# Patient Record
Sex: FEMALE | Race: BLACK OR AFRICAN AMERICAN | Employment: UNEMPLOYED | URBAN - METROPOLITAN AREA
[De-identification: names, ages, dates, MRNs, and addresses within clinical notes are randomized per-mention and may not be internally consistent; named-entity substitution may affect disease eponyms.]

---

## 2017-01-25 ENCOUNTER — CLINICAL SUPPORT (OUTPATIENT)
Dept: SURGERY | Age: 32
End: 2017-01-25

## 2017-01-25 VITALS — BODY MASS INDEX: 43.05 KG/M2 | WEIGHT: 243 LBS | HEIGHT: 63 IN

## 2017-01-25 DIAGNOSIS — E66.01 MORBID OBESITY WITH BODY MASS INDEX OF 40.0-49.9 (HCC): Primary | ICD-10-CM

## 2017-01-25 NOTE — PATIENT INSTRUCTIONS
Goals: 1. Will start walking 10-15 minutes per day 3 days per week. 2. Start taking Multivitamin with iron (Flinstone's Complete twice per day). 3. Follow 3 balanced meals guide; decrease portion sizes and do not skip meals. 4. Try protein shakes with 20-30g protein and less than 7g carb. Can use for snacks and one meal replacement per day. 5. Reduce carbonated and beverages with added sugars, replace with sugar-free or water.

## 2017-01-25 NOTE — PROGRESS NOTES
Pre-Surgical Multi-Disciplinary Program    Name: Riya Delacruz   : 1985    Session# 1  Date: 2017     Height: 5' 3\" (160 cm)    Weight: 110.2 kg (243 lb) lbs. Body mass index is 43.05 kg/(m^2). Pounds Gained: 13    Dietary Instructions    Reviewed intake  Understanding label reading  Understanding low carbohydrates, low sugar, higher protein meals  Understanding proper portions  Dining outside home  Instruction given for personal dietary changes  Comments: Pt given brief pre/post-op diet ed and diet hx reviewed. Physical Activity/Exercise    Discussed Perceived Compliance  Reasonable Goals Set  Motivation  Comments: Pt does not currently have a routine. Set goal to start, please see below. Behavior Modification    Positive attitude  Comments: Pt is working on the following goals: Goals: 1. Will start walking 10-15 minutes per day 3 days per week. 2. Start taking Multivitamin with iron (Flinstone's Complete twice per day). 3. Follow 3 balanced meals guide; decrease portion sizes and do not skip meals. 4. Try protein shakes with 20-30g protein and less than 7g carb. Can use for snacks and one meal replacement per day. 5. Reduce carbonated and beverages with added sugars, replace with sugar-free or water. Candidate for surgery (per RD): Pending    Dietitian: Camacho Arellano RD     Riya Delacruz is a 27 y.o. female who present for a pre-op evaluation.     Visit Vitals    Ht 5' 3\" (1.6 m)    Wt 110.2 kg (243 lb)    BMI 43.05 kg/m2     Past Medical History   Diagnosis Date    Incisional hernia     Intestinal malabsorption     Morbid obesity with body mass index of 40.0-49.9 (HCC)     Nausea & vomiting      with certain PO post sleeve resection    Status post bariatric surgery 2012     sleeve resection / kirstin borja         Patient Education and Materials Provided:  Supplement [facility], B Vitamin Information, MVI Recommendations, Calcium Citrate Information, Bariatric Supplement Companies, Protein Supplement Information, Fluid Requirements, No Caffeine or Carbonation, No Alcohol for One Year Post Op, 3 Balanced Meals a Day, Food Group Guide, Good Choices Dining Out, No Snacks, No Concentrated Sweets, Support System at Home, Exercising, Support Group Information and Addressed Current Habits / Changes to make     Reasons for Surgery:  BMI > 40 with one or more medically significant comorbidities    Summary:  Pt given brief pre/post-op diet ed and diet hx reviewed. Pt set several goals. See below. Candidate for surgery:   Pending    Procedure:  laparoscopic gastric bypass surgery over sleeve    Nutritional Hx: What is the number of meals you eat per day? 2-3  Comment: Skips breakfast sometimes    Do you eat between meals / snack? yes  Typical snack: peanuts, pineapple, apples with avocados    How fast do you eat your meals? fast    How many sodas do you drink per day? Very rarely; 2x per month    How many caffeinated drinks do you have per day? None; sensitive to caffeine, has palpitations    How much water do you drink per day? 6 glasses    How often do you eat fast food? occasionally, 3x/month    How often do you consume alcohol? never    Diet History:  Breakfast  What are you eating and how much? 2 pieces stephens, 1 piece honey wheat toast, bowl of oatmeal   When? After morning shift;11:30am   Where? iii   Snacks  What are you eating and how much? Pineapple, oranges and peanuts   When? ii   Where? iii   Hydration  What are you eating and how much? water   When? ii   Where? ii   Lunch  What are you eating and how much? skipped   When? Where? iii   Snacks  What are you eating and how much? i   When? ii   Where? iii   Hydration  What are you eating and how much? i   When? ii   Where? iii   Dinner  What are you eating and how much?  Pulled pork, broccoli 4oz, baked beans 4oz   When? 6pm   Where? iii   Snacks  What are you eating and how much? i   When? ii   Where? iii Hydration  What are you eating and how much? Mauro water, orange juice   When? ii   Where? iii     Exercise:  Do you currently have an exercise routine? no    Goals: 1. Will start walking 10-15 minutes per day 3 days per week. 2. Start taking Multivitamin with iron (Flinstone's Complete twice per day). 3. Follow 3 balanced meals guide; decrease portion sizes and do not skip meals. 4. Try protein shakes with 20-30g protein and less than 7g carb. Can use for snacks and one meal replacement per day. 5. Reduce carbonated and beverages with added sugars, replace with sugar-free or water.

## 2017-02-27 ENCOUNTER — CLINICAL SUPPORT (OUTPATIENT)
Dept: SURGERY | Age: 32
End: 2017-02-27

## 2017-02-27 DIAGNOSIS — E66.01 MORBID OBESITY WITH BODY MASS INDEX OF 40.0-49.9 (HCC): Primary | ICD-10-CM

## 2017-03-10 VITALS — WEIGHT: 246 LBS | BODY MASS INDEX: 43.59 KG/M2 | HEIGHT: 63 IN

## 2017-03-29 ENCOUNTER — CLINICAL SUPPORT (OUTPATIENT)
Dept: SURGERY | Age: 32
End: 2017-03-29

## 2017-03-29 DIAGNOSIS — E66.01 MORBID OBESITY WITH BODY MASS INDEX OF 40.0-49.9 (HCC): Primary | ICD-10-CM

## 2017-03-29 NOTE — PROGRESS NOTES
Pre-Surgical Multi-Disciplinary Program    Name: Dustin Guajardo   : 1985    Session# 3  Date: 3/29/2017            lbs.  There is no height or weight on file to calculate BMI. Phone consult, pt will submit Ht and wt as soon as possible. Dietary Instructions    Reviewed intake  Understanding label reading  Understanding low carbohydrates, low sugar, higher protein meals  Understanding proper portions  Dining outside home  Instruction given for personal dietary changes  Comments: Diet history reviewed and personal dietary changes discussed. Physical Activity/Exercise    Discussed Perceived Compliance  Reasonable Goals Set  Motivation  Comments: Pt is doing well with going to the gym 25-30 minutes 3 times per week. Behavior Modification    Identify obstacles to trigger change  Achieving/Rewarding goals met  Positive attitude  Comments: Pt is doing really well meeting goals. She started drinking a Premeir protein shake for breakast and is getting just about 64 oz of fluid per day. She also completely cut out pineapple which was a huge change for her. Goals:   1. Keep up the good work. 2. Slow down your eating, it should take at least 20 minutes to eat a meal.   3. Start practicing spacing eating from drinking to prepare for after surgery.      Candidate for surgery (per RD): Pending    Dietitian: Vamsi Shah RD

## 2017-04-25 ENCOUNTER — OFFICE VISIT (OUTPATIENT)
Dept: SURGERY | Age: 32
End: 2017-04-25

## 2017-04-25 DIAGNOSIS — E66.01 MORBID OBESITY WITH BODY MASS INDEX OF 40.0-49.9 (HCC): Primary | ICD-10-CM

## 2017-04-26 VITALS — HEIGHT: 63 IN | BODY MASS INDEX: 43.05 KG/M2 | WEIGHT: 243 LBS

## 2017-04-26 NOTE — PROGRESS NOTES
Medical Weight Loss Multi-Disciplinary Program    Name: Familia Blanca   : 1985    Session# 4  Date: 2017     Height: 5' 3\" (160 cm)    Weight: 110.2 kg (243 lb) lbs. Body mass index is 43.05 kg/(m^2). Pounds Lost: 3    Dietary Instructions    Reviewed intake  Understanding low carbohydrates, low sugar, higher protein meals  Understanding proper portions  Instruction given for personal dietary changes  Discussed perceived compliance  Comments: Diet hx reviewed and personal dietary changes discussed. Pt received a Thoughtful Eating diet ed. Physical Activity/Exercise    Discussed Perceived Compliance  Reasonable Goals Set  Motivation  Comments: Pt is walking for 30-45 minutes, at least twice per week. Goal to increase to walking at least 3 times per week for 30-45 minutes. Behavior Modification    Achieving/Rewarding goals met  Positive attitude  Discussed perceived compliance  Comments: Pt is doing well walking and plans to increase. She has limited snacking. She tried the EAS protein drink and likes this. She plans to work to limit mindless eating this month- with plans to fill out the food-mood diary. She is working on portion size and increasing fluid. She plans to limit snacks to 2 deliberate protein snacks daily. 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      Candidate for surgery (per RD): pending    Dietitian: Mahogany Chacon RD

## 2017-05-02 RX ORDER — ERGOCALCIFEROL 1.25 MG/1
CAPSULE ORAL
Qty: 52 CAP | Refills: 0 | Status: SHIPPED | OUTPATIENT
Start: 2017-05-02 | End: 2017-08-31

## 2017-05-22 ENCOUNTER — OFFICE VISIT (OUTPATIENT)
Dept: SURGERY | Age: 32
End: 2017-05-22

## 2017-05-22 VITALS — WEIGHT: 249 LBS | HEIGHT: 63 IN | BODY MASS INDEX: 44.12 KG/M2

## 2017-05-22 DIAGNOSIS — E66.01 MORBID OBESITY WITH BODY MASS INDEX OF 40.0-49.9 (HCC): Primary | ICD-10-CM

## 2017-05-23 NOTE — PROGRESS NOTES
Medical Weight Loss Multi-Disciplinary Program    Name: Ernestina Click   : 1985    Session# 5  Date: 2017     Height: 5' 3\" (160 cm)    Weight: 112.9 kg (249 lb) lbs. Body mass index is 44.11 kg/(m^2). Pounds Gained: 6    Dietary Instructions    Reviewed intake  Understanding low carbohydrates, low sugar, higher protein meals  Understanding proper portions  Instruction given for personal dietary changes  Discussed perceived compliance  Comments: Diet hx reviewed and personal dietary changes discussed. Pt given eating healthy on a budget for people having bariatric surgery diet ed. Physical Activity/Exercise    Discussed Perceived Compliance  Reasonable Goals Set  Motivation  Comments: Pt was exercising on the treadmill for 20 minutes, 3 times per week. She plans to increase to 30-45 minutes and her steps to 10,000 per day. Behavior Modification    Achieving/Rewarding goals met  Positive attitude  Discussed perceived compliance  Comments: Pt is doing well exercising and plans to increase. 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 She still plans to get Altoids to help with her mindless eating on the bus- has not done yet. She reports she got off track with some food choices and plans to get back on track.      Candidate for surgery (per RD): pending    Dietitian: Ashanti Bradley RD

## 2017-06-26 ENCOUNTER — OFFICE VISIT (OUTPATIENT)
Dept: SURGERY | Age: 32
End: 2017-06-26

## 2017-06-26 DIAGNOSIS — E66.01 MORBID OBESITY WITH BODY MASS INDEX OF 40.0-49.9 (HCC): Primary | ICD-10-CM

## 2017-06-27 VITALS — HEIGHT: 63 IN | WEIGHT: 249 LBS | BODY MASS INDEX: 44.12 KG/M2

## 2017-06-27 NOTE — PROGRESS NOTES
Medical Weight Loss Multi-Disciplinary Program    Name: Shay Bush   : 1985    Session# 6  Date: 2017     Height: 5' 3\" (160 cm)    Weight: 112.9 kg (249 lb) lbs. Body mass index is 44.11 kg/(m^2). Same weight     Dietary Instructions    Reviewed intake  Understanding low carbohydrates, low sugar, higher protein meals  Understanding proper portions  Dining outside home  Instruction given for personal dietary changes  Discussed perceived compliance  Comments: Diet hx reviewed and personal dietary changes discussed. Pt given eating healthy on vacation diet ed. 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 She plans to strive to be active wit her kids on her days off. Behavior Modification    Achieving/Rewarding goals met  Positive attitude  Discussed perceived compliance  Comments: Pt is being active and plans to increase. She is taking recommended vitamins. She has been packing healthy snacks for work. She is working to purchase different types of Pure Protein in preparation for surgery. Also to continue to make better food choices.      Candidate for surgery (per RD): YES    Dietitian: Andreina Galeana RD

## 2017-08-01 DIAGNOSIS — E66.01 MORBID OBESITY WITH BODY MASS INDEX OF 40.0-49.9 (HCC): ICD-10-CM

## 2017-08-01 DIAGNOSIS — R13.10 PROBLEMS WITH SWALLOWING AND MASTICATION: Primary | ICD-10-CM

## 2017-08-01 DIAGNOSIS — R11.2 NAUSEA AND VOMITING, INTRACTABILITY OF VOMITING NOT SPECIFIED, UNSPECIFIED VOMITING TYPE: ICD-10-CM

## 2017-08-01 DIAGNOSIS — Z01.812 BLOOD TESTS PRIOR TO TREATMENT OR PROCEDURE: ICD-10-CM

## 2017-09-15 RX ORDER — PHENOL/SODIUM PHENOLATE
20 AEROSOL, SPRAY (ML) MUCOUS MEMBRANE DAILY
Qty: 30 TAB | Refills: 1 | Status: SHIPPED | OUTPATIENT
Start: 2017-09-15 | End: 2017-10-25

## 2017-09-15 RX ORDER — OXYCODONE AND ACETAMINOPHEN 5; 325 MG/1; MG/1
1 TABLET ORAL
Qty: 30 TAB | Refills: 0 | Status: SHIPPED | OUTPATIENT
Start: 2017-09-15 | End: 2017-10-10

## 2017-09-18 ENCOUNTER — OFFICE VISIT (OUTPATIENT)
Dept: SURGERY | Age: 32
End: 2017-09-18

## 2017-09-18 ENCOUNTER — HOSPITAL ENCOUNTER (OUTPATIENT)
Dept: PREADMISSION TESTING | Age: 32
Discharge: HOME | End: 2017-09-18
Payer: COMMERCIAL

## 2017-09-18 VITALS
DIASTOLIC BLOOD PRESSURE: 91 MMHG | HEART RATE: 84 BPM | RESPIRATION RATE: 16 BRPM | BODY MASS INDEX: 45.18 KG/M2 | OXYGEN SATURATION: 100 % | SYSTOLIC BLOOD PRESSURE: 130 MMHG | HEIGHT: 63 IN | WEIGHT: 255 LBS

## 2017-09-18 DIAGNOSIS — E66.01 MORBID OBESITY WITH BODY MASS INDEX OF 40.0-49.9 (HCC): Primary | ICD-10-CM

## 2017-09-18 DIAGNOSIS — K90.9 INTESTINAL MALABSORPTION, UNSPECIFIED TYPE: ICD-10-CM

## 2017-09-18 DIAGNOSIS — K43.2 INCISIONAL HERNIA, WITHOUT OBSTRUCTION OR GANGRENE: ICD-10-CM

## 2017-09-18 DIAGNOSIS — Z98.84 STATUS POST BARIATRIC SURGERY: ICD-10-CM

## 2017-09-18 LAB
ABO + RH BLD: NORMAL
ALBUMIN SERPL-MCNC: 3.2 G/DL (ref 3.4–5)
ALBUMIN/GLOB SERPL: 0.8 {RATIO} (ref 0.8–1.7)
ALP SERPL-CCNC: 121 U/L (ref 45–117)
ALT SERPL-CCNC: 21 U/L (ref 13–56)
ANION GAP SERPL CALC-SCNC: 7 MMOL/L (ref 3–18)
AST SERPL-CCNC: 19 U/L (ref 15–37)
ATRIAL RATE: 70 BPM
BASOPHILS # BLD: 0 K/UL (ref 0–0.06)
BASOPHILS NFR BLD: 0 % (ref 0–2)
BILIRUB SERPL-MCNC: 0.3 MG/DL (ref 0.2–1)
BLOOD GROUP ANTIBODIES SERPL: NORMAL
BUN SERPL-MCNC: 11 MG/DL (ref 7–18)
BUN/CREAT SERPL: 22 (ref 12–20)
CALCIUM SERPL-MCNC: 9.1 MG/DL (ref 8.5–10.1)
CALCULATED P AXIS, ECG09: 61 DEGREES
CALCULATED R AXIS, ECG10: 70 DEGREES
CALCULATED T AXIS, ECG11: 59 DEGREES
CHLORIDE SERPL-SCNC: 103 MMOL/L (ref 100–108)
CO2 SERPL-SCNC: 29 MMOL/L (ref 21–32)
CREAT SERPL-MCNC: 0.5 MG/DL (ref 0.6–1.3)
DIAGNOSIS, 93000: NORMAL
DIFFERENTIAL METHOD BLD: ABNORMAL
EOSINOPHIL # BLD: 0.1 K/UL (ref 0–0.4)
EOSINOPHIL NFR BLD: 1 % (ref 0–5)
ERYTHROCYTE [DISTWIDTH] IN BLOOD BY AUTOMATED COUNT: 14.2 % (ref 11.6–14.5)
GLOBULIN SER CALC-MCNC: 4.1 G/DL (ref 2–4)
GLUCOSE SERPL-MCNC: 87 MG/DL (ref 74–99)
HCT VFR BLD AUTO: 36.6 % (ref 35–45)
HGB BLD-MCNC: 11.9 G/DL (ref 12–16)
LYMPHOCYTES # BLD: 2.6 K/UL (ref 0.9–3.6)
LYMPHOCYTES NFR BLD: 38 % (ref 21–52)
MCH RBC QN AUTO: 28.7 PG (ref 24–34)
MCHC RBC AUTO-ENTMCNC: 32.5 G/DL (ref 31–37)
MCV RBC AUTO: 88.4 FL (ref 74–97)
MONOCYTES # BLD: 0.6 K/UL (ref 0.05–1.2)
MONOCYTES NFR BLD: 8 % (ref 3–10)
NEUTS SEG # BLD: 3.5 K/UL (ref 1.8–8)
NEUTS SEG NFR BLD: 53 % (ref 40–73)
P-R INTERVAL, ECG05: 102 MS
PLATELET # BLD AUTO: 302 K/UL (ref 135–420)
PMV BLD AUTO: 10.9 FL (ref 9.2–11.8)
POTASSIUM SERPL-SCNC: 4.4 MMOL/L (ref 3.5–5.5)
PROT SERPL-MCNC: 7.3 G/DL (ref 6.4–8.2)
Q-T INTERVAL, ECG07: 364 MS
QRS DURATION, ECG06: 82 MS
QTC CALCULATION (BEZET), ECG08: 393 MS
RBC # BLD AUTO: 4.14 M/UL (ref 4.2–5.3)
SODIUM SERPL-SCNC: 139 MMOL/L (ref 136–145)
SPECIMEN EXP DATE BLD: NORMAL
VENTRICULAR RATE, ECG03: 70 BPM
WBC # BLD AUTO: 6.8 K/UL (ref 4.6–13.2)

## 2017-09-18 PROCEDURE — 86900 BLOOD TYPING SEROLOGIC ABO: CPT | Performed by: SPECIALIST

## 2017-09-18 PROCEDURE — 93005 ELECTROCARDIOGRAM TRACING: CPT

## 2017-09-18 PROCEDURE — 85025 COMPLETE CBC W/AUTO DIFF WBC: CPT | Performed by: SPECIALIST

## 2017-09-18 PROCEDURE — 80053 COMPREHEN METABOLIC PANEL: CPT | Performed by: SPECIALIST

## 2017-09-18 PROCEDURE — 36415 COLL VENOUS BLD VENIPUNCTURE: CPT | Performed by: SPECIALIST

## 2017-09-18 NOTE — PATIENT INSTRUCTIONS
Body Mass Index: Care Instructions  Your Care Instructions    Body mass index (BMI) can help you see if your weight is raising your risk for health problems. It uses a formula to compare how much you weigh with how tall you are. · A BMI lower than 18.5 is considered underweight. · A BMI between 18.5 and 24.9 is considered healthy. · A BMI between 25 and 29.9 is considered overweight. A BMI of 30 or higher is considered obese. If your BMI is in the normal range, it means that you have a lower risk for weight-related health problems. If your BMI is in the overweight or obese range, you may be at increased risk for weight-related health problems, such as high blood pressure, heart disease, stroke, arthritis or joint pain, and diabetes. If your BMI is in the underweight range, you may be at increased risk for health problems such as fatigue, lower protection (immunity) against illness, muscle loss, bone loss, hair loss, and hormone problems. BMI is just one measure of your risk for weight-related health problems. You may be at higher risk for health problems if you are not active, you eat an unhealthy diet, or you drink too much alcohol or use tobacco products. Follow-up care is a key part of your treatment and safety. Be sure to make and go to all appointments, and call your doctor if you are having problems. It's also a good idea to know your test results and keep a list of the medicines you take. How can you care for yourself at home? · Practice healthy eating habits. This includes eating plenty of fruits, vegetables, whole grains, lean protein, and low-fat dairy. · If your doctor recommends it, get more exercise. Walking is a good choice. Bit by bit, increase the amount you walk every day. Try for at least 30 minutes on most days of the week. · Do not smoke. Smoking can increase your risk for health problems. If you need help quitting, talk to your doctor about stop-smoking programs and medicines. These can increase your chances of quitting for good. · Limit alcohol to 2 drinks a day for men and 1 drink a day for women. Too much alcohol can cause health problems. If you have a BMI higher than 25  · Your doctor may do other tests to check your risk for weight-related health problems. This may include measuring the distance around your waist. A waist measurement of more than 40 inches in men or 35 inches in women can increase the risk of weight-related health problems. · Talk with your doctor about steps you can take to stay healthy or improve your health. You may need to make lifestyle changes to lose weight and stay healthy, such as changing your diet and getting regular exercise. If you have a BMI lower than 18.5  · Your doctor may do other tests to check your risk for health problems. · Talk with your doctor about steps you can take to stay healthy or improve your health. You may need to make lifestyle changes to gain or maintain weight and stay healthy, such as getting more healthy foods in your diet and doing exercises to build muscle. Where can you learn more? Go to http://wilbert-loraine.info/. Enter S176 in the search box to learn more about \"Body Mass Index: Care Instructions. \"  Current as of: January 23, 2017  Content Version: 11.3  © 2736-7582 [employer]. Care instructions adapted under license by [employer] (which disclaims liability or warranty for this information). If you have questions about a medical condition or this instruction, always ask your healthcare professional. [place] [address] any warranty or liability for your use of this information.

## 2017-09-18 NOTE — PROGRESS NOTES
Appears to have a good understanding of the diet progression, food choices, and dietary/exercise habits for successful weight loss and nourishment after surgery. The class material included: post-op diet progression, including liquid, pureed, and low fat, low sugar food recommendations; proper food group choices, and encouraging dietary and exercise habits that lead to weight loss success.      Freya Byrne RD

## 2017-09-18 NOTE — PROGRESS NOTES
Sleeve Resection to Gastric Bypass - History and Physical    Subjective: The patient is a 27 y.o. obese female with a Body mass index is 45.17 kg/(m^2). .   she presents now to review their work up to date to see if they are a candidate for surgery and whether or not to proceed with the previously requested procedure. The patient had a sleeve resection via [address] 5 years ago. She is seeking a conversion to a gastric bypass due to retained fundus and poor weight loss. Bariatric comorbidities continue to include:   Patient Active Problem List   Diagnosis Code    Morbid obesity with body mass index of 40.0-49.9 ([facility]) E66.01    Status post bariatric surgery Z98.84    Intestinal malabsorption K90.9    Nausea & vomiting R11.2    Incisional hernia K43.2       They have been generally well prior to this visit and have had no recent significant illnesses. The patient has had no gastrointestinal issues that would preclude them from proceeding with the surgery they have chosen. Dada Musa has recently tried a preoperative weight loss program  in addition to seeing a bariatric nutritionist preoperatively. We have discussed on at least one other occasion about the various types of surgical weight loss procedures and they have considered these options after our initial consultation. We have once again discussed these procedures in detail and they have now decided on a surgical procedure. They present today to discuss this and confirm that their evaluation pre operatively is acceptable to continue with surgery. The patient desires laparoscopic gastric bypass surgery for surgical weight loss. The patients goal weight is 152lb. (this represents a BMI of 27)    These goals are consistent with expected outcomes of their desired operation. her Medical goals are resolution of these health issues.     Patient Active Problem List    Diagnosis Date Noted    Morbid obesity with body mass index of 40.0-49.9 [facility])     Status post bariatric surgery     Intestinal malabsorption     Nausea & vomiting     Incisional hernia       Past Surgical History:   Procedure Laterality Date    HX  SECTION      X 3    HX GI  2012    sleeve resection / a salzberg    HX ORTHOPAEDIC      scoliosis correction surgery X 2    HX TONSIL AND ADENOIDECTOMY      HX TUBAL LIGATION        Social History   Substance Use Topics    Smoking status: Never Smoker    Smokeless tobacco: Never Used    Alcohol use No      History reviewed. No pertinent family history. Current Outpatient Prescriptions   Medication Sig Dispense Refill    oxyCODONE-acetaminophen (PERCOCET) 5-325 mg per tablet Take 1 Tab by mouth every four (4) hours as needed for Pain. Max Daily Amount: 6 Tabs. 30 Tab 0    Omeprazole delayed release (PRILOSEC D/R) 20 mg tablet Take 1 Tab by mouth daily. OTC 30 Tab 1    Cholecalciferol, Vitamin D3, 50,000 unit cap Take  by mouth two (2) times a week.  Indications: mon and tu       No Known Allergies       Review of Systems:        General - No history or complaints of unexpected fever, chills, or weight loss  Head/Neck - No history or complaints of headache, diplopia, dysphagia, hearing loss  Cardiac - No history or complaints of chest pain, palpitations, murmur, or shortness of breath  Pulmonary - No history or complaints of shortness of breath, productive cough, hemoptysis  Gastrointestinal - (+) reflux with certain PO, No history or complaints of abdominal pain, obstipation/constipation, blood per rectum  Genitourinary - No history or complaints of hematuria/dysuria, stress urinary incontinence symptoms, or renal lithiasis  Musculoskeletal - No history or complaints of joint pain or muscular weakness  Hematologic - No history or complaints of bleeding disorders, blood transfusions, sickle cell anemia  Neurologic - No history or complaints of  migraine headaches, seizure activity, syncopal episodes, TIA or stroke  Integumentary - No history or complaints of rashes, abnormal nevi, skin cancer  Gynecological - No history of heavy menses/abnormal menses    Objective:     Visit Vitals    BP (!) 130/91 (BP 1 Location: Left arm, BP Patient Position: Sitting)    Pulse 84    Resp 16    Ht 5' 3\" (1.6 m)    Wt 115.7 kg (255 lb)    LMP 08/16/2017    SpO2 100%    BMI 45.17 kg/m2     Physical Examination: General appearance - alert, well appearing, and in no distress and oriented to person, place, and time  Mental status - alert, oriented to person, place, and time, normal mood, behavior, speech, dress, motor activity, and thought processes  Eyes - pupils equal and reactive, extraocular eye movements intact, sclera anicteric, left eye normal, right eye normal  Ears - bilateral TM's and external ear canals normal, right ear normal, left ear normal  Nose - normal and patent, no erythema, discharge or polyps and normal nontender sinuses  Mouth - mucous membranes moist, pharynx normal without lesions  Neck - supple, no significant adenopathy  Lymphatics - no palpable lymphadenopathy, no hepatosplenomegaly  Chest - clear to auscultation, no wheezes, rales or rhonchi, symmetric air entry  Heart - normal rate, regular rhythm, normal S1, S2, no murmurs, rubs, clicks or gallops  Abdomen - soft, nontender, nondistended, no masses or organomegaly  Back exam - full range of motion, no tenderness, palpable spasm or pain on motion  Neurological - alert, oriented, normal speech, no focal findings or movement disorder noted  Musculoskeletal - no joint tenderness, deformity or swelling  Extremities - peripheral pulses normal, no pedal edema, no clubbing or cyanosis  Skin - normal coloration and turgor, no rashes, no suspicious skin lesions noted    Labs / Preoperative Evaluations:     Recent Results (from the past 1008 hour(s))   EKG, 12 LEAD, INITIAL    Collection Time: 09/18/17 12:37 PM   Result Value Ref Range    Ventricular Rate 70 BPM Atrial Rate 70 BPM    P-R Interval 102 ms    QRS Duration 82 ms    Q-T Interval 364 ms    QTC Calculation (Bezet) 393 ms    Calculated P Axis 61 degrees    Calculated R Axis 70 degrees    Calculated T Axis 59 degrees    Diagnosis       Sinus rhythm with short AK with premature supraventricular complexes and with   occasional premature ventricular complexes  Otherwise normal ECG  No previous ECGs available     CBC WITH AUTOMATED DIFF    Collection Time: 09/18/17 12:49 PM   Result Value Ref Range    WBC 6.8 4.6 - 13.2 K/uL    RBC 4.14 (L) 4.20 - 5.30 M/uL    HGB 11.9 (L) 12.0 - 16.0 g/dL    HCT 36.6 35.0 - 45.0 %    MCV 88.4 74.0 - 97.0 FL    MCH 28.7 24.0 - 34.0 PG    MCHC 32.5 31.0 - 37.0 g/dL    RDW 14.2 11.6 - 14.5 %    PLATELET 853 081 - 485 K/uL    MPV 10.9 9.2 - 11.8 FL    NEUTROPHILS 53 40 - 73 %    LYMPHOCYTES 38 21 - 52 %    MONOCYTES 8 3 - 10 %    EOSINOPHILS 1 0 - 5 %    BASOPHILS 0 0 - 2 %    ABS. NEUTROPHILS 3.5 1.8 - 8.0 K/UL    ABS. LYMPHOCYTES 2.6 0.9 - 3.6 K/UL    ABS. MONOCYTES 0.6 0.05 - 1.2 K/UL    ABS. EOSINOPHILS 0.1 0.0 - 0.4 K/UL    ABS. BASOPHILS 0.0 0.0 - 0.06 K/UL    DF AUTOMATED     METABOLIC PANEL, COMPREHENSIVE    Collection Time: 09/18/17 12:49 PM   Result Value Ref Range    Sodium 139 136 - 145 mmol/L    Potassium 4.4 3.5 - 5.5 mmol/L    Chloride 103 100 - 108 mmol/L    CO2 29 21 - 32 mmol/L    Anion gap 7 3.0 - 18 mmol/L    Glucose 87 74 - 99 mg/dL    BUN 11 7.0 - 18 MG/DL    Creatinine 0.50 (L) 0.6 - 1.3 MG/DL    BUN/Creatinine ratio 22 (H) 12 - 20      GFR est AA >60 >60 ml/min/1.73m2    GFR est non-AA >60 >60 ml/min/1.73m2    Calcium 9.1 8.5 - 10.1 MG/DL    Bilirubin, total 0.3 0.2 - 1.0 MG/DL    ALT (SGPT) 21 13 - 56 U/L    AST (SGOT) 19 15 - 37 U/L    Alk.  phosphatase 121 (H) 45 - 117 U/L    Protein, total 7.3 6.4 - 8.2 g/dL    Albumin 3.2 (L) 3.4 - 5.0 g/dL    Globulin 4.1 (H) 2.0 - 4.0 g/dL    A-G Ratio 0.8 0.8 - 1.7         Assessment:     Morbid obesity with associated comorbidity    Plan:     laparoscopic gastric bypass surgery    This is a 27 y.o. female with a BMI of Body mass index is 45.17 kg/(m^2). and the weight-related co-morbidties as noted above. Jessie Santillan meets the NIH criteria for bariatric surgery based upon the BMI of Body mass index is 45.17 kg/(m^2). and multiple weight-related co-morbidties. Jessie Santillan has elected laparoscopic gastric bypass as her intervention of choice for treatment of morbid obestiy through surgical means secondary to its uniform results,  profound baseline suppression of hunger and pace at which weight is lost.    In the office today, following Evelina's history and physical examination, a 40 minute discussion regarding the anatomic alterations for the laparoscopic gastric bypass  was undertaken. The dietary expectations and the patient  dependent factors for success were thoroughly discussed, to include the need for interval follow-up and long-term dietary changes associated with success. The possible short and long term  complications of the gastric bypass were also discussed, to include but not limited to;death, DVT/PE, staple line leak, bleeding, stricture formation, infection,internal hernia  and pouch dilation. Specific weight related outcomes for success were also discussed with an emphasis on careful and close follow-up with the first year and Dietary behavior modification over the first years as baseline cyclical hunger returns  The patient expressed an understanding of the above factors, and her questions were answered in their entirety. In addition, the patient attended a 1.5 hour power point seminar regarding obesity, surgical weight loss including, adjustable gastric band, gastric bypass, and sleeve gastrectomy. This discussion contrasted the different surgical techniques, mechanisms of actions and expected outcomes, and surgical and medical risks associated with each procedure.   During this seminar, there was a long question and answer session where each questions was answered until there were no additional questions. Today, the patient had all of her questions answered and the decision was made today that the patient's preoperative evaluation is acceptable for them  to proceed with bariatric surgery  choosing adjustable gastric bypass as her surgical option. The patient understands the plan of action    UGI from 6/2016 -     DESCRIPTION OF PROCEDURE: The patient was brought to the fluoroscopy unit,  was given thin barium. On swallowing of barium, she was noted to have  normal peristalsis of her esophagus. She had prompt filling of the distal  esophagus with tapering into and through the gastroesophageal junction. Contrast then filled with what appeared to be an extremely large gastric  fundus, there appeared to be at least half of her gastric fundus still  remaining. We continued to have her drink several swallows more of barium. The gastric body then filled out and the prepyloric region filled out, all  which appeared to be consistent with about only a 50% reduction in her  total gastric volume.     At this juncture, I feel like her reflux, regurgitation, poor weight loss  are all related to a sleeve that is too large retaining, particularly the  upper fundic region. I think the course of action in her situation would be  a conversion to a gastric bypass procedure, as this would improve her  weight loss and take away all of her reflux symptoms. We talked about this  the other day in the office. She is very comfortable with this process. We  will go ahead and submit this to her insurance company for approval.      Since the patient's original consult 15 months ago they have been seen by their PCP for routine appts. There has been no change to their overall medical or surgical history and they have been on no steroids in any form.       Secondary Diagnoses:     DVT / Pulmonary Embolus Risk - The patient is at a higher risk for post operative DVT / pulmonary embolus secondary to their morbid obese status, relative sedentary lifestyle, and impending general anesthetic. We will plan to use anticoagulation therapy pre and post operative as well as  pneumatic compression devices and encourage ambulation once on the hospital nursing floor. The need for possible at home anticoagulation therapy has also been discussed and any decision on this matter will be made during post operative evaluations. The patient understands that their efforts at ambulation are of vital importance to reduce the risk of this complication thus placing significant burden on them as to the prevention of such issues.  Signs and symptoms of DVT / PE have been discussed with the patient and they have been instructed to call the office if any these occur in the \"at home\" post op phase    Signed By: Joseph Cardenas MD     September 18, 2017

## 2017-09-26 ENCOUNTER — HOSPITAL ENCOUNTER (INPATIENT)
Age: 32
LOS: 1 days | Discharge: HOME | DRG: 327 | End: 2017-09-27
Attending: SPECIALIST | Admitting: SPECIALIST
Payer: COMMERCIAL

## 2017-09-26 ENCOUNTER — ANESTHESIA (OUTPATIENT)
Dept: SURGERY | Age: 32
DRG: 327 | End: 2017-09-26
Payer: COMMERCIAL

## 2017-09-26 ENCOUNTER — ANESTHESIA EVENT (OUTPATIENT)
Dept: SURGERY | Age: 32
DRG: 327 | End: 2017-09-26
Payer: COMMERCIAL

## 2017-09-26 PROBLEM — E66.01 MORBID OBESITY WITH BMI OF 40.0-44.9, ADULT (HCC): Status: ACTIVE | Noted: 2017-09-26

## 2017-09-26 LAB
ABO + RH BLD: NORMAL
BLOOD GROUP ANTIBODIES SERPL: NORMAL
HCG SERPL QL: NEGATIVE
SPECIMEN EXP DATE BLD: NORMAL

## 2017-09-26 PROCEDURE — 77030008683 HC TU ET CUF COVD -A: Performed by: SPECIALIST

## 2017-09-26 PROCEDURE — 76210000016 HC OR PH I REC 1 TO 1.5 HR: Performed by: SPECIALIST

## 2017-09-26 PROCEDURE — 77030022585 HC SEAL FBRN EVICEL J&J -F: Performed by: SPECIALIST

## 2017-09-26 PROCEDURE — 77030009403 HC ELECTRD ENDO MEGA -B: Performed by: SPECIALIST

## 2017-09-26 PROCEDURE — 84703 CHORIONIC GONADOTROPIN ASSAY: CPT | Performed by: SPECIALIST

## 2017-09-26 PROCEDURE — 77030027876 HC STPLR ENDOSC FLX PWR J&J -G1: Performed by: SPECIALIST

## 2017-09-26 PROCEDURE — 77030008603 HC TRCR ENDOSC EPATH J&J -C: Performed by: SPECIALIST

## 2017-09-26 PROCEDURE — 77030011810 HC STPLR ENDOSC J&J -G: Performed by: SPECIALIST

## 2017-09-26 PROCEDURE — 74011000250 HC RX REV CODE- 250

## 2017-09-26 PROCEDURE — 74011000258 HC RX REV CODE- 258: Performed by: ANESTHESIOLOGY

## 2017-09-26 PROCEDURE — 74011250636 HC RX REV CODE- 250/636

## 2017-09-26 PROCEDURE — 77030036598 HC CARTDRG STPL RELD ECHELON FLX J&J -D: Performed by: SPECIALIST

## 2017-09-26 PROCEDURE — 77030002935 HC SUT MCRYL J&J -C: Performed by: SPECIALIST

## 2017-09-26 PROCEDURE — 74011250636 HC RX REV CODE- 250/636: Performed by: SPECIALIST

## 2017-09-26 PROCEDURE — 77030020255 HC SOL INJ LR 1000ML BG: Performed by: SPECIALIST

## 2017-09-26 PROCEDURE — 36415 COLL VENOUS BLD VENIPUNCTURE: CPT | Performed by: SPECIALIST

## 2017-09-26 PROCEDURE — 88307 TISSUE EXAM BY PATHOLOGIST: CPT | Performed by: SPECIALIST

## 2017-09-26 PROCEDURE — 77030002916 HC SUT ETHLN J&J -A: Performed by: SPECIALIST

## 2017-09-26 PROCEDURE — 88313 SPECIAL STAINS GROUP 2: CPT | Performed by: SPECIALIST

## 2017-09-26 PROCEDURE — 77030036732 HC RELD STPLR VASC J&J -F: Performed by: SPECIALIST

## 2017-09-26 PROCEDURE — 77030008477 HC STYL SATN SLP COVD -A: Performed by: SPECIALIST

## 2017-09-26 PROCEDURE — 77030012407 HC DRN WND BARD -B: Performed by: SPECIALIST

## 2017-09-26 PROCEDURE — 0DB64ZZ EXCISION OF STOMACH, PERCUTANEOUS ENDOSCOPIC APPROACH: ICD-10-PCS | Performed by: SPECIALIST

## 2017-09-26 PROCEDURE — 77030018004 HC RELD STPLR ENDOSC1 J&J -C: Performed by: SPECIALIST

## 2017-09-26 PROCEDURE — 88342 IMHCHEM/IMCYTCHM 1ST ANTB: CPT | Performed by: SPECIALIST

## 2017-09-26 PROCEDURE — 74011250637 HC RX REV CODE- 250/637: Performed by: SPECIALIST

## 2017-09-26 PROCEDURE — 77030002986 HC SUT PROL J&J -A: Performed by: SPECIALIST

## 2017-09-26 PROCEDURE — 77030032490 HC SLV COMPR SCD KNE COVD -B: Performed by: SPECIALIST

## 2017-09-26 PROCEDURE — 77030002912 HC SUT ETHBND J&J -A: Performed by: SPECIALIST

## 2017-09-26 PROCEDURE — 0FB24ZX EXCISION OF LEFT LOBE LIVER, PERCUTANEOUS ENDOSCOPIC APPROACH, DIAGNOSTIC: ICD-10-PCS | Performed by: SPECIALIST

## 2017-09-26 PROCEDURE — 77030009426 HC FCPS BIOP ENDOSC BSC -B: Performed by: SPECIALIST

## 2017-09-26 PROCEDURE — 76060000037 HC ANESTHESIA 3 TO 3.5 HR: Performed by: SPECIALIST

## 2017-09-26 PROCEDURE — 77030005264 HC CATH JEJU BKR BARD -D: Performed by: SPECIALIST

## 2017-09-26 PROCEDURE — 77030002896 HC SUT CLP ABSRB J&J -B: Performed by: SPECIALIST

## 2017-09-26 PROCEDURE — 77030018836 HC SOL IRR NACL ICUM -A: Performed by: SPECIALIST

## 2017-09-26 PROCEDURE — 77030034154 HC SHR COAG HARM ACE J&J -F: Performed by: SPECIALIST

## 2017-09-26 PROCEDURE — 77030020782 HC GWN BAIR PAWS FLX 3M -B: Performed by: SPECIALIST

## 2017-09-26 PROCEDURE — 77030002966 HC SUT PDS J&J -A: Performed by: SPECIALIST

## 2017-09-26 PROCEDURE — 77030013567 HC DRN WND RESERV BARD -A: Performed by: SPECIALIST

## 2017-09-26 PROCEDURE — 77030003580 HC NDL INSUF VERES J&J -B: Performed by: SPECIALIST

## 2017-09-26 PROCEDURE — 77030034850: Performed by: SPECIALIST

## 2017-09-26 PROCEDURE — 65270000029 HC RM PRIVATE

## 2017-09-26 PROCEDURE — 0D164ZA BYPASS STOMACH TO JEJUNUM, PERCUTANEOUS ENDOSCOPIC APPROACH: ICD-10-PCS | Performed by: SPECIALIST

## 2017-09-26 PROCEDURE — 77030009851 HC PCH RTVR ENDOSC AMR -B: Performed by: SPECIALIST

## 2017-09-26 PROCEDURE — 88305 TISSUE EXAM BY PATHOLOGIST: CPT | Performed by: SPECIALIST

## 2017-09-26 PROCEDURE — 74011250636 HC RX REV CODE- 250/636: Performed by: ANESTHESIOLOGY

## 2017-09-26 PROCEDURE — 0DN74ZZ RELEASE STOMACH, PYLORUS, PERCUTANEOUS ENDOSCOPIC APPROACH: ICD-10-PCS | Performed by: SPECIALIST

## 2017-09-26 PROCEDURE — 0DB78ZX EXCISION OF STOMACH, PYLORUS, VIA NATURAL OR ARTIFICIAL OPENING ENDOSCOPIC, DIAGNOSTIC: ICD-10-PCS | Performed by: SPECIALIST

## 2017-09-26 PROCEDURE — 77030010515 HC APPL ENDOCLP LIG J&J -B: Performed by: SPECIALIST

## 2017-09-26 PROCEDURE — 74011000250 HC RX REV CODE- 250: Performed by: SPECIALIST

## 2017-09-26 PROCEDURE — 86900 BLOOD TYPING SEROLOGIC ABO: CPT | Performed by: SPECIALIST

## 2017-09-26 PROCEDURE — 76010000133 HC OR TIME 3 TO 3.5 HR: Performed by: SPECIALIST

## 2017-09-26 PROCEDURE — 77030010030: Performed by: SPECIALIST

## 2017-09-26 RX ORDER — KETOROLAC TROMETHAMINE 30 MG/ML
30 INJECTION, SOLUTION INTRAMUSCULAR; INTRAVENOUS EVERY 6 HOURS
Status: DISCONTINUED | OUTPATIENT
Start: 2017-09-26 | End: 2017-09-27 | Stop reason: HOSPADM

## 2017-09-26 RX ORDER — ONDANSETRON 2 MG/ML
4 INJECTION INTRAMUSCULAR; INTRAVENOUS
Status: DISCONTINUED | OUTPATIENT
Start: 2017-09-26 | End: 2017-09-27 | Stop reason: HOSPADM

## 2017-09-26 RX ORDER — DIPHENHYDRAMINE HYDROCHLORIDE 50 MG/ML
25 INJECTION, SOLUTION INTRAMUSCULAR; INTRAVENOUS
Status: DISCONTINUED | OUTPATIENT
Start: 2017-09-26 | End: 2017-09-27 | Stop reason: HOSPADM

## 2017-09-26 RX ORDER — NYSTATIN 100000 [USP'U]/ML
500000 SUSPENSION ORAL
Status: COMPLETED | OUTPATIENT
Start: 2017-09-26 | End: 2017-09-26

## 2017-09-26 RX ORDER — ENOXAPARIN SODIUM 100 MG/ML
40 INJECTION SUBCUTANEOUS EVERY 12 HOURS
Status: DISCONTINUED | OUTPATIENT
Start: 2017-09-26 | End: 2017-09-27 | Stop reason: HOSPADM

## 2017-09-26 RX ORDER — GLYCOPYRROLATE 0.2 MG/ML
INJECTION INTRAMUSCULAR; INTRAVENOUS AS NEEDED
Status: DISCONTINUED | OUTPATIENT
Start: 2017-09-26 | End: 2017-09-26 | Stop reason: HOSPADM

## 2017-09-26 RX ORDER — ONDANSETRON 2 MG/ML
4 INJECTION INTRAMUSCULAR; INTRAVENOUS ONCE
Status: COMPLETED | OUTPATIENT
Start: 2017-09-26 | End: 2017-09-26

## 2017-09-26 RX ORDER — SODIUM CHLORIDE 9 MG/ML
125 INJECTION, SOLUTION INTRAVENOUS CONTINUOUS
Status: DISCONTINUED | OUTPATIENT
Start: 2017-09-26 | End: 2017-09-26 | Stop reason: HOSPADM

## 2017-09-26 RX ORDER — HYDROMORPHONE HYDROCHLORIDE 1 MG/ML
0.2 INJECTION, SOLUTION INTRAMUSCULAR; INTRAVENOUS; SUBCUTANEOUS
Status: DISCONTINUED | OUTPATIENT
Start: 2017-09-26 | End: 2017-09-26 | Stop reason: HOSPADM

## 2017-09-26 RX ORDER — PROPOFOL 10 MG/ML
INJECTION, EMULSION INTRAVENOUS AS NEEDED
Status: DISCONTINUED | OUTPATIENT
Start: 2017-09-26 | End: 2017-09-26 | Stop reason: HOSPADM

## 2017-09-26 RX ORDER — SODIUM CHLORIDE, SODIUM LACTATE, POTASSIUM CHLORIDE, CALCIUM CHLORIDE 600; 310; 30; 20 MG/100ML; MG/100ML; MG/100ML; MG/100ML
150 INJECTION, SOLUTION INTRAVENOUS CONTINUOUS
Status: DISCONTINUED | OUTPATIENT
Start: 2017-09-26 | End: 2017-09-27 | Stop reason: HOSPADM

## 2017-09-26 RX ORDER — ENOXAPARIN SODIUM 100 MG/ML
40 INJECTION SUBCUTANEOUS ONCE
Status: COMPLETED | OUTPATIENT
Start: 2017-09-26 | End: 2017-09-26

## 2017-09-26 RX ORDER — ROCURONIUM BROMIDE 10 MG/ML
INJECTION, SOLUTION INTRAVENOUS AS NEEDED
Status: DISCONTINUED | OUTPATIENT
Start: 2017-09-26 | End: 2017-09-26 | Stop reason: HOSPADM

## 2017-09-26 RX ORDER — SUFENTANIL CITRATE 50 UG/ML
INJECTION EPIDURAL; INTRAVENOUS AS NEEDED
Status: DISCONTINUED | OUTPATIENT
Start: 2017-09-26 | End: 2017-09-26 | Stop reason: HOSPADM

## 2017-09-26 RX ORDER — SODIUM CHLORIDE 0.9 % (FLUSH) 0.9 %
5-10 SYRINGE (ML) INJECTION AS NEEDED
Status: DISCONTINUED | OUTPATIENT
Start: 2017-09-26 | End: 2017-09-26 | Stop reason: HOSPADM

## 2017-09-26 RX ORDER — HYDROMORPHONE HYDROCHLORIDE 2 MG/ML
1 INJECTION, SOLUTION INTRAMUSCULAR; INTRAVENOUS; SUBCUTANEOUS
Status: DISCONTINUED | OUTPATIENT
Start: 2017-09-26 | End: 2017-09-27

## 2017-09-26 RX ORDER — FENTANYL CITRATE 50 UG/ML
25 INJECTION, SOLUTION INTRAMUSCULAR; INTRAVENOUS AS NEEDED
Status: DISCONTINUED | OUTPATIENT
Start: 2017-09-26 | End: 2017-09-26 | Stop reason: HOSPADM

## 2017-09-26 RX ORDER — LIDOCAINE HYDROCHLORIDE 20 MG/ML
INJECTION, SOLUTION EPIDURAL; INFILTRATION; INTRACAUDAL; PERINEURAL AS NEEDED
Status: DISCONTINUED | OUTPATIENT
Start: 2017-09-26 | End: 2017-09-26 | Stop reason: HOSPADM

## 2017-09-26 RX ORDER — NALOXONE HYDROCHLORIDE 0.4 MG/ML
0.1 INJECTION, SOLUTION INTRAMUSCULAR; INTRAVENOUS; SUBCUTANEOUS AS NEEDED
Status: DISCONTINUED | OUTPATIENT
Start: 2017-09-26 | End: 2017-09-26 | Stop reason: HOSPADM

## 2017-09-26 RX ORDER — ONDANSETRON 2 MG/ML
INJECTION INTRAMUSCULAR; INTRAVENOUS AS NEEDED
Status: DISCONTINUED | OUTPATIENT
Start: 2017-09-26 | End: 2017-09-26 | Stop reason: HOSPADM

## 2017-09-26 RX ORDER — HYDROMORPHONE HYDROCHLORIDE 2 MG/ML
0.5 INJECTION, SOLUTION INTRAMUSCULAR; INTRAVENOUS; SUBCUTANEOUS
Status: DISCONTINUED | OUTPATIENT
Start: 2017-09-26 | End: 2017-09-27

## 2017-09-26 RX ORDER — ACETAMINOPHEN 10 MG/ML
1000 INJECTION, SOLUTION INTRAVENOUS ONCE
Status: COMPLETED | OUTPATIENT
Start: 2017-09-26 | End: 2017-09-26

## 2017-09-26 RX ORDER — CEFAZOLIN SODIUM 2 G/50ML
2 SOLUTION INTRAVENOUS ONCE
Status: COMPLETED | OUTPATIENT
Start: 2017-09-26 | End: 2017-09-26

## 2017-09-26 RX ORDER — SODIUM CHLORIDE, SODIUM LACTATE, POTASSIUM CHLORIDE, CALCIUM CHLORIDE 600; 310; 30; 20 MG/100ML; MG/100ML; MG/100ML; MG/100ML
125 INJECTION, SOLUTION INTRAVENOUS CONTINUOUS
Status: DISCONTINUED | OUTPATIENT
Start: 2017-09-26 | End: 2017-09-26

## 2017-09-26 RX ORDER — CEFAZOLIN SODIUM 2 G/50ML
2 SOLUTION INTRAVENOUS EVERY 8 HOURS
Status: COMPLETED | OUTPATIENT
Start: 2017-09-26 | End: 2017-09-27

## 2017-09-26 RX ORDER — MIDAZOLAM HYDROCHLORIDE 1 MG/ML
INJECTION, SOLUTION INTRAMUSCULAR; INTRAVENOUS AS NEEDED
Status: DISCONTINUED | OUTPATIENT
Start: 2017-09-26 | End: 2017-09-26 | Stop reason: HOSPADM

## 2017-09-26 RX ORDER — MAGNESIUM SULFATE 100 %
4 CRYSTALS MISCELLANEOUS AS NEEDED
Status: DISCONTINUED | OUTPATIENT
Start: 2017-09-26 | End: 2017-09-26 | Stop reason: HOSPADM

## 2017-09-26 RX ORDER — KETAMINE HYDROCHLORIDE 10 MG/ML
INJECTION, SOLUTION INTRAMUSCULAR; INTRAVENOUS AS NEEDED
Status: DISCONTINUED | OUTPATIENT
Start: 2017-09-26 | End: 2017-09-26 | Stop reason: HOSPADM

## 2017-09-26 RX ORDER — ACETAMINOPHEN 10 MG/ML
1000 INJECTION, SOLUTION INTRAVENOUS EVERY 6 HOURS
Status: COMPLETED | OUTPATIENT
Start: 2017-09-26 | End: 2017-09-27

## 2017-09-26 RX ORDER — FAMOTIDINE 20 MG/50ML
20 INJECTION, SOLUTION INTRAVENOUS ONCE
Status: DISCONTINUED | OUTPATIENT
Start: 2017-09-26 | End: 2017-09-26 | Stop reason: RX

## 2017-09-26 RX ORDER — DEXTROSE 50 % IN WATER (D50W) INTRAVENOUS SYRINGE
25-50 AS NEEDED
Status: DISCONTINUED | OUTPATIENT
Start: 2017-09-26 | End: 2017-09-26 | Stop reason: HOSPADM

## 2017-09-26 RX ADMIN — KETOROLAC TROMETHAMINE 30 MG: 30 INJECTION, SOLUTION INTRAMUSCULAR at 18:23

## 2017-09-26 RX ADMIN — FENTANYL CITRATE 25 MCG: 50 INJECTION, SOLUTION INTRAMUSCULAR; INTRAVENOUS at 13:14

## 2017-09-26 RX ADMIN — SUFENTANIL CITRATE 5 MCG: 50 INJECTION EPIDURAL; INTRAVENOUS at 11:41

## 2017-09-26 RX ADMIN — SODIUM CHLORIDE, SODIUM LACTATE, POTASSIUM CHLORIDE, AND CALCIUM CHLORIDE: 600; 310; 30; 20 INJECTION, SOLUTION INTRAVENOUS at 10:37

## 2017-09-26 RX ADMIN — KETOROLAC TROMETHAMINE 30 MG: 30 INJECTION, SOLUTION INTRAMUSCULAR at 23:17

## 2017-09-26 RX ADMIN — FENTANYL CITRATE 25 MCG: 50 INJECTION, SOLUTION INTRAMUSCULAR; INTRAVENOUS at 13:24

## 2017-09-26 RX ADMIN — SUFENTANIL CITRATE 5 MCG: 50 INJECTION EPIDURAL; INTRAVENOUS at 10:57

## 2017-09-26 RX ADMIN — CEFAZOLIN SODIUM 2 G: 2 SOLUTION INTRAVENOUS at 18:23

## 2017-09-26 RX ADMIN — SUFENTANIL CITRATE 10 MCG: 50 INJECTION EPIDURAL; INTRAVENOUS at 09:32

## 2017-09-26 RX ADMIN — CEFAZOLIN SODIUM 2 G: 2 SOLUTION INTRAVENOUS at 09:36

## 2017-09-26 RX ADMIN — HYDROMORPHONE HYDROCHLORIDE 0.2 MG: 1 INJECTION, SOLUTION INTRAMUSCULAR; INTRAVENOUS; SUBCUTANEOUS at 14:21

## 2017-09-26 RX ADMIN — SUFENTANIL CITRATE 5 MCG: 50 INJECTION EPIDURAL; INTRAVENOUS at 11:55

## 2017-09-26 RX ADMIN — LIDOCAINE HYDROCHLORIDE 20 MG: 20 INJECTION, SOLUTION EPIDURAL; INFILTRATION; INTRACAUDAL; PERINEURAL at 09:29

## 2017-09-26 RX ADMIN — PROPOFOL 20 MG: 10 INJECTION, EMULSION INTRAVENOUS at 09:29

## 2017-09-26 RX ADMIN — MIDAZOLAM HYDROCHLORIDE 2 MG: 1 INJECTION, SOLUTION INTRAMUSCULAR; INTRAVENOUS at 09:25

## 2017-09-26 RX ADMIN — SODIUM CHLORIDE, SODIUM LACTATE, POTASSIUM CHLORIDE, AND CALCIUM CHLORIDE: 600; 310; 30; 20 INJECTION, SOLUTION INTRAVENOUS at 09:58

## 2017-09-26 RX ADMIN — SUFENTANIL CITRATE 10 MCG: 50 INJECTION EPIDURAL; INTRAVENOUS at 09:35

## 2017-09-26 RX ADMIN — FENTANYL CITRATE 25 MCG: 50 INJECTION, SOLUTION INTRAMUSCULAR; INTRAVENOUS at 13:36

## 2017-09-26 RX ADMIN — FENTANYL CITRATE 25 MCG: 50 INJECTION, SOLUTION INTRAMUSCULAR; INTRAVENOUS at 13:04

## 2017-09-26 RX ADMIN — SODIUM CHLORIDE, SODIUM LACTATE, POTASSIUM CHLORIDE, AND CALCIUM CHLORIDE: 600; 310; 30; 20 INJECTION, SOLUTION INTRAVENOUS at 11:31

## 2017-09-26 RX ADMIN — SODIUM CHLORIDE, SODIUM LACTATE, POTASSIUM CHLORIDE, AND CALCIUM CHLORIDE 125 ML/HR: 600; 310; 30; 20 INJECTION, SOLUTION INTRAVENOUS at 07:45

## 2017-09-26 RX ADMIN — ROCURONIUM BROMIDE 50 MG: 10 INJECTION, SOLUTION INTRAVENOUS at 09:33

## 2017-09-26 RX ADMIN — ACETAMINOPHEN 1000 MG: 10 INJECTION, SOLUTION INTRAVENOUS at 21:34

## 2017-09-26 RX ADMIN — GLYCOPYRROLATE 0.2 MG: 0.2 INJECTION INTRAMUSCULAR; INTRAVENOUS at 12:31

## 2017-09-26 RX ADMIN — SUFENTANIL CITRATE 5 MCG: 50 INJECTION EPIDURAL; INTRAVENOUS at 12:20

## 2017-09-26 RX ADMIN — ENOXAPARIN SODIUM 40 MG: 40 INJECTION SUBCUTANEOUS at 21:34

## 2017-09-26 RX ADMIN — SODIUM CHLORIDE, SODIUM LACTATE, POTASSIUM CHLORIDE, AND CALCIUM CHLORIDE 150 ML/HR: 600; 310; 30; 20 INJECTION, SOLUTION INTRAVENOUS at 17:48

## 2017-09-26 RX ADMIN — GLYCOPYRROLATE 0.2 MG: 0.2 INJECTION INTRAMUSCULAR; INTRAVENOUS at 09:25

## 2017-09-26 RX ADMIN — CEFAZOLIN SODIUM 2 G: 2 SOLUTION INTRAVENOUS at 23:11

## 2017-09-26 RX ADMIN — HYDROMORPHONE HYDROCHLORIDE 1 MG: 2 INJECTION, SOLUTION INTRAMUSCULAR; INTRAVENOUS; SUBCUTANEOUS at 21:34

## 2017-09-26 RX ADMIN — ACETAMINOPHEN 1000 MG: 10 INJECTION, SOLUTION INTRAVENOUS at 09:58

## 2017-09-26 RX ADMIN — ENOXAPARIN SODIUM 40 MG: 40 INJECTION SUBCUTANEOUS at 07:47

## 2017-09-26 RX ADMIN — SUFENTANIL CITRATE 5 MCG: 50 INJECTION EPIDURAL; INTRAVENOUS at 12:13

## 2017-09-26 RX ADMIN — ONDANSETRON 4 MG: 2 INJECTION INTRAMUSCULAR; INTRAVENOUS at 13:01

## 2017-09-26 RX ADMIN — LIDOCAINE HYDROCHLORIDE 120 MG: 20 INJECTION, SOLUTION EPIDURAL; INFILTRATION; INTRACAUDAL; PERINEURAL at 09:32

## 2017-09-26 RX ADMIN — ROCURONIUM BROMIDE 15 MG: 10 INJECTION, SOLUTION INTRAVENOUS at 10:49

## 2017-09-26 RX ADMIN — PROMETHAZINE HYDROCHLORIDE 6.25 MG: 25 INJECTION, SOLUTION INTRAMUSCULAR; INTRAVENOUS at 18:21

## 2017-09-26 RX ADMIN — ACETAMINOPHEN 1000 MG: 10 INJECTION, SOLUTION INTRAVENOUS at 16:02

## 2017-09-26 RX ADMIN — KETAMINE HYDROCHLORIDE 30 MG: 10 INJECTION, SOLUTION INTRAMUSCULAR; INTRAVENOUS at 09:32

## 2017-09-26 RX ADMIN — SUFENTANIL CITRATE 5 MCG: 50 INJECTION EPIDURAL; INTRAVENOUS at 12:36

## 2017-09-26 RX ADMIN — NYSTATIN 500000 UNITS: 100000 SUSPENSION ORAL at 07:48

## 2017-09-26 RX ADMIN — HYDROMORPHONE HYDROCHLORIDE 0.2 MG: 1 INJECTION, SOLUTION INTRAMUSCULAR; INTRAVENOUS; SUBCUTANEOUS at 14:11

## 2017-09-26 RX ADMIN — PROPOFOL 120 MG: 10 INJECTION, EMULSION INTRAVENOUS at 09:32

## 2017-09-26 RX ADMIN — FAMOTIDINE 20 MG: 10 INJECTION, SOLUTION INTRAVENOUS at 07:45

## 2017-09-26 RX ADMIN — ROCURONIUM BROMIDE 10 MG: 10 INJECTION, SOLUTION INTRAVENOUS at 10:09

## 2017-09-26 RX ADMIN — ONDANSETRON 4 MG: 2 INJECTION INTRAMUSCULAR; INTRAVENOUS at 09:25

## 2017-09-26 RX ADMIN — HYDROMORPHONE HYDROCHLORIDE 1 MG: 2 INJECTION, SOLUTION INTRAMUSCULAR; INTRAVENOUS; SUBCUTANEOUS at 16:02

## 2017-09-26 NOTE — PERIOP NOTES
TRANSFER - IN REPORT:    Verbal report received from Dr Corrinne Gayer (name) on [employer]  being received from OR (unit) for routine progression of care      Report consisted of patients Situation, Background, Assessment and   Recommendations(SBAR). Information from the following report(s) OR Summary, Procedure Summary, Intake/Output and MAR was reviewed with the receiving nurse. Opportunity for questions and clarification was provided. Assessment completed upon patients arrival to unit and care assumed.

## 2017-09-26 NOTE — PROGRESS NOTES
Pt admitted for an elective surgical procedure. Pt is independent. Please encourage ambulation. No plan of care needs identified. Anticipate pt will be medically stable for discharge within the next 24-48 hours. CM available to assist as needed. Discharge Reassessment Plan:  Low Risk    RRAT Score:  1 - 12     Low Risk Care Transition Interventions:  1. Discharge transition plan:  Physician follow up   2. Involved patient/caregiver in assessment, planning, education and implement of intervention. 3. CM daily patient care huddles/interdisciplinary rounds were completed. 4. PCP/Specialist appointment within 5 days made prior to discharge. Date/Time  5. Facilitated transportation and logistics for follow-up appointments. 6. Handoff to [address] [place] [address] Nurse Navigator or PCP practice. Care Management Interventions  PCP Verified by CM: Yes  Mode of Transport at Discharge:  Other (see comment) (Family)  Transition of Care Consult (CM Consult): Discharge Planning  Health Maintenance Reviewed: Yes  Current Support Network: Family Lives Nearby  Confirm Follow Up Transport: Self  Discharge Location  Discharge Placement: Home

## 2017-09-26 NOTE — INTERVAL H&P NOTE
H&P Update:  Rick Forbes was seen and examined. History and physical has been reviewed. The patient has been examined.  There have been no significant clinical changes since the completion of the originally dated History and Physical.    Signed By: Elias Golden MD     September 26, 2017 7:16 AM

## 2017-09-26 NOTE — IP AVS SNAPSHOT
[address] [address] 
[phone] Patient: Medhat Sylvester MRN: [id number] :1985 Current Discharge Medication List  
  
CONTINUE these medications which have NOT CHANGED Dose & Instructions Dispensing Information Comments Morning Noon Evening Bedtime Omeprazole delayed release 20 mg tablet Commonly known as:  PRILOSEC D/R Your last dose was: Your next dose is:    
   
   
 Dose:  20 mg Take 1 Tab by mouth daily. OTC Quantity:  30 Tab Refills:  1  
     
   
   
   
  
 oxyCODONE-acetaminophen 5-325 mg per tablet Commonly known as:  PERCOCET Your last dose was: Your next dose is:    
   
   
 Dose:  1 Tab Take 1 Tab by mouth every four (4) hours as needed for Pain. Max Daily Amount: 6 Tabs. Quantity:  30 Tab Refills:  0 STOP taking these medications Cholecalciferol (Vitamin D3) 50,000 unit Cap

## 2017-09-26 NOTE — PROGRESS NOTES
PM check -      Pt awake and alert C/O expected discomfort     KASSIDY with serosang output     UGI and diet in AM

## 2017-09-26 NOTE — PERIOP NOTES
Reviewed PTA medication list with patient/caregiver and patient/caregiver denies any additional medications.

## 2017-09-26 NOTE — ANESTHESIA PREPROCEDURE EVALUATION
Anesthetic History        Pertinent negatives: No PONV       Review of Systems / Medical History  Patient summary reviewed, nursing notes reviewed and pertinent labs reviewed    Pulmonary              Pertinent negatives: No COPD and asthma     Neuro/Psych   Within defined limits           Cardiovascular  Within defined limits              Pertinent negatives: No hypertension, past MI and CAD       GI/Hepatic/Renal  Within defined limits           Pertinent negatives: No GERD, liver disease and renal disease   Endo/Other        Morbid obesity  Pertinent negatives: No diabetes   Other Findings              Physical Exam    Airway  Mallampati: II  TM Distance: > 6 cm  Neck ROM: normal range of motion   Mouth opening: Normal     Cardiovascular    Rhythm: regular  Rate: normal         Dental      Comments: Retainer lower   Pulmonary  Breath sounds clear to auscultation               Abdominal         Other Findings            Anesthetic Plan    ASA: 3  Anesthesia type: general          Induction: Intravenous  Anesthetic plan and risks discussed with: Patient

## 2017-09-26 NOTE — PERIOP NOTES
TRANSFER - OUT REPORT:    Verbal report given to [employer] (name) on Venice Ladonna  being transferred to 3 S (unit) for routine progression of care       Report consisted of patients Situation, Background, Assessment and   Recommendations(SBAR). Information from the following report(s) SBAR, Kardex, Procedure Summary and Intake/Output was reviewed with the receiving nurse. Lines:   Peripheral IV 09/26/17 Left Hand (Active)   Site Assessment Clean, dry, & intact 9/26/2017  2:27 PM   Phlebitis Assessment 0 9/26/2017  2:27 PM   Infiltration Assessment 0 9/26/2017  2:27 PM   Dressing Status Clean, dry, & intact 9/26/2017  2:27 PM   Dressing Type Transparent;Tape 9/26/2017  2:27 PM   Hub Color/Line Status Infusing 9/26/2017  2:27 PM        Opportunity for questions and clarification was provided.       Patient transported with:   O2 @ 2 liters  Registered Nurse

## 2017-09-26 NOTE — PERIOP NOTES
Spoke to Kady, [facility] about patients irregular heart beat.  She will notify Dr. Krystyna Mercado

## 2017-09-26 NOTE — IP AVS SNAPSHOT
[facility] [address] 
[phone] Patient: Rick Forbes MRN: [id number] :1985 You are allergic to the following No active allergies Recent Documentation Height Weight BMI OB Status Smoking Status 1.6 m 115.4 kg 45.06 kg/m2 Having regular periods Never Smoker Emergency Contacts Name Discharge Info Relation Home Work Mobile Alanis Timmons DISCHARGE CAREGIVER [3] Mother [14]   [phone] About your hospitalization You were admitted on:  2017 You last received care in the:  [address] [place] [address] You were discharged on:  2017 Unit phone number:  [phone] Why you were hospitalized Your primary diagnosis was:  Not on File Your diagnoses also included: Morbid Obesity With Bmi Of 40.0-44.9, Adult (Hcc) Providers Seen During Your Hospitalizations Provider Role Specialty Primary office phone Elias Golden MD Attending Provider General Surgery [phone] Your Primary Care Physician (PCP) Primary Care Physician Office Phone Office Fax  
 Juanita Jacobson [place] [phone] Follow-up Information Follow up With Details Comments Contact Info Abimael Olea MD   [address] [place] [address] 
[phone] Elias Golden MD On 10/11/2017 Follow up appointment scheduled for 2017 at 11:00 a.m. [address] [place] [address] Drive [address] [facility] 
[phone] Your Appointments 2017 11:00 AM EDT  
POST OP with Elias Golden MD  
[facility] ([facility])  
 [address] [place] [address] [facility]  
[phone] 2017 11:00 AM EDT Office Visit with Elias Golden MD  
 [facility] ([facility])  
 [address] [place] [address] [place] [address] [place]  
[phone] Wednesday October 25, 2017 11:30 AM EDT Office Visit with TSS NUTR VISIT2 [facility] ([facility])  
 [address] [place] [address] [address]  
[phone] Current Discharge Medication List  
  
CONTINUE these medications which have NOT CHANGED Dose & Instructions Dispensing Information Comments Morning Noon Evening Bedtime Omeprazole delayed release 20 mg tablet Commonly known as:  PRILOSEC D/R Your last dose was: Your next dose is:    
   
   
 Dose:  20 mg Take 1 Tab by mouth daily. OTC Quantity:  30 Tab Refills:  1  
     
   
   
   
  
 oxyCODONE-acetaminophen 5-325 mg per tablet Commonly known as:  PERCOCET Your last dose was: Your next dose is:    
   
   
 Dose:  1 Tab Take 1 Tab by mouth every four (4) hours as needed for Pain. Max Daily Amount: 6 Tabs. Quantity:  30 Tab Refills:  0 STOP taking these medications Cholecalciferol (Vitamin D3) 50,000 unit Cap Discharge Instructions [facility] Ana Barahona M.D., F.A.C.S. 
[address] [place] [address]., [address] [facility], [address] [place] [address] Office: [phone]    Fax:  [phone] Discharge Instruction for Gastric Bypass / Sleeve Gastrectomy Patients Diet: 
? Continue with the liquid diet until you are seen in the office. Make sure you sip fluids all day. Goal for total fluid intake is a minimum of 64 ounces per day. ? Aim for  Grams of protein every day. Activity: 
? Walk a minimum of 30 minutes every day. Rest when you are tired. ? You may shower. No baths, hot tubs or swimming. ? You may climb stairs. Take your time. ? No lifting more than 10-15 lbs. ? If you feel discomfort during an activity, rest. 
? Do not drive for 1 week or until you are off of all narcotics. Wound Care: ? Clean incisions with soap and water when in the shower. Pat dry. ? Leave steri-strips on until they fall off. ? A small amount of drainage may be present from the incisions. Contact the office if you notice an increase in drainage, an odor, increased redness, or fever > 100.5. Medications: 
? You should have received a prescription for pain medication and Prilosec prior to surgery, if not, notify Dr. Piña Sensor team. 
? For upset stomach you may take over the counter medications such as Maalox, Mylanta, or Pepto Bismol. ? Gas X works very well for bloating when eating or drinking. ? You may take Tylenol, do not exceed 4,000mg in one day. Percocet has Tylenol in it, you will need to consider this when taking Tylenol and Percocet together. ? Non-aspirin based arthritis medications may be resumed. ? Take a childrens or adult chewable multivitamin. ? Milk of Magnesia will help with constipation. ? It is fine to take your usual home medications. Blood pressure medications should be continued after surgery. Diabetic medications can frequently be reduced very quickly after surgery. Diabetics should continue to monitor blood sugars frequently after surgery and contact the prescribing physician for any questions. Follow-Up Appointment: 
? If you do not already have a follow-up appointment scheduled, contact the office in the next few days to obtain one. It is usually scheduled for 10-14 days after your surgery date. Office phone number:  [phone].  
? Call our office if you have questions, concerns or any worsening of condition. If you are not able to reach us, go to your primary care provider or the Emergency Department. Discharge Orders None Curtis Announcement  We are excited to announce that we are making your provider's discharge notes available to you in Jumblets. You will see these notes when they are completed and signed by the physician that discharged you from your recent hospital stay. If you have any questions or concerns about any information you see in Jumblets, please call the Health Information Department where you were seen or reach out to your Primary Care Provider for more information about your plan of care. Introducing [facility]! [facility] introduces Jumblets patient portal. Now you can access parts of your medical record, email your doctor's office, and request medication refills online. 1. In your internet browser, go to [email or web address] 2. Click on the First Time User? Click Here link in the Sign In box. You will see the New Member Sign Up page. 3. Enter your Jumblets Access Code exactly as it appears below. You will not need to use this code after youve completed the sign-up process. If you do not sign up before the expiration date, you must request a new code. · Jumblets Access Code: [email or web address] Expires: 12/25/2017  6:31 AM 
 
4. Enter the last four digits of your Social Security Number (xxxx) and Date of Birth (mm/dd/yyyy) as indicated and click Submit. You will be taken to the next sign-up page. 5. Create a Jumblets ID. This will be your Jumblets login ID and cannot be changed, so think of one that is secure and easy to remember. 6. Create a Jumblets password. You can change your password at any time. 7. Enter your Password Reset Question and Answer. This can be used at a later time if you forget your password. 8. Enter your e-mail address. You will receive e-mail notification when new information is available in [address] 9. Click Sign Up. You can now view and download portions of your medical record. 10. Click the Download Summary menu link to download a portable copy of your medical information. If you have questions, please visit the Frequently Asked Questions section of the MyChart website. Remember, MyChart is NOT to be used for urgent needs. For medical emergencies, dial 911. Now available from your iPhone and Android! General Information Please provide this summary of care documentation to your next provider. Patient Signature:  ____________________________________________________________ Date:  ____________________________________________________________  
  
Aloma Snellen Provider Signature:  ____________________________________________________________ Date:  ____________________________________________________________

## 2017-09-26 NOTE — H&P (VIEW-ONLY)
Sleeve Resection to Gastric Bypass - History and Physical    Subjective: The patient is a 27 y.o. obese female with a Body mass index is 45.17 kg/(m^2). .   she presents now to review their work up to date to see if they are a candidate for surgery and whether or not to proceed with the previously requested procedure. The patient had a sleeve resection via [address] 5 years ago. She is seeking a conversion to a gastric bypass due to retained fundus and poor weight loss. Bariatric comorbidities continue to include:   Patient Active Problem List   Diagnosis Code    Morbid obesity with body mass index of 40.0-49.9 ([facility]) E66.01    Status post bariatric surgery Z98.84    Intestinal malabsorption K90.9    Nausea & vomiting R11.2    Incisional hernia K43.2       They have been generally well prior to this visit and have had no recent significant illnesses. The patient has had no gastrointestinal issues that would preclude them from proceeding with the surgery they have chosen. Viktor Salomon has recently tried a preoperative weight loss program  in addition to seeing a bariatric nutritionist preoperatively. We have discussed on at least one other occasion about the various types of surgical weight loss procedures and they have considered these options after our initial consultation. We have once again discussed these procedures in detail and they have now decided on a surgical procedure. They present today to discuss this and confirm that their evaluation pre operatively is acceptable to continue with surgery. The patient desires laparoscopic gastric bypass surgery for surgical weight loss. The patients goal weight is 152lb. (this represents a BMI of 27)    These goals are consistent with expected outcomes of their desired operation. her Medical goals are resolution of these health issues.     Patient Active Problem List    Diagnosis Date Noted    Morbid obesity with body mass index of 40.0-49.9 [facility])     Status post bariatric surgery     Intestinal malabsorption     Nausea & vomiting     Incisional hernia       Past Surgical History:   Procedure Laterality Date    HX  SECTION      X 3    HX GI  2012    sleeve resection / a salzberg    HX ORTHOPAEDIC      scoliosis correction surgery X 2    HX TONSIL AND ADENOIDECTOMY      HX TUBAL LIGATION        Social History   Substance Use Topics    Smoking status: Never Smoker    Smokeless tobacco: Never Used    Alcohol use No      History reviewed. No pertinent family history. Current Outpatient Prescriptions   Medication Sig Dispense Refill    oxyCODONE-acetaminophen (PERCOCET) 5-325 mg per tablet Take 1 Tab by mouth every four (4) hours as needed for Pain. Max Daily Amount: 6 Tabs. 30 Tab 0    Omeprazole delayed release (PRILOSEC D/R) 20 mg tablet Take 1 Tab by mouth daily. OTC 30 Tab 1    Cholecalciferol, Vitamin D3, 50,000 unit cap Take  by mouth two (2) times a week.  Indications: mon and tu       No Known Allergies       Review of Systems:        General - No history or complaints of unexpected fever, chills, or weight loss  Head/Neck - No history or complaints of headache, diplopia, dysphagia, hearing loss  Cardiac - No history or complaints of chest pain, palpitations, murmur, or shortness of breath  Pulmonary - No history or complaints of shortness of breath, productive cough, hemoptysis  Gastrointestinal - (+) reflux with certain PO, No history or complaints of abdominal pain, obstipation/constipation, blood per rectum  Genitourinary - No history or complaints of hematuria/dysuria, stress urinary incontinence symptoms, or renal lithiasis  Musculoskeletal - No history or complaints of joint pain or muscular weakness  Hematologic - No history or complaints of bleeding disorders, blood transfusions, sickle cell anemia  Neurologic - No history or complaints of  migraine headaches, seizure activity, syncopal episodes, TIA or stroke  Integumentary - No history or complaints of rashes, abnormal nevi, skin cancer  Gynecological - No history of heavy menses/abnormal menses    Objective:     Visit Vitals    BP (!) 130/91 (BP 1 Location: Left arm, BP Patient Position: Sitting)    Pulse 84    Resp 16    Ht 5' 3\" (1.6 m)    Wt 115.7 kg (255 lb)    LMP 08/16/2017    SpO2 100%    BMI 45.17 kg/m2     Physical Examination: General appearance - alert, well appearing, and in no distress and oriented to person, place, and time  Mental status - alert, oriented to person, place, and time, normal mood, behavior, speech, dress, motor activity, and thought processes  Eyes - pupils equal and reactive, extraocular eye movements intact, sclera anicteric, left eye normal, right eye normal  Ears - bilateral TM's and external ear canals normal, right ear normal, left ear normal  Nose - normal and patent, no erythema, discharge or polyps and normal nontender sinuses  Mouth - mucous membranes moist, pharynx normal without lesions  Neck - supple, no significant adenopathy  Lymphatics - no palpable lymphadenopathy, no hepatosplenomegaly  Chest - clear to auscultation, no wheezes, rales or rhonchi, symmetric air entry  Heart - normal rate, regular rhythm, normal S1, S2, no murmurs, rubs, clicks or gallops  Abdomen - soft, nontender, nondistended, no masses or organomegaly  Back exam - full range of motion, no tenderness, palpable spasm or pain on motion  Neurological - alert, oriented, normal speech, no focal findings or movement disorder noted  Musculoskeletal - no joint tenderness, deformity or swelling  Extremities - peripheral pulses normal, no pedal edema, no clubbing or cyanosis  Skin - normal coloration and turgor, no rashes, no suspicious skin lesions noted    Labs / Preoperative Evaluations:     Recent Results (from the past 1008 hour(s))   EKG, 12 LEAD, INITIAL    Collection Time: 09/18/17 12:37 PM   Result Value Ref Range    Ventricular Rate 70 BPM Atrial Rate 70 BPM    P-R Interval 102 ms    QRS Duration 82 ms    Q-T Interval 364 ms    QTC Calculation (Bezet) 393 ms    Calculated P Axis 61 degrees    Calculated R Axis 70 degrees    Calculated T Axis 59 degrees    Diagnosis       Sinus rhythm with short KY with premature supraventricular complexes and with   occasional premature ventricular complexes  Otherwise normal ECG  No previous ECGs available     CBC WITH AUTOMATED DIFF    Collection Time: 09/18/17 12:49 PM   Result Value Ref Range    WBC 6.8 4.6 - 13.2 K/uL    RBC 4.14 (L) 4.20 - 5.30 M/uL    HGB 11.9 (L) 12.0 - 16.0 g/dL    HCT 36.6 35.0 - 45.0 %    MCV 88.4 74.0 - 97.0 FL    MCH 28.7 24.0 - 34.0 PG    MCHC 32.5 31.0 - 37.0 g/dL    RDW 14.2 11.6 - 14.5 %    PLATELET 555 155 - 291 K/uL    MPV 10.9 9.2 - 11.8 FL    NEUTROPHILS 53 40 - 73 %    LYMPHOCYTES 38 21 - 52 %    MONOCYTES 8 3 - 10 %    EOSINOPHILS 1 0 - 5 %    BASOPHILS 0 0 - 2 %    ABS. NEUTROPHILS 3.5 1.8 - 8.0 K/UL    ABS. LYMPHOCYTES 2.6 0.9 - 3.6 K/UL    ABS. MONOCYTES 0.6 0.05 - 1.2 K/UL    ABS. EOSINOPHILS 0.1 0.0 - 0.4 K/UL    ABS. BASOPHILS 0.0 0.0 - 0.06 K/UL    DF AUTOMATED     METABOLIC PANEL, COMPREHENSIVE    Collection Time: 09/18/17 12:49 PM   Result Value Ref Range    Sodium 139 136 - 145 mmol/L    Potassium 4.4 3.5 - 5.5 mmol/L    Chloride 103 100 - 108 mmol/L    CO2 29 21 - 32 mmol/L    Anion gap 7 3.0 - 18 mmol/L    Glucose 87 74 - 99 mg/dL    BUN 11 7.0 - 18 MG/DL    Creatinine 0.50 (L) 0.6 - 1.3 MG/DL    BUN/Creatinine ratio 22 (H) 12 - 20      GFR est AA >60 >60 ml/min/1.73m2    GFR est non-AA >60 >60 ml/min/1.73m2    Calcium 9.1 8.5 - 10.1 MG/DL    Bilirubin, total 0.3 0.2 - 1.0 MG/DL    ALT (SGPT) 21 13 - 56 U/L    AST (SGOT) 19 15 - 37 U/L    Alk.  phosphatase 121 (H) 45 - 117 U/L    Protein, total 7.3 6.4 - 8.2 g/dL    Albumin 3.2 (L) 3.4 - 5.0 g/dL    Globulin 4.1 (H) 2.0 - 4.0 g/dL    A-G Ratio 0.8 0.8 - 1.7         Assessment:     Morbid obesity with associated comorbidity    Plan:     laparoscopic gastric bypass surgery    This is a 27 y.o. female with a BMI of Body mass index is 45.17 kg/(m^2). and the weight-related co-morbidties as noted above. Nash Her meets the NIH criteria for bariatric surgery based upon the BMI of Body mass index is 45.17 kg/(m^2). and multiple weight-related co-morbidties. Nash Her has elected laparoscopic gastric bypass as her intervention of choice for treatment of morbid obestiy through surgical means secondary to its uniform results,  profound baseline suppression of hunger and pace at which weight is lost.    In the office today, following Evelina's history and physical examination, a 40 minute discussion regarding the anatomic alterations for the laparoscopic gastric bypass  was undertaken. The dietary expectations and the patient  dependent factors for success were thoroughly discussed, to include the need for interval follow-up and long-term dietary changes associated with success. The possible short and long term  complications of the gastric bypass were also discussed, to include but not limited to;death, DVT/PE, staple line leak, bleeding, stricture formation, infection,internal hernia  and pouch dilation. Specific weight related outcomes for success were also discussed with an emphasis on careful and close follow-up with the first year and Dietary behavior modification over the first years as baseline cyclical hunger returns  The patient expressed an understanding of the above factors, and her questions were answered in their entirety. In addition, the patient attended a 1.5 hour power point seminar regarding obesity, surgical weight loss including, adjustable gastric band, gastric bypass, and sleeve gastrectomy. This discussion contrasted the different surgical techniques, mechanisms of actions and expected outcomes, and surgical and medical risks associated with each procedure.   During this seminar, there was a long question and answer session where each questions was answered until there were no additional questions. Today, the patient had all of her questions answered and the decision was made today that the patient's preoperative evaluation is acceptable for them  to proceed with bariatric surgery  choosing adjustable gastric bypass as her surgical option. The patient understands the plan of action    UGI from 6/2016 -     DESCRIPTION OF PROCEDURE: The patient was brought to the fluoroscopy unit,  was given thin barium. On swallowing of barium, she was noted to have  normal peristalsis of her esophagus. She had prompt filling of the distal  esophagus with tapering into and through the gastroesophageal junction. Contrast then filled with what appeared to be an extremely large gastric  fundus, there appeared to be at least half of her gastric fundus still  remaining. We continued to have her drink several swallows more of barium. The gastric body then filled out and the prepyloric region filled out, all  which appeared to be consistent with about only a 50% reduction in her  total gastric volume.     At this juncture, I feel like her reflux, regurgitation, poor weight loss  are all related to a sleeve that is too large retaining, particularly the  upper fundic region. I think the course of action in her situation would be  a conversion to a gastric bypass procedure, as this would improve her  weight loss and take away all of her reflux symptoms. We talked about this  the other day in the office. She is very comfortable with this process. We  will go ahead and submit this to her insurance company for approval.      Since the patient's original consult 15 months ago they have been seen by their PCP for routine appts. There has been no change to their overall medical or surgical history and they have been on no steroids in any form.       Secondary Diagnoses:     DVT / Pulmonary Embolus Risk - The patient is at a higher risk for post operative DVT / pulmonary embolus secondary to their morbid obese status, relative sedentary lifestyle, and impending general anesthetic. We will plan to use anticoagulation therapy pre and post operative as well as  pneumatic compression devices and encourage ambulation once on the hospital nursing floor. The need for possible at home anticoagulation therapy has also been discussed and any decision on this matter will be made during post operative evaluations. The patient understands that their efforts at ambulation are of vital importance to reduce the risk of this complication thus placing significant burden on them as to the prevention of such issues.  Signs and symptoms of DVT / PE have been discussed with the patient and they have been instructed to call the office if any these occur in the \"at home\" post op phase    Signed By: Glory Fitzgerald MD     September 18, 2017

## 2017-09-26 NOTE — PROGRESS NOTES
conducted an initial consultation and Spiritual Assessment for Jonathan Dougherty, who is a 27 y.o.,female. Patients Primary Language is: [place]. According to the patients EMR Hindu Affiliation is: [place]. The reason the Patient came to the hospital is:   Patient Active Problem List    Diagnosis Date Noted    Morbid obesity with BMI of 40.0-44.9, adult ([address] 09/26/2017    Morbid obesity with body mass index of 40.0-49.9 ([facility])     Status post bariatric surgery     Intestinal malabsorption     Nausea & vomiting     Incisional hernia         The  provided the following Interventions:  Initiated a relationship of care and support. Explored issues of genesis, belief, spirituality and Baptist/ritual needs while hospitalized. Listened empathically. Provided chaplaincy education. Provided information about Spiritual Care Services. Offered prayer and assurance of continued prayers on patients behalf. Chart reviewed. The following outcomes were achieved:  Patient shared limited information about both their medical narrative and spiritual journey/beliefs. Patient processed feeling about current hospitalization. Patient expressed gratitude for pastoral care visit. Assessment:  Patient does not have any Baptist/cultural needs that will affect patients preferences in health care. There are no further spiritual or Baptist issues which require intervention at this time. Plan:  Chaplains will continue to follow and will provide pastoral care on an as needed/requested basis.  recommends bedside caregivers page  on duty if patient shows signs of acute spiritual or emotional distress.       Sister Paula Butts, [place], [address]  [phone]

## 2017-09-27 ENCOUNTER — APPOINTMENT (OUTPATIENT)
Dept: GENERAL RADIOLOGY | Age: 32
DRG: 327 | End: 2017-09-27
Attending: SPECIALIST
Payer: COMMERCIAL

## 2017-09-27 VITALS
TEMPERATURE: 98.5 F | WEIGHT: 254.38 LBS | DIASTOLIC BLOOD PRESSURE: 70 MMHG | SYSTOLIC BLOOD PRESSURE: 120 MMHG | RESPIRATION RATE: 20 BRPM | HEART RATE: 94 BPM | BODY MASS INDEX: 45.07 KG/M2 | OXYGEN SATURATION: 99 % | HEIGHT: 63 IN

## 2017-09-27 PROCEDURE — 74011250637 HC RX REV CODE- 250/637: Performed by: SPECIALIST

## 2017-09-27 PROCEDURE — 74011636320 HC RX REV CODE- 636/320: Performed by: SPECIALIST

## 2017-09-27 PROCEDURE — 74011250636 HC RX REV CODE- 250/636: Performed by: SPECIALIST

## 2017-09-27 PROCEDURE — 74240 X-RAY XM UPR GI TRC 1CNTRST: CPT

## 2017-09-27 RX ORDER — OXYCODONE AND ACETAMINOPHEN 5; 325 MG/1; MG/1
1 TABLET ORAL
Status: DISCONTINUED | OUTPATIENT
Start: 2017-09-27 | End: 2017-09-27 | Stop reason: HOSPADM

## 2017-09-27 RX ADMIN — SODIUM CHLORIDE, SODIUM LACTATE, POTASSIUM CHLORIDE, AND CALCIUM CHLORIDE 150 ML/HR: 600; 310; 30; 20 INJECTION, SOLUTION INTRAVENOUS at 02:21

## 2017-09-27 RX ADMIN — IOHEXOL 75 ML: 240 INJECTION, SOLUTION INTRATHECAL; INTRAVASCULAR; INTRAVENOUS; ORAL at 09:31

## 2017-09-27 RX ADMIN — HYDROMORPHONE HYDROCHLORIDE 1 MG: 2 INJECTION, SOLUTION INTRAMUSCULAR; INTRAVENOUS; SUBCUTANEOUS at 03:32

## 2017-09-27 RX ADMIN — ACETAMINOPHEN 1000 MG: 10 INJECTION, SOLUTION INTRAVENOUS at 10:38

## 2017-09-27 RX ADMIN — KETOROLAC TROMETHAMINE 30 MG: 30 INJECTION, SOLUTION INTRAMUSCULAR at 06:51

## 2017-09-27 RX ADMIN — HYDROMORPHONE HYDROCHLORIDE 1 MG: 2 INJECTION, SOLUTION INTRAMUSCULAR; INTRAVENOUS; SUBCUTANEOUS at 06:45

## 2017-09-27 RX ADMIN — SODIUM CHLORIDE, SODIUM LACTATE, POTASSIUM CHLORIDE, AND CALCIUM CHLORIDE 150 ML/HR: 600; 310; 30; 20 INJECTION, SOLUTION INTRAVENOUS at 10:38

## 2017-09-27 RX ADMIN — HYDROMORPHONE HYDROCHLORIDE 1 MG: 2 INJECTION, SOLUTION INTRAMUSCULAR; INTRAVENOUS; SUBCUTANEOUS at 00:29

## 2017-09-27 RX ADMIN — ACETAMINOPHEN 1000 MG: 10 INJECTION, SOLUTION INTRAVENOUS at 03:32

## 2017-09-27 RX ADMIN — ENOXAPARIN SODIUM 40 MG: 40 INJECTION SUBCUTANEOUS at 10:38

## 2017-09-27 RX ADMIN — HYDROMORPHONE HYDROCHLORIDE 1 MG: 2 INJECTION, SOLUTION INTRAMUSCULAR; INTRAVENOUS; SUBCUTANEOUS at 10:09

## 2017-09-27 RX ADMIN — KETOROLAC TROMETHAMINE 30 MG: 30 INJECTION, SOLUTION INTRAMUSCULAR at 12:56

## 2017-09-27 RX ADMIN — OXYCODONE HYDROCHLORIDE AND ACETAMINOPHEN 1 TABLET: 5; 325 TABLET ORAL at 12:56

## 2017-09-27 NOTE — PROGRESS NOTES
Bariatric Surgery                POD #1 (PM check)    Visit Vitals    /70 (BP 1 Location: Right arm, BP Patient Position: At rest)    Pulse 94    Temp 98.5 °F (36.9 °C)    Resp 20    Ht 5' 3\" (1.6 m)    Wt 115.4 kg (254 lb 6 oz)    LMP 08/16/2017    SpO2 99%    BMI 45.06 kg/m2     Patient has minimal complaints of pain, minimal nausea noted     Exam:  Appears well in no distress  Lungs- clear bilaterally  Abd - soft, incisions look good without erythema  Extremities- no new edema or swelling    UGI - no obstrustion or leak    Data Review:    Labs: Results:       Chemistry No results for input(s): GLU, NA, K, CL, CO2, BUN, CREA, CA, AGAP, BUCR, TBIL, GPT, AP, TP, ALB, GLOB, AGRAT in the last 72 hours. CBC w/Diff No results for input(s): WBC, RBC, HGB, HCT, PLT, GRANS, LYMPH, EOS, RETIC, HGBEXT, HCTEXT, PLTEXT in the last 72 hours. Coagulation No results for input(s): PTP, INR, APTT in the last 72 hours. No lab exists for component: INREXT    Liver Enzymes No results for input(s): TP, ALB, TBIL, AP, SGOT, GPT in the last 72 hours. No lab exists for component: DBIL       Assessment/Plan: S/P  laparoscopic gastric bypass surgery - doing well without any issues    1.  Proceed with D/C

## 2017-09-27 NOTE — OP NOTES
[address] [place] [address]  OPERATIVE REPORT    Name:  Ney Rodriguez  MR#:  [id number]  :  1985  Account #:  [de-identified]  Date of Adm:  2017  Date of Surgery:  2017      PREOPERATIVE DIAGNOSIS: The patient status post laparoscopic  sleeve gastrectomy with massively enlarged proximal gastric pouch  and chronic reflux disease, in addition to morbid obesity. POSTOPERATIVE DIAGNOSIS: The patient status post laparoscopic  sleeve gastrectomy with massively enlarged proximal gastric pouch  and chronic reflux disease, in addition to morbid obesity with evidence  of prior perforation of fundus of sleeve back to splenic hilum. PROCEDURES PERFORMED:  1. Laparoscopic lysis of adhesions of approximately 90 minutes'  duration. 2. Conversion of laparoscopic sleeve gastrectomy through  laparoscopic gastric bypass procedure with a 150-cm Kori limb  bypass in an antecolic antegastric fashion. 3. Partial gastrectomy. 4. Wedge liver biopsy. 5. Endoscopy with biopsy. SURGEON: Suzie Villalobos MD    ASSISTANT: Jp Ellison PA-C    ANESTHESIA: General endotracheal.    COMPLICATIONS: None. ESTIMATED BLOOD LOSS: Scant. SPECIMENS REMOVED: gastric resection, liver biopsy, endoscopy biopsy    FINDINGS:  1. The patient noted to have significant intraabdominal adhesions due  to prior laparoscopic sleeve surgery. 2. The patient had a massively enlarged proximal gastric pouch with  almost all of the fundus retained. 3. The patient had evidence of a prior perforation of fundic portion of  sleeve gastrectomy in apical most staple line with complete  involvement of splenic hilum. 4. Fatty infiltration of the liver. STATEMENT OF MEDICAL NECESSITY: The patient is a prior patient  who had a laparoscopic sleeve gastrectomy performed at [facility] several years ago. Immediately  postoperatively, the patient suffered from several things.  The first being  some chronic reflux symptoms and almost immediately she noted very  poor weight loss with no sensation of fullness at all. The patient  ultimately was lost to followup in the [facility] system since their  program closed down. She was able find us over at [facility] and she consulted me for evaluation of her symptomatology  and her persistent morbid obesity. We performed an upper GI on the  patient and upper the GI showed a fairly normal mid to distal sleeve,  but she had almost complete retained fundus of her stomach. An  endoscopy had been performed, which confirmed the same. I  discussed with her this finding. The current recommendation by most  experts in the field of bariatrics is that the best course of action would  be conversion to a gastric bypass procedure and not consider  resleeving her prior operation. She, at this juncture, does wish to  proceed with gastric bypass procedure, understanding the risk of the  operation as it pertains to  her procedure and the fact that it is a  Conversion procedure. OPERATIVE PROCEDURE: The patient was brought to the operating  room, placed on the table in supine position. Some general anesthesia  was administered without any difficulty. The abdomen was prepped  and draped in the usual sterile fashion. Using a 15 blade, a 1 cm  incision made just to the left of the umbilicus. Veress needle approach  was used to gain access to the peritoneal cavity, which was then  insufflated. The Visi-Port was then placed at that site, then 4 additional  trocars placed in the usual U-shaped configuration. On entering the  abdomen, the first interesting thing was that the prior surgeon had  completely mobilized the falciform ligament off of the  the diaphragm. This made her liver essentially  free-floating within her abdomen, it was massively enlarged due to fatty  infiltration. I then placed a Hazel tractor.  I was able to elevate the  liver and at this juncture, the patient had classic adhesions consistent  with the prior sleeve. I began to free up the anterior abdominal wall first  using the hook Bovie cautery. I then freed up the subhepatic space on  the left that I began at the prepyloric region where the sleeve had  begun and I began a dissection using the Harmonic scalpel. It should  be noted that the prior surgeon had violated the transverse  mesocolon, such that the small bowel was completely visible  throughout the mesocolon. I knew I would not be able to close this. Therefore, I left it widely open. Then, in an exceptionally arduous  fashion, moved cephalad towards the midportion of the sleeve, freeing  off the gastrocolic omentum off of the sleeve. It was at this point that I  noted that the sleeve turned extremely to the left upper quadrant and  the sleeve itself was completely adherent to the entire hilum of the  spleen and the spleen was massively enlarged due to her  hepatomegaly. There was no clear evidence of any portal  hypertension, but the sleeve was involved in the entire hilum of the  spleen from its superior portion of the lower pole all the way to the  superior portion of the upper pole. The spleen itself was completely  involved with the completely retained fundus and it was evident that  there had been probably contained perforation from the superior most  aspect of the staple line. I turned my attention to the mobilization of the  gastroesophageal junction. I was able to enter a small space between  the retained fundus and the angle of His and working from both the  superior and inferior aspects of the retained fundus. In an exceptionally  arduous fashion, I continued to dissect along the retained fundus and  the splenic hilum.  After about an hour's worth of dissection and lysis of  adhesions, I reached a point where it was clear that there would be no  way to separate the fundus of the stomach off of the splenic hilum due  to its involvement and I elected to simply come across the stomach at  that point, leaving some of the mucosa within the splenic hilum. I did so  using the Harmonic scalpel, and I was able to finally, after about 90  minutes worth of dissection, bring the retained fundus of the stomach  off the splenic hilum. Of course, I was left with an enterotomy which  was about 3 cm in length and I used a stapling device to close this  gastrotomy. At this juncture, what I could ascertain was that the spleen  was massively enlarged at its mid portion, likely was involved with this  contained perforation. I irrigated the abdomen at this point. The plan was at the  termination of the procedure, to completely burn the mucosa using the  Bovie cautery and then I used some Evicel in that location and Surgicel  SNoW. I, at this juncture, had the sleeve completely mobilized, placed  a Baker's tube transorally in the stomach, inflating it to 20 mL of saline  solution, I pulled it back towards the gastroesophageal junction. At this  juncture, I then dissected along the lesser curvature of the stomach  and the retrogastric space. It was at this point that I knew I would be  able to convert her to a gastric bypass, so I did. Once I had achieved  this, I then turned my attention to the ligament of Treitz. I measured an  area approximately 30 to 40 cm distal to ligament of Treitz. I transected  the small bowel using the Endo CHEO stapler with white reloads. Then, I  mobilized the mesentery of the small bowel using 2-1/2 firings of the  Endo CHEO stapler, which allowed for excellent mobilization to the upper  abdominal region. I then measured off a 150-cm Kori limb bypass. I  placed it in a side-to-side fashion with the afferent limb, placing stay  sutures in 2 limbs of the bowel. I then created enterotomies in 2 limbs  of the bowel. I placed the stapling device intraluminally. I then closed it  and fired it, creating the linear anastomosis.  With the anastomosis  being hemostatic, the free margin of the enterotomy was  reapproximated using #2 Ethibond suture and these sutures were then  held up to facilitate closure using the stapling device. I then closed the  mesenteric defect at that site using running #2 Ethibond suture. The  Kori limb passed nicely over the transverse colon to the upper  abdomen with no tension at all. I then, at this time period, had removed  the Baker's tube, I then created a gastrotomy using the lateral portion  of the distal sleeve where there was some retained antrum and placed  the cap of a 21 ILS stapler transabdominally. It was guided through the  gastrotomy out through the anterior gastric pouch in the area I had  marked earlier using a flamingo grasper and Prolene suture attached  to the cap. After retrieving the cap, a pursestring suture was then  placed at the base and tied securely. I then created the pouch using  the Valley stapler with gold reloads. I transected the proximal sleeve  just under the cap which I had placed in the lumen of the stomach and  this was completely hemostatic at this point. I then closed the lateral  gastrotomy using the stapling device. Once this was all achieved, the Kori limb  was then brought in an antecolic antegastric fashion. It reached nicely  to the level of the pouch and no tension at all. At this juncture, I then  opened free end using Harmonic scalpel. I guided the circular stapling  device transabdominally and guided it through the enterotomy which I  had placed in the Kori limb and deployed the sphere through the  anterior mesenteric border of bowel. I then attached the cap, closed  and fired, creating the circular anastomosis. I then trimmed free the  Kori limb using the Valley stapler. I then oversewed the anastomosis  and there were 2 good tissue rings noted within the stapling device. We then tested the pouch using dilute methylene blue. It was noted to  be completely watertight.  I then placed Evicel in the entire upper  abdomen along all staple lines. It should be noted that I then used the  Bovie cautery to completely burn all of the mucosa which was left  within the splenic hilum, taking care not to affect the spleen itself. Evicel was placed at that site also and SNoW was placed along with  some Surgicel in the upper abdomen. All areas were hemostatic. The  liver retractor was then removed. I did biopsy the left lobe of the liver  using wedge technique, submitted to pathology for permanent section. All areas were once again hemostatic. A KASSIDY drain was placed in the  upper abdomen and brought out via the left upper quadrant incision. All  trocars were then removed under direct visualization. I closed the left  lower quadrant trocar site using a transabdominal 0 PDS suture. It  should be noted that after placing the stapler cap in the proximal  pouch, I had to resect all of the retained fundus and I did so using the  [place] stapler. This was done using gold reloads and that  gastrectomy specimen was submitted to Pathology for permanent  section. Once again, I then turned my attention to the head of the  table, performed endoscopy on the pateint, the patient's pouch was  noted to be completely viable. The Kori limb was viable. I did biopsy  the proximal pouch using the cold biopsy forceps and submitted to  pathology for   assessment. After removing all trocars, the skin  incisions were closed using 4-0 subcuticular Monocryl and Steri-Strips  and sterile dressings were applied. The patient tolerated the procedure  well.         Dea Abdul MD    AT / CD  D:  09/26/2017   20:53  T:  09/27/2017   07:29  Job #:  [id number]

## 2017-09-27 NOTE — DISCHARGE INSTRUCTIONS
[facility]  Jeff Kelly. Jake Dang M.D., F.A.C.S.  [facility]., [address] [place] [address], [address] [place] [address]  Office: [phone]    Fax:  [phone]    Discharge Instruction for Gastric Bypass / Sleeve Gastrectomy Patients    Diet:   Continue with the liquid diet until you are seen in the office. Make sure you sip fluids all day. Goal for total fluid intake is a minimum of 64 ounces per day.  Aim for  Grams of protein every day. Activity:   Walk a minimum of 30 minutes every day. Rest when you are tired.  You may shower. No baths, hot tubs or swimming.  You may climb stairs. Take your time.  No lifting more than 10-15 lbs.  If you feel discomfort during an activity, rest.   Do not drive for 1 week or until you are off of all narcotics. Wound Care:   Clean incisions with soap and water when in the shower. Pat dry.  Leave steri-strips on until they fall off.  A small amount of drainage may be present from the incisions. Contact the office if you notice an increase in drainage, an odor, increased redness, or fever > 100.5. Medications:   You should have received a prescription for pain medication and Prilosec prior to surgery, if not, notify Dr. Yeny Bonds team.  Ellyn Philippe For upset stomach you may take over the counter medications such as Maalox, Mylanta, or Pepto Bismol.  Gas X works very well for bloating when eating or drinking.  You may take Tylenol, do not exceed 4,000mg in one day. Percocet has Tylenol in it, you will need to consider this when taking Tylenol and Percocet together.  Non-aspirin based arthritis medications may be resumed.  Take a childrens or adult chewable multivitamin.  Milk of Magnesia will help with constipation.  It is fine to take your usual home medications. Blood pressure medications should be continued after surgery. Diabetic medications can frequently be reduced very quickly after surgery.   Diabetics should continue to monitor blood sugars frequently after surgery and contact the prescribing physician for any questions. Follow-Up Appointment:   If you do not already have a follow-up appointment scheduled, contact the office in the next few days to obtain one. It is usually scheduled for 10-14 days after your surgery date. Office phone number:  [phone].  Call our office if you have questions, concerns or any worsening of condition. If you are not able to reach us, go to your primary care provider or the Emergency Department.

## 2017-09-27 NOTE — PROGRESS NOTES
Dual AVS reviewed with Jia RN. All medications reviewed individually with patient. Opportunities for questions and concerns provided. Patient discharged via (mode of transport ie. Car, ambulance or air transport) car  Patient's arm band appropriately discarded.

## 2017-09-27 NOTE — DISCHARGE SUMMARY
Discharge Summary    Patient: Marta Topete               Sex: female          DOA: 9/26/2017         YOB: 1985      Age:  27 y.o.        LOS:  LOS: 1 day                Admit Date: 9/26/2017    Discharge Date: 9/27/2017    Admission Diagnoses: [address] SURGERY,POST BARIATRIC SURGERY  Morbid obesity with BMI of 40.0-44.9, [facility])    Discharge Diagnoses:    Problem List as of 9/27/2017  Date Reviewed: 9/18/2017          Codes Class Noted - Resolved    Morbid obesity with BMI of 40.0-44.9, [facility]) ICD-10-CM: E66.01, Z68.41  ICD-9-CM: 278.01, V85.41  9/26/2017 - Present        Morbid obesity with body mass index of 40.0-49.9 ([facility]) ICD-10-CM: E66.01  ICD-9-CM: 278.01  Unknown - Present        Status post bariatric surgery ICD-10-CM: Z98.84  ICD-9-CM: V45.86  Unknown - Present        Intestinal malabsorption ICD-10-CM: K90.9  ICD-9-CM: 579.9  Unknown - Present        Nausea & vomiting ICD-10-CM: R11.2  ICD-9-CM: 787.01  Unknown - Present        Incisional hernia ICD-10-CM: K43.2  ICD-9-CM: 553.21  Unknown - Present              Discharge Condition: Good    Hospital Course: The patient underwent  laparoscopic sleeve gastrectomy revision to Laparoscopic Gastric Bypass  on 9/26/2017. The patient tolerated the procedure well. Vital signs remained stable and the patient was transferred to  3rd floor surgical unit without complications. The patient remained stable throughout the first night post operatively with stable vital signs and adequate urine output and pain control. Pain was controlled with Dilaudid IV and IV Tylenol . The patient on the first morning post operative was transferred to the radiology suite where they underwent a gastrograffin UGI study which showed no evidence of a leak or stricture. The drain was discontinued on POD # 1 and the patient was started on a bariatric liquid diet with protein shakes.  The patient progressed throughout the day and was ambulating well and tolerating their diet. They were therefore discharged home with instructions to notify me with any issues that may arise. Significant Diagnostic Studies:   No results for input(s): HGB, HGBEXT in the last 72 hours. No results for input(s): HCT, HCTEXT in the last 72 hours. Current Discharge Medication List      CONTINUE these medications which have NOT CHANGED    Details   oxyCODONE-acetaminophen (PERCOCET) 5-325 mg per tablet Take 1 Tab by mouth every four (4) hours as needed for Pain. Max Daily Amount: 6 Tabs. Qty: 30 Tab, Refills: 0      Omeprazole delayed release (PRILOSEC D/R) 20 mg tablet Take 1 Tab by mouth daily. OTC  Qty: 30 Tab, Refills: 1         STOP taking these medications       Cholecalciferol, Vitamin D3, 50,000 unit cap Comments:   Reason for Stopping:               Activity: activity as tolerated with no heavy lifting of greater than 20 pounds. No anti- inflammatory medications. Use stool softeners at home as needed while taking pain medications since they are constipating. Diet: Bariatric liquid diet    Wound Care: Keep wound clean and dry, Reinforce dressing PRN and ice to area for comfort. Do not get wound wet for 2 days.     Follow-up: 14 days with Dr Russell Guzman M.D

## 2017-09-27 NOTE — PROGRESS NOTES
Bariatric Surgery                POD #1    Visit Vitals    /80 (BP 1 Location: Right arm, BP Patient Position: At rest)    Pulse 97    Temp 98.4 °F (36.9 °C)    Resp 18    Ht 5' 3\" (1.6 m)    Wt 115.4 kg (254 lb 6 oz)    LMP 08/16/2017    SpO2 97%    BMI 45.06 kg/m2     Patient has minimal complaints of pain, minimal nausea noted     Exam:  Appears well in no distress  Lungs- clear bilaterally  Abd - soft, incisions look good without erythema           KASSIDY with minimal serosanguinous output  Extremities- no new edema or swelling    UGI - pending    Data Review:    Labs: Results:       Chemistry No results for input(s): GLU, NA, K, CL, CO2, BUN, CREA, CA, AGAP, BUCR, TBIL, GPT, AP, TP, ALB, GLOB, AGRAT in the last 72 hours. CBC w/Diff No results for input(s): WBC, RBC, HGB, HCT, PLT, GRANS, LYMPH, EOS, RETIC, HGBEXT, HCTEXT, PLTEXT in the last 72 hours. Coagulation No results for input(s): PTP, INR, APTT in the last 72 hours. No lab exists for component: INREXT    Liver Enzymes No results for input(s): TP, ALB, TBIL, AP, SGOT, GPT in the last 72 hours. No lab exists for component: DBIL       Assessment/Plan: S/P  Laparoscopic gastric sleeve revision to  laparoscopic gastric bypass surgery  - doing well without any issues    Orders are pending until UGI study shows normal anatomy. 1.Start bariatric diet and protein shakes  2. D/C IV pain meds and start PO pain meds  3. D/C KASSIDY drain  4. Likely PM D/C if continues to be okay and tolerates PO

## 2017-09-27 NOTE — NURSE NAVIGATOR
Doing well. UGI results confirmed. Tolerating liquid diet and protein shakes. Denies nausea. Pain - 1.  KASSIDY output WNL. Adequate urine output. Encouraged to continue to cough, deep breathe and use spirometer every hour while awake. Encouraged to be out of bed ambulating no less than 3 times today. Dressings removed. Incisions dry and intact. Discussed diet and activities after discharge.

## 2017-09-27 NOTE — ROUTINE PROCESS
Bedside and Verbal shift change report given to DERRICK Landis RN (oncoming nurse) by Hang Gonzalez RN  (offgoing nurse). Report included the following information SBAR, Kardex, Intake/Output and MAR.

## 2017-09-27 NOTE — PROGRESS NOTES
2134 Pt ambulated in hallway x2 laps. Tolerated well. Burping, but denies passing gas. Performed IS, tolerated 750cc. 0011 Pt ambulating in hallway x3 laps around nurse station. Tolerating well.     0327 Pt ambulating in hallway x3 laps. Tolerated well. SHIFT SUMMARY: Syed catheter removed at 0630. Assisted to use bath wipes this AM. Continues to burp. Pt is very cooperative.

## 2017-10-02 ENCOUNTER — TELEPHONE (OUTPATIENT)
Dept: SURGERY | Age: 32
End: 2017-10-02

## 2017-10-02 ENCOUNTER — NURSE NAVIGATOR (OUTPATIENT)
Dept: SURGERY | Age: 32
End: 2017-10-02

## 2017-10-02 RX ORDER — TRAMADOL HYDROCHLORIDE 50 MG/1
50 TABLET ORAL
Qty: 20 TAB | Refills: 0 | Status: SHIPPED | OUTPATIENT
Start: 2017-10-02 | End: 2017-10-25

## 2017-10-02 NOTE — TELEPHONE ENCOUNTER
Yesica please advise:    Patient requested refill on Rx pain medication. Advised patient that alternative med likely to be prescribed. Patient verbalized understanding.

## 2017-10-04 ENCOUNTER — APPOINTMENT (OUTPATIENT)
Dept: CT IMAGING | Age: 32
End: 2017-10-04
Attending: PHYSICIAN ASSISTANT
Payer: COMMERCIAL

## 2017-10-04 ENCOUNTER — TELEPHONE (OUTPATIENT)
Dept: SURGERY | Age: 32
End: 2017-10-04

## 2017-10-04 ENCOUNTER — HOSPITAL ENCOUNTER (EMERGENCY)
Age: 32
Discharge: HOME | End: 2017-10-05
Attending: EMERGENCY MEDICINE
Payer: COMMERCIAL

## 2017-10-04 DIAGNOSIS — N39.0 URINARY TRACT INFECTION WITHOUT HEMATURIA, SITE UNSPECIFIED: ICD-10-CM

## 2017-10-04 DIAGNOSIS — J18.9 PNEUMONIA OF LEFT LOWER LOBE DUE TO INFECTIOUS ORGANISM: ICD-10-CM

## 2017-10-04 DIAGNOSIS — E86.0 DEHYDRATION: Primary | ICD-10-CM

## 2017-10-04 LAB
ALBUMIN SERPL-MCNC: 2.8 G/DL (ref 3.4–5)
ALBUMIN/GLOB SERPL: 0.6 {RATIO} (ref 0.8–1.7)
ALP SERPL-CCNC: 105 U/L (ref 45–117)
ALT SERPL-CCNC: 32 U/L (ref 13–56)
ANION GAP SERPL CALC-SCNC: 15 MMOL/L (ref 3–18)
APPEARANCE UR: CLEAR
APTT PPP: 37 SEC (ref 23–36.4)
AST SERPL-CCNC: 27 U/L (ref 15–37)
BACTERIA URNS QL MICRO: ABNORMAL /HPF
BASOPHILS # BLD: 0 K/UL (ref 0–0.06)
BASOPHILS NFR BLD: 0 % (ref 0–2)
BILIRUB SERPL-MCNC: 0.4 MG/DL (ref 0.2–1)
BILIRUB UR QL: NEGATIVE
BUN SERPL-MCNC: 4 MG/DL (ref 7–18)
BUN/CREAT SERPL: 12 (ref 12–20)
CALCIUM SERPL-MCNC: 8.9 MG/DL (ref 8.5–10.1)
CHLORIDE SERPL-SCNC: 102 MMOL/L (ref 100–108)
CO2 SERPL-SCNC: 21 MMOL/L (ref 21–32)
COLOR UR: ABNORMAL
CREAT SERPL-MCNC: 0.33 MG/DL (ref 0.6–1.3)
DIFFERENTIAL METHOD BLD: ABNORMAL
EOSINOPHIL # BLD: 0.1 K/UL (ref 0–0.4)
EOSINOPHIL NFR BLD: 1 % (ref 0–5)
EPITH CASTS URNS QL MICRO: ABNORMAL /LPF (ref 0–5)
ERYTHROCYTE [DISTWIDTH] IN BLOOD BY AUTOMATED COUNT: 14.1 % (ref 11.6–14.5)
GLOBULIN SER CALC-MCNC: 4.4 G/DL (ref 2–4)
GLUCOSE SERPL-MCNC: 76 MG/DL (ref 74–99)
GLUCOSE UR STRIP.AUTO-MCNC: NEGATIVE MG/DL
HCT VFR BLD AUTO: 33.6 % (ref 35–45)
HGB BLD-MCNC: 10.8 G/DL (ref 12–16)
HGB UR QL STRIP: ABNORMAL
INR PPP: 1.2 (ref 0.8–1.2)
KETONES UR QL STRIP.AUTO: 80 MG/DL
LACTATE BLD-SCNC: 0.6 MMOL/L (ref 0.4–2)
LEUKOCYTE ESTERASE UR QL STRIP.AUTO: ABNORMAL
LYMPHOCYTES # BLD: 1.5 K/UL (ref 0.9–3.6)
LYMPHOCYTES NFR BLD: 13 % (ref 21–52)
MCH RBC QN AUTO: 28.4 PG (ref 24–34)
MCHC RBC AUTO-ENTMCNC: 32.1 G/DL (ref 31–37)
MCV RBC AUTO: 88.4 FL (ref 74–97)
MONOCYTES # BLD: 1.3 K/UL (ref 0.05–1.2)
MONOCYTES NFR BLD: 11 % (ref 3–10)
NEUTS SEG # BLD: 8.8 K/UL (ref 1.8–8)
NEUTS SEG NFR BLD: 75 % (ref 40–73)
NITRITE UR QL STRIP.AUTO: NEGATIVE
PH UR STRIP: 6 [PH] (ref 5–8)
PLATELET # BLD AUTO: 418 K/UL (ref 135–420)
PMV BLD AUTO: 10.6 FL (ref 9.2–11.8)
POTASSIUM SERPL-SCNC: 4.5 MMOL/L (ref 3.5–5.5)
PROT SERPL-MCNC: 7.2 G/DL (ref 6.4–8.2)
PROT UR STRIP-MCNC: ABNORMAL MG/DL
PROTHROMBIN TIME: 14.2 SEC (ref 11.5–15.2)
RBC # BLD AUTO: 3.8 M/UL (ref 4.2–5.3)
RBC #/AREA URNS HPF: ABNORMAL /HPF (ref 0–5)
SODIUM SERPL-SCNC: 138 MMOL/L (ref 136–145)
SP GR UR REFRACTOMETRY: >1.03 (ref 1–1.03)
TROPONIN I SERPL-MCNC: 0.06 NG/ML (ref 0–0.06)
UROBILINOGEN UR QL STRIP.AUTO: 1 EU/DL (ref 0.2–1)
WBC # BLD AUTO: 11.7 K/UL (ref 4.6–13.2)
WBC URNS QL MICRO: ABNORMAL /HPF (ref 0–4)

## 2017-10-04 PROCEDURE — 96361 HYDRATE IV INFUSION ADD-ON: CPT

## 2017-10-04 PROCEDURE — 87086 URINE CULTURE/COLONY COUNT: CPT | Performed by: EMERGENCY MEDICINE

## 2017-10-04 PROCEDURE — 74011250636 HC RX REV CODE- 250/636: Performed by: EMERGENCY MEDICINE

## 2017-10-04 PROCEDURE — 96365 THER/PROPH/DIAG IV INF INIT: CPT

## 2017-10-04 PROCEDURE — 93005 ELECTROCARDIOGRAM TRACING: CPT

## 2017-10-04 PROCEDURE — 74011636320 HC RX REV CODE- 636/320: Performed by: EMERGENCY MEDICINE

## 2017-10-04 PROCEDURE — 74011000258 HC RX REV CODE- 258: Performed by: EMERGENCY MEDICINE

## 2017-10-04 PROCEDURE — 81001 URINALYSIS AUTO W/SCOPE: CPT | Performed by: EMERGENCY MEDICINE

## 2017-10-04 PROCEDURE — 85610 PROTHROMBIN TIME: CPT | Performed by: PHYSICIAN ASSISTANT

## 2017-10-04 PROCEDURE — 80053 COMPREHEN METABOLIC PANEL: CPT | Performed by: PHYSICIAN ASSISTANT

## 2017-10-04 PROCEDURE — 83605 ASSAY OF LACTIC ACID: CPT

## 2017-10-04 PROCEDURE — 85730 THROMBOPLASTIN TIME PARTIAL: CPT | Performed by: PHYSICIAN ASSISTANT

## 2017-10-04 PROCEDURE — 99285 EMERGENCY DEPT VISIT HI MDM: CPT

## 2017-10-04 PROCEDURE — 85025 COMPLETE CBC W/AUTO DIFF WBC: CPT | Performed by: PHYSICIAN ASSISTANT

## 2017-10-04 PROCEDURE — 71275 CT ANGIOGRAPHY CHEST: CPT

## 2017-10-04 PROCEDURE — 84484 ASSAY OF TROPONIN QUANT: CPT | Performed by: PHYSICIAN ASSISTANT

## 2017-10-04 RX ORDER — SODIUM CHLORIDE 9 MG/ML
1000 INJECTION, SOLUTION INTRAVENOUS ONCE
Status: COMPLETED | OUTPATIENT
Start: 2017-10-04 | End: 2017-10-05

## 2017-10-04 RX ORDER — SODIUM CHLORIDE 9 MG/ML
1000 INJECTION, SOLUTION INTRAVENOUS ONCE
Status: COMPLETED | OUTPATIENT
Start: 2017-10-04 | End: 2017-10-04

## 2017-10-04 RX ADMIN — IOPAMIDOL 90 ML: 755 INJECTION, SOLUTION INTRAVENOUS at 18:47

## 2017-10-04 RX ADMIN — SODIUM CHLORIDE 1000 ML: 900 INJECTION, SOLUTION INTRAVENOUS at 19:05

## 2017-10-04 RX ADMIN — SODIUM CHLORIDE 1000 ML: 900 INJECTION, SOLUTION INTRAVENOUS at 22:32

## 2017-10-04 RX ADMIN — SODIUM CHLORIDE 1000 ML: 900 INJECTION, SOLUTION INTRAVENOUS at 20:48

## 2017-10-04 RX ADMIN — CEFTRIAXONE 1 G: 1 INJECTION, POWDER, FOR SOLUTION INTRAMUSCULAR; INTRAVENOUS at 23:02

## 2017-10-04 NOTE — ED PROVIDER NOTES
HPI Comments: Sheri Tiwari is a 27 y.o. Female 8 days s/p bariatric surgery who presents to the ED c/o palpitations onset this morning. Patient states she woke up this morning just generally not feeling well and had onset of palpitations. Patient reports a history of palpitations that she had worked up by a cardiologist approximately 10 years ago at [facility] in [place], [place] to include Holter study. She states she was told they were benign but she has never experienced them for as long as she has today. She reports she tends to have palpitations when on her menses which she is currently on. Pt also reporting chest pain that she describes as has like pains in her chest and shoulders. She reports last night she had diffuse abdominal cramping that she describes as gas pains. Patient also reports increased belching since surgery. Patient not on hormonal therapy, no recent travel, no prior history of DVT/PE. No SOB. Patient received lovenox in the hospital s/p bariatric surgery. The history is provided by the patient. Past Medical History:   Diagnosis Date    Incisional hernia     Intestinal malabsorption     Morbid obesity with body mass index of 40.0-49.9 ([facility])     Status post bariatric surgery 2012    sleeve resection / kirstin Anderson       Past Surgical History:   Procedure Laterality Date    HX  SECTION      X 3    HX GI  2012    sleeve resection / a salzberg    HX ORTHOPAEDIC      scoliosis correction surgery X 2    HX TONSIL AND ADENOIDECTOMY      HX TUBAL LIGATION           No family history on file. Social History     Social History    Marital status: SINGLE     Spouse name: N/A    Number of children: N/A    Years of education: N/A     Occupational History    Not on file.      Social History Main Topics    Smoking status: Never Smoker    Smokeless tobacco: Never Used    Alcohol use No    Drug use: No    Sexual activity: Yes     Partners: Male Other Topics Concern    Not on file     Social History Narrative         ALLERGIES: Review of patient's allergies indicates no known allergies. Review of Systems   Constitutional: Negative for chills and fever. Respiratory: Negative for shortness of breath. Cardiovascular: Positive for chest pain and palpitations. Negative for leg swelling. Gastrointestinal: Positive for abdominal pain. Negative for abdominal distention, constipation, diarrhea, nausea and vomiting. Genitourinary: Positive for vaginal bleeding. Negative for dysuria and urgency. There were no vitals filed for this visit. Physical Exam   Constitutional: She is oriented to person, place, and time. She appears well-developed and well-nourished. HENT:   Head: Normocephalic and atraumatic. Right Ear: External ear normal.   Left Ear: External ear normal.   Eyes: Conjunctivae are normal.   Neck: Normal range of motion. Cardiovascular:   Regular rhythm, tachycardiac. During abdominal examination patient HR up to 170's on the monitor. No change with vagal maneuvers. Repeat EKG obtained at that time. Pulmonary/Chest: Effort normal and breath sounds normal. No respiratory distress. She has no wheezes. She exhibits no tenderness. Abdominal: Soft. Bowel sounds are normal. She exhibits no distension. There is no rebound and no guarding. Mild diffuse tenderness to palpation   Neurological: She is alert and oriented to person, place, and time. Skin: Skin is warm and dry. Psychiatric: She has a normal mood and affect. Her behavior is normal.   Nursing note and vitals reviewed.        MDM  Number of Diagnoses or Management Options  Diagnosis management comments: DDx: PE, dehydration, fever, cardiac etiology, anxiety/panic disorder, medication induced, sepsis, post-op fever, PAC      Patient is 8 day post-op arrived tachycardiac and tachypneic; during initial examination patient had a brief run of SVT at 170, approximately 3 minutes in duration, that initially had no response with vagal maneuver but did break spontaneously. Since that time patient has remained tachycardiac around 110s. Patient symptoms most concerning for PE - Well's criteria 6.0 moderate risk therefore the decision was made to forego D-dimer and proceed with CTA. Delay in getting patient to CTA as nurses where unable to get 20g IV access for CTA. Pt currently in CTA now. 8:16 PM  Patient back from CTA. Dr. Kenny Dinero, oncoming ED attending, made aware of the patient, presenting symptoms and all available labs at this time. He is aware CTA results are pending and will assume care of the patient. Amount and/or Complexity of Data Reviewed  Clinical lab tests: ordered and reviewed  Discuss the patient with other providers: yes (Case discussed with Dr. Hortencia Castillo and Dr. Kenny Dinero, both ED attendings.    )      ED Course       Procedures

## 2017-10-04 NOTE — TELEPHONE ENCOUNTER
Ms. Lawrence Victor called in regards to feeling dehydrated. I transferred her to [facility]. Patient called right back and told me no one answers and if I knew what she needed to do. I told patient she needs to speak with a nurse. I put patient on hold and went and spoke with bharati. End result: patient said she will have her mom take her to the Emergency Room.

## 2017-10-04 NOTE — TELEPHONE ENCOUNTER
Dr. Fidelina Delgado please be advised:    Called patient per report from front office staff. Pt is enroute to [facility].  Pt. c/o \"heart pounding, dry mouth, excessive tiredness and thirst.

## 2017-10-05 VITALS
TEMPERATURE: 98.5 F | WEIGHT: 249 LBS | BODY MASS INDEX: 44.12 KG/M2 | RESPIRATION RATE: 12 BRPM | SYSTOLIC BLOOD PRESSURE: 125 MMHG | HEIGHT: 63 IN | DIASTOLIC BLOOD PRESSURE: 78 MMHG | OXYGEN SATURATION: 99 % | HEART RATE: 107 BPM

## 2017-10-05 LAB
ATRIAL RATE: 121 BPM
ATRIAL RATE: 123 BPM
CALCULATED P AXIS, ECG09: 54 DEGREES
CALCULATED P AXIS, ECG09: 56 DEGREES
CALCULATED R AXIS, ECG10: 29 DEGREES
CALCULATED R AXIS, ECG10: 31 DEGREES
CALCULATED T AXIS, ECG11: 18 DEGREES
CALCULATED T AXIS, ECG11: 23 DEGREES
DIAGNOSIS, 93000: NORMAL
DIAGNOSIS, 93000: NORMAL
P-R INTERVAL, ECG05: 126 MS
P-R INTERVAL, ECG05: 126 MS
Q-T INTERVAL, ECG07: 290 MS
Q-T INTERVAL, ECG07: 290 MS
QRS DURATION, ECG06: 84 MS
QRS DURATION, ECG06: 86 MS
QTC CALCULATION (BEZET), ECG08: 411 MS
QTC CALCULATION (BEZET), ECG08: 415 MS
VENTRICULAR RATE, ECG03: 121 BPM
VENTRICULAR RATE, ECG03: 123 BPM

## 2017-10-05 RX ORDER — SULFAMETHOXAZOLE AND TRIMETHOPRIM 800; 160 MG/1; MG/1
1 TABLET ORAL 2 TIMES DAILY
Qty: 14 TAB | Refills: 0 | Status: SHIPPED | OUTPATIENT
Start: 2017-10-05 | End: 2017-10-08 | Stop reason: CLARIF

## 2017-10-05 RX ORDER — AMOXICILLIN 500 MG/1
500 TABLET, FILM COATED ORAL 3 TIMES DAILY
Qty: 21 TAB | Refills: 0 | Status: SHIPPED | OUTPATIENT
Start: 2017-10-05 | End: 2017-10-08

## 2017-10-05 RX ORDER — ONDANSETRON 4 MG/1
4 TABLET, ORALLY DISINTEGRATING ORAL
Qty: 14 TAB | Refills: 0 | Status: SHIPPED | OUTPATIENT
Start: 2017-10-05 | End: 2017-11-14

## 2017-10-05 RX ORDER — HYDROCODONE BITARTRATE AND ACETAMINOPHEN 5; 325 MG/1; MG/1
1-2 TABLET ORAL
Qty: 16 TAB | Refills: 0 | Status: SHIPPED | OUTPATIENT
Start: 2017-10-05 | End: 2017-10-25

## 2017-10-05 NOTE — DISCHARGE INSTRUCTIONS
Return for pain, fever, any shortness of breath, vomiting, decreased fluid intake, weakness, numbness, dizziness, or any change or concerns. Dehydration: Care Instructions  Your Care Instructions  Dehydration happens when your body loses too much fluid. This might happen when you do not drink enough water or you lose large amounts of fluids from your body because of diarrhea, vomiting, or sweating. Severe dehydration can be life-threatening. Water and minerals called electrolytes help put your body fluids back in balance. Learn the early signs of fluid loss, and drink more fluids to prevent dehydration. Follow-up care is a key part of your treatment and safety. Be sure to make and go to all appointments, and call your doctor if you are having problems. It's also a good idea to know your test results and keep a list of the medicines you take. How can you care for yourself at home? · To prevent dehydration, drink plenty of fluids, enough so that your urine is light yellow or clear like water. Choose water and other caffeine-free clear liquids until you feel better. If you have kidney, heart, or liver disease and have to limit fluids, talk with your doctor before you increase the amount of fluids you drink. · If you do not feel like eating or drinking, try taking small sips of water, sports drinks, or other rehydration drinks. · Get plenty of rest.  To prevent dehydration  · Add more fluids to your diet and daily routine, unless your doctor has told you not to. · During hot weather, drink more fluids. Drink even more fluids if you exercise a lot. Stay away from drinks with alcohol or caffeine. · Watch for the symptoms of dehydration. These include:  ¨ A dry, sticky mouth. ¨ Dark yellow urine, and not much of it. ¨ Dry and sunken eyes. ¨ Feeling very tired. · Learn what problems can lead to dehydration. These include:  ¨ Diarrhea, fever, and vomiting.   ¨ Any illness with a fever, such as pneumonia or the flu. ¨ Activities that cause heavy sweating, such as endurance races and heavy outdoor work in hot or humid weather. ¨ Alcohol or drug abuse or withdrawal.  ¨ Certain medicines, such as cold and allergy pills (antihistamines), diet pills (diuretics), and laxatives. ¨ Certain diseases, such as diabetes, cancer, and heart or kidney disease. When should you call for help? Call 911 anytime you think you may need emergency care. For example, call if:  · You passed out (lost consciousness). Call your doctor now or seek immediate medical care if:  · You are confused and cannot think clearly. · You are dizzy or lightheaded, or you feel like you may faint. · You have signs of needing more fluids. You have sunken eyes and a dry mouth, and you pass only a little dark urine. · You cannot keep fluids down. Watch closely for changes in your health, and be sure to contact your doctor if:  · You are not making tears. · Your skin is very dry and sags slowly back into place after you pinch it. · Your mouth and eyes are very dry. Where can you learn more? Go to http://wilbertWrite.myloraine.info/. Enter F872 in the search box to learn more about \"Dehydration: Care Instructions. \"  Current as of: March 20, 2017  Content Version: 11.3  © 1841-1283 [employer]. Care instructions adapted under license by [employer] (which disclaims liability or warranty for this information). If you have questions about a medical condition or this instruction, always ask your healthcare professional. [place] [address] any warranty or liability for your use of this information. Pneumonia: Care Instructions  Your Care Instructions    Pneumonia is an infection of the lungs. Most cases are caused by infections from bacteria or viruses. Pneumonia may be mild or very severe. If it is caused by bacteria, you will be treated with antibiotics.  It may take a few weeks to a few months to recover fully from pneumonia, depending on how sick you were and whether your overall health is good. Follow-up care is a key part of your treatment and safety. Be sure to make and go to all appointments, and call your doctor if you are having problems. Its also a good idea to know your test results and keep a list of the medicines you take. How can you care for yourself at home? · Take your antibiotics exactly as directed. Do not stop taking the medicine just because you are feeling better. You need to take the full course of antibiotics. · Take your medicines exactly as prescribed. Call your doctor if you think you are having a problem with your medicine. · Get plenty of rest and sleep. You may feel weak and tired for a while, but your energy level will improve with time. · To prevent dehydration, drink plenty of fluids, enough so that your urine is light yellow or clear like water. Choose water and other caffeine-free clear liquids until you feel better. If you have kidney, heart, or liver disease and have to limit fluids, talk with your doctor before you increase the amount of fluids you drink. · Take care of your cough so you can rest. A cough that brings up mucus from your lungs is common with pneumonia. It is one way your body gets rid of the infection. But if coughing keeps you from resting or causes severe fatigue and chest-wall pain, talk to your doctor. He or she may suggest that you take a medicine to reduce the cough. · Use a vaporizer or humidifier to add moisture to your bedroom. Follow the directions for cleaning the machine. · Do not smoke or allow others to smoke around you. Smoke will make your cough last longer. If you need help quitting, talk to your doctor about stop-smoking programs and medicines. These can increase your chances of quitting for good. · Take an over-the-counter pain medicine, such as acetaminophen (Tylenol), ibuprofen (Advil, Motrin), or naproxen (Aleve).  Read and follow all instructions on the label. · Do not take two or more pain medicines at the same time unless the doctor told you to. Many pain medicines have acetaminophen, which is Tylenol. Too much acetaminophen (Tylenol) can be harmful. · If you were given a spirometer to measure how well your lungs are working, use it as instructed. This can help your doctor tell how your recovery is going. · To prevent pneumonia in the future, talk to your doctor about getting a flu vaccine (once a year) and a pneumococcal vaccine (one time only for most people). When should you call for help? Call 911 anytime you think you may need emergency care. For example, call if:  · You have severe trouble breathing. Call your doctor now or seek immediate medical care if:  · You cough up dark brown or bloody mucus (sputum). · You have new or worse trouble breathing. · You are dizzy or lightheaded, or you feel like you may faint. Watch closely for changes in your health, and be sure to contact your doctor if:  · You have a new or higher fever. · You are coughing more deeply or more often. · You are not getting better after 2 days (48 hours). · You do not get better as expected. Where can you learn more? Go to http://wilbert-loraine.info/. Enter [id number] in the search box to learn more about \"Pneumonia: Care Instructions. \"  Current as of: March 25, 2017  Content Version: 11.3  © 7116-5085 [employer]. Care instructions adapted under license by [employer] (which disclaims liability or warranty for this information). If you have questions about a medical condition or this instruction, always ask your healthcare professional. [place] [address] any warranty or liability for your use of this information.          Urinary Tract Infection in Women: Care Instructions  Your Care Instructions    A urinary tract infection, or UTI, is a general term for an infection anywhere between the kidneys and the urethra (where urine comes out). Most UTIs are bladder infections. They often cause pain or burning when you urinate. UTIs are caused by bacteria and can be cured with antibiotics. Be sure to complete your treatment so that the infection goes away. Follow-up care is a key part of your treatment and safety. Be sure to make and go to all appointments, and call your doctor if you are having problems. It's also a good idea to know your test results and keep a list of the medicines you take. How can you care for yourself at home? · Take your antibiotics as directed. Do not stop taking them just because you feel better. You need to take the full course of antibiotics. · Drink extra water and other fluids for the next day or two. This may help wash out the bacteria that are causing the infection. (If you have kidney, heart, or liver disease and have to limit fluids, talk with your doctor before you increase your fluid intake.)  · Avoid drinks that are carbonated or have caffeine. They can irritate the bladder. · Urinate often. Try to empty your bladder each time. · To relieve pain, take a hot bath or lay a heating pad set on low over your lower belly or genital area. Never go to sleep with a heating pad in place. To prevent UTIs  · Drink plenty of water each day. This helps you urinate often, which clears bacteria from your system. (If you have kidney, heart, or liver disease and have to limit fluids, talk with your doctor before you increase your fluid intake.)  · Urinate when you need to. · Urinate right after you have sex. · Change sanitary pads often. · Avoid douches, bubble baths, feminine hygiene sprays, and other feminine hygiene products that have deodorants. · After going to the bathroom, wipe from front to back. When should you call for help?   Call your doctor now or seek immediate medical care if:  · Symptoms such as fever, chills, nausea, or vomiting get worse or appear for the first time. · You have new pain in your back just below your rib cage. This is called flank pain. · There is new blood or pus in your urine. · You have any problems with your antibiotic medicine. Watch closely for changes in your health, and be sure to contact your doctor if:  · You are not getting better after taking an antibiotic for 2 days. · Your symptoms go away but then come back. Where can you learn more? Go to http://wilbert-loraine.info/. Enter K807 in the search box to learn more about \"Urinary Tract Infection in Women: Care Instructions. \"  Current as of: November 28, 2016  Content Version: 11.3  © 9832-3048 [employer]. Care instructions adapted under license by [employer] (which disclaims liability or warranty for this information). If you have questions about a medical condition or this instruction, always ask your healthcare professional. [address] any warranty or liability for your use of this information.

## 2017-10-06 LAB
BACTERIA SPEC CULT: NORMAL
SERVICE CMNT-IMP: NORMAL

## 2017-10-08 ENCOUNTER — HOSPITAL ENCOUNTER (EMERGENCY)
Age: 32
Discharge: HOME | End: 2017-10-08
Attending: EMERGENCY MEDICINE
Payer: COMMERCIAL

## 2017-10-08 ENCOUNTER — APPOINTMENT (OUTPATIENT)
Dept: CT IMAGING | Age: 32
End: 2017-10-08
Attending: PHYSICIAN ASSISTANT
Payer: COMMERCIAL

## 2017-10-08 VITALS
HEIGHT: 63 IN | TEMPERATURE: 98.1 F | WEIGHT: 247 LBS | DIASTOLIC BLOOD PRESSURE: 64 MMHG | BODY MASS INDEX: 43.77 KG/M2 | SYSTOLIC BLOOD PRESSURE: 109 MMHG | RESPIRATION RATE: 16 BRPM | HEART RATE: 94 BPM | OXYGEN SATURATION: 100 %

## 2017-10-08 DIAGNOSIS — J18.9 COMMUNITY ACQUIRED PNEUMONIA OF LEFT LOWER LOBE OF LUNG: Primary | ICD-10-CM

## 2017-10-08 DIAGNOSIS — E87.6 HYPOKALEMIA: ICD-10-CM

## 2017-10-08 DIAGNOSIS — N30.01 ACUTE CYSTITIS WITH HEMATURIA: ICD-10-CM

## 2017-10-08 DIAGNOSIS — Z98.84 HISTORY OF GASTRIC BYPASS: ICD-10-CM

## 2017-10-08 LAB
ALBUMIN SERPL-MCNC: 2.5 G/DL (ref 3.4–5)
ALBUMIN/GLOB SERPL: 0.4 {RATIO} (ref 0.8–1.7)
ALP SERPL-CCNC: 107 U/L (ref 45–117)
ALT SERPL-CCNC: 16 U/L (ref 13–56)
ANION GAP SERPL CALC-SCNC: 15 MMOL/L (ref 3–18)
APPEARANCE UR: ABNORMAL
AST SERPL-CCNC: 16 U/L (ref 15–37)
BACTERIA URNS QL MICRO: ABNORMAL /HPF
BASOPHILS # BLD: 0 K/UL (ref 0–0.1)
BASOPHILS NFR BLD: 0 % (ref 0–3)
BILIRUB SERPL-MCNC: 0.2 MG/DL (ref 0.2–1)
BILIRUB UR QL: NEGATIVE
BUN SERPL-MCNC: 6 MG/DL (ref 7–18)
BUN/CREAT SERPL: 9 (ref 12–20)
CALCIUM SERPL-MCNC: 9 MG/DL (ref 8.5–10.1)
CHLORIDE SERPL-SCNC: 98 MMOL/L (ref 100–108)
CK MB CFR SERPL CALC: 1.3 % (ref 0–4)
CK MB SERPL-MCNC: 1.3 NG/ML (ref 5–25)
CK SERPL-CCNC: 99 U/L (ref 26–192)
CO2 SERPL-SCNC: 20 MMOL/L (ref 21–32)
COLOR UR: YELLOW
CREAT SERPL-MCNC: 0.64 MG/DL (ref 0.6–1.3)
DIFFERENTIAL METHOD BLD: ABNORMAL
EOSINOPHIL # BLD: 0.1 K/UL (ref 0–0.4)
EOSINOPHIL NFR BLD: 1 % (ref 0–5)
EPITH CASTS URNS QL MICRO: ABNORMAL /LPF (ref 0–5)
ERYTHROCYTE [DISTWIDTH] IN BLOOD BY AUTOMATED COUNT: 14.3 % (ref 11.6–14.5)
GLOBULIN SER CALC-MCNC: 5.6 G/DL (ref 2–4)
GLUCOSE SERPL-MCNC: 94 MG/DL (ref 74–99)
GLUCOSE UR STRIP.AUTO-MCNC: NEGATIVE MG/DL
HCG SERPL QL: NEGATIVE
HCT VFR BLD AUTO: 31.1 % (ref 35–45)
HGB BLD-MCNC: 10.5 G/DL (ref 12–16)
HGB UR QL STRIP: ABNORMAL
KETONES UR QL STRIP.AUTO: >160 MG/DL
LEUKOCYTE ESTERASE UR QL STRIP.AUTO: ABNORMAL
LIPASE SERPL-CCNC: 143 U/L (ref 73–393)
LYMPHOCYTES # BLD: 1.1 K/UL (ref 0.8–3.5)
LYMPHOCYTES NFR BLD: 10 % (ref 20–51)
MAGNESIUM SERPL-MCNC: 1.9 MG/DL (ref 1.6–2.6)
MCH RBC QN AUTO: 28.9 PG (ref 24–34)
MCHC RBC AUTO-ENTMCNC: 33.8 G/DL (ref 31–37)
MCV RBC AUTO: 85.7 FL (ref 74–97)
METAMYELOCYTES NFR BLD MANUAL: 1 %
MONOCYTES # BLD: 0.9 K/UL (ref 0–1)
MONOCYTES NFR BLD: 8 % (ref 2–9)
MUCOUS THREADS URNS QL MICRO: ABNORMAL /LPF
NEUTS BAND NFR BLD MANUAL: 1 % (ref 0–5)
NEUTS SEG # BLD: 8.9 K/UL (ref 1.8–8)
NEUTS SEG NFR BLD: 79 % (ref 42–75)
NITRITE UR QL STRIP.AUTO: NEGATIVE
PH UR STRIP: 6.5 [PH] (ref 5–8)
PLATELET # BLD AUTO: 530 K/UL (ref 135–420)
PLATELET COMMENTS,PCOM: ABNORMAL
PMV BLD AUTO: 9.4 FL (ref 9.2–11.8)
POTASSIUM SERPL-SCNC: 3.4 MMOL/L (ref 3.5–5.5)
PROT SERPL-MCNC: 8.1 G/DL (ref 6.4–8.2)
PROT UR STRIP-MCNC: 30 MG/DL
RBC # BLD AUTO: 3.63 M/UL (ref 4.2–5.3)
RBC #/AREA URNS HPF: ABNORMAL /HPF (ref 0–5)
RBC MORPH BLD: ABNORMAL
SODIUM SERPL-SCNC: 133 MMOL/L (ref 136–145)
SP GR UR REFRACTOMETRY: 1.02 (ref 1–1.03)
TROPONIN I SERPL-MCNC: <0.02 NG/ML (ref 0–0.06)
UROBILINOGEN UR QL STRIP.AUTO: 1 EU/DL (ref 0.2–1)
WBC # BLD AUTO: 11.3 K/UL (ref 4.6–13.2)
WBC MORPH BLD: ABNORMAL
WBC URNS QL MICRO: ABNORMAL /HPF (ref 0–5)

## 2017-10-08 PROCEDURE — 96365 THER/PROPH/DIAG IV INF INIT: CPT

## 2017-10-08 PROCEDURE — 74011250637 HC RX REV CODE- 250/637: Performed by: PHYSICIAN ASSISTANT

## 2017-10-08 PROCEDURE — 96361 HYDRATE IV INFUSION ADD-ON: CPT

## 2017-10-08 PROCEDURE — 74011636320 HC RX REV CODE- 636/320: Performed by: PHYSICIAN ASSISTANT

## 2017-10-08 PROCEDURE — 74176 CT ABD & PELVIS W/O CONTRAST: CPT

## 2017-10-08 PROCEDURE — 96375 TX/PRO/DX INJ NEW DRUG ADDON: CPT

## 2017-10-08 PROCEDURE — 93005 ELECTROCARDIOGRAM TRACING: CPT

## 2017-10-08 PROCEDURE — 83735 ASSAY OF MAGNESIUM: CPT | Performed by: PHYSICIAN ASSISTANT

## 2017-10-08 PROCEDURE — 74011250636 HC RX REV CODE- 250/636: Performed by: PHYSICIAN ASSISTANT

## 2017-10-08 PROCEDURE — 82550 ASSAY OF CK (CPK): CPT | Performed by: PHYSICIAN ASSISTANT

## 2017-10-08 PROCEDURE — 81001 URINALYSIS AUTO W/SCOPE: CPT | Performed by: PHYSICIAN ASSISTANT

## 2017-10-08 PROCEDURE — 87086 URINE CULTURE/COLONY COUNT: CPT | Performed by: PHYSICIAN ASSISTANT

## 2017-10-08 PROCEDURE — 74011000250 HC RX REV CODE- 250: Performed by: PHYSICIAN ASSISTANT

## 2017-10-08 PROCEDURE — 84703 CHORIONIC GONADOTROPIN ASSAY: CPT | Performed by: PHYSICIAN ASSISTANT

## 2017-10-08 PROCEDURE — 83690 ASSAY OF LIPASE: CPT | Performed by: PHYSICIAN ASSISTANT

## 2017-10-08 PROCEDURE — 96376 TX/PRO/DX INJ SAME DRUG ADON: CPT

## 2017-10-08 PROCEDURE — 85025 COMPLETE CBC W/AUTO DIFF WBC: CPT | Performed by: PHYSICIAN ASSISTANT

## 2017-10-08 PROCEDURE — 99285 EMERGENCY DEPT VISIT HI MDM: CPT

## 2017-10-08 PROCEDURE — 80053 COMPREHEN METABOLIC PANEL: CPT | Performed by: PHYSICIAN ASSISTANT

## 2017-10-08 RX ORDER — FAMOTIDINE 10 MG/ML
20 INJECTION INTRAVENOUS
Status: COMPLETED | OUTPATIENT
Start: 2017-10-08 | End: 2017-10-08

## 2017-10-08 RX ORDER — ONDANSETRON 2 MG/ML
4 INJECTION INTRAMUSCULAR; INTRAVENOUS
Status: COMPLETED | OUTPATIENT
Start: 2017-10-08 | End: 2017-10-08

## 2017-10-08 RX ORDER — MORPHINE SULFATE 4 MG/ML
4 INJECTION, SOLUTION INTRAMUSCULAR; INTRAVENOUS
Status: COMPLETED | OUTPATIENT
Start: 2017-10-08 | End: 2017-10-08

## 2017-10-08 RX ORDER — POTASSIUM CHLORIDE 7.45 MG/ML
10 INJECTION INTRAVENOUS
Status: COMPLETED | OUTPATIENT
Start: 2017-10-08 | End: 2017-10-08

## 2017-10-08 RX ORDER — LEVOFLOXACIN 750 MG/1
750 TABLET ORAL
Status: COMPLETED | OUTPATIENT
Start: 2017-10-08 | End: 2017-10-08

## 2017-10-08 RX ORDER — LEVOFLOXACIN 500 MG/1
500 TABLET, FILM COATED ORAL DAILY
Qty: 10 TAB | Refills: 0 | Status: SHIPPED | OUTPATIENT
Start: 2017-10-08 | End: 2017-10-25

## 2017-10-08 RX ADMIN — Medication 4 MG: at 11:39

## 2017-10-08 RX ADMIN — IOHEXOL: 240 INJECTION, SOLUTION INTRATHECAL; INTRAVASCULAR; INTRAVENOUS; ORAL at 11:43

## 2017-10-08 RX ADMIN — SODIUM CHLORIDE, SODIUM LACTATE, POTASSIUM CHLORIDE, AND CALCIUM CHLORIDE 1000 ML: 600; 310; 30; 20 INJECTION, SOLUTION INTRAVENOUS at 14:12

## 2017-10-08 RX ADMIN — ONDANSETRON 4 MG: 2 INJECTION INTRAMUSCULAR; INTRAVENOUS at 14:11

## 2017-10-08 RX ADMIN — ONDANSETRON 4 MG: 2 INJECTION INTRAMUSCULAR; INTRAVENOUS at 11:39

## 2017-10-08 RX ADMIN — POTASSIUM CHLORIDE 10 MEQ: 10 INJECTION, SOLUTION INTRAVENOUS at 14:10

## 2017-10-08 RX ADMIN — SODIUM CHLORIDE, SODIUM LACTATE, POTASSIUM CHLORIDE, AND CALCIUM CHLORIDE 1000 ML: 600; 310; 30; 20 INJECTION, SOLUTION INTRAVENOUS at 11:39

## 2017-10-08 RX ADMIN — FAMOTIDINE 20 MG: 10 INJECTION, SOLUTION INTRAVENOUS at 11:39

## 2017-10-08 RX ADMIN — Medication 4 MG: at 14:10

## 2017-10-08 RX ADMIN — LEVOFLOXACIN 750 MG: 750 TABLET, FILM COATED ORAL at 14:10

## 2017-10-08 NOTE — DISCHARGE INSTRUCTIONS
Pneumonia: Care Instructions  Your Care Instructions    Pneumonia is an infection of the lungs. Most cases are caused by infections from bacteria or viruses. Pneumonia may be mild or very severe. If it is caused by bacteria, you will be treated with antibiotics. It may take a few weeks to a few months to recover fully from pneumonia, depending on how sick you were and whether your overall health is good. Follow-up care is a key part of your treatment and safety. Be sure to make and go to all appointments, and call your doctor if you are having problems. Its also a good idea to know your test results and keep a list of the medicines you take. How can you care for yourself at home? · Take your antibiotics exactly as directed. Do not stop taking the medicine just because you are feeling better. You need to take the full course of antibiotics. · Take your medicines exactly as prescribed. Call your doctor if you think you are having a problem with your medicine. · Get plenty of rest and sleep. You may feel weak and tired for a while, but your energy level will improve with time. · To prevent dehydration, drink plenty of fluids, enough so that your urine is light yellow or clear like water. Choose water and other caffeine-free clear liquids until you feel better. If you have kidney, heart, or liver disease and have to limit fluids, talk with your doctor before you increase the amount of fluids you drink. · Take care of your cough so you can rest. A cough that brings up mucus from your lungs is common with pneumonia. It is one way your body gets rid of the infection. But if coughing keeps you from resting or causes severe fatigue and chest-wall pain, talk to your doctor. He or she may suggest that you take a medicine to reduce the cough. · Use a vaporizer or humidifier to add moisture to your bedroom. Follow the directions for cleaning the machine. · Do not smoke or allow others to smoke around you.  Smoke will make your cough last longer. If you need help quitting, talk to your doctor about stop-smoking programs and medicines. These can increase your chances of quitting for good. · Take an over-the-counter pain medicine, such as acetaminophen (Tylenol), ibuprofen (Advil, Motrin), or naproxen (Aleve). Read and follow all instructions on the label. · Do not take two or more pain medicines at the same time unless the doctor told you to. Many pain medicines have acetaminophen, which is Tylenol. Too much acetaminophen (Tylenol) can be harmful. · If you were given a spirometer to measure how well your lungs are working, use it as instructed. This can help your doctor tell how your recovery is going. · To prevent pneumonia in the future, talk to your doctor about getting a flu vaccine (once a year) and a pneumococcal vaccine (one time only for most people). When should you call for help? Call 911 anytime you think you may need emergency care. For example, call if:  · You have severe trouble breathing. Call your doctor now or seek immediate medical care if:  · You cough up dark brown or bloody mucus (sputum). · You have new or worse trouble breathing. · You are dizzy or lightheaded, or you feel like you may faint. Watch closely for changes in your health, and be sure to contact your doctor if:  · You have a new or higher fever. · You are coughing more deeply or more often. · You are not getting better after 2 days (48 hours). · You do not get better as expected. Where can you learn more? Go to http://wilbert-loraine.info/. Enter [id number] in the search box to learn more about \"Pneumonia: Care Instructions. \"  Current as of: March 25, 2017  Content Version: 11.3  © 8180-2831 [employer]. Care instructions adapted under license by [employer] (which disclaims liability or warranty for this information).  If you have questions about a medical condition or this instruction, always ask your healthcare professional. [place] [address] any warranty or liability for your use of this information. Urinary Tract Infection in Women: Care Instructions  Your Care Instructions    A urinary tract infection, or UTI, is a general term for an infection anywhere between the kidneys and the urethra (where urine comes out). Most UTIs are bladder infections. They often cause pain or burning when you urinate. UTIs are caused by bacteria and can be cured with antibiotics. Be sure to complete your treatment so that the infection goes away. Follow-up care is a key part of your treatment and safety. Be sure to make and go to all appointments, and call your doctor if you are having problems. It's also a good idea to know your test results and keep a list of the medicines you take. How can you care for yourself at home? · Take your antibiotics as directed. Do not stop taking them just because you feel better. You need to take the full course of antibiotics. · Drink extra water and other fluids for the next day or two. This may help wash out the bacteria that are causing the infection. (If you have kidney, heart, or liver disease and have to limit fluids, talk with your doctor before you increase your fluid intake.)  · Avoid drinks that are carbonated or have caffeine. They can irritate the bladder. · Urinate often. Try to empty your bladder each time. · To relieve pain, take a hot bath or lay a heating pad set on low over your lower belly or genital area. Never go to sleep with a heating pad in place. To prevent UTIs  · Drink plenty of water each day. This helps you urinate often, which clears bacteria from your system. (If you have kidney, heart, or liver disease and have to limit fluids, talk with your doctor before you increase your fluid intake.)  · Urinate when you need to. · Urinate right after you have sex. · Change sanitary pads often.   · Avoid douches, bubble baths, feminine hygiene sprays, and other feminine hygiene products that have deodorants. · After going to the bathroom, wipe from front to back. When should you call for help? Call your doctor now or seek immediate medical care if:  · Symptoms such as fever, chills, nausea, or vomiting get worse or appear for the first time. · You have new pain in your back just below your rib cage. This is called flank pain. · There is new blood or pus in your urine. · You have any problems with your antibiotic medicine. Watch closely for changes in your health, and be sure to contact your doctor if:  · You are not getting better after taking an antibiotic for 2 days. · Your symptoms go away but then come back. Where can you learn more? Go to http://wilbert-loraine.info/. Enter S651 in the search box to learn more about \"Urinary Tract Infection in Women: Care Instructions. \"  Current as of: November 28, 2016  Content Version: 11.3  © 2546-9014 [employer] Care instructions adapted under license by [employer] (which disclaims liability or warranty for this information). If you have questions about a medical condition or this instruction, always ask your healthcare professional. [address] any warranty or liability for your use of this information.

## 2017-10-08 NOTE — ED PROVIDER NOTES
HPI Comments: 11:01 AM  Nilda Carolina is a 27 y.o. female with PSHX gastric bypass (by Dr. Sreedhar Spencer on 17 for which she received Lovenox s/p surgery and takes Vicodin for pain) presenting to the ED C/O sharp stabbing epigastric abd pain that has spread to the LUQ and radiates to the left flank, rated 7/10, onset 5 hours ago. Worse with inspiration and movement. Associated sxs include constipation (last BM 5 days ago) and increased belching. Pt denies passing flatus. Pt reports she tried 30 mL Miralax yesterday with minimal relief. Pt states her first post-op appointment is in 3 days. Pt is still on a liquid diet (protein shakes, fruit punch shakes). Pt was seen at [facility] 4 days ago for palpitations, had a negative CTA Chest, dx'ed with UTI and pna, and rx'ed Bactrim and Amoxicillin. LNMP 10/2/17. PSHX scoliosis surgery. Pt denies palpitations, fever, chills, n/v, and any other symptoms or complaints. Written by RAMON Freedman, as dictated by Teresa Zapata PA-C       Patient is a 27 y.o. female presenting with post-operative complications. The history is provided by the patient. No  was used. Post OP Complication   This is a new problem. The current episode started 3 to 5 hours ago. The problem occurs constantly. Associated symptoms include abdominal pain (epigastric that has spread to the LUQ and radiates to the left flank). Pertinent negatives include no chest pain, no headaches and no shortness of breath.         Past Medical History:   Diagnosis Date    Incisional hernia     Intestinal malabsorption     Morbid obesity with body mass index of 40.0-49.9 ([facility])     Status post bariatric surgery 2012    sleeve resection / kirstin Jackson       Past Surgical History:   Procedure Laterality Date    HX  SECTION      X 3    HX GI  2012    sleeve resection / a salcasiberg    HX ORTHOPAEDIC      scoliosis correction surgery X 2    HX TONSIL AND ADENOIDECTOMY      HX TUBAL LIGATION           History reviewed. No pertinent family history. Social History     Social History    Marital status: SINGLE     Spouse name: N/A    Number of children: N/A    Years of education: N/A     Occupational History    Not on file. Social History Main Topics    Smoking status: Never Smoker    Smokeless tobacco: Never Used    Alcohol use No    Drug use: No    Sexual activity: Yes     Partners: Male     Other Topics Concern    Not on file     Social History Narrative         ALLERGIES: Review of patient's allergies indicates no known allergies. Review of Systems   Constitutional: Negative for chills and fever. HENT: Negative for congestion and sore throat. Respiratory: Negative for cough and shortness of breath. Cardiovascular: Negative for chest pain. Gastrointestinal: Positive for abdominal pain (epigastric that has spread to the LUQ and radiates to the left flank) and constipation. Negative for abdominal distention, nausea and vomiting.        (+) increased belching   Genitourinary: Positive for flank pain. Negative for difficulty urinating, dysuria, frequency, hematuria and urgency. Musculoskeletal: Positive for back pain. Negative for arthralgias and joint swelling. Skin: Negative for rash. Neurological: Negative for dizziness, weakness, light-headedness and headaches. All other systems reviewed and are negative. Vitals:    10/08/17 1200 10/08/17 1230 10/08/17 1400 10/08/17 1500   BP: 109/71 105/65 119/70 109/64   Pulse:  (!) 103  94   Resp:  16  16   Temp:       SpO2: 100% 97% 99% 100%   Weight:       Height:                Physical Exam   Constitutional: She is oriented to person, place, and time. She appears well-developed and well-nourished. No distress. AA morbidly obese female in NAD. Alert. Appears uncomfortable at times. HENT:   Head: Normocephalic and atraumatic.    Right Ear: External ear normal.   Left Ear: External ear normal.   Nose: Nose normal.   Mouth/Throat: Oropharynx is clear and moist.   Eyes: Conjunctivae are normal. Right eye exhibits no discharge. Left eye exhibits no discharge. Neck: Normal range of motion. Cardiovascular: Normal rate, regular rhythm, normal heart sounds and intact distal pulses. Exam reveals no gallop and no friction rub. No murmur heard. Pulmonary/Chest: Effort normal and breath sounds normal. No accessory muscle usage. No tachypnea. No respiratory distress. She has no decreased breath sounds. She has no wheezes. She has no rhonchi. She has no rales. Abdominal: Soft. Normal appearance. She exhibits no ascites and no mass. There is tenderness (epigastric). There is no rebound, no guarding, no CVA tenderness and no tenderness at McBurney's point. Obese abdomen     Musculoskeletal: Normal range of motion. Neurological: She is alert and oriented to person, place, and time. Skin: Skin is warm and dry. No rash noted. She is not diaphoretic. No erythema. Psychiatric: She has a normal mood and affect. Judgment normal.   Nursing note and vitals reviewed. RESULTS:    PULSE OXIMETRY NOTE:  Pulse-ox is 100% on RA  Interpretation: normal       EKG interpretation: (Preliminary)  11:59 PM   103 bpm, sinus tachycardia with short FL, minimal voltage criteria for LVH, may be normal variant. No SHAY.  EKG read by Lorri Lesch, PA-C     CT ABD PELV WO CONT   Final Result   Worsening airspace disease in left base consistent with pneumonia     Ventral hernias as described above. One contains fluid. 2 separate  fat-containing periumbilical hernias with adjacent fat stranding. Recommend  correlation with area of patient's pain.     No herniated bowel. No evidence of bowel obstruction.     No acute intra-abdominal abnormality identified.     Spinal fixation rods extending to L4. Possible spondylolysis/pars defects at L4  with anterior subluxation L4 on L5.  Consider follow-up evaluation to assess for  Instability. As read by the radiologist.           Labs Reviewed   URINALYSIS W/ RFLX MICROSCOPIC - Abnormal; Notable for the following:        Result Value    Protein 30 (*)     Ketone >160 (*)     Blood MODERATE (*)     Leukocyte Esterase TRACE (*)     All other components within normal limits   CBC WITH AUTOMATED DIFF - Abnormal; Notable for the following:     RBC 3.63 (*)     HGB 10.5 (*)     HCT 31.1 (*)     PLATELET 479 (*)     NEUTROPHILS 79 (*)     LYMPHOCYTES 10 (*)     METAMYELOCYTES 1 (*)     ABS.  NEUTROPHILS 8.9 (*)     All other components within normal limits   METABOLIC PANEL, COMPREHENSIVE - Abnormal; Notable for the following:     Sodium 133 (*)     Potassium 3.4 (*)     Chloride 98 (*)     CO2 20 (*)     BUN 6 (*)     BUN/Creatinine ratio 9 (*)     Albumin 2.5 (*)     Globulin 5.6 (*)     A-G Ratio 0.4 (*)     All other components within normal limits   URINE MICROSCOPIC ONLY - Abnormal; Notable for the following:     Bacteria 2+ (*)     Mucus 2+ (*)     All other components within normal limits   CULTURE, URINE   LIPASE   CARDIAC PANEL,(CK, CKMB & TROPONIN)   MAGNESIUM   HCG QL SERUM       Recent Results (from the past 12 hour(s))   URINALYSIS W/ RFLX MICROSCOPIC    Collection Time: 10/08/17 11:40 AM   Result Value Ref Range    Color YELLOW      Appearance CLOUDY      Specific gravity 1.023 1.005 - 1.030      pH (UA) 6.5 5.0 - 8.0      Protein 30 (A) NEG mg/dL    Glucose NEGATIVE  NEG mg/dL    Ketone >160 (A) NEG mg/dL    Bilirubin NEGATIVE  NEG      Blood MODERATE (A) NEG      Urobilinogen 1.0 0.2 - 1.0 EU/dL    Nitrites NEGATIVE  NEG      Leukocyte Esterase TRACE (A) NEG     URINE MICROSCOPIC ONLY    Collection Time: 10/08/17 11:40 AM   Result Value Ref Range    WBC 11 to 20 0 - 5 /hpf    RBC 2 to 5 0 - 5 /hpf    Epithelial cells 4+ 0 - 5 /lpf    Bacteria 2+ (A) NEG /hpf    Mucus 2+ (A) NEG /lpf   CBC WITH AUTOMATED DIFF    Collection Time: 10/08/17 11:45 AM   Result Value Ref Range WBC 11.3 4.6 - 13.2 K/uL    RBC 3.63 (L) 4.20 - 5.30 M/uL    HGB 10.5 (L) 12.0 - 16.0 g/dL    HCT 31.1 (L) 35.0 - 45.0 %    MCV 85.7 74.0 - 97.0 FL    MCH 28.9 24.0 - 34.0 PG    MCHC 33.8 31.0 - 37.0 g/dL    RDW 14.3 11.6 - 14.5 %    PLATELET 184 (H) 193 - 420 K/uL    MPV 9.4 9.2 - 11.8 FL    NEUTROPHILS 79 (H) 42 - 75 %    BAND NEUTROPHILS 1 0 - 5 %    LYMPHOCYTES 10 (L) 20 - 51 %    MONOCYTES 8 2 - 9 %    EOSINOPHILS 1 0 - 5 %    BASOPHILS 0 0 - 3 %    METAMYELOCYTES 1 (H) 0 %    ABS. NEUTROPHILS 8.9 (H) 1.8 - 8.0 K/UL    ABS. LYMPHOCYTES 1.1 0.8 - 3.5 K/UL    ABS. MONOCYTES 0.9 0 - 1.0 K/UL    ABS. EOSINOPHILS 0.1 0.0 - 0.4 K/UL    ABS. BASOPHILS 0.0 0.0 - 0.1 K/UL    PLATELET COMMENTS LARGE PLATELETS      RBC COMMENTS OVALOCYTES  FEW        WBC COMMENTS REACTIVE LYMPHS      DF MANUAL     METABOLIC PANEL, COMPREHENSIVE    Collection Time: 10/08/17 11:45 AM   Result Value Ref Range    Sodium 133 (L) 136 - 145 mmol/L    Potassium 3.4 (L) 3.5 - 5.5 mmol/L    Chloride 98 (L) 100 - 108 mmol/L    CO2 20 (L) 21 - 32 mmol/L    Anion gap 15 3.0 - 18 mmol/L    Glucose 94 74 - 99 mg/dL    BUN 6 (L) 7.0 - 18 MG/DL    Creatinine 0.64 0.6 - 1.3 MG/DL    BUN/Creatinine ratio 9 (L) 12 - 20      GFR est AA >60 >60 ml/min/1.73m2    GFR est non-AA >60 >60 ml/min/1.73m2    Calcium 9.0 8.5 - 10.1 MG/DL    Bilirubin, total 0.2 0.2 - 1.0 MG/DL    ALT (SGPT) 16 13 - 56 U/L    AST (SGOT) 16 15 - 37 U/L    Alk.  phosphatase 107 45 - 117 U/L    Protein, total 8.1 6.4 - 8.2 g/dL    Albumin 2.5 (L) 3.4 - 5.0 g/dL    Globulin 5.6 (H) 2.0 - 4.0 g/dL    A-G Ratio 0.4 (L) 0.8 - 1.7     LIPASE    Collection Time: 10/08/17 11:45 AM   Result Value Ref Range    Lipase 143 73 - 393 U/L   CARDIAC PANEL,(CK, CKMB & TROPONIN)    Collection Time: 10/08/17 11:45 AM   Result Value Ref Range    CK 99 26 - 192 U/L    CK - MB 1.3 <3.6 ng/ml    CK-MB Index 1.3 0.0 - 4.0 %    Troponin-I, Qt. <0.02 0.00 - 0.06 NG/ML   MAGNESIUM    Collection Time: 10/08/17 11:45 AM   Result Value Ref Range    Magnesium 1.9 1.6 - 2.6 mg/dL   HCG QL SERUM    Collection Time: 10/08/17 11:45 AM   Result Value Ref Range    HCG, Ql. NEGATIVE  NEG     EKG, 12 LEAD, INITIAL    Collection Time: 10/08/17 11:59 AM   Result Value Ref Range    Ventricular Rate 103 BPM    Atrial Rate 103 BPM    P-R Interval 96 ms    QRS Duration 78 ms    Q-T Interval 322 ms    QTC Calculation (Bezet) 421 ms    Calculated P Axis 45 degrees    Calculated R Axis 9 degrees    Calculated T Axis 0 degrees    Diagnosis       Sinus tachycardia with short [place]  Minimal voltage criteria for LVH, may be normal variant  Borderline ECG  When compared with ECG of 04-OCT-2017 18:14,  No significant change was found          MDM  Number of Diagnoses or Management Options  Acute cystitis with hematuria:   Community acquired pneumonia of left lower lobe of lung ([facility] [address]):   History of gastric bypass:   Hypokalemia:   Diagnosis management comments: Gastritis, gastroenteritis, GB, post-op complications, SBO, food borne, stone, UTI/pyelo, MSK.  Doubt dissection       Amount and/or Complexity of Data Reviewed  Clinical lab tests: ordered and reviewed  Tests in the radiology section of CPT®: ordered and reviewed (CT Abd Pelv)  Tests in the medicine section of CPT®: ordered and reviewed (EKG)  Independent visualization of images, tracings, or specimens: yes (EKG)      ED Course     MEDICATIONS GIVEN:  Medications   lactated ringers bolus infusion 1,000 mL (0 mL IntraVENous IV Completed 10/8/17 1215)   morphine injection 4 mg (4 mg IntraVENous Given 10/8/17 1139)   ondansetron (ZOFRAN) injection 4 mg (4 mg IntraVENous Given 10/8/17 1139)   famotidine (PF) (PEPCID) injection 20 mg (20 mg IntraVENous Given 10/8/17 1139)   iohexol (OMNIPAQUE) ORAL mixture ( Oral Given 10/8/17 1143)   potassium chloride 10 mEq in 100 ml IVPB (0 mEq IntraVENous IV Completed 10/8/17 1530)   lactated ringers bolus infusion 1,000 mL (0 mL IntraVENous IV Completed 10/8/17 1530) levoFLOXacin (LEVAQUIN) tablet 750 mg (750 mg Oral Given 10/8/17 1410)   morphine injection 4 mg (4 mg IntraVENous Given 10/8/17 1410)   ondansetron (ZOFRAN) injection 4 mg (4 mg IntraVENous Given 10/8/17 1411)        Procedures      PROGRESS NOTE:  11:01 AM  Initial assessment performed. Written by Mitra Oreilly ED Scribe, as dictated by Robby Posada PA-C    CONSULT NOTE:   1:43 PM  Robby Posada PA-C spoke with Laura Patrick MD   Specialty: Emergency Medicine  Discussed pt's hx, disposition, and available diagnostic and imaging results. Reviewed care plans. Consulting physician agrees with plans as outlined. Recommends changing abx. Will stop Bactrim and start Levaquin and follow up with Dr. Johana Galeano or his Gloria Giordano NP. Written by Mitra Oreilly ED Scribe, as dictated by Robby Posada PA-C. PROGRESS NOTE:   2:02 PM  Pt has been re-examined by Robby Posada PA-C. Pt feels better. Will give additional dose of pain meds, abx, replace K, and hydrate with 2L of LR. Written by Mitra Oreilly ED Scribe, as dictated by Robby Posada PA-C. CT with evidence of PNA. On bactrim and amox for PNA after negative CTA chest during recent [facility] ED visit. Will have pt d/c bactrim and amox. Place on levaquin. Replaced K in ED. IVF given. Close FU with bariatric surgery. Reasons to RTED discussed with pt. All questions answered. Pt feels comfortable going home at this time. Pt expressed understanding and she agrees with plan. DISCHARGE NOTE:  2:03 PM  Mary Lou Timmons's  results have been reviewed with her. She has been counseled regarding her diagnosis, treatment, and plan. She verbally conveys understanding and agreement of the signs, symptoms, diagnosis, treatment and prognosis and additionally agrees to follow up as discussed. She also agrees with the care-plan and conveys that all of her questions have been answered.   I have also provided discharge instructions for her that include: educational information regarding their diagnosis and treatment, and list of reasons why they would want to return to the ED prior to their follow-up appointment, should her condition change. She has been provided with education for proper emergency department utilization. CLINICAL IMPRESSION:    1. Community acquired pneumonia of left lower lobe of lung ([address]    2. Acute cystitis with hematuria    3. Hypokalemia    4. History of gastric bypass        PLAN:  1. D/C Home  2. Discharge Medication List as of 10/8/2017  2:03 PM      START taking these medications    Details   levoFLOXacin (LEVAQUIN) 500 mg tablet Take 1 Tab by mouth daily. Next dose in 24 hours. First dose given in ED., Normal, Disp-10 Tab, R-0         CONTINUE these medications which have NOT CHANGED    Details   Omeprazole delayed release (PRILOSEC D/R) 20 mg tablet Take 1 Tab by mouth daily. OTC, Print, Disp-30 Tab, R-1      ondansetron (ZOFRAN ODT) 4 mg disintegrating tablet Take 1 Tab by mouth every eight (8) hours as needed for Nausea. , Print, Disp-14 Tab, R-0      HYDROcodone-acetaminophen (NORCO) 5-325 mg per tablet Take 1-2 Tabs by mouth every six (6) hours as needed for Pain. Max Daily Amount: 8 Tabs., Print, Disp-16 Tab, R-0      traMADol (ULTRAM) 50 mg tablet Take 1 Tab by mouth every six (6) hours as needed for Pain. Max Daily Amount: 200 mg., Print, Disp-20 Tab, R-0      oxyCODONE-acetaminophen (PERCOCET) 5-325 mg per tablet Take 1 Tab by mouth every four (4) hours as needed for Pain. Max Daily Amount: 6 Tabs., Print, Disp-30 Tab, R-0         STOP taking these medications       amoxicillin 500 mg tab Comments:   Reason for Stopping:             3.   Follow-up Information     Follow up With Details Comments 201 Alex Gutiérrez MD Go in 3 days For you scheduled appointment.   [address]  [address] [address] [facility]  [phone]      [facility] EMERGENCY DEPT  As needed, If symptoms worsen [address] [address]  [phone]          SCRIBE ATTESTATION:  This note is prepared by Julia Babcock, acting as Scribe for Gabriel Bucahnan PA-C    PROVIDER ATTESTATION:  Gabriel Buchanan PA-C: The scribe's documentation has been prepared under my direction and personally reviewed by me in its entirety. I confirm that the note above accurately reflects all work, treatment, procedures, and medical decision making performed by me.

## 2017-10-09 LAB
BACTERIA SPEC CULT: NORMAL
SERVICE CMNT-IMP: NORMAL

## 2017-10-10 ENCOUNTER — HOSPITAL ENCOUNTER (EMERGENCY)
Age: 32
Discharge: HOME | End: 2017-10-10
Attending: EMERGENCY MEDICINE
Payer: COMMERCIAL

## 2017-10-10 ENCOUNTER — APPOINTMENT (OUTPATIENT)
Dept: GENERAL RADIOLOGY | Age: 32
End: 2017-10-10
Attending: EMERGENCY MEDICINE
Payer: COMMERCIAL

## 2017-10-10 VITALS
HEART RATE: 91 BPM | TEMPERATURE: 98.4 F | SYSTOLIC BLOOD PRESSURE: 114 MMHG | HEIGHT: 63 IN | WEIGHT: 245 LBS | RESPIRATION RATE: 20 BRPM | DIASTOLIC BLOOD PRESSURE: 70 MMHG | OXYGEN SATURATION: 98 % | BODY MASS INDEX: 43.41 KG/M2

## 2017-10-10 DIAGNOSIS — R07.81 PLEURITIC CHEST PAIN: Primary | ICD-10-CM

## 2017-10-10 LAB
ANION GAP SERPL CALC-SCNC: 12 MMOL/L (ref 3–18)
BASOPHILS # BLD: 0 K/UL (ref 0–0.06)
BASOPHILS NFR BLD: 0 % (ref 0–3)
BUN SERPL-MCNC: 8 MG/DL (ref 7–18)
BUN/CREAT SERPL: 16 (ref 12–20)
CALCIUM SERPL-MCNC: 9.1 MG/DL (ref 8.5–10.1)
CHLORIDE SERPL-SCNC: 102 MMOL/L (ref 100–108)
CO2 SERPL-SCNC: 27 MMOL/L (ref 21–32)
CREAT SERPL-MCNC: 0.49 MG/DL (ref 0.6–1.3)
DIFFERENTIAL METHOD BLD: ABNORMAL
EOSINOPHIL # BLD: 0 K/UL (ref 0–0.4)
EOSINOPHIL NFR BLD: 0 % (ref 0–5)
ERYTHROCYTE [DISTWIDTH] IN BLOOD BY AUTOMATED COUNT: 14.2 % (ref 11.6–14.5)
GLUCOSE SERPL-MCNC: 83 MG/DL (ref 74–99)
HCT VFR BLD AUTO: 31.6 % (ref 35–45)
HGB BLD-MCNC: 10.1 G/DL (ref 12–16)
LYMPHOCYTES # BLD: 1.9 K/UL (ref 0.8–3.5)
LYMPHOCYTES NFR BLD: 25 % (ref 20–51)
MCH RBC QN AUTO: 28.2 PG (ref 24–34)
MCHC RBC AUTO-ENTMCNC: 32 G/DL (ref 31–37)
MCV RBC AUTO: 88.3 FL (ref 74–97)
MONOCYTES # BLD: 0.8 K/UL (ref 0–1)
MONOCYTES NFR BLD: 10 % (ref 2–9)
NEUTS SEG # BLD: 4.8 K/UL (ref 1.8–8)
NEUTS SEG NFR BLD: 65 % (ref 42–75)
PLATELET # BLD AUTO: 544 K/UL (ref 135–420)
PLATELET COMMENTS,PCOM: ABNORMAL
PMV BLD AUTO: 9.8 FL (ref 9.2–11.8)
POTASSIUM SERPL-SCNC: 4 MMOL/L (ref 3.5–5.5)
RBC # BLD AUTO: 3.58 M/UL (ref 4.2–5.3)
RBC MORPH BLD: ABNORMAL
SODIUM SERPL-SCNC: 141 MMOL/L (ref 136–145)
WBC # BLD AUTO: 7.5 K/UL (ref 4.6–13.2)

## 2017-10-10 PROCEDURE — 71020 XR CHEST PA LAT: CPT

## 2017-10-10 PROCEDURE — 71020 XR CHEST AP LAT: CPT

## 2017-10-10 PROCEDURE — 85025 COMPLETE CBC W/AUTO DIFF WBC: CPT | Performed by: EMERGENCY MEDICINE

## 2017-10-10 PROCEDURE — 74011250637 HC RX REV CODE- 250/637: Performed by: EMERGENCY MEDICINE

## 2017-10-10 PROCEDURE — 93005 ELECTROCARDIOGRAM TRACING: CPT

## 2017-10-10 PROCEDURE — 99285 EMERGENCY DEPT VISIT HI MDM: CPT

## 2017-10-10 PROCEDURE — 80048 BASIC METABOLIC PNL TOTAL CA: CPT | Performed by: EMERGENCY MEDICINE

## 2017-10-10 RX ORDER — OXYCODONE AND ACETAMINOPHEN 5; 325 MG/1; MG/1
TABLET ORAL
Qty: 12 TAB | Refills: 0 | Status: SHIPPED | OUTPATIENT
Start: 2017-10-10 | End: 2017-10-25

## 2017-10-10 RX ORDER — OXYCODONE AND ACETAMINOPHEN 5; 325 MG/1; MG/1
1 TABLET ORAL
Status: COMPLETED | OUTPATIENT
Start: 2017-10-10 | End: 2017-10-10

## 2017-10-10 RX ADMIN — OXYCODONE HYDROCHLORIDE AND ACETAMINOPHEN 1 TABLET: 5; 325 TABLET ORAL at 21:02

## 2017-10-10 NOTE — ED PROVIDER NOTES
HPI Comments: 7:15 PM Michael Mac is a 27 y.o. female with a h/o Gastric bypass several weeks ago, C-Sec, and Obesity presents to the ED with c/o CP onset several hours ago. Pt states she had a gastric bypass several weeks ago, and was recently dx with pneumonia 1 week ago when she received Levaquin that she believes did not work. Pt reported left sided flank pain that makes breathing difficult. Pt denied n/v/d, fever, SOB, chills, or cough. All other sx denied. No other complaints at this time. Nadir Jones MD      Patient is a 27 y.o. female presenting with chest pain. The history is provided by the patient. Chest Pain (Angina)    Pertinent negatives include no abdominal pain, no back pain, no cough, no fever, no shortness of breath and no vomiting. Past Medical History:   Diagnosis Date    Incisional hernia     Intestinal malabsorption     Morbid obesity with body mass index of 40.0-49.9 ([facility])     Status post bariatric surgery 2012    sleeve resection / kirstin Sousa       Past Surgical History:   Procedure Laterality Date    HX  SECTION      X 3    HX GI  2012    sleeve resection / a salzberg    HX ORTHOPAEDIC      scoliosis correction surgery X 2    HX TONSIL AND ADENOIDECTOMY      HX TUBAL LIGATION           History reviewed. No pertinent family history. Social History     Social History    Marital status: SINGLE     Spouse name: N/A    Number of children: N/A    Years of education: N/A     Occupational History    Not on file. Social History Main Topics    Smoking status: Never Smoker    Smokeless tobacco: Never Used    Alcohol use No    Drug use: No    Sexual activity: Yes     Partners: Male     Other Topics Concern    Not on file     Social History Narrative         ALLERGIES: Review of patient's allergies indicates no known allergies. Review of Systems   Constitutional: Negative for fever. HENT: Negative for congestion.     Respiratory: Negative for cough and shortness of breath. Cardiovascular: Positive for chest pain. Gastrointestinal: Negative for abdominal pain and vomiting. Musculoskeletal: Negative for back pain. Skin: Negative for rash. Neurological: Negative for light-headedness. All other systems reviewed and are negative. Vitals:    10/10/17 1857   BP: 123/77   Pulse: 96   Resp: 18   Temp: 98.1 °F (36.7 °C)   SpO2: 100%   Weight: 111.1 kg (245 lb)   Height: 5' 3\" (1.6 m)            Physical Exam   Constitutional: She is oriented to person, place, and time. She appears well-developed and well-nourished. No distress. HENT:   Head: Normocephalic and atraumatic. Mouth/Throat: Oropharynx is clear and moist.   Eyes: Conjunctivae and EOM are normal. Pupils are equal, round, and reactive to light. No scleral icterus. Neck: Normal range of motion. Neck supple. Cardiovascular: Normal rate, regular rhythm and normal heart sounds. No murmur heard. Pulmonary/Chest: Effort normal and breath sounds normal. No respiratory distress. Abdominal: Soft. Bowel sounds are normal. She exhibits no distension. There is no tenderness. Musculoskeletal: She exhibits no edema. Lymphadenopathy:     She has no cervical adenopathy. Neurological: She is alert and oriented to person, place, and time. Coordination normal.   Skin: Skin is warm and dry. No rash noted. Psychiatric: She has a normal mood and affect. Her behavior is normal.   Nursing note and vitals reviewed. MDM  Number of Diagnoses or Management Options  Pleuritic chest pain:   Diagnosis management comments: Results reviewed with pt, she is to continue her current ABX regimen, will add a pain med to help with her sx. Pt understands and agrees with this plan.   Sigifredo Rascon MD  8:54 PM         Amount and/or Complexity of Data Reviewed  Clinical lab tests: ordered and reviewed  Tests in the radiology section of CPT®: ordered and reviewed  Review and summarize past medical records: yes  Independent visualization of images, tracings, or specimens: yes      ED Course       Procedures   Vitals:  Patient Vitals for the past 12 hrs:   Temp Pulse Resp BP SpO2   10/10/17 1857 98.1 °F (36.7 °C) 96 18 123/77 100 %         Medications ordered:   Medications - No data to display      Lab findings:  Recent Results (from the past 12 hour(s))   EKG, 12 LEAD, INITIAL    Collection Time: 10/10/17  7:00 PM   Result Value Ref Range    Ventricular Rate 96 BPM    Atrial Rate 96 BPM    P-R Interval 124 ms    QRS Duration 84 ms    Q-T Interval 332 ms    QTC Calculation (Bezet) 419 ms    Calculated P Axis 48 degrees    Calculated R Axis 19 degrees    Calculated T Axis 21 degrees    Diagnosis       Normal sinus rhythm  Normal ECG  When compared with ECG of 08-OCT-2017 11:59,  No significant change was found         EKG interpretation by ED Physician:      X-Ray, CT or other radiology findings or impressions:  XR CHEST AP LAT    (Results Pending)       Progress notes, Consult notes or additional Procedure notes:       Reevaluation of patient:       Disposition:  Diagnosis: No diagnosis found. Disposition: discharge    Follow-up Information     None            Patient's Medications   Start Taking    No medications on file   Continue Taking    HYDROCODONE-ACETAMINOPHEN (NORCO) 5-325 MG PER TABLET    Take 1-2 Tabs by mouth every six (6) hours as needed for Pain. Max Daily Amount: 8 Tabs. LEVOFLOXACIN (LEVAQUIN) 500 MG TABLET    Take 1 Tab by mouth daily. Next dose in 24 hours. First dose given in ED. OMEPRAZOLE DELAYED RELEASE (PRILOSEC D/R) 20 MG TABLET    Take 1 Tab by mouth daily. OTC    ONDANSETRON (ZOFRAN ODT) 4 MG DISINTEGRATING TABLET    Take 1 Tab by mouth every eight (8) hours as needed for Nausea. OXYCODONE-ACETAMINOPHEN (PERCOCET) 5-325 MG PER TABLET    Take 1 Tab by mouth every four (4) hours as needed for Pain. Max Daily Amount: 6 Tabs.     TRAMADOL (ULTRAM) 50 MG TABLET    Take 1 Tab by mouth every six (6) hours as needed for Pain. Max Daily Amount: 200 mg. These Medications have changed    No medications on file   Stop Taking    No medications on file     Ruba Friend acting as a scribe for and in the presence of Irlanda Arellano MD      October 10, 2017 at 7:46 PM       Provider Attestation:      I personally performed the services described in the documentation, reviewed the documentation, as recorded by the scribe in my presence, and it accurately and completely records my words and actions.  October 10, 2017 at 7:46 PM - Irlanda Arellano MD

## 2017-10-10 NOTE — ED TRIAGE NOTES
Patient states that she has pneumonia. States chest pain onset a few hours ago.    States sensation of burning pain to chest

## 2017-10-11 ENCOUNTER — OFFICE VISIT (OUTPATIENT)
Dept: SURGERY | Age: 32
End: 2017-10-11

## 2017-10-11 VITALS
HEART RATE: 108 BPM | HEIGHT: 63 IN | SYSTOLIC BLOOD PRESSURE: 120 MMHG | WEIGHT: 242 LBS | DIASTOLIC BLOOD PRESSURE: 72 MMHG | BODY MASS INDEX: 42.88 KG/M2 | RESPIRATION RATE: 16 BRPM | OXYGEN SATURATION: 100 %

## 2017-10-11 DIAGNOSIS — K90.9 INTESTINAL MALABSORPTION, UNSPECIFIED TYPE: Primary | ICD-10-CM

## 2017-10-11 LAB
ATRIAL RATE: 96 BPM
CALCULATED P AXIS, ECG09: 48 DEGREES
CALCULATED R AXIS, ECG10: 19 DEGREES
CALCULATED T AXIS, ECG11: 21 DEGREES
DIAGNOSIS, 93000: NORMAL
P-R INTERVAL, ECG05: 124 MS
Q-T INTERVAL, ECG07: 332 MS
QRS DURATION, ECG06: 84 MS
QTC CALCULATION (BEZET), ECG08: 419 MS
VENTRICULAR RATE, ECG03: 96 BPM

## 2017-10-11 NOTE — ED NOTES
I have reviewed discharge instructions with the patient. The patient verbalized understanding. Medication teaching given, to include name, dose, action, and side effects. Patient verbalized understanding of medications. Encouraged patient to voice any concerns with reassurance provided. Instructed not to drive or use heavy machinery while taking this medication. Patient states they have someone to drive them home. Patient armband removed and shredded    Patient Discharged in stable condition. Patient is awake, alert and oriented x 4, C/O left rib cage pain with deep breath.

## 2017-10-11 NOTE — PROGRESS NOTES
Subjective:     Ashu Smart  is a 27 y.o. female who presents for follow-up about 8 days following conversion sleeve gastrectomy to gastric bypass. She has lost a total of 13 pounds since surgery. Body mass index is 42.87 kg/(m^2). Capri Juarez Liver biopsy results were reviewed with the patient today. Surgery related complication: none       She reports 3 trips to ER for pneumonia and denies abdominal pain. Patients pain score:3      The patient's exercise level: not active. Changes in her medical history and medications have been reviewed. Patient Active Problem List   Diagnosis Code    Morbid obesity with body mass index of 40.0-49.9 ([facility]) E66.01    Status post bariatric surgery Z98.84    Intestinal malabsorption K90.9    Nausea & vomiting R11.2    Incisional hernia K43.2    Morbid obesity with BMI of 40.0-44.9, adult ([facility] [address] E66.01, Z68.41     Past Medical History:   Diagnosis Date    Incisional hernia     Intestinal malabsorption     Morbid obesity with body mass index of 40.0-49.9 ([facility] [address]     Status post bariatric surgery 2012    sleeve resection / kirstin Hernandez     Past Surgical History:   Procedure Laterality Date    HX  SECTION      X 3    HX GI  2012    sleeve resection / rosaline salzberg    HX ORTHOPAEDIC      scoliosis correction surgery X 2    HX TONSIL AND ADENOIDECTOMY      HX TUBAL LIGATION       Current Outpatient Prescriptions   Medication Sig Dispense Refill    multivitamin with iron (FLINTSTONES) chewable tablet Take 1 Tab by mouth two (2) times a day.  oxyCODONE-acetaminophen (PERCOCET) 5-325 mg per tablet Take 1 tablet every 4-6 hours as needed for pain control. If you were instructed to try over the counter ibuprofen or tylenol, only take the percocet for pain not controlled with the over the counter medication. 12 Tab 0    levoFLOXacin (LEVAQUIN) 500 mg tablet Take 1 Tab by mouth daily. Next dose in 24 hours. First dose given in ED.  10 Tab 0    ondansetron (ZOFRAN ODT) 4 mg disintegrating tablet Take 1 Tab by mouth every eight (8) hours as needed for Nausea. 14 Tab 0    HYDROcodone-acetaminophen (NORCO) 5-325 mg per tablet Take 1-2 Tabs by mouth every six (6) hours as needed for Pain. Max Daily Amount: 8 Tabs. 16 Tab 0    traMADol (ULTRAM) 50 mg tablet Take 1 Tab by mouth every six (6) hours as needed for Pain. Max Daily Amount: 200 mg. 20 Tab 0    Omeprazole delayed release (PRILOSEC D/R) 20 mg tablet Take 1 Tab by mouth daily. OTC 30 Tab 1       Objective:     Visit Vitals    /72 (BP 1 Location: Left arm, BP Patient Position: Sitting)    Pulse (!) 108    Resp 16    Ht 5' 3\" (1.6 m)    Wt 109.8 kg (242 lb)    LMP 10/02/2017    SpO2 100%    BMI 42.87 kg/m2        Physical Exam:    General:  alert, cooperative, no distress, appears stated age   Lungs:  Decreased BS  Left base   Abdomen:   abdomen is soft without significant tenderness, masses, organomegaly or guarding; Incisions: healing well           CXR:    Results   XR CHEST PA LAT (Accession [id number]) (Order [id number])   Allergies      No Known Allergies   Result Information   Status Provider Status      Final result (Exam End: 10/10/2017  7:53 PM) Open    Study Result   Two view chest     CPT code: 72113     CLINICAL HISTORY: Left-sided chest pain. Left flank pain making breathing  difficulty. Possible pneumonia. History of gastric bypass procedure several  weeks ago.     TECHNIQUE: 2 views of the chest.     COMPARISON: 3/24/14 [address] [place] [address] CT 10/4/2017 and 10/8/2017     FINDINGS: Previous fusion thoracolumbar spine with bilateral Gomez rods. Numerous vascular clips left upper quadrant of the abdomen.     There is opacity in the retrocardiac region and small left pleural effusion. Part of the density at the left lung base is related to a higher diaphragm on  this side as seen on previous CT. Lung volumes are lower than previously. Left  hemidiaphragm and left heart border are obscured.  No effusion on the right.     Vascularity normal. Heart size difficult to evaluate due to the left lung  opacities.     IMPRESSION  IMPRESSION:     Small left effusion. Question sympathetic fusion related to upper abdominal  process in the early postoperative period. Correlate for any upper abdominal  symptoms. No effusion on the right.     Parenchymal densities at the left base likely passive atelectasis. Pneumonia  might have a similar appearance. Worsening aeration since 10/4/2017.                   Assessment:     1. History of Morbid obesity, status post  laparoscopic gastric bypass surgery. LLL pneumonia worsening. Plan:     1. Remember to measure portions, continue low carbohydrate diet  2. Advance diet to puree phase in 1 week  3.   Refer to Dr Delonte Peña for evaluation today

## 2017-10-11 NOTE — DISCHARGE INSTRUCTIONS
Chest Pain: Care Instructions  Your Care Instructions  There are many things that can cause chest pain. Some are not serious and will get better on their own in a few days. But some kinds of chest pain need more testing and treatment. Your doctor may have recommended a follow-up visit in the next 8 to 12 hours. If you are not getting better, you may need more tests or treatment. Even though your doctor has released you, you still need to watch for any problems. The doctor carefully checked you, but sometimes problems can develop later. If you have new symptoms or if your symptoms do not get better, get medical care right away. If you have worse or different chest pain or pressure that lasts more than 5 minutes or you passed out (lost consciousness), call 911 or seek other emergency help right away. A medical visit is only one step in your treatment. Even if you feel better, you still need to do what your doctor recommends, such as going to all suggested follow-up appointments and taking medicines exactly as directed. This will help you recover and help prevent future problems. How can you care for yourself at home? · Rest until you feel better. · Take your medicine exactly as prescribed. Call your doctor if you think you are having a problem with your medicine. · Do not drive after taking a prescription pain medicine. When should you call for help? Call 911 if:  · You passed out (lost consciousness). · You have severe difficulty breathing. · You have symptoms of a heart attack. These may include:  ¨ Chest pain or pressure, or a strange feeling in your chest.  ¨ Sweating. ¨ Shortness of breath. ¨ Nausea or vomiting. ¨ Pain, pressure, or a strange feeling in your back, neck, jaw, or upper belly or in one or both shoulders or arms. ¨ Lightheadedness or sudden weakness. ¨ A fast or irregular heartbeat.   After you call 911, the  may tell you to chew 1 adult-strength or 2 to 4 low-dose aspirin. Wait for an ambulance. Do not try to drive yourself. Call your doctor today if:  · You have any trouble breathing. · Your chest pain gets worse. · You are dizzy or lightheaded, or you feel like you may faint. · You are not getting better as expected. · You are having new or different chest pain. Where can you learn more? Go to http://wilbert-loraine.info/. Enter A120 in the search box to learn more about \"Chest Pain: Care Instructions. \"  Current as of: March 20, 2017  Content Version: 11.3  © 6060-5074 [employer] [place]. Care instructions adapted under license by [employer] (which disclaims liability or warranty for this information). If you have questions about a medical condition or this instruction, always ask your healthcare professional. [address] any warranty or liability for your use of this information.

## 2017-10-11 NOTE — PATIENT INSTRUCTIONS
Body Mass Index: Care Instructions  Your Care Instructions    Body mass index (BMI) can help you see if your weight is raising your risk for health problems. It uses a formula to compare how much you weigh with how tall you are. · A BMI lower than 18.5 is considered underweight. · A BMI between 18.5 and 24.9 is considered healthy. · A BMI between 25 and 29.9 is considered overweight. A BMI of 30 or higher is considered obese. If your BMI is in the normal range, it means that you have a lower risk for weight-related health problems. If your BMI is in the overweight or obese range, you may be at increased risk for weight-related health problems, such as high blood pressure, heart disease, stroke, arthritis or joint pain, and diabetes. If your BMI is in the underweight range, you may be at increased risk for health problems such as fatigue, lower protection (immunity) against illness, muscle loss, bone loss, hair loss, and hormone problems. BMI is just one measure of your risk for weight-related health problems. You may be at higher risk for health problems if you are not active, you eat an unhealthy diet, or you drink too much alcohol or use tobacco products. Follow-up care is a key part of your treatment and safety. Be sure to make and go to all appointments, and call your doctor if you are having problems. It's also a good idea to know your test results and keep a list of the medicines you take. How can you care for yourself at home? · Practice healthy eating habits. This includes eating plenty of fruits, vegetables, whole grains, lean protein, and low-fat dairy. · If your doctor recommends it, get more exercise. Walking is a good choice. Bit by bit, increase the amount you walk every day. Try for at least 30 minutes on most days of the week. · Do not smoke. Smoking can increase your risk for health problems. If you need help quitting, talk to your doctor about stop-smoking programs and medicines. These can increase your chances of quitting for good. · Limit alcohol to 2 drinks a day for men and 1 drink a day for women. Too much alcohol can cause health problems. If you have a BMI higher than 25  · Your doctor may do other tests to check your risk for weight-related health problems. This may include measuring the distance around your waist. A waist measurement of more than 40 inches in men or 35 inches in women can increase the risk of weight-related health problems. · Talk with your doctor about steps you can take to stay healthy or improve your health. You may need to make lifestyle changes to lose weight and stay healthy, such as changing your diet and getting regular exercise. If you have a BMI lower than 18.5  · Your doctor may do other tests to check your risk for health problems. · Talk with your doctor about steps you can take to stay healthy or improve your health. You may need to make lifestyle changes to gain or maintain weight and stay healthy, such as getting more healthy foods in your diet and doing exercises to build muscle. Where can you learn more? Go to http://wilbert-loraine.info/. Enter S176 in the search box to learn more about \"Body Mass Index: Care Instructions. \"  Current as of: January 23, 2017  Content Version: 11.3  © 7575-9772 [employer]. Care instructions adapted under license by [employer] (which disclaims liability or warranty for this information). If you have questions about a medical condition or this instruction, always ask your healthcare professional. [place] [address] any warranty or liability for your use of this information.

## 2017-10-15 LAB
ATRIAL RATE: 103 BPM
CALCULATED P AXIS, ECG09: 45 DEGREES
CALCULATED R AXIS, ECG10: 9 DEGREES
CALCULATED T AXIS, ECG11: 0 DEGREES
DIAGNOSIS, 93000: NORMAL
P-R INTERVAL, ECG05: 96 MS
Q-T INTERVAL, ECG07: 322 MS
QRS DURATION, ECG06: 78 MS
QTC CALCULATION (BEZET), ECG08: 421 MS
VENTRICULAR RATE, ECG03: 103 BPM

## 2017-10-25 ENCOUNTER — OFFICE VISIT (OUTPATIENT)
Dept: SURGERY | Age: 32
End: 2017-10-25

## 2017-10-25 VITALS
HEIGHT: 63 IN | BODY MASS INDEX: 41.11 KG/M2 | OXYGEN SATURATION: 99 % | HEART RATE: 90 BPM | SYSTOLIC BLOOD PRESSURE: 122 MMHG | WEIGHT: 232 LBS | DIASTOLIC BLOOD PRESSURE: 75 MMHG

## 2017-10-25 VITALS — WEIGHT: 232 LBS | HEIGHT: 63 IN | BODY MASS INDEX: 41.11 KG/M2

## 2017-10-25 DIAGNOSIS — Z98.84 STATUS POST BARIATRIC SURGERY: ICD-10-CM

## 2017-10-25 DIAGNOSIS — Z98.84 STATUS POST BARIATRIC SURGERY: Primary | ICD-10-CM

## 2017-10-25 DIAGNOSIS — K90.9 INTESTINAL MALABSORPTION, UNSPECIFIED TYPE: Primary | ICD-10-CM

## 2017-10-25 NOTE — PROGRESS NOTES
Pt given one on one diet education. Appears to have a good understanding of the diet progression, food choices, and dietary/exercise habits for successful weight loss and nourishment one month after surgery. The  material included: post-op diet progression and portion sizes (including low fat, low sugar food recommendations and emphasis on protein foods and protein supplements), good beverage choices, reading a food label, vitamins/minerals required after weight loss surgery, and encouraging dietary and exercise habits that lead to weight loss success. Pt also received a restaurant card, which tells restaurants that the patient had a procedure that decreases the size of their stomach so the restaurant may let them order off the children's menu, the senior's menu, or a smaller portion for a reduced rate. Jasmyn Izaguirre        Pt states she is nauseous when she wakes up and the smell of food also makes her nauseous and the Zofran is not working.

## 2017-10-25 NOTE — PATIENT INSTRUCTIONS
Body Mass Index: Care Instructions  Your Care Instructions    Body mass index (BMI) can help you see if your weight is raising your risk for health problems. It uses a formula to compare how much you weigh with how tall you are. · A BMI lower than 18.5 is considered underweight. · A BMI between 18.5 and 24.9 is considered healthy. · A BMI between 25 and 29.9 is considered overweight. A BMI of 30 or higher is considered obese. If your BMI is in the normal range, it means that you have a lower risk for weight-related health problems. If your BMI is in the overweight or obese range, you may be at increased risk for weight-related health problems, such as high blood pressure, heart disease, stroke, arthritis or joint pain, and diabetes. If your BMI is in the underweight range, you may be at increased risk for health problems such as fatigue, lower protection (immunity) against illness, muscle loss, bone loss, hair loss, and hormone problems. BMI is just one measure of your risk for weight-related health problems. You may be at higher risk for health problems if you are not active, you eat an unhealthy diet, or you drink too much alcohol or use tobacco products. Follow-up care is a key part of your treatment and safety. Be sure to make and go to all appointments, and call your doctor if you are having problems. It's also a good idea to know your test results and keep a list of the medicines you take. How can you care for yourself at home? · Practice healthy eating habits. This includes eating plenty of fruits, vegetables, whole grains, lean protein, and low-fat dairy. · If your doctor recommends it, get more exercise. Walking is a good choice. Bit by bit, increase the amount you walk every day. Try for at least 30 minutes on most days of the week. · Do not smoke. Smoking can increase your risk for health problems. If you need help quitting, talk to your doctor about stop-smoking programs and medicines. These can increase your chances of quitting for good. · Limit alcohol to 2 drinks a day for men and 1 drink a day for women. Too much alcohol can cause health problems. If you have a BMI higher than 25  · Your doctor may do other tests to check your risk for weight-related health problems. This may include measuring the distance around your waist. A waist measurement of more than 40 inches in men or 35 inches in women can increase the risk of weight-related health problems. · Talk with your doctor about steps you can take to stay healthy or improve your health. You may need to make lifestyle changes to lose weight and stay healthy, such as changing your diet and getting regular exercise. If you have a BMI lower than 18.5  · Your doctor may do other tests to check your risk for health problems. · Talk with your doctor about steps you can take to stay healthy or improve your health. You may need to make lifestyle changes to gain or maintain weight and stay healthy, such as getting more healthy foods in your diet and doing exercises to build muscle. Where can you learn more? Go to http://wilbert-loraine.info/. Enter S176 in the search box to learn more about \"Body Mass Index: Care Instructions. \"  Current as of: January 23, 2017  Content Version: 11.3  © 4265-9915 [employer]. Care instructions adapted under license by [employer] (which disclaims liability or warranty for this information). If you have questions about a medical condition or this instruction, always ask your healthcare professional. [place] [address] any warranty or liability for your use of this information.

## 2017-10-25 NOTE — PROGRESS NOTES
Subjective:     Dustin Guajardo  is a 27 y.o. female who presents for follow-up about 1 month following laparoscopic gastric bypass surgery as a conversion from sleeve gastrectomy. She has lost a total of 23 pounds since surgery. Body mass index is 41.1 kg/(m^2). .    Surgery related complication: none       She reports feeling better and denies vomiting and abdominal pain. The patient's exercise plan has been discussed with them and is unrestricted at this time. Changes in her medical history and medications have been reviewed. Patient Active Problem List   Diagnosis Code    Morbid obesity with body mass index of 40.0-49.9 ([facility]) E66.01    Status post bariatric surgery Z98.84    Intestinal malabsorption K90.9    Nausea & vomiting R11.2    Incisional hernia K43.2    Morbid obesity with BMI of 40.0-44.9, adult ([facility] [address] E66.01, Z68.41     Past Medical History:   Diagnosis Date    Incisional hernia     Intestinal malabsorption     Morbid obesity with body mass index of 40.0-49.9 ([facility] [address]     Status post bariatric surgery 2012    sleeve resection / a. Wynona Blizzard     Past Surgical History:   Procedure Laterality Date    HX  SECTION      X 3    HX GI  2012    sleeve resection / a huong    HX ORTHOPAEDIC      scoliosis correction surgery X 2    HX TONSIL AND ADENOIDECTOMY      HX TUBAL LIGATION      LAP GASTRIC BYPASS/TAMICA-EN-Y      revision from sleeve on 2017     Current Outpatient Prescriptions   Medication Sig Dispense Refill    multivitamin with iron (FLINTSTONES) chewable tablet Take 1 Tab by mouth two (2) times a day.  ondansetron (ZOFRAN ODT) 4 mg disintegrating tablet Take 1 Tab by mouth every eight (8) hours as needed for Nausea.  14 Tab 0       Objective:     Visit Vitals    /75 (BP 1 Location: Left arm, BP Patient Position: Sitting)    Pulse 90    Ht 5' 3\" (1.6 m)    Wt 105.2 kg (232 lb)    LMP 10/02/2017    SpO2 99%    BMI 41.1 kg/m2        Physical Exam:    General:  alert, cooperative, no distress, appears stated age   Heart:  Regular rate and rhythm   Abdomen:   abdomen is soft without significant tenderness, masses, organomegaly or guarding; Incisions: healing well       Assessment:     1. History of Morbid obesity, status post  laparoscopic gastric bypass surgery. Doing well; no concerns. .     Plan:     1. Remember to measure portions, continue low carbohydrate diet  2. Advance diet to solid phase  3. Remember vitamin supplements. Start adult MVI, B-Complex, B-12, and Calcium supplements. Discussed importance as it relates to their malabsorptive state pertaining to vitamins. 4. Start Ursoforte for gastric bypass patients. 5. Increase aerobic exercise. 6. Attend support group  7. Follow-up in 1 month(s). 8. Total time spent with the patient was 20 minutes.

## 2017-10-25 NOTE — MR AVS SNAPSHOT
Visit Information Date & Time Provider Department Dept. Phone Encounter #  
 10/25/2017 11:30 AM [facility] [phone] [id number] Follow-up Instructions Routing History Follow-up and Disposition History Your Appointments 11/29/2017 10:15 AM  
Office Visit with MD [place] [facility] ([address]) Appt Note: 2 mo po  
 [address] [place] [address]  
  
   
 [address] [place] [address] Upcoming Health Maintenance Date Due DTaP/Tdap/Td series (1 - Tdap) 12/12/2005 PAP AKA CERVICAL CYTOLOGY 12/12/2005 INFLUENZA AGE 9 TO ADULT 8/1/2017 Allergies as of 10/25/2017  Review Complete On: 10/25/2017 By: [place] MD  
 No Known Allergies Current Immunizations  Reviewed on 9/27/2017 No immunizations on file. Not reviewed this visit You Were Diagnosed With   
  
 Codes Comments Status post bariatric surgery    -  Primary ICD-10-CM: Z98.84 ICD-9-CM: V45.86 Vitals Height(growth percentile) Weight(growth percentile) LMP BMI OB Status Smoking Status 5' 3\" (1.6 m) 232 lb (105.2 kg) 10/02/2017 41.1 kg/m2 Having regular periods Never Smoker BMI and BSA Data Body Mass Index Body Surface Area  
 41.1 kg/m 2 2.16 m 2 Preferred Pharmacy Pharmacy Name Phone [id number] [address] Your Updated Medication List  
  
   
This list is accurate as of: 10/25/17 12:30 PM.  Always use your most recent med list.  
  
  
  
  
 multivitamin with iron chewable tablet Commonly known as:  Wyona Kiarra Take 1 Tab by mouth two (2) times a day. ondansetron 4 mg disintegrating tablet Commonly known as:  ZOFRAN ODT Take 1 Tab by mouth every eight (8) hours as needed for Nausea. Introducing [facility]! Estefani Bowens introduces [employer] patient portal. Now you can access parts of your medical record, email your doctor's office, and request medication refills online. 1. In your internet browser, go to [email or web address] 2. Click on the First Time User? Click Here link in the Sign In box. You will see the New Member Sign Up page. 3. Enter your [employer] Access Code exactly as it appears below. You will not need to use this code after youve completed the sign-up process. If you do not sign up before the expiration date, you must request a new code. · [employer] Access Code: [email or web address] Expires: 12/25/2017  6:31 AM 
 
4. Enter the last four digits of your Social Security Number (xxxx) and Date of Birth (mm/dd/yyyy) as indicated and click Submit. You will be taken to the next sign-up page. 5. Create a [employer] ID. This will be your [employer] login ID and cannot be changed, so think of one that is secure and easy to remember. 6. Create a [employer] password. You can change your password at any time. 7. Enter your Password Reset Question and Answer. This can be used at a later time if you forget your password. 8. Enter your e-mail address. You will receive e-mail notification when new information is available in [address] 9. Click Sign Up. You can now view and download portions of your medical record. 10. Click the Download Summary menu link to download a portable copy of your medical information. If you have questions, please visit the Frequently Asked Questions section of the [employer] website. Remember, [employer] is NOT to be used for urgent needs. For medical emergencies, dial 911. Now available from your iPhone and Android! Please provide this summary of care documentation to your next provider. Your primary care clinician is listed as Sandra Dennison. If you have any questions after today's visit, please call [phone].

## 2017-11-06 ENCOUNTER — TELEPHONE (OUTPATIENT)
Dept: SURGERY | Age: 32
End: 2017-11-06

## 2017-11-14 ENCOUNTER — APPOINTMENT (OUTPATIENT)
Dept: GENERAL RADIOLOGY | Age: 32
End: 2017-11-14
Attending: PHYSICIAN ASSISTANT
Payer: COMMERCIAL

## 2017-11-14 ENCOUNTER — HOSPITAL ENCOUNTER (EMERGENCY)
Age: 32
Discharge: HOME | End: 2017-11-14
Attending: EMERGENCY MEDICINE
Payer: COMMERCIAL

## 2017-11-14 VITALS
BODY MASS INDEX: 39.87 KG/M2 | HEART RATE: 86 BPM | SYSTOLIC BLOOD PRESSURE: 132 MMHG | RESPIRATION RATE: 16 BRPM | HEIGHT: 63 IN | DIASTOLIC BLOOD PRESSURE: 87 MMHG | TEMPERATURE: 97.7 F | OXYGEN SATURATION: 100 % | WEIGHT: 225 LBS

## 2017-11-14 DIAGNOSIS — S39.012A STRAIN OF LUMBAR REGION, INITIAL ENCOUNTER: Primary | ICD-10-CM

## 2017-11-14 DIAGNOSIS — M62.838 MUSCLE SPASM: ICD-10-CM

## 2017-11-14 DIAGNOSIS — M43.10 SPONDYLOLISTHESIS, UNSPECIFIED SPINAL REGION: ICD-10-CM

## 2017-11-14 PROCEDURE — 72100 X-RAY EXAM L-S SPINE 2/3 VWS: CPT

## 2017-11-14 PROCEDURE — 99282 EMERGENCY DEPT VISIT SF MDM: CPT

## 2017-11-14 RX ORDER — CYCLOBENZAPRINE HCL 10 MG
10 TABLET ORAL
Qty: 30 TAB | Refills: 0 | Status: SHIPPED | OUTPATIENT
Start: 2017-11-14 | End: 2018-01-26

## 2017-11-14 RX ORDER — IBUPROFEN 200 MG
CAPSULE ORAL DAILY
COMMUNITY

## 2017-11-14 RX ORDER — KETOROLAC TROMETHAMINE 30 MG/ML
15 INJECTION, SOLUTION INTRAMUSCULAR; INTRAVENOUS
Status: DISCONTINUED | OUTPATIENT
Start: 2017-11-14 | End: 2017-11-14 | Stop reason: HOSPADM

## 2017-11-14 RX ORDER — HYDROCODONE BITARTRATE AND ACETAMINOPHEN 5; 325 MG/1; MG/1
1 TABLET ORAL
Qty: 6 TAB | Refills: 0 | Status: SHIPPED | OUTPATIENT
Start: 2017-11-14 | End: 2018-01-26

## 2017-11-14 RX ORDER — LANOLIN ALCOHOL/MO/W.PET/CERES
500 CREAM (GRAM) TOPICAL DAILY
COMMUNITY

## 2017-11-14 NOTE — ED TRIAGE NOTES
C/O left lower back pain since yesterday. Pt states that she fell a week ago and has not had any pain since fall. Pt reports hx of back surgery for scoliosis.

## 2017-11-14 NOTE — LETTER
[facility] EMERGENCY DEPT 
[address] [facility] [id number] [phone] Work/School Note Date: 11/14/2017 To Whom It May concern: 
 
Marta Topete was seen and treated today in the emergency room by the following provider(s): 
Attending Provider: Anand German MD 
Physician Assistant: SHAHID Bradley. Marta Topete may return to work on 11/16/2017. Sincerely, Eladio Kelly RN

## 2017-11-15 NOTE — DISCHARGE INSTRUCTIONS
Spondylolysis and Spondylolisthesis: Exercises  Your Care Instructions  Here are some examples of typical rehabilitation exercises for your condition. Start each exercise slowly. Ease off the exercise if you start to have pain. Your doctor or physical therapist will tell you when you can start these exercises and which ones will work best for you. How to do the exercises  Single knee-to-chest    1. Lie on your back with your knees bent and your feet flat on the floor. You can put a small pillow under your head and neck if it is more comfortable. 2. Bring one knee to your chest, keeping the other foot flat on the floor. 3. Keep your lower back pressed to the floor. Hold for 15 to 30 seconds. 4. Relax, and lower the knee to the starting position. 5. Repeat with the other leg. Repeat 2 to 4 times with each leg. 6. To get more stretch, put your other leg flat on the floor while pulling your knee to your chest.  Double knee-to-chest    1. Lie on your back with your knees bent and your feet flat on the floor. You can put a small pillow under your head and neck if it is more comfortable. 2. Bring both knees to your chest.  3. Keep your lower back pressed to the floor. Hold for 15 to 30 seconds. 4. Relax, and lower your knees to the starting position. 5. Repeat 2 to 4 times. Alternate arm and leg (bird dog) exercise    Do this exercise slowly. Try to keep your body straight at all times. 1. Start on the floor, on your hands and knees. 2. Tighten your belly muscles by pulling your belly button in toward your spine. Be sure you continue to breathe normally and do not hold your breath. 3. Raise one arm off the floor, and hold it straight out in front of you. Be careful not to let your shoulder drop down, because that will twist your trunk. 4. Hold for about 6 seconds, then lower your arm and switch to your other arm. 5. Repeat 8 to 12 times on each arm.   6. When you can do this exercise with ease and no pain, repeat steps 1 through 5. But this time do it with one leg raised off the floor, holding your leg straight out behind you. Be careful not to let your hip drop down, because that will twist your trunk. 7. When holding your leg straight out becomes easier, try raising your opposite arm at the same time, and repeat steps 1 through 5. Bridging    1. Lie on your back with both knees bent. Your knees should be bent about 90 degrees. 2. Then push your feet into the floor, squeeze your buttocks, and lift your hips off the floor until your shoulders, hips, and knees are all in a straight line. 3. Hold for about 6 seconds as you continue to breathe normally, and then slowly lower your hips back down to the floor and rest for up to 10 seconds. 4. Repeat 8 to 12 times. Curl-ups    1. Lie on the floor on your back with your knees bent at a 90-degree angle. Your feet should be flat on the floor, about 12 inches from your buttocks. 2. Cross your arms over your chest. If this bothers your neck, try putting your hands behind your neck (not your head), with your elbows spread apart. 3. Slowly tighten your belly muscles and raise your shoulder blades off the floor. 4. Keep your head in line with your body, and do not press your chin to your chest.  5. Hold this position for 1 or 2 seconds, then slowly lower yourself back down to the floor. 6. Repeat 8 to 12 times. Plank    Do this exercise slowly. Try to keep your body straight at all times, and do not let one hip drop lower than the other. 1. Lie on your stomach, resting your upper body on your forearms. 2. Tighten your belly muscles by pulling your belly button in toward your spine. 3. Keeping your knees on the floor, press down with your forearms to lift your upper body off the floor. 4. Hold for about 6 seconds, then lower your body to the floor. Rest for up to 10 seconds. 5. Repeat 8 to 12 times.   6. Over time, work up to holding for 15 to 30 seconds each time.  7. If this exercise is easy to do with your knees on the floor, try doing this exercise with your knees and legs straight, supported by your toes on the floor. Follow-up care is a key part of your treatment and safety. Be sure to make and go to all appointments, and call your doctor if you are having problems. It's also a good idea to know your test results and keep a list of the medicines you take. Where can you learn more? Go to http://wilbert-loraine.info/. Enter [phone] in the search box to learn more about \"Spondylolysis and Spondylolisthesis: Exercises. \"  Current as of: March 21, 2017  Content Version: 11.4  © 0187-3674 [employer]. Care instructions adapted under license by [employer] (which disclaims liability or warranty for this information). If you have questions about a medical condition or this instruction, always ask your healthcare professional. [place] [address] any warranty or liability for your use of this information.

## 2017-11-15 NOTE — ED NOTES
Larry Beach is a [facility] that was discharged in stable. Pt was accompanied by mother. Pt is not driving. The patients diagnosis, condition and treatment were explained to  patient and aftercare instructions were given. The patient verbalized understanding. Patient armband removed and shredded.

## 2017-11-15 NOTE — ED PROVIDER NOTES
Patient is a 27 y.o. female presenting with back pain. The history is provided by the patient. Back Pain       Medhat Sylvester is a 27 y.o. female with history of previous back surgery with rods presents with constant 7/10 low sharp left low back pain. States fell about a week ago but pain did not start till yesterday. States has taken OTC pain medication with little relief. Denies radicular sx, changes in bowel or urination. Past Medical History:   Diagnosis Date    Incisional hernia     Intestinal malabsorption     Morbid obesity with body mass index of 40.0-49.9 ([facility])     Status post bariatric surgery 2012    sleeve resection / kirstin Ventura       Past Surgical History:   Procedure Laterality Date    HX  SECTION      X 3    HX GI  2012    sleeve resection / a isidraberg    HX ORTHOPAEDIC      scoliosis correction surgery X 2    HX TONSIL AND ADENOIDECTOMY      HX TUBAL LIGATION      LAP GASTRIC BYPASS/TAMICA-EN-Y      revision from sleeve on 2017         History reviewed. No pertinent family history. Social History     Social History    Marital status: SINGLE     Spouse name: N/A    Number of children: N/A    Years of education: N/A     Occupational History    Not on file. Social History Main Topics    Smoking status: Never Smoker    Smokeless tobacco: Never Used    Alcohol use No    Drug use: No    Sexual activity: Yes     Partners: Male     Other Topics Concern    Not on file     Social History Narrative         ALLERGIES: Review of patient's allergies indicates no known allergies. Review of Systems   Musculoskeletal: Positive for back pain. Constitutional:  Denies malaise, fever, chills. Head:  Denies injury. Face:  Denies injury or pain. ENMT:  Denies sore throat. Neck:  Denies injury or pain. Chest:  Denies injury. Cardiac:  Denies chest pain or palpitations. Respiratory:  Denies cough, wheezing, difficulty breathing, shortness of breath. GI/ABD:  Denies injury, pain, distention, nausea, vomiting, diarrhea. :  Denies injury, pain, dysuria or urgency. Back:  Pain  Pelvis:  Denies injury or pain. Extremity/MS:  Denies injury or pain. Neuro:  Denies headache, LOC, dizziness, neurologic symptoms/deficits/paresthesias. Skin: Denies injury, rash, itching or skin changes. Vitals:    11/14/17 1900   BP: 132/87   Pulse: 86   Resp: 16   Temp: 97.7 °F (36.5 °C)   SpO2: 100%   Weight: 102.1 kg (225 lb)   Height: 5' 3\" (1.6 m)            Physical Exam   Nursing note and vitals reviewed. CONSTITUTIONAL: Alert, in no apparent distress; well-developed; well-nourished. HEAD:  Normocephalic, atraumatic. RESP: Chest clear, equal breath sounds. CV: S1 and S2 WNL; No murmurs, gallops or rubs. GI: Abdomen soft and non-tender. No masses or organomegaly. BACK: hands to thighs, 5/5 strength, 2+DTR's, left lumbar paraspinal tenderness no erythema, no edema, no ecchymosis,(-) deformity, swelling, skin changes, midline tenderness or crepitus. (-) step offs. Neg SLR bilaterlly. Full sensation to deep and light palpation bilaterally. (-) foot drop  UPPER EXT:  Normal inspection. LOWER EXT: Normal inspection  NEURO:  strength 5/5 and sym, sensation intact. SKIN: No rashes; Normal for age and stage. PSYCH:  Alert and oriented, normal affect.        MDM  Number of Diagnoses or Management Options  Muscle spasm:   Spondylolisthesis, unspecified spinal region:   Strain of lumbar region, initial encounter:   Diagnosis management comments: DDx: sciatica, spinal stenosis, arthritis, DJD, spondylitis, muscular pain/spasm, Fx (traumatic, compression, etc.), cauda equina, epidural abscess, UTI, pyelo, STD,  Pewg-Rlfq-Sibzit syndrome, kidney stones, preg, ectopic, ovarian cyst, ovarian torsion, GI etiology (constipation, pancreatitis, hepatitis, gastritis, diverticulitis, colitis, gastric cancer, etc), acute abdomen (appendicitis, SBO, etc.), AAA, malignancy  IMPRESSION AND MEDICAL DECISION MAKING:  Based upon the patient's presentation with noted HPI and PE, along with the work up done in the emergency department, I believe that the patient has a lumbar strain, muscle spasm and spondylolisthesis. Spondylolisthesis appears stable at this point but will have her follow upw ith Dr. Stephanie Walker tomorrow for further evaluation. Compared to CT done 10/8/17. Pt does not have any radicular sx at this time. The patient will be discharged home. Warning signs of worsening condition were discussed and understood by the patient. Based on patient's age, coexisting illness, exam, and the results of this ED evaluation, the decision to treat as an outpatient was made. Based on the information available at time of discharge, acute pathology requiring immediate intervention was deemed relative unlikely. While it is impossible to completely exclude the possibility of underlying serious disease or worsening of condition, I feel the relative likelihood is extremely low. I discussed this uncertainty with the patient, who understood ED evaluation and treatment and felt comfortable with the outpatient treatment plan. All questions regarding care, test results, and follow up were answered. The patient is stable and appropriate to discharge. They understand that they should return to the emergency department for any new or worsening symptoms. I stressed the importance of follow up for repeat assessment and possibly further evaluation/treatment.            Amount and/or Complexity of Data Reviewed  Tests in the radiology section of CPT®: ordered and reviewed  Review and summarize past medical records: yes (Review of abdominal CT done on 10/8/17 shows similar in appearance spondylolisthesis.)  Independent visualization of images, tracings, or specimens: yes    Patient Progress  Patient progress: improved    ED Course       Procedures        Vitals:  Patient Vitals for the past 12 hrs: Temp Pulse Resp BP SpO2   11/14/17 1900 97.7 °F (36.5 °C) 86 16 132/87 100 %         Medications ordered:   Medications   ketorolac (TORADOL) injection 15 mg (not administered)         Lab findings:  No results found for this or any previous visit (from the past 12 hour(s)). EKG interpretation by ED Physician:      X-Ray, CT or other radiology findings or impressions:  XR SPINE LUMB TRAUMA 2 V    (Results Pending)       Progress notes, Consult notes or additional Procedure notes:       Disposition:  Diagnosis:   1. Strain of lumbar region, initial encounter    2. Muscle spasm    3. Spondylolisthesis, unspecified spinal region        Disposition:   8:04 PM  Pt reevaluated at this time and is resting comfortably in NAD. Discussed results and findings, as well as, diagnosis and plan for discharge. Pt verbalizes understanding and agreement with plan. All questions addressed at this time. Follow-up Information     Follow up With Details Comments [address] Schedule an appointment as soon as possible for a visit in 1 day  [address] [place] [address] [facility]  If symptoms worsen [address]  [phone]           Patient's Medications   Start Taking    CYCLOBENZAPRINE (FLEXERIL) 10 MG TABLET    Take 1 Tab by mouth three (3) times daily as needed for Muscle Spasm(s). HYDROCODONE-ACETAMINOPHEN (NORCO) 5-325 MG PER TABLET    Take 1 Tab by mouth every four (4) hours as needed for Pain. Max Daily Amount: 6 Tabs. Continue Taking    CALCIUM CITRATE 200 MG (950 MG) TABLET    Take  by mouth daily. CYANOCOBALAMIN (VITAMIN B-12) 500 MCG TABLET    Take 500 mcg by mouth daily. MULTIVITAMIN WITH IRON (FLINTSTONES) CHEWABLE TABLET    Take 1 Tab by mouth two (2) times a day.    These Medications have changed    No medications on file   Stop Taking    ONDANSETRON (ZOFRAN ODT) 4 MG DISINTEGRATING TABLET    Take 1 Tab by mouth every eight (8) hours as needed for Nausea.

## 2017-11-18 ENCOUNTER — HOSPITAL ENCOUNTER (EMERGENCY)
Age: 32
Discharge: HOME | End: 2017-11-18
Attending: EMERGENCY MEDICINE
Payer: COMMERCIAL

## 2017-11-18 ENCOUNTER — APPOINTMENT (OUTPATIENT)
Dept: GENERAL RADIOLOGY | Age: 32
End: 2017-11-18
Attending: PHYSICIAN ASSISTANT
Payer: COMMERCIAL

## 2017-11-18 ENCOUNTER — APPOINTMENT (OUTPATIENT)
Dept: CT IMAGING | Age: 32
End: 2017-11-18
Attending: PHYSICIAN ASSISTANT
Payer: COMMERCIAL

## 2017-11-18 VITALS
WEIGHT: 225 LBS | DIASTOLIC BLOOD PRESSURE: 67 MMHG | RESPIRATION RATE: 14 BRPM | TEMPERATURE: 98.6 F | OXYGEN SATURATION: 100 % | BODY MASS INDEX: 39.87 KG/M2 | HEART RATE: 85 BPM | HEIGHT: 63 IN | SYSTOLIC BLOOD PRESSURE: 108 MMHG

## 2017-11-18 DIAGNOSIS — K92.1 MELENA: Primary | ICD-10-CM

## 2017-11-18 DIAGNOSIS — R07.81 PLEURITIC CHEST PAIN: ICD-10-CM

## 2017-11-18 DIAGNOSIS — E87.6 HYPOKALEMIA: ICD-10-CM

## 2017-11-18 DIAGNOSIS — R10.12 ABDOMINAL PAIN, LUQ (LEFT UPPER QUADRANT): ICD-10-CM

## 2017-11-18 LAB
ANION GAP SERPL CALC-SCNC: 8 MMOL/L (ref 3–18)
BASOPHILS # BLD: 0 K/UL (ref 0–0.06)
BASOPHILS NFR BLD: 0 % (ref 0–2)
BUN SERPL-MCNC: 11 MG/DL (ref 7–18)
BUN/CREAT SERPL: 24 (ref 12–20)
CALCIUM SERPL-MCNC: 9 MG/DL (ref 8.5–10.1)
CHLORIDE SERPL-SCNC: 103 MMOL/L (ref 100–108)
CK MB CFR SERPL CALC: 0.8 % (ref 0–4)
CK MB SERPL-MCNC: 1.3 NG/ML (ref 5–25)
CK SERPL-CCNC: 170 U/L (ref 26–192)
CO2 SERPL-SCNC: 29 MMOL/L (ref 21–32)
CREAT SERPL-MCNC: 0.45 MG/DL (ref 0.6–1.3)
D DIMER PPP FEU-MCNC: 1.46 UG/ML(FEU)
DIFFERENTIAL METHOD BLD: ABNORMAL
EOSINOPHIL # BLD: 0.1 K/UL (ref 0–0.4)
EOSINOPHIL NFR BLD: 2 % (ref 0–5)
ERYTHROCYTE [DISTWIDTH] IN BLOOD BY AUTOMATED COUNT: 15 % (ref 11.6–14.5)
GLUCOSE SERPL-MCNC: 82 MG/DL (ref 74–99)
HCT VFR BLD AUTO: 32.6 % (ref 35–45)
HGB BLD-MCNC: 10 G/DL (ref 12–16)
LIPASE SERPL-CCNC: 86 U/L (ref 73–393)
LYMPHOCYTES # BLD: 2 K/UL (ref 0.9–3.6)
LYMPHOCYTES NFR BLD: 36 % (ref 21–52)
MCH RBC QN AUTO: 27.5 PG (ref 24–34)
MCHC RBC AUTO-ENTMCNC: 30.7 G/DL (ref 31–37)
MCV RBC AUTO: 89.6 FL (ref 74–97)
MONOCYTES # BLD: 0.5 K/UL (ref 0.05–1.2)
MONOCYTES NFR BLD: 9 % (ref 3–10)
NEUTS SEG # BLD: 3 K/UL (ref 1.8–8)
NEUTS SEG NFR BLD: 53 % (ref 40–73)
PLATELET # BLD AUTO: 350 K/UL (ref 135–420)
PMV BLD AUTO: 11.6 FL (ref 9.2–11.8)
POTASSIUM SERPL-SCNC: 3.2 MMOL/L (ref 3.5–5.5)
RBC # BLD AUTO: 3.64 M/UL (ref 4.2–5.3)
SODIUM SERPL-SCNC: 140 MMOL/L (ref 136–145)
TROPONIN I SERPL-MCNC: <0.02 NG/ML (ref 0–0.06)
WBC # BLD AUTO: 5.6 K/UL (ref 4.6–13.2)

## 2017-11-18 PROCEDURE — 74011636320 HC RX REV CODE- 636/320: Performed by: EMERGENCY MEDICINE

## 2017-11-18 PROCEDURE — 93005 ELECTROCARDIOGRAM TRACING: CPT

## 2017-11-18 PROCEDURE — 85025 COMPLETE CBC W/AUTO DIFF WBC: CPT

## 2017-11-18 PROCEDURE — 96374 THER/PROPH/DIAG INJ IV PUSH: CPT

## 2017-11-18 PROCEDURE — 99284 EMERGENCY DEPT VISIT MOD MDM: CPT

## 2017-11-18 PROCEDURE — 82550 ASSAY OF CK (CPK): CPT

## 2017-11-18 PROCEDURE — 74011250636 HC RX REV CODE- 250/636: Performed by: PHYSICIAN ASSISTANT

## 2017-11-18 PROCEDURE — 83690 ASSAY OF LIPASE: CPT

## 2017-11-18 PROCEDURE — 71275 CT ANGIOGRAPHY CHEST: CPT

## 2017-11-18 PROCEDURE — C9113 INJ PANTOPRAZOLE SODIUM, VIA: HCPCS | Performed by: PHYSICIAN ASSISTANT

## 2017-11-18 PROCEDURE — 71010 XR CHEST PORT: CPT

## 2017-11-18 PROCEDURE — 80048 BASIC METABOLIC PNL TOTAL CA: CPT

## 2017-11-18 PROCEDURE — 85379 FIBRIN DEGRADATION QUANT: CPT

## 2017-11-18 RX ORDER — PANTOPRAZOLE SODIUM 40 MG/10ML
40 INJECTION, POWDER, LYOPHILIZED, FOR SOLUTION INTRAVENOUS
Status: COMPLETED | OUTPATIENT
Start: 2017-11-18 | End: 2017-11-18

## 2017-11-18 RX ADMIN — PANTOPRAZOLE SODIUM 40 MG: 40 INJECTION, POWDER, FOR SOLUTION INTRAVENOUS at 15:43

## 2017-11-18 RX ADMIN — IOPAMIDOL 75 ML: 755 INJECTION, SOLUTION INTRAVENOUS at 15:00

## 2017-11-18 NOTE — ED NOTES
Pt states ready for discharge. Pt states she will follow up with PCP as instructed by provider. Pt appears in NOAD. I have reviewed discharge instructions with the patient. The patient verbalized understanding. Patient seen leaving ED ambulatory without difficulty or need for assistance, with S/O in no sign of distress. Patient armband removed and shredded. Current Discharge Medication List      CONTINUE these medications which have NOT CHANGED    Details   cyanocobalamin (VITAMIN B-12) 500 mcg tablet Take 500 mcg by mouth daily. calcium citrate 200 mg (950 mg) tablet Take  by mouth daily. cyclobenzaprine (FLEXERIL) 10 mg tablet Take 1 Tab by mouth three (3) times daily as needed for Muscle Spasm(s). Qty: 30 Tab, Refills: 0      HYDROcodone-acetaminophen (NORCO) 5-325 mg per tablet Take 1 Tab by mouth every four (4) hours as needed for Pain. Max Daily Amount: 6 Tabs. Qty: 6 Tab, Refills: 0      multivitamin with iron (FLINTSTONES) chewable tablet Take 1 Tab by mouth two (2) times a day.

## 2017-11-18 NOTE — ED TRIAGE NOTES
Patient reports to ED with complaints of left upper quadrant pain, chest pain and blood in stool [id number].  Patient had gastric bypass 9/26/17

## 2017-11-18 NOTE — ED PROVIDER NOTES
HPI Comments: Patient is a 26 y/o female w/ PMH obesity, with recent Gastric Bypass on 9/26/2017 who presents to the ER c/o chest pain, abdominal pain and bloody stools. Patient states she has been having these symptoms intermittently for 3-5 days. She reports pain with deep breathing and when she turns. She also reports abdominal pain that comes and goes in the center and left upper quadrant of her abdomen. She describes the pain as pressure, however is unable to rate her pain. She has also noticed some dark blood in her stool for the past 3 days. She denied any recent fevers, chills, SOB, headache, dizziness, nausea, vomiting, diarrhea, constipation, dysuria and has no other complaints. Patient reports having a collapsed lung s/p gastric bypass surgery due to not using the incentive spirometer. She states the pain today is somewhat similar however she is not experiencing any shortness of breath. She is taking her vitamins as directed and having a normal appetite. Patient is a 27 y.o. female presenting with chest pain, abdominal pain, and melena. The history is provided by the patient. Chest Pain (Angina)    Associated symptoms include abdominal pain. Pertinent negatives include no cough, no dizziness, no fever, no headaches, no nausea, no palpitations, no shortness of breath, no vomiting and no weakness. Abdominal Pain    Associated symptoms include melena and chest pain. Pertinent negatives include no fever, no diarrhea, no nausea, no vomiting, no constipation, no dysuria and no headaches. Melena    Associated symptoms include abdominal pain and chest pain. Pertinent negatives include no fever, no diarrhea, no nausea, no vomiting, no headaches and no coughing.         Past Medical History:   Diagnosis Date    Incisional hernia     Intestinal malabsorption     Morbid obesity with body mass index of 40.0-49.9 ([facility])     Status post bariatric surgery 7/2012    sleeve resection / kirstin Capellan Past Surgical History:   Procedure Laterality Date    HX  SECTION      X 3    HX GI  2012    sleeve resection / a salzberg    HX ORTHOPAEDIC      scoliosis correction surgery X 2    HX TONSIL AND ADENOIDECTOMY      HX TUBAL LIGATION      LAP GASTRIC BYPASS/TAMICA-EN-Y      revision from sleeve on 2017         History reviewed. No pertinent family history. Social History     Social History    Marital status: SINGLE     Spouse name: N/A    Number of children: N/A    Years of education: N/A     Occupational History    Not on file. Social History Main Topics    Smoking status: Never Smoker    Smokeless tobacco: Never Used    Alcohol use No    Drug use: No    Sexual activity: Yes     Partners: Male     Other Topics Concern    Not on file     Social History Narrative         ALLERGIES: Review of patient's allergies indicates no known allergies. Review of Systems   Constitutional: Negative for chills, fatigue and fever. HENT: Negative. Negative for sore throat. Eyes: Negative. Respiratory: Negative for cough and shortness of breath. Cardiovascular: Positive for chest pain. Negative for palpitations. Gastrointestinal: Positive for abdominal pain, blood in stool and melena. Negative for constipation, diarrhea, nausea and vomiting. Genitourinary: Negative for dysuria. Musculoskeletal: Negative. Skin: Negative. Neurological: Negative for dizziness, weakness, light-headedness and headaches. Psychiatric/Behavioral: Negative. All other systems reviewed and are negative. Vitals:    17 1526 17 1530 17 1545 17 1600   BP:  116/75 115/73 118/68   Pulse: 90 91 87 85   Resp: 18 14 10 19   Temp:       SpO2: 100% 100% 100% 100%   Weight:       Height:                Physical Exam   Constitutional: She is oriented to person, place, and time. She appears well-developed and well-nourished. No distress.    HENT:   Head: Normocephalic and atraumatic. Mouth/Throat: Oropharynx is clear and moist.   Eyes: Conjunctivae are normal. No scleral icterus. Neck: Neck supple. No JVD present. No tracheal deviation present. Cardiovascular: Normal rate, regular rhythm and normal heart sounds. Pulmonary/Chest: Effort normal and breath sounds normal. No respiratory distress. She has no wheezes. She has no rales. She exhibits tenderness. Left anterior chest wall tenderness along with tenderness under left breast; worsens with deep inspiration   Abdominal: Soft. Bowel sounds are normal. She exhibits no distension and no mass. There is tenderness in the periumbilical area and left upper quadrant. There is no rigidity, no rebound, no guarding, no CVA tenderness, no tenderness at McBurney's point and negative Rangel's sign. Abdomen, soft mild tenderness to periumbilical and LUQ on exam; no signs of acute abdomen. Normal bowel sounds. Genitourinary: Rectal exam shows guaiac positive stool. Rectal exam shows no external hemorrhoid, no internal hemorrhoid, no fissure, no mass, no tenderness and anal tone normal.   Genitourinary Comments: No gross blood on exam, however fecal occult positive   Musculoskeletal: Normal range of motion. Neurological: She is alert and oriented to person, place, and time. She has normal strength. Gait normal. GCS eye subscore is 4. GCS verbal subscore is 5. GCS motor subscore is 6. Skin: Skin is warm and dry. She is not diaphoretic. Psychiatric: She has a normal mood and affect. Nursing note and vitals reviewed. MDM  Number of Diagnoses or Management Options  Abdominal pain, LUQ (left upper quadrant):   Hypokalemia:   Melena:   Pleuritic chest pain:   Diagnosis management comments: 2:59 PM  26 y/o female c/o chest pain, abd pain and blood in stool x several days. Recent bariatric sx late September with several complications. States stools have been darker than normal.  Taking her supplements as prescribed.   Well appearing on exam with no signs of acute abdomen. Will plan on labs, imaging. EKG NSR rate 95 w/ no acute ST/T wave abnormalities. Fecal Occult positive, however no gross blood on exam.    Teddy Hadley PA-C    4:34 PM  CTA chest negative for acute DVT/embolus. All labs reviewed. Discussed results with pt. Noted to have slow down trending H/H since September 2017. Suspect possible ulcer or other upper GI bleed. abd soft on reexamination. Due to recent bariatric surgery, will defer prescription of GERD meds to bariatric surgeon and GI doctor as outpatient follow up on Monday. Pt expressed understanding of everything discussed. All questions answered and patient in agreement with plan of care. Will plan for discharge.   Teddy Hadley PA-C    Clinical Impression:  Melena, hypokalemia, LUQ abd pain, Pleuritic chest pain         Amount and/or Complexity of Data Reviewed  Clinical lab tests: ordered and reviewed  Tests in the radiology section of CPT®: ordered and reviewed    Risk of Complications, Morbidity, and/or Mortality  Presenting problems: moderate  Diagnostic procedures: moderate  Management options: moderate    Patient Progress  Patient progress: stable    ED Course       Procedures           Vitals:  Patient Vitals for the past 12 hrs:   Temp Pulse Resp BP SpO2   11/18/17 1600 - 85 19 118/68 100 %   11/18/17 1545 - 87 10 115/73 100 %   11/18/17 1530 - 91 14 116/75 100 %   11/18/17 1526 - 90 18 - 100 %   11/18/17 1525 - 89 13 - 100 %   11/18/17 1524 - - - 117/73 -   11/18/17 1500 - 88 21 114/71 100 %   11/18/17 1445 - 91 25 112/74 100 %   11/18/17 1430 - 91 23 121/86 99 %   11/18/17 1419 98.6 °F (37 °C) 88 16 126/86 100 %   11/18/17 1415 - 97 17 128/90 100 %         Medications ordered:   Medications   iopamidol (ISOVUE-370) 76 % injection  mL (75 mL IntraVENous Given 11/18/17 1500)   pantoprazole (PROTONIX) injection 40 mg (40 mg IntraVENous Given 11/18/17 1543)         Lab findings:  Recent Results (from the past 12 hour(s))   EKG, 12 LEAD, INITIAL    Collection Time: 11/18/17  2:13 PM   Result Value Ref Range    Ventricular Rate 95 BPM    Atrial Rate 95 BPM    P-R Interval 118 ms    QRS Duration 82 ms    Q-T Interval 340 ms    QTC Calculation (Bezet) 427 ms    Calculated P Axis 58 degrees    Calculated R Axis 45 degrees    Calculated T Axis 42 degrees    Diagnosis       Normal sinus rhythm  Normal ECG  When compared with ECG of 10-OCT-2017 19:00,  No significant change was found     CBC WITH AUTOMATED DIFF    Collection Time: 11/18/17  2:26 PM   Result Value Ref Range    WBC 5.6 4.6 - 13.2 K/uL    RBC 3.64 (L) 4.20 - 5.30 M/uL    HGB 10.0 (L) 12.0 - 16.0 g/dL    HCT 32.6 (L) 35.0 - 45.0 %    MCV 89.6 74.0 - 97.0 FL    MCH 27.5 24.0 - 34.0 PG    MCHC 30.7 (L) 31.0 - 37.0 g/dL    RDW 15.0 (H) 11.6 - 14.5 %    PLATELET 491 333 - 062 K/uL    MPV 11.6 9.2 - 11.8 FL    NEUTROPHILS 53 40 - 73 %    LYMPHOCYTES 36 21 - 52 %    MONOCYTES 9 3 - 10 %    EOSINOPHILS 2 0 - 5 %    BASOPHILS 0 0 - 2 %    ABS. NEUTROPHILS 3.0 1.8 - 8.0 K/UL    ABS. LYMPHOCYTES 2.0 0.9 - 3.6 K/UL    ABS. MONOCYTES 0.5 0.05 - 1.2 K/UL    ABS. EOSINOPHILS 0.1 0.0 - 0.4 K/UL    ABS.  BASOPHILS 0.0 0.0 - 0.06 K/UL    DF AUTOMATED     METABOLIC PANEL, BASIC    Collection Time: 11/18/17  2:26 PM   Result Value Ref Range    Sodium 140 136 - 145 mmol/L    Potassium 3.2 (L) 3.5 - 5.5 mmol/L    Chloride 103 100 - 108 mmol/L    CO2 29 21 - 32 mmol/L    Anion gap 8 3.0 - 18 mmol/L    Glucose 82 74 - 99 mg/dL    BUN 11 7.0 - 18 MG/DL    Creatinine 0.45 (L) 0.6 - 1.3 MG/DL    BUN/Creatinine ratio 24 (H) 12 - 20      GFR est AA >60 >60 ml/min/1.73m2    GFR est non-AA >60 >60 ml/min/1.73m2    Calcium 9.0 8.5 - 10.1 MG/DL   LIPASE    Collection Time: 11/18/17  2:26 PM   Result Value Ref Range    Lipase 86 73 - 393 U/L   CARDIAC PANEL,(CK, CKMB & TROPONIN)    Collection Time: 11/18/17  2:26 PM   Result Value Ref Range     26 - 192 U/L    CK - MB 1.3 <3.6 ng/ml    CK-MB Index 0.8 0.0 - 4.0 %    Troponin-I, Qt. <0.02 0.00 - 0.06 NG/ML   D DIMER    Collection Time: 11/18/17  2:26 PM   Result Value Ref Range    D DIMER 1.46 (H) <0.46 ug/ml(FEU)       EKG interpretation by ED Physician:  NSR rate 95 w/ no acute ST/T wave abnormalities    X-Ray, CT or other radiology findings or impressions:  CTA CHEST W OR W WO CONT   Final Result      XR CHEST PORT   Final Result          Progress notes, Consult notes or additional Procedure notes:       Reevaluation of patient:       Disposition:  Diagnosis:   1. Melena    2. Hypokalemia    3. Abdominal pain, LUQ (left upper quadrant)    4. Pleuritic chest pain        Disposition:     Follow-up Information     Follow up With Details Comments Contact Info    [facility] EMERGENCY DEPT  If symptoms worsen [address]    Namoie Laguna MD Call in 3 days ER follow up as needed [address] [facility]  Jelani Moore MD Call on 11/20/2017 ER follow up for Melena and suspected GI bleed and downtrending H/H [address] [place] [address] Drive  [address] [place] [address]  [phone]             Patient's Medications   Start Taking    No medications on file   Continue Taking    CALCIUM CITRATE 200 MG (950 MG) TABLET    Take  by mouth daily. CYANOCOBALAMIN (VITAMIN B-12) 500 MCG TABLET    Take 500 mcg by mouth daily. CYCLOBENZAPRINE (FLEXERIL) 10 MG TABLET    Take 1 Tab by mouth three (3) times daily as needed for Muscle Spasm(s). HYDROCODONE-ACETAMINOPHEN (NORCO) 5-325 MG PER TABLET    Take 1 Tab by mouth every four (4) hours as needed for Pain. Max Daily Amount: 6 Tabs. MULTIVITAMIN WITH IRON (FLINTSTONES) CHEWABLE TABLET    Take 1 Tab by mouth two (2) times a day.    These Medications have changed    No medications on file   Stop Taking    No medications on file

## 2017-11-19 LAB
ATRIAL RATE: 95 BPM
CALCULATED P AXIS, ECG09: 58 DEGREES
CALCULATED R AXIS, ECG10: 45 DEGREES
CALCULATED T AXIS, ECG11: 42 DEGREES
DIAGNOSIS, 93000: NORMAL
P-R INTERVAL, ECG05: 118 MS
Q-T INTERVAL, ECG07: 340 MS
QRS DURATION, ECG06: 82 MS
QTC CALCULATION (BEZET), ECG08: 427 MS
VENTRICULAR RATE, ECG03: 95 BPM

## 2017-11-20 ENCOUNTER — TELEPHONE (OUTPATIENT)
Dept: SURGERY | Age: 32
End: 2017-11-20

## 2017-11-20 ENCOUNTER — NURSE NAVIGATOR (OUTPATIENT)
Dept: SURGERY | Age: 32
End: 2017-11-20

## 2017-11-20 NOTE — TELEPHONE ENCOUNTER
Dr. Larisa Barahona please call this patient:    Patient went to [facility] ED 11/18/17 Dx: Melena, Peptic GI bleed, Upper GI bleed due to color of blood and downtrending HGB, no hemorrhoid.

## 2017-11-21 ENCOUNTER — OFFICE VISIT (OUTPATIENT)
Dept: SURGERY | Age: 32
End: 2017-11-21

## 2017-11-21 ENCOUNTER — TELEPHONE (OUTPATIENT)
Dept: SURGERY | Age: 32
End: 2017-11-21

## 2017-11-21 VITALS
HEART RATE: 109 BPM | SYSTOLIC BLOOD PRESSURE: 129 MMHG | HEIGHT: 63 IN | BODY MASS INDEX: 39.14 KG/M2 | DIASTOLIC BLOOD PRESSURE: 84 MMHG | WEIGHT: 220.9 LBS | OXYGEN SATURATION: 100 %

## 2017-11-21 DIAGNOSIS — Z98.84 STATUS POST BARIATRIC SURGERY: ICD-10-CM

## 2017-11-21 DIAGNOSIS — K90.9 INTESTINAL MALABSORPTION, UNSPECIFIED TYPE: Primary | ICD-10-CM

## 2017-11-21 NOTE — PROGRESS NOTES
Subjective:     Agustín Hu  is a 27 y.o. female who presents for follow-up about 2 months following laparoscopic gastric bypass surgery as a revision of her prior sleeve procedure. She has lost a total of 35 pounds since surgery. Body mass index is 39.13 kg/(m^2). .    Surgery related complication: none       She reports some rectal bleeding which has since stopped and denies vomiting and abdominal pain. Patients pain score:0      The patient's exercise level: moderately active. Changes in her medical history and medications have been reviewed. Patient Active Problem List   Diagnosis Code    Morbid obesity with body mass index of 40.0-49.9 ([facility]) E66.01    Status post bariatric surgery Z98.84    Intestinal malabsorption K90.9    Nausea & vomiting R11.2    Incisional hernia K43.2    Morbid obesity with BMI of 40.0-44.9, adult ([facility] [address] E66.01, Z68.41     Past Medical History:   Diagnosis Date    Incisional hernia     Intestinal malabsorption     Morbid obesity with body mass index of 40.0-49.9 ([facility] [address]     Status post bariatric surgery 2012    sleeve resection / kirstin Eli     Past Surgical History:   Procedure Laterality Date    HX  SECTION      X 3    HX GI  2012    sleeve resection / a salzberg    HX ORTHOPAEDIC      scoliosis correction surgery X 2    HX TONSIL AND ADENOIDECTOMY      HX TUBAL LIGATION      LAP GASTRIC BYPASS/TAMICA-EN-Y      revision from sleeve on 2017     Current Outpatient Prescriptions   Medication Sig Dispense Refill    cyanocobalamin (VITAMIN B-12) 500 mcg tablet Take 500 mcg by mouth daily.  calcium citrate 200 mg (950 mg) tablet Take  by mouth daily.  multivitamin with iron (FLINTSTONES) chewable tablet Take 1 Tab by mouth two (2) times a day.  cyclobenzaprine (FLEXERIL) 10 mg tablet Take 1 Tab by mouth three (3) times daily as needed for Muscle Spasm(s).  30 Tab 0    HYDROcodone-acetaminophen (NORCO) 5-325 mg per tablet Take 1 Tab by mouth every four (4) hours as needed for Pain. Max Daily Amount: 6 Tabs. 6 Tab 0       Objective:     Visit Vitals    /84 (BP 1 Location: Left arm, BP Patient Position: Sitting)    Pulse (!) 109    Ht 5' 3\" (1.6 m)    Wt 100.2 kg (220 lb 14.4 oz)    LMP 11/09/2017    SpO2 100%    BMI 39.13 kg/m2        Physical Exam:    General:  alert, cooperative, no distress, appears stated age   Lungs:   clear to auscultation bilaterally   Heart:  Regular rate and rhythm   Abdomen:   abdomen is soft without significant tenderness, masses, organomegaly or guarding; Incisions: healing well         Assessment:     1. History of Morbid obesity, status post  laparoscopic gastric bypass surgery. Probable hemorrhoidal bleed now stopped    Plan:     1. Remember to measure portions, continue low carbohydrate diet  2. Tolerated transition to solids without issue  3. Remember vitamin supplements. 4. Exercise regimen appears adequate. 5. Attend support group  6. Follow-up in 2 month(s). 7. Total time spent with the patient 20 minutes.

## 2017-11-21 NOTE — TELEPHONE ENCOUNTER
Dr. Marlon Mohan please be advised. Spoke with patient on 11/20/17 about recent ER visit. Patient c/o 5-6 days of \"dark rectal bleeding\", weakness and tiredness. Seen in [facility] emergency department. Patient advised of office visit 11/21/17. If Dr. Bailee Tamez is unable hopefully she can be seen by Daija Thompson or Reggie Porter.     Thank you

## 2017-11-22 ENCOUNTER — TELEPHONE (OUTPATIENT)
Dept: SURGERY | Age: 32
End: 2017-11-22

## 2017-11-23 NOTE — PROGRESS NOTES
Evelina Timmons  11/22/17    I saw Ms. Timmons yesterday in the office as per my note. She had some minimal rectal bleeding which was fairly bright red in nature and it had since stopped prior to her appointment with me. Of note, was the fact that she had lab work a few days prior which showed a normal BUN and we really felt like she was having simply hemorrhoidal bleeding. She has had no emesis at all and no abdominal pain and apparently she called the office today stating that she vomited some clot and we had her go immediately to the ER. Unfortunately, she did call EMS and they transported her to the nearest ER which was [facility]. I talked with the ER physician there and although she appeared to be totally stable and was not actively vomiting, she was found to have a hemoglobin which had dropped significantly in the mid 6 range. He felt that she needed to stay there at his facility and I certainly agree with that given her situation and he will have the GI physician there at [facility] see her and go ahead and perform an endoscopy on her. She is to receive a transfusion, of course, there at [facility] and I have asked him to let the GI physician know that I am available if there are any questions or concerns. This is certainly a change from her symptoms which she described yesterday and my feeling is since she did have a slightly laney post operative course with her pulmonary status that she might have developed a stress ulceration in her gastric pouch due to that issue. It is unclear to me as to whether or not she had been taking nonsteroidal drugs and the ER physician states that he is unaware that she had been taking that. He will let me know if there are any issues, otherwise, she will be treated there at [facility] for her current issue.     Dee/alley

## 2017-11-27 RX ORDER — SUCRALFATE 1 G/1
1 TABLET ORAL 3 TIMES DAILY
Qty: 30 TAB | Refills: 0 | Status: SHIPPED | OUTPATIENT
Start: 2017-11-27 | End: 2017-12-07

## 2017-11-29 RX ORDER — PHENOL/SODIUM PHENOLATE
AEROSOL, SPRAY (ML) MUCOUS MEMBRANE
Qty: 30 TAB | Refills: 1 | Status: SHIPPED | OUTPATIENT
Start: 2017-11-29 | End: 2018-03-29 | Stop reason: ALTCHOICE

## 2017-12-20 NOTE — ED NOTES
12:20 AM pt declines admit, strongly wants dc. No complaints. No weakness. Hr markedly improved but still 107, pt declines further ed care/tx/fluids, strongly wants dc at this time. No emc, detailed ret inst given. Not c/w sepsis/pe. Nl sats.
Multiple attempts to obtain IV for CTA by several ED staff; unsuccessful. Provider informed.
Noted no assessment completed by off going RN Janett Vera).
Patient instructed of still need for a clean catch urine specimen. Patient verbalized understanding of.
Patient states she \"feels so much better\". Will continue to monitor.
Patient states she is on her menses and she always gets palpuitations with it each month.
Patient up for discharge. Discharge results have been reviewed with patient by the Provider.  Patient has been counseled regarding her diagnosis, treatment, and plan. Patient verbally conveys understanding and agreement of the signs, symptoms, diagnosis, treatment and prognosis and additionally agrees to follow up as discussed.  Patient also agrees with the care-plan and conveys that all of her questions have been answered.  I have also provided discharge instructions for the patient by the Provider, that include: educational information regarding their diagnosis and treatment, and list of reasons why they would want to return to the ED prior to their follow-up appointment, should her condition change. Patient has been provided with education for proper emergency department utilization. Any and all prescriptions for medicationif any, including it's  name, dosage, action, frequency, and side effects have been reviewed with the patient. Encouraged to adhere to MD discharge instructions. Armband removed and shredded per policy. Encouraged to voice any concerns, and all concerns addressed. Patient discharged in no apparent distress and stable vital signs.
Patient updated on which test results are still pending. Encouraged to voice any concerns, and all questions/concerns addressed. Frequent rounding provided to address any concerns. Offered comfort measures; warm blanket, reposition, dimmed lights. Patient denies any discomforts at this time. Call bell remains at bedside. Hourly rounding for comfort and pain control remains in progress.
Purposeful rounding completed:  Side rails up x 2:  YES  Bed in low position and wheels locked: YES  Call bell within reach: YES  Comfort addressed: YES    Toileting needs addressed: YES  Plan of care reviewed/updated with patient and or family members: YES  IV site assessed: YES  Pain assessed and addressed: YES  Will continue to monitor.
Received report from off going RN Nikia Hansen). Noted no assessment documented. Patient greeted / introduced myself as their primary nurse. Encouraged to voice any concerns, and all questions/concerns addressed. Assessment in progress. Explanation and teaching of all care given,  including any pending orders or procedures. Patient verbalized understanding of 'Plan of Care'. Vital signs, history, and home medications reviewed. Call bell with reach. Awaiting further MD orders at this time.
Resting quietly with eyes closed. No distress noted. Respirations equal and unlabored. Skins warm, dry, acyanotic. Arouses easily to verbal stimuli with no complaints offered.
Sitting up on side of stretcher talking with visitor. No complaints at this time.
Sugar free juice given, per Provider approval.
Attending Attestation (For Attendings USE Only)...

## 2017-12-26 ENCOUNTER — OFFICE VISIT (OUTPATIENT)
Dept: ORTHOPEDIC SURGERY | Age: 32
End: 2017-12-26

## 2017-12-26 ENCOUNTER — TELEPHONE (OUTPATIENT)
Dept: SURGERY | Age: 32
End: 2017-12-26

## 2017-12-26 VITALS
DIASTOLIC BLOOD PRESSURE: 86 MMHG | HEART RATE: 98 BPM | TEMPERATURE: 98 F | HEIGHT: 63 IN | RESPIRATION RATE: 18 BRPM | BODY MASS INDEX: 37.92 KG/M2 | SYSTOLIC BLOOD PRESSURE: 131 MMHG | WEIGHT: 214 LBS

## 2017-12-26 DIAGNOSIS — M54.5 LOW BACK PAIN, UNSPECIFIED BACK PAIN LATERALITY, UNSPECIFIED CHRONICITY, WITH SCIATICA PRESENCE UNSPECIFIED: ICD-10-CM

## 2017-12-26 DIAGNOSIS — M43.16 SPONDYLOLISTHESIS OF LUMBAR REGION: Primary | ICD-10-CM

## 2017-12-26 RX ORDER — GABAPENTIN 300 MG/1
300 CAPSULE ORAL 3 TIMES DAILY
Qty: 90 CAP | Refills: 2 | Status: SHIPPED | OUTPATIENT
Start: 2017-12-26 | End: 2018-01-26

## 2017-12-26 NOTE — PROGRESS NOTES
[address]  [address], [address]  [place], [address] [place] [address] Street  Phone: [phone]  Fax: [phone]        Desi Yang  : 1985  PCP: Elisa Campbell MD  2017    NEW PATIENT      ASSESSMENT AND PLAN     Mark Carrillo comes in to the office today c/o chronic low back pain with a hx of a long fusion ending at the L4 level. She presents with tenderness in the lumbar region, reproducible pain with lumbar flexion/extension. This was worst with extension. She also had a positive left straight leg raise. The lumbar XR showed an unstable spondylolisthesis at L4-5. Her symptoms are likely multifactorial including left lumbar radiculopathy, spondylolisthesis, and a posterior element pain possibly related to hardware/listhesis causing flexed forward posture which may be responsible for her myofascial symptoms. I will obtain a lumbar MRI to further assess her spinal pathology. She will f/u with Dr. Min Bill for a surgical consultation regarding potential fusion. I prescribed her Gabapentin 300 mg TID for her radicular symptoms. I also referred her to pain management for her chronic back pain. Pt will f/u in 2 weeks or sooner if needed. Diagnoses and all orders for this visit:    1. Spondylolisthesis of lumbar region  -     MRI LUMB SPINE W WO CONT; Future  -     gabapentin (NEURONTIN) 300 mg capsule; Take 1 Cap by mouth three (3) times daily. 2. Low back pain, unspecified back pain laterality, unspecified chronicity, with sciatica presence unspecified  -     [[id number]] L/S 2-3 views         Follow-up Disposition:  Return with Dr. Min Bill. CHIEF COMPLAINT  Mark Carrillo is seen today in consultation at the request of Elisa Campbell MD for complaints of chronic back pain.        HISTORY OF PRESENT ILLNESS  Mark Carrillo is a 34 y.o. female c/o chronic back pain with a hx of severe scoliosis which was treated with a long fusion surgery which ended at the L4 level when she was 5 y.o. The procedure presented with some complications which included left-sided paralysis. This was treated with a second surgery. She continues to have weakness on the left side related to this. She reports her pain started to worsen in 2017. She notes that sitting will help alleviate her pain however, she will have to change positions after a prolonged period. She will have intermittent neuropathic radicular symptoms down the posterior aspect of the LLE. Her pain is worse with bending forward. She has recently lost approximately 50 lbs due to a gastric bypass surgery, which has helped decrease her pain. Pt denies any fevers, chills, nausea, vomiting. Pt denies any chest pain and shortness of breath. Pt denies any ear, nose, and throat problems. Pt denies any fecal or urinary incontinence. PAST MEDICAL HISTORY   Past Medical History:   Diagnosis Date    Incisional hernia     Intestinal malabsorption     Morbid obesity with body mass index of 40.0-49.9 ([facility])     Status post bariatric surgery 2012    sleeve resection / kirstin borja       Past Surgical History:   Procedure Laterality Date    HX  SECTION      X 3    HX GI  2012    sleeve resection / rosaline borja    HX ORTHOPAEDIC      scoliosis correction surgery X 2    HX TONSIL AND ADENOIDECTOMY      HX TUBAL LIGATION      LAP GASTRIC BYPASS/TAMICA-EN-Y      revision from sleeve on 2017       MEDICATIONS      Current Outpatient Prescriptions   Medication Sig Dispense Refill    gabapentin (NEURONTIN) 300 mg capsule Take 1 Cap by mouth three (3) times daily. 90 Cap 2    Omeprazole delayed release (PRILOSEC D/R) 20 mg tablet TAKE 1 TABLET BY MOUTH EVERY DAY 30 Tab 1    cyanocobalamin (VITAMIN B-12) 500 mcg tablet Take 500 mcg by mouth daily.  calcium citrate 200 mg (950 mg) tablet Take  by mouth daily.       cyclobenzaprine (FLEXERIL) 10 mg tablet Take 1 Tab by mouth three (3) times daily as needed for Muscle Spasm(s). 30 Tab 0    HYDROcodone-acetaminophen (NORCO) 5-325 mg per tablet Take 1 Tab by mouth every four (4) hours as needed for Pain. Max Daily Amount: 6 Tabs. 6 Tab 0    multivitamin with iron (FLINTSTONES) chewable tablet Take 1 Tab by mouth two (2) times a day. ALLERGIES    Allergies   Allergen Reactions    Nsaids (Non-Steroidal Anti-Inflammatory Drug) Other (comments)     GI Ulcer- Hx of Gastric Bypass          SOCIAL HISTORY    Social History     Social History    Marital status: SINGLE     Spouse name: N/A    Number of children: N/A    Years of education: N/A     Social History Main Topics    Smoking status: Never Smoker    Smokeless tobacco: Never Used    Alcohol use No    Drug use: No    Sexual activity: Yes     Partners: Male     Other Topics Concern    None     Social History Narrative       FAMILY HISTORY  No family history on file. REVIEW OF SYSTEMS  Review of Systems   Constitutional: Negative for chills, diaphoresis, fever, malaise/fatigue and weight loss. Respiratory: Negative for shortness of breath. Cardiovascular: Negative for chest pain and leg swelling. Gastrointestinal: Negative for constipation, nausea and vomiting. Musculoskeletal: Positive for back pain. Neurological: Negative for dizziness, tingling, seizures, loss of consciousness, weakness and headaches. Psychiatric/Behavioral: The patient does not have insomnia.         PHYSICAL EXAMINATION  Visit Vitals    /86    Pulse 98    Temp 98 °F (36.7 °C) (Oral)    Resp 18    Ht 5' 3\" (1.6 m)    Wt 214 lb (97.1 kg)  Comment: heavy clothing and boots noted    BMI 37.91 kg/m2       Pain Assessment  12/26/2017   Location of Pain Back   Severity of Pain 7   Quality of Pain Burning   Duration of Pain Persistent   Frequency of Pain Constant   Aggravating Factors Bending;Walking;Squatting;Standing;Stretching;Exercise   Limiting Behavior Yes   Relieving Factors Nothing   Result of Injury No       Constitutional:  Well developed, well nourished, in no acute distress. Psychiatric: Affect and mood are appropriate. HEENT: Normocephalic, atraumatic. Extraocular movements intact. Integumentary: No rashes or abrasions noted on exposed areas. Cardiovascular: Regular rate and rhythm. Pulmonary: Clear to auscultation bilaterally. SPINE/MUSCULOSKELETAL EXAM    Cervical spine:  Neck is midline. Normal muscle tone. No focal atrophy is noted. ROM pain free. Shoulder ROM intact. No tenderness to palpation. Negative Spurling's sign. Negative Tinel's sign. Negative Robbins's sign. Sensation in the bilateral arms grossly intact to light touch. Lumbar spine:  No rash, ecchymosis, or gross obliquity. No fasciculations. No focal atrophy is noted. No pain with hip ROM. Full range of motion. Tenderness to palpation. No tenderness to palpation at the sciatic notch. SI joints non-tender. Trochanters non tender. Pain with lumbar flexion and extension      Sensation in the bilateral legs grossly intact to light touch. MOTOR:      Biceps  Triceps Deltoids Wrist Ext Wrist Flex Hand Intrin   Right 5/5 5/5 5/5 5/5 5/5 5/5   Left 5/5 5/5 5/5 5/5 5/5 5/5             Hip Flex  Quads Hamstrings Ankle DF EHL Ankle PF   Right 5/5 5/5 5/5 5/5 5/5 5/5   Left 5/5 5/5 5/5 5/5 5/5 5/5     DTRs are 1+ biceps, triceps, brachioradialis, patella, and Achilles. Positive left Straight Leg raise. Squat not tested. No difficulty with tandem gait. Ambulation without assistive device. FWB. RADIOGRAPHS  Lumbar XR images taken on 12/26/2017 personally reviewed with patient:  Spondylolisthesis at approximately 1 cm with flexion and .5 cm with extension. Lumbar XR images taken on 11/14/2017 personally reviewed with patient:  FINDINGS:     Two views obtained.  Long Gomez thierry bilaterally from T2 through L4. 1 cm of  anterior offset of L4 on L5 with marked disc space narrowing. .  The pedicles are  intact. There is no evidence of fracture or dislocation. Mineralization is  normal.     IMPRESSION  IMPRESSION:     Stable 1 cm spondylolisthesis L4/5 with marked degenerative disc disease. Long Gomez thierry intact     reviewed    Ms. Maria R Arnett has a reminder for a \"due or due soon\" health maintenance. I have asked that she contact her primary care provider for follow-up on this health maintenance. This plan was reviewed with the patient and patient agrees. All questions were answered. More than half of this visit today was spent on counseling. Written by Cynthia Sandoval, as dictated by Dr. Paul Francisco. I, Dr. Paul Francisco, confirm that all documentation is accurate.

## 2017-12-26 NOTE — TELEPHONE ENCOUNTER
Patient had called and left a message about whether it is ok for her to start taking gabapentin at the advise of another provider. Attempted to call and let her know that is fine. Had to leave message as there was no answer. PIETER Moreno, FNP-BC

## 2018-01-10 ENCOUNTER — HOSPITAL ENCOUNTER (EMERGENCY)
Age: 33
Discharge: HOME | End: 2018-01-10
Attending: EMERGENCY MEDICINE
Payer: COMMERCIAL

## 2018-01-10 ENCOUNTER — HOSPITAL ENCOUNTER (OUTPATIENT)
Dept: CT IMAGING | Age: 33
Discharge: HOME | End: 2018-01-10
Attending: EMERGENCY MEDICINE
Payer: COMMERCIAL

## 2018-01-10 ENCOUNTER — HOSPITAL ENCOUNTER (OUTPATIENT)
Age: 33
Discharge: HOME | End: 2018-01-10
Attending: PHYSICAL MEDICINE & REHABILITATION
Payer: COMMERCIAL

## 2018-01-10 ENCOUNTER — HOSPITAL ENCOUNTER (OUTPATIENT)
Dept: GENERAL RADIOLOGY | Age: 33
Discharge: HOME | End: 2018-01-10
Attending: EMERGENCY MEDICINE
Payer: COMMERCIAL

## 2018-01-10 VITALS
HEART RATE: 65 BPM | OXYGEN SATURATION: 100 % | BODY MASS INDEX: 37.39 KG/M2 | TEMPERATURE: 98 F | HEIGHT: 63 IN | SYSTOLIC BLOOD PRESSURE: 123 MMHG | RESPIRATION RATE: 18 BRPM | DIASTOLIC BLOOD PRESSURE: 80 MMHG | WEIGHT: 211 LBS

## 2018-01-10 DIAGNOSIS — M43.16 SPONDYLOLISTHESIS OF LUMBAR REGION: ICD-10-CM

## 2018-01-10 DIAGNOSIS — R06.00 DYSPNEA, UNSPECIFIED TYPE: Primary | ICD-10-CM

## 2018-01-10 DIAGNOSIS — R07.9 CHEST PAIN, UNSPECIFIED TYPE: ICD-10-CM

## 2018-01-10 LAB
ALBUMIN SERPL-MCNC: 3.1 G/DL (ref 3.4–5)
ALBUMIN/GLOB SERPL: 0.7 {RATIO} (ref 0.8–1.7)
ALP SERPL-CCNC: 109 U/L (ref 45–117)
ALT SERPL-CCNC: 28 U/L (ref 13–56)
ANION GAP SERPL CALC-SCNC: 7 MMOL/L (ref 3–18)
AST SERPL-CCNC: 24 U/L (ref 15–37)
BASOPHILS # BLD: 0 K/UL (ref 0–0.06)
BASOPHILS NFR BLD: 0 % (ref 0–2)
BILIRUB SERPL-MCNC: 0.2 MG/DL (ref 0.2–1)
BUN SERPL-MCNC: 12 MG/DL (ref 7–18)
BUN/CREAT SERPL: 21 (ref 12–20)
CALCIUM SERPL-MCNC: 9.2 MG/DL (ref 8.5–10.1)
CHLORIDE SERPL-SCNC: 104 MMOL/L (ref 100–108)
CO2 SERPL-SCNC: 29 MMOL/L (ref 21–32)
CREAT SERPL-MCNC: 0.56 MG/DL (ref 0.6–1.3)
D DIMER PPP FEU-MCNC: 0.84 UG/ML(FEU)
DIFFERENTIAL METHOD BLD: ABNORMAL
EOSINOPHIL # BLD: 0.1 K/UL (ref 0–0.4)
EOSINOPHIL NFR BLD: 2 % (ref 0–5)
ERYTHROCYTE [DISTWIDTH] IN BLOOD BY AUTOMATED COUNT: 14.2 % (ref 11.6–14.5)
GLOBULIN SER CALC-MCNC: 4.6 G/DL (ref 2–4)
GLUCOSE SERPL-MCNC: 85 MG/DL (ref 74–99)
HCT VFR BLD AUTO: 33.6 % (ref 35–45)
HGB BLD-MCNC: 10 G/DL (ref 12–16)
LYMPHOCYTES # BLD: 2.2 K/UL (ref 0.9–3.6)
LYMPHOCYTES NFR BLD: 36 % (ref 21–52)
MCH RBC QN AUTO: 26.2 PG (ref 24–34)
MCHC RBC AUTO-ENTMCNC: 29.8 G/DL (ref 31–37)
MCV RBC AUTO: 88 FL (ref 74–97)
MONOCYTES # BLD: 0.6 K/UL (ref 0.05–1.2)
MONOCYTES NFR BLD: 10 % (ref 3–10)
NEUTS SEG # BLD: 3.3 K/UL (ref 1.8–8)
NEUTS SEG NFR BLD: 52 % (ref 40–73)
PLATELET # BLD AUTO: 288 K/UL (ref 135–420)
PMV BLD AUTO: 11.7 FL (ref 9.2–11.8)
POTASSIUM SERPL-SCNC: 3.9 MMOL/L (ref 3.5–5.5)
PROT SERPL-MCNC: 7.7 G/DL (ref 6.4–8.2)
RBC # BLD AUTO: 3.82 M/UL (ref 4.2–5.3)
SODIUM SERPL-SCNC: 140 MMOL/L (ref 136–145)
WBC # BLD AUTO: 6.2 K/UL (ref 4.6–13.2)

## 2018-01-10 PROCEDURE — 85379 FIBRIN DEGRADATION QUANT: CPT | Performed by: EMERGENCY MEDICINE

## 2018-01-10 PROCEDURE — 80053 COMPREHEN METABOLIC PANEL: CPT | Performed by: EMERGENCY MEDICINE

## 2018-01-10 PROCEDURE — 99282 EMERGENCY DEPT VISIT SF MDM: CPT

## 2018-01-10 PROCEDURE — 85025 COMPLETE CBC W/AUTO DIFF WBC: CPT | Performed by: EMERGENCY MEDICINE

## 2018-01-10 PROCEDURE — 71275 CT ANGIOGRAPHY CHEST: CPT

## 2018-01-10 PROCEDURE — 71046 X-RAY EXAM CHEST 2 VIEWS: CPT

## 2018-01-10 PROCEDURE — 74011636320 HC RX REV CODE- 636/320: Performed by: EMERGENCY MEDICINE

## 2018-01-10 RX ORDER — ACETAMINOPHEN AND CODEINE PHOSPHATE 300; 30 MG/1; MG/1
1 TABLET ORAL
Qty: 12 TAB | Refills: 0 | Status: SHIPPED | OUTPATIENT
Start: 2018-01-10 | End: 2018-01-26

## 2018-01-10 RX ORDER — ONDANSETRON 4 MG/1
4 TABLET, ORALLY DISINTEGRATING ORAL
Qty: 14 TAB | Refills: 0 | Status: SHIPPED | OUTPATIENT
Start: 2018-01-10 | End: 2018-01-26

## 2018-01-10 RX ADMIN — IOPAMIDOL 75 ML: 612 INJECTION, SOLUTION INTRAVENOUS at 20:00

## 2018-01-10 NOTE — ED PROVIDER NOTES
EMERGENCY DEPARTMENT HISTORY AND PHYSICAL EXAM    12:59 PM      Date: 1/10/2018  Patient Name: iNma Espinal    History of Presenting Illness     Chief Complaint   Patient presents with    Breathing Problem         History Provided By: Patient    Chief Complaint: SOB  Duration:  PTA during MRI  Timing:  Acute  Location:   Quality:   Severity: Mild  Modifying Factors: MRI for back pain  Associated Symptoms: CP, lightheadedness      Additional History (Context): Nima Espinal is a 34 y.o. female with shx of gastric bypass in Sept 2017 who presents with c/o acute onset mild SOB during MRI immediately prior to arrival.  Pt states she started to become SOB along with lightheadedness during MRI and test was not complete secondary to sx and pt immediately came to ED to be seen. Pt reports associated sx of left sided CP. Denies hx of blood clots. Pt had gastric bypass in Sept 2017 and then went to ED after secondary to CP and discharged with possible PNE and given Levaquin Rx. Pt followed up with gastric surgeon and he stated sx looks to be increasing and referred to [address] [place] [address] Drive determined from labs/imaging performed in ED that pt had collapsed lung. Pt states there has not been any follow up imaging performed. PCP: Tamara Radford MD    Current Outpatient Prescriptions   Medication Sig Dispense Refill    ondansetron (ZOFRAN ODT) 4 mg disintegrating tablet Take 1 Tab by mouth every eight (8) hours as needed for Nausea. 14 Tab 0    acetaminophen-codeine (TYLENOL-CODEINE #3) 300-30 mg per tablet Take 1 Tab by mouth every six (6) hours as needed for Pain. Max Daily Amount: 4 Tabs. 12 Tab 0    gabapentin (NEURONTIN) 300 mg capsule Take 1 Cap by mouth three (3) times daily. 90 Cap 2    Omeprazole delayed release (PRILOSEC D/R) 20 mg tablet TAKE 1 TABLET BY MOUTH EVERY DAY 30 Tab 1    cyanocobalamin (VITAMIN B-12) 500 mcg tablet Take 500 mcg by mouth daily.       calcium citrate 200 mg (950 mg) tablet Take  by mouth daily.  cyclobenzaprine (FLEXERIL) 10 mg tablet Take 1 Tab by mouth three (3) times daily as needed for Muscle Spasm(s). 30 Tab 0    HYDROcodone-acetaminophen (NORCO) 5-325 mg per tablet Take 1 Tab by mouth every four (4) hours as needed for Pain. Max Daily Amount: 6 Tabs. 6 Tab 0    multivitamin with iron (FLINTSTONES) chewable tablet Take 1 Tab by mouth two (2) times a day. Past History     Past Medical History:  Past Medical History:   Diagnosis Date    Incisional hernia     Intestinal malabsorption     Morbid obesity with body mass index of 40.0-49.9 ([facility])     Status post bariatric surgery 2012    sleeve resection / kirstin Marino       Past Surgical History:  Past Surgical History:   Procedure Laterality Date    HX  SECTION      X 3    HX GI  2012    sleeve resection / rosaline borja    HX ORTHOPAEDIC      scoliosis correction surgery X 2    HX TONSIL AND ADENOIDECTOMY      HX TUBAL LIGATION      LAP GASTRIC BYPASS/TAMICA-EN-Y      revision from sleeve on 2017       Family History:  No family history on file. Social History:  Social History   Substance Use Topics    Smoking status: Never Smoker    Smokeless tobacco: Never Used    Alcohol use No       Allergies: Allergies   Allergen Reactions    Nsaids (Non-Steroidal Anti-Inflammatory Drug) Other (comments)     GI Ulcer- Hx of Gastric Bypass         Review of Systems       Review of Systems   Constitutional: Negative for chills and fever. Respiratory: Positive for shortness of breath. Cardiovascular: Positive for chest pain (left-sided). Gastrointestinal: Negative for diarrhea, nausea and vomiting. Neurological: Positive for light-headedness. Negative for headaches. Psychiatric/Behavioral: The patient is nervous/anxious (panic attack during MRI). All other systems reviewed and are negative.         Physical Exam     Visit Vitals    /80    Pulse 65    Temp 98 °F (36.7 °C)    Resp 18    Ht 5' 3\" (1.6 m)    Wt 95.7 kg (211 lb)    SpO2 100%    BMI 37.38 kg/m2         Physical Exam   Constitutional: She is oriented to person, place, and time. She appears well-developed and well-nourished. No distress. HENT:   Head: Normocephalic and atraumatic. Eyes: Conjunctivae and EOM are normal. Right eye exhibits no discharge. Left eye exhibits no discharge. No scleral icterus. Neck: Normal range of motion. Neck supple. No tracheal deviation present. Cardiovascular: Normal rate, regular rhythm and normal heart sounds. No murmur heard. Pulmonary/Chest: Tachypnea (mild) noted. No respiratory distress. She has decreased breath sounds (diminished at right base). She has no wheezes. She has no rales. Abdominal: Soft. She exhibits no distension. There is no tenderness. There is no rebound and no guarding. Musculoskeletal: Normal range of motion. She exhibits no edema or deformity. Neurological: She is alert and oriented to person, place, and time. No cranial nerve deficit. Skin: Skin is warm and dry. She is not diaphoretic. Psychiatric: She has a normal mood and affect. Her behavior is normal. Judgment and thought content normal.         Diagnostic Study Results     Labs -  No results found for this or any previous visit (from the past 12 hour(s)). Radiologic Studies -   XR CHEST PA LAT   Final Result   IMPRESSION:     No acute diagnostic abnormality. Medical Decision Making   I am the first provider for this patient. I reviewed the vital signs, available nursing notes, past medical history, past surgical history, family history and social history. Vital Signs-Reviewed the patient's vital signs. Provider Notes (Medical Decision Making): Positive d dimer, pending CTA. Diagnosis     Clinical Impression:   1. Dyspnea, unspecified type    2. Chest pain, unspecified type        Disposition: 7:00 PM : Pt care transferred to Dr. Aries Colón  ,ED provider.  History of patient complaint(s), available diagnostic reports and current treatment plan has been discussed thoroughly. Bedside rounding on patient occured : yes . Intended disposition of patient : TBD  Pending diagnostics reports and/or labs (please list): CTA-Chest        Follow-up Information     Follow up With Details Comments 324 Malcolm Rawls MD Schedule an appointment as soon as possible for a visit in 1 day  [address]             Discharge Medication List as of 1/10/2018  8:55 PM      START taking these medications    Details   ondansetron (ZOFRAN ODT) 4 mg disintegrating tablet Take 1 Tab by mouth every eight (8) hours as needed for Nausea. , Print, Disp-14 Tab, R-0      acetaminophen-codeine (TYLENOL-CODEINE #3) 300-30 mg per tablet Take 1 Tab by mouth every six (6) hours as needed for Pain. Max Daily Amount: 4 Tabs., Print, Disp-12 Tab, R-0         CONTINUE these medications which have NOT CHANGED    Details   gabapentin (NEURONTIN) 300 mg capsule Take 1 Cap by mouth three (3) times daily. , Normal, Disp-90 Cap, R-2      Omeprazole delayed release (PRILOSEC D/R) 20 mg tablet TAKE 1 TABLET BY MOUTH EVERY DAY, Normal, Disp-30 Tab, R-1      cyanocobalamin (VITAMIN B-12) 500 mcg tablet Take 500 mcg by mouth daily. , Historical Med      calcium citrate 200 mg (950 mg) tablet Take  by mouth daily. , Historical Med      cyclobenzaprine (FLEXERIL) 10 mg tablet Take 1 Tab by mouth three (3) times daily as needed for Muscle Spasm(s). , Print, Disp-30 Tab, R-0      HYDROcodone-acetaminophen (NORCO) 5-325 mg per tablet Take 1 Tab by mouth every four (4) hours as needed for Pain.  Max Daily Amount: 6 Tabs., Print, Disp-6 Tab, R-0      multivitamin with iron (FLINTSTONES) chewable tablet Take 1 Tab by mouth two (2) times a day., Historical Med           _______________________________    Attestations:  Scribe Attestation     Danelle Lopez acting as a scribe for and in the presence of Candy Cifuentes MD      January 10, 2018 at 12:59 PM       Provider Attestation:      I personally performed the services described in the documentation, reviewed the documentation, as recorded by the scribe in my presence, and it accurately and completely records my words and actions.  January 10, 2018 at 12:59 PM - Candy Cifuentes MD    _______________________________

## 2018-01-10 NOTE — ED TRIAGE NOTES
Patient here for outpatient MRI (low back) but became anxious and SOB with test discontinued. Patient walked over to ED for evaluation. States she had gastric bypass in September, did not use incentive spirometer as directed, and was Dx with left pneumothorax in October. States she was treated with incentive spirometer only (Dr. Ramu Clifton) and has not had follow up. States she has been having pain with deep inspiration to left lung since Dx.

## 2018-01-11 NOTE — ED NOTES
I have reviewed discharge instructions with the patient. The patient verbalized understanding. Patient armband removed and given to patient to take home. Patient was informed of the privacy risks if armband lost or stolen  Current Discharge Medication List      START taking these medications    Details   ondansetron (ZOFRAN ODT) 4 mg disintegrating tablet Take 1 Tab by mouth every eight (8) hours as needed for Nausea. Qty: 14 Tab, Refills: 0    Associated Diagnoses: Chest pain, unspecified type      acetaminophen-codeine (TYLENOL-CODEINE #3) 300-30 mg per tablet Take 1 Tab by mouth every six (6) hours as needed for Pain. Max Daily Amount: 4 Tabs.   Qty: 12 Tab, Refills: 0    Associated Diagnoses: Chest pain, unspecified type

## 2018-01-11 NOTE — DISCHARGE INSTRUCTIONS
Return for any new or worsening pain, fever, shortness of breath, vomiting, decreased fluid intake, weakness, numbness, dizziness, or any change or concerns. Shortness of Breath: Care Instructions  Your Care Instructions  Shortness of breath has many causes. Sometimes conditions such as anxiety can lead to shortness of breath. Some people get mild shortness of breath when they exercise. Trouble breathing also can be a symptom of a serious problem, such as asthma, lung disease, emphysema, heart problems, and pneumonia. If your shortness of breath continues, you may need tests and treatment. Watch for any changes in your breathing and other symptoms. Follow-up care is a key part of your treatment and safety. Be sure to make and go to all appointments, and call your doctor if you are having problems. It's also a good idea to know your test results and keep a list of the medicines you take. How can you care for yourself at home? · Do not smoke or allow others to smoke around you. If you need help quitting, talk to your doctor about stop-smoking programs and medicines. These can increase your chances of quitting for good. · Get plenty of rest and sleep. · Take your medicines exactly as prescribed. Call your doctor if you think you are having a problem with your medicine. · Find healthy ways to deal with stress. ¨ Exercise daily. ¨ Get plenty of sleep. ¨ Eat regularly and well. When should you call for help? Call 911 anytime you think you may need emergency care. For example, call if:  ? · You have severe shortness of breath. ? · You have symptoms of a heart attack. These may include:  ¨ Chest pain or pressure, or a strange feeling in the chest.  ¨ Sweating. ¨ Shortness of breath. ¨ Nausea or vomiting. ¨ Pain, pressure, or a strange feeling in the back, neck, jaw, or upper belly or in one or both shoulders or arms. ¨ Lightheadedness or sudden weakness. ¨ A fast or irregular heartbeat.   After you call 911, the  may tell you to chew 1 adult-strength or 2 to 4 low-dose aspirin. Wait for an ambulance. Do not try to drive yourself. ?Call your doctor now or seek immediate medical care if:  ? · Your shortness of breath gets worse or you start to wheeze. Wheezing is a high-pitched sound when you breathe. ? · You wake up at night out of breath or have to prop your head up on several pillows to breathe. ? · You are short of breath after only light activity or while at rest.   ? Watch closely for changes in your health, and be sure to contact your doctor if:  ? · You do not get better over the next 1 to 2 days. Where can you learn more? Go to http://wilbert-loraine.info/. Enter S780 in the search box to learn more about \"Shortness of Breath: Care Instructions. \"  Current as of: May 12, 2017  Content Version: 11.4  © 1299-7866 [employer]. Care instructions adapted under license by [employer] (which disclaims liability or warranty for this information). If you have questions about a medical condition or this instruction, always ask your healthcare professional. [address] any warranty or liability for your use of this information. Chest Pain: Care Instructions  Your Care Instructions    There are many things that can cause chest pain. Some are not serious and will get better on their own in a few days. But some kinds of chest pain need more testing and treatment. Your doctor may have recommended a follow-up visit in the next 8 to 12 hours. If you are not getting better, you may need more tests or treatment. Even though your doctor has released you, you still need to watch for any problems. The doctor carefully checked you, but sometimes problems can develop later. If you have new symptoms or if your symptoms do not get better, get medical care right away.   If you have worse or different chest pain or pressure that lasts more than 5 minutes or you passed out (lost consciousness), call 911 or seek other emergency help right away. A medical visit is only one step in your treatment. Even if you feel better, you still need to do what your doctor recommends, such as going to all suggested follow-up appointments and taking medicines exactly as directed. This will help you recover and help prevent future problems. How can you care for yourself at home? · Rest until you feel better. · Take your medicine exactly as prescribed. Call your doctor if you think you are having a problem with your medicine. · Do not drive after taking a prescription pain medicine. When should you call for help? Call 911 if:  ? · You passed out (lost consciousness). ? · You have severe difficulty breathing. ? · You have symptoms of a heart attack. These may include:  ¨ Chest pain or pressure, or a strange feeling in your chest.  ¨ Sweating. ¨ Shortness of breath. ¨ Nausea or vomiting. ¨ Pain, pressure, or a strange feeling in your back, neck, jaw, or upper belly or in one or both shoulders or arms. ¨ Lightheadedness or sudden weakness. ¨ A fast or irregular heartbeat. After you call 911, the  may tell you to chew 1 adult-strength or 2 to 4 low-dose aspirin. Wait for an ambulance. Do not try to drive yourself. ?Call your doctor today if:  ? · You have any trouble breathing. ? · Your chest pain gets worse. ? · You are dizzy or lightheaded, or you feel like you may faint. ? · You are not getting better as expected. ? · You are having new or different chest pain. Where can you learn more? Go to http://wilbert-loraine.info/. Enter A120 in the search box to learn more about \"Chest Pain: Care Instructions. \"  Current as of: March 20, 2017  Content Version: 11.4  © 7449-1064 [employer]  Care instructions adapted under license by [employer] (which disclaims liability or warranty for this information). If you have questions about a medical condition or this instruction, always ask your healthcare professional. [place] [address] any warranty or liability for your use of this information.

## 2018-01-11 NOTE — ED NOTES
8:55 PM rec'd pt in s/o to check cta. cta wnl. Pt told. Says still mild cp w inspiration and sob. But declines further testing/treatment as offered, prefers dc per dr Josemanuel Urban. No emc. Stable for dc and close f/u. Detailed return instructions given. No emc.

## 2018-01-15 ENCOUNTER — TELEPHONE (OUTPATIENT)
Dept: ORTHOPEDIC SURGERY | Age: 33
End: 2018-01-15

## 2018-01-15 NOTE — TELEPHONE ENCOUNTER
See below. Please assist with rescheduling MRI. Ok for Valium 10 mg #1    0 RF to take 30 mins prior to MRI. Needs .

## 2018-01-15 NOTE — TELEPHONE ENCOUNTER
Patient very claustrophobic could not complete MRI of 01/10/2018. Need to r/s and have medication prescribed for her so she can have the mri completed. [facility] [address] [phone]  Patient does have appt to see Dr. Vijay Bryan on 01/23/2018 need mri before appt.  Please call patient back at [phone]

## 2018-01-16 DIAGNOSIS — M43.16 SPONDYLOLISTHESIS OF LUMBAR REGION: Primary | ICD-10-CM

## 2018-01-16 RX ORDER — DIAZEPAM 10 MG/1
TABLET ORAL
Qty: 1 TAB | Refills: 0 | Status: SHIPPED | OUTPATIENT
Start: 2018-01-16 | End: 2018-01-26

## 2018-01-16 NOTE — TELEPHONE ENCOUNTER
Called in the Valium 10 mg #1 tab to take prior to MRI to [facility] and new order for MRI given to Charles Bhardwaj to R/S, patient notified

## 2018-01-22 ENCOUNTER — HOSPITAL ENCOUNTER (OUTPATIENT)
Age: 33
Discharge: HOME | End: 2018-01-22
Attending: PHYSICAL MEDICINE & REHABILITATION
Payer: COMMERCIAL

## 2018-01-22 DIAGNOSIS — M43.16 SPONDYLOLISTHESIS OF LUMBAR REGION: ICD-10-CM

## 2018-01-22 PROCEDURE — 72148 MRI LUMBAR SPINE W/O DYE: CPT

## 2018-01-26 ENCOUNTER — OFFICE VISIT (OUTPATIENT)
Dept: SURGERY | Age: 33
End: 2018-01-26

## 2018-01-26 VITALS
WEIGHT: 203.8 LBS | OXYGEN SATURATION: 100 % | SYSTOLIC BLOOD PRESSURE: 130 MMHG | DIASTOLIC BLOOD PRESSURE: 72 MMHG | BODY MASS INDEX: 36.11 KG/M2 | HEIGHT: 63 IN | HEART RATE: 79 BPM

## 2018-01-26 DIAGNOSIS — K90.9 INTESTINAL MALABSORPTION, UNSPECIFIED TYPE: Primary | ICD-10-CM

## 2018-01-26 DIAGNOSIS — E55.9 HYPOVITAMINOSIS D: ICD-10-CM

## 2018-01-26 DIAGNOSIS — Z98.84 STATUS POST BARIATRIC SURGERY: ICD-10-CM

## 2018-01-26 DIAGNOSIS — K90.9 INTESTINAL MALABSORPTION, UNSPECIFIED TYPE: ICD-10-CM

## 2018-01-26 RX ORDER — GLUCOSAMINE/CHONDR SU A SOD 750-600 MG
TABLET ORAL
COMMUNITY

## 2018-01-26 NOTE — MR AVS SNAPSHOT
[address] [address] 
[phone] Patient: Suzette Skiff MRN: [id number] :1985 Visit Information Date & Time Provider Department Dept. Phone Encounter #  
 2018 11:30 AM Brando Cruz, 82 [facility] [phone] [id number] Follow-up Instructions Return in about 2 months (around 3/26/2018). Your Appointments 2018  9:30 AM  
SURGERY CONSULT with Luis Barrera MD  
[facility] ONE [address]) Appt Note: PER DR MACEDO LOWER LUMBAR; LVM FOR PT TO CALL AND R/S APPT; pt rsd to this date [facility] 18  
 [address] [facility] [address] [facility] [address]  
[phone]  
  
   
 [facility] [facility] [address] [facility] [address] Upcoming Health Maintenance Date Due DTaP/Tdap/Td series (1 - Tdap) 2005 PAP AKA CERVICAL CYTOLOGY 2005 Influenza Age 5 to Adult 2017 Allergies as of 2018  Review Complete On: 2018 By: SHAHID Carty Severity Noted Reaction Type Reactions Nsaids (Non-steroidal Anti-inflammatory Drug) High 2017    Other (comments) GI Ulcer- Hx of Gastric Bypass Current Immunizations  Reviewed on 2017 No immunizations on file. Not reviewed this visit You Were Diagnosed With   
  
 Codes Comments Intestinal malabsorption, unspecified type    -  Primary ICD-10-CM: K90.9 ICD-9-CM: 579.9 Status post bariatric surgery     ICD-10-CM: Z98.84 ICD-9-CM: V45.86 Hypovitaminosis D     ICD-10-CM: E55.9 ICD-9-CM: 268.9 Vitals BP Pulse Height(growth percentile) Weight(growth percentile) SpO2 BMI  
 130/72 (BP 1 Location: Right arm, BP Patient Position: Sitting) 79 5' 3\" (1.6 m) 203 lb 12.8 oz (92.4 kg) 100% 36.1 kg/m2 OB Status Smoking Status Having regular periods Never Smoker BMI and BSA Data Body Mass Index Body Surface Area  
 36.1 kg/m 2 2.03 m 2 Preferred Pharmacy Pharm[facility] Name Phone [id number], [address] [place] [address] Your Updated Medication List  
  
   
This list is accurate as of: 1/26/18 12:20 PM.  Always use your most recent med list.  
  
  
  
  
 Biotin 2,500 mcg Cap Take  by mouth.  
  
 calcium citrate 200 mg (950 mg) tablet Take  by mouth daily. multivitamin with iron chewable tablet Commonly known as:  Beverlie Husky Take 1 Tab by mouth two (2) times a day. Omeprazole delayed release 20 mg tablet Commonly known as:  PRILOSEC D/R  
TAKE 1 TABLET BY MOUTH EVERY DAY  
  
 VITAMIN B-12 500 mcg tablet Generic drug:  cyanocobalamin Take 500 mcg by mouth daily. Follow-up Instructions Return in about 2 months (around 3/26/2018). To-Do List   
 01/26/2018 Lab:  VITAMIN B1, WHOLE BLOOD   
  
 01/26/2018 Lab:  VITAMIN B12 & FOLATE   
  
 01/26/2018 Lab:  VITAMIN D, 25 HYDROXY Patient Instructions Patient Instructions 1. Remember hydration goals - minimum of 64 ounces of liquids per day (dehydration is the number one reason for hospital readmission). 2. Continue to monitor carbohydrate and protein intake you need a minimum of  Grams of protein daily- remember to keep your total carbohydrates to 50 grams or less per day for best results. 3. Continue to work towards exercise goals - 60-90 minutes, 5 times a week minimum of deliberate, aerobic exercise is the ultimate goal with strength training 2 times each week. Refer to Appsembler for  information. 4. Remember to take vitamins as directed in your handbook. 5. Attend support group the 2nd Thursday of each month. 6. Use Miralax if you become constipated. 7. Call us at [phone] or email us through [email or web address]" with questions,     concerns or worsening of condition, we have someone on call 24 hours a day. If you are unable to reach our office, you are to go to your Primary Care Physician or the Emergency Department. Supplement Resource Guide Importance of Protein:  
Maintains lean body mass, produces antibodies to fight off infections, heals wounds, minimizes hair loss, helps to give you energy, helps with satiety, and keeping you full between meals. Importance of Calcium: 
Needed for healthy bones and teeth, normal blood clotting, and nervous system functioning, higher risk of osteoporosis and bone disease with non-compliance. Importance of Multivitamins: Many functions. Supply you with extra nutrients that you may be missing from food. May lead to iron deficiency anemia, weakness, fatigue, and many other symptoms with non-compliance. Importance of B Vitamins: 
Important for red blood cell formation, metabolism, energy, and helps to maintain a healthy nervous system. Protein Supplement Find one you like now. Use immediately after surgery. Look for: 
35-50g protein each day from your protein supplement once you reach the progression diet. 0-3 g fat per serving 0-3 g sugar per serving Protein drinks should be split in separate dosages. Recommend: Lifelong 1 year + Calcium Supplement:  
 
Start taking within a month after surgery. Look for: Calcium Citrate Plus D (1500 mg per day) Recommend: Citracal 
 
 . Avoid chocolate chewable calcium. Can use chewable bariatric or GNC brand or similar chewable. The body cannot absorb more than 500-600 mg of calcium at a time. Take for Life Multi-vitamin Supplement:   
 
Start immediately after surgery: any complete chewable, such as: Avas Complete chewables. Avoid Ava sours or gummies.   They lack iron and other important nutrients and also have added sugar. Continue with chewable vitamin or change to adult complete multivitamin one month after surgery. Menstruating women can take a prenatal vitamin. Make sure has at least 18 mg iron and 515-691 mcg folic acid Vitamin B12, B Complex Vitamin, and Biotin Start taking within a month after surgery. Vitamin B12:  1000 mcg of Vitamin B12 three times weekly Must take sublingually (meaning you take it under your tongue) or in a liquid drop form for easy absorption. B Complex Vitamin: Take a pill or liquid drop form once daily. Biotin: This vitamin can help prevent hair loss. Recommend 5mg  
(5000 mcg) a day Biotin is Optional  
 
 
 
 
 
 
  
Introducing [facility] & HEALTH SERVICES! [facility] introduces Activity Rocket patient portal. Now you can access parts of your medical record, email your doctor's office, and request medication refills online. 1. In your internet browser, go to [email or web address] 2. Click on the First Time User? Click Here link in the Sign In box. You will see the New Member Sign Up page. 3. Enter your Activity Rocket Access Code exactly as it appears below. You will not need to use this code after youve completed the sign-up process. If you do not sign up before the expiration date, you must request a new code. · Activity Rocket Access Code: [email or web address] Expires: 3/24/2018  1:00 PM 
 
4. Enter the last four digits of your Social Security Number (xxxx) and Date of Birth (mm/dd/yyyy) as indicated and click Submit. You will be taken to the next sign-up page. 5. Create a [employer] ID. This will be your Activity Rocket login ID and cannot be changed, so think of one that is secure and easy to remember. 6. Create a [employer] password. You can change your password at any time. 7. Enter your Password Reset Question and Answer. This can be used at a later time if you forget your password. 8. Enter your e-mail address. You will receive e-mail notification when new information is available in [address] 9. Click Sign Up. You can now view and download portions of your medical record. 10. Click the Download Summary menu link to download a portable copy of your medical information. If you have questions, please visit the Frequently Asked Questions section of the miacosa website. Remember, miacosa is NOT to be used for urgent needs. For medical emergencies, dial 911. Now available from your iPhone and Android! Please provide this summary of care documentation to your next provider. Your primary care clinician is listed as Chanelle Griffith. If you have any questions after today's visit, please call [phone].

## 2018-01-26 NOTE — PROGRESS NOTES
Subjective:      Alveta Koyanagi is a 34 y.o. female is now 4 months status post conversion from sleeve gastrectomy to gastric bypass. Doing well overall. She has lost a total of 52 pounds since surgery. Body mass index is 36.1 kg/(m^2). Has lost 41% of EBW. Currently on a solid food diet without difficulty, reports pain on left side under her rib cage when she takes a deep breath and denies vomiting and abdominal pain. Taking in 64oz water daily. Sources of protein include shakes, chicken, eggs, cheese, seafood, fish, shrimp, crab meat. 15-20 min of activity 4 days a week, including stairs. Bowel movements are regular. The patient is compliant with multivitamins, calcium, Vit D and B12 supplements.      Weight Loss Metrics 2018 1/10/2018 2017 2017 2017 2017 10/25/2017   Today's Wt 203 lb 12.8 oz 211 lb 214 lb 220 lb 14.4 oz 225 lb 225 lb 232 lb   BMI 36.1 kg/m2 37.38 kg/m2 37.91 kg/m2 39.13 kg/m2 39.86 kg/m2 39.86 kg/m2 41.1 kg/m2          Comorbidities:    Hypertension: not applicable  Diabetes: not applicable  Obstructive Sleep Apnea: not applicable  Hyperlipidemia: not applicable  Stress Urinary Incontinence: not applicable  Gastroesophageal Reflux: not applicable  Weight related arthropathy:improved has scoliosis which has causes some musculoskeletal issues      Patient Active Problem List   Diagnosis Code    Morbid obesity with body mass index of 40.0-49.9 ([facility]) E66.01    Status post bariatric surgery Z98.84    Intestinal malabsorption K90.9    Nausea & vomiting R11.2    Incisional hernia K43.2    Morbid obesity with BMI of 40.0-44.9, adult ([facility] E66.01, Z68.41        Past Medical History:   Diagnosis Date    Incisional hernia     Intestinal malabsorption     Morbid obesity with body mass index of 40.0-49.9 ([facility]     Status post bariatric surgery 2012    sleeve resection / kirstin Buck       Past Surgical History:   Procedure Laterality Date    HX  SECTION      X 3    HX GI  7/2012    sleeve resection / a salzberg    HX ORTHOPAEDIC      scoliosis correction surgery X 2    HX TONSIL AND ADENOIDECTOMY      HX TUBAL LIGATION      LAP GASTRIC BYPASS/TAMICA-EN-Y      revision from sleeve on 9/26/2017       Current Outpatient Prescriptions   Medication Sig Dispense Refill    Biotin 2,500 mcg cap Take  by mouth.  Omeprazole delayed release (PRILOSEC D/R) 20 mg tablet TAKE 1 TABLET BY MOUTH EVERY DAY 30 Tab 1    cyanocobalamin (VITAMIN B-12) 500 mcg tablet Take 500 mcg by mouth daily.  calcium citrate 200 mg (950 mg) tablet Take  by mouth daily.  multivitamin with iron (FLINTSTONES) chewable tablet Take 1 Tab by mouth two (2) times a day.          Allergies   Allergen Reactions    Nsaids (Non-Steroidal Anti-Inflammatory Drug) Other (comments)     GI Ulcer- Hx of Gastric Bypass       Review of Systems:  General - No history or complaints of unexpected fever or chills  Head/Neck - No history or complaints of headache or dizziness  Cardiac - No history or complaints of chest pain, palpitations, or shortness of breath  Pulmonary - No history or complaints of shortness of breath or productive cough  Gastrointestinal - as noted above  Genitourinary - No history or complaints of hematuria/dysuria or renal lithiasis  Musculoskeletal - No history or complaints of joint  muscular weakness  Hematologic - No history of any bleeding episodes  Neurologic - No history or complaints of  migraine headaches or neurologic symptoms    Objective:     Visit Vitals    /72 (BP 1 Location: Right arm, BP Patient Position: Sitting)    Pulse 79    Ht 5' 3\" (1.6 m)    Wt 92.4 kg (203 lb 12.8 oz)    SpO2 100%    BMI 36.1 kg/m2       General:  alert, cooperative, no distress, appears stated age   Chest: lungs clear to auscultation, no rhonchi, rales or wheezes, no accessory muscle use, right lower lobe sounded different than all other lung fields almost as if it were hollow   Cor:   Regular rate and rhythm or without murmur or extra heart sounds   Abdomen: soft, bowel sounds active, non-tender, no masses or organomegaly   Incisions:   healing well, no drainage, no erythema, no hernia, no seroma, no swelling, no dehiscence, incision well approximated       Assessment:   History of Morbid obesity, status post conversion from sleeve gastrectomy to gastric bypass. Doing well postoperatively. Left sided chest pain on inspiration  Plan:     1. Increase activity to the goal of 30 minutes daily   2. Return to pulmonologist  3. Discussed patients weight loss goals and dietary choices in relation to goals. 4. Reminded to measure portions, continue high protein, low carbohydrate diet. Reminded to eat regularly, to eat slowly & not to drink with meals. 5. Continue vitamin supplementation  6. Continue current medications and follow up with PCP for management of regimen. 7. Continue cardio exercise and add resistance exercises. 60-90 minutes of aerobic activity 5 days a week and strength training 2 days each week. 8. Encouraged to attend support group   9. Patient to complete labs before next visit. Lab slip given today. 10. I have discussed this plan with patient and they verbalized understanding  11. Follow up in 2 months or sooner if patient has questions, concerns or worsening of condition, if unable to reach our office, patient should report to the ED. 15. Ms. Tavarez Alen has a reminder for a \"due or due soon\" health maintenance. I have asked that she contact her primary care provider for a follow-up on this health maintenance.

## 2018-02-02 ENCOUNTER — OFFICE VISIT (OUTPATIENT)
Dept: ORTHOPEDIC SURGERY | Age: 33
End: 2018-02-02

## 2018-02-02 VITALS
DIASTOLIC BLOOD PRESSURE: 71 MMHG | SYSTOLIC BLOOD PRESSURE: 115 MMHG | OXYGEN SATURATION: 100 % | HEIGHT: 63 IN | TEMPERATURE: 97.8 F | RESPIRATION RATE: 20 BRPM | WEIGHT: 205 LBS | HEART RATE: 81 BPM | BODY MASS INDEX: 36.32 KG/M2

## 2018-02-02 DIAGNOSIS — Z98.890 HISTORY OF SPINAL SURGERY: ICD-10-CM

## 2018-02-02 DIAGNOSIS — M43.16 SPONDYLOLISTHESIS OF LUMBAR REGION: Primary | ICD-10-CM

## 2018-02-02 NOTE — MR AVS SNAPSHOT
[address] [facility]. [address] [place] [id number] 
[phone] Patient: Merary Chavez MRN: [id number] :1985 Visit Information Date & Time Provider Department Dept. Phone Encounter #  
 2018  9:30 AM Jack Pham  [facility], [address] [facility] [phone] [id number] Follow-up Instructions Return for MRI/CT f/u. Follow-up and Disposition History Your Appointments 3/28/2018 11:15 AM  
EST PATIENT PROBLEM with Vi Toussaint MD  
[address] [facility]) Appt Note: f/u  
 [address] [facility]  
  
   
 [address] [place] [address] Upcoming Health Maintenance Date Due DTaP/Tdap/Td series (1 - Tdap) 2005 PAP AKA CERVICAL CYTOLOGY 2005 Influenza Age 5 to Adult 2017 Allergies as of 2018  Review Complete On: 2018 By: Jack Pham MD  
  
 Severity Noted Reaction Type Reactions Nsaids (Non-steroidal Anti-inflammatory Drug) High 2017    Other (comments) GI Ulcer- Hx of Gastric Bypass Current Immunizations  Reviewed on 2017 No immunizations on file. Not reviewed this visit You Were Diagnosed With   
  
 Codes Comments Spondylolisthesis of lumbar region    -  Primary ICD-10-CM: M43.16 
ICD-9-CM: 738.4 History of spinal surgery     ICD-10-CM: Z98.890 ICD-9-CM: V45.89 scoliosis surgery Vitals BP Pulse Temp Resp Height(growth percentile) Weight(growth percentile) 115/71 81 97.8 °F (36.6 °C) (Oral) 20 5' 3\" (1.6 m) 205 lb (93 kg) SpO2 BMI OB Status Smoking Status 100% 36.31 kg/m2 Having regular periods Never Smoker Vitals History BMI and BSA Data Body Mass Index Body Surface Area  
 36.31 kg/m 2 2.03 m 2 Preferred Pharmacy Pharmacy Name Phone [facility], [address] [place] [address] Your Updated Medication List  
  
   
This list is accurate as of: 2/2/18 10:58 AM.  Always use your most recent med list.  
  
  
  
  
 Biotin 2,500 mcg Cap Take  by mouth.  
  
 calcium citrate 200 mg (950 mg) tablet Take  by mouth daily. multivitamin with iron chewable tablet Commonly known as:  Wadell Big Take 1 Tab by mouth two (2) times a day. Omeprazole delayed release 20 mg tablet Commonly known as:  PRILOSEC D/R  
TAKE 1 TABLET BY MOUTH EVERY DAY  
  
 VITAMIN B-12 500 mcg tablet Generic drug:  cyanocobalamin Take 500 mcg by mouth daily. Follow-up Instructions Return for MRI/CT f/u. To-Do List   
 02/02/2018 Imaging:  CT SPINE LUMB WO CONT   
  
 02/02/2018 Imaging:  XR SPINE ENTIRE T-L , SKULL TO SACRUM 2 OR 3 VWS SCOLIOSIS   
  
 02/13/2018 6:30 PM  
  Appointment with [facility] CT RM 1 at [facility] CT ([phone]) GENERAL INSTRUCTIONS  This study does not require you to drink contrast prior to your study. RELATED STUDY INFORMATION  Bring any films, CDs, and reports related with you on the day of your exam.  This only includes studies done outside of [address] [place] [address], [facility], Nohemi Stark , and Shandra. QUESTIONS  Notify the CT Department if you have any questions concerning your study. Nohemi Lincoln [phone] [facility] [phone] Introducing [facility] & HEALTH SERVICES! [employer] introduces Revee patient portal. Now you can access parts of your medical record, email your doctor's office, and request medication refills online. 1. In your internet browser, go to [email or web address] 2. Click on the First Time User? Click Here link in the Sign In box. You will see the New Member Sign Up page. 3. Enter your Revee Access Code exactly as it appears below.  You will not need to use this code after youve completed the sign-up process. If you do not sign up before the expiration date, you must request a new code. · ZipRecruiter Access Code: [email or web address] Expires: 3/24/2018  1:00 PM 
 
4. Enter the last four digits of your Social Security Number (xxxx) and Date of Birth (mm/dd/yyyy) as indicated and click Submit. You will be taken to the next sign-up page. 5. Create a ZipRecruiter ID. This will be your ZipRecruiter login ID and cannot be changed, so think of one that is secure and easy to remember. 6. Create a ZipRecruiter password. You can change your password at any time. 7. Enter your Password Reset Question and Answer. This can be used at a later time if you forget your password. 8. Enter your e-mail address. You will receive e-mail notification when new information is available in [address] 9. Click Sign Up. You can now view and download portions of your medical record. 10. Click the Download Summary menu link to download a portable copy of your medical information. If you have questions, please visit the Frequently Asked Questions section of the ZipRecruiter website. Remember, ZipRecruiter is NOT to be used for urgent needs. For medical emergencies, dial 911. Now available from your iPhone and Android! Please provide this summary of care documentation to your next provider. Your primary care clinician is listed as Juan Ruiz. If you have any questions after today's visit, please call [phone].

## 2018-02-02 NOTE — PROGRESS NOTES
[address]  [address], [address]  [address]  Phone: [phone]  Fax: [phone]  INITIAL CONSULTATION  Patient: Nima Espinal                MRN: [id number]       SSN: [id number]  YOB: 1985        AGE: 34 y.o. SEX: female  Body mass index is 36.31 kg/(m^2). PCP: Tamara Radford MD  02/02/18    Chief Complaint   Patient presents with    Back Pain     Surgery consult          HISTORY OF PRESENT ILLNESS, RADIOGRAPHS, and PLAN:         HISTORY OF PRESENT ILLNESS:  Ms. Miriam Alas is seen today at the request of Dr. Skye Gtz, Dr. Jose Aceves, and Dr. Jevon Solorzano. Ms. Miriam Alas is a pleasant, 28-year-old female who 20 years ago or so underwent a thoracolumbar fusion by Dr. Damon Phelps at [facility] utilizing hook thierry instrumentation to L4. She did well until two or three years ago when she began to experience progressive lumbosacral back pain. There was minimal leg radiation, just severe back pain, left paracentral, with some buttock pain and posterior thigh pain on the left. The pain has been steadily progressive. Now it is an 8/10 pain. It is sometimes down to a 6/10 pain. The pain has been progressive. She suffered from morbid obesity. Three months ago, she underwent gastric bypass surgery for when she lost 50 pounds. She is now down to 205 pounds. The patient is referred today for evaluation of her back pain. PHYSICAL EXAMINATION:  Her physical exam demonstrates normal strength, sensation, and reflexes with back and radiating leg pain. She has to stand with some flexed posture to improve her symptoms. She does not have bad sagittal or coronal balance when attempting to stand erect. She has a well healed thoracolumbar scar and normal strength and sensation throughout. RADIOGRAPHS:  Radiographs demonstrate hook thierry construct at L4.   Solid fusion grade II mobile degenerative spondylolisthesis at L4-5, with severe motion with flexion and extension but most to a grade III slip on flexion. L5-S1 appears to be preserved. ASSESSMENT/PLAN: I discussed the matter at length with her. The patient has junctional degeneration below a solid thoracolumbar fusion. I believe she requires a reconstructive fusion. The question is do we do it to L5 or to the sacrum. My sense is it probably makes sense to take her to the sacrum, and she probably requires a fusion circumferentially. I would likely consider doing it with anterior interbody devices at L4-5 and L5-S1 and extending her instrumentation with instrumentation at L5 and S1 adding on to a pre-existing instrumentation above with appropriate interbody devices at the segments above. I discussed the matter with her. It would be nice if we could get her weight down a bit more. She has already lost 50 pounds in the first three months. I would like to get some formal scoliosis films on her and a CT scan of her lumbar spine for further planning. I will see her back following further studies and to give her a chance to digest our discussion. cc: Bryan Hallman M.D. Harold Severe, M.D. Past Medical History:   Diagnosis Date    Incisional hernia     Intestinal malabsorption     Morbid obesity with body mass index of 40.0-49.9 ([facility])     Status post bariatric surgery 7/2012    sleeve resection / kirstin borja       History reviewed. No pertinent family history. Current Outpatient Prescriptions   Medication Sig Dispense Refill    Biotin 2,500 mcg cap Take  by mouth.  Omeprazole delayed release (PRILOSEC D/R) 20 mg tablet TAKE 1 TABLET BY MOUTH EVERY DAY 30 Tab 1    cyanocobalamin (VITAMIN B-12) 500 mcg tablet Take 500 mcg by mouth daily.  calcium citrate 200 mg (950 mg) tablet Take  by mouth daily.  multivitamin with iron (FLINTSTONES) chewable tablet Take 1 Tab by mouth two (2) times a day.          Allergies   Allergen Reactions  Nsaids (Non-Steroidal Anti-Inflammatory Drug) Other (comments)     GI Ulcer- Hx of Gastric Bypass       Past Surgical History:   Procedure Laterality Date    HX  SECTION      X 3    HX GI  2012    sleeve resection / a salzberg    HX ORTHOPAEDIC      scoliosis correction surgery X 2    HX TONSIL AND ADENOIDECTOMY      HX TUBAL LIGATION      LAP GASTRIC BYPASS/TAMICA-EN-Y      revision from sleeve on 2017       Past Medical History:   Diagnosis Date    Incisional hernia     Intestinal malabsorption     Morbid obesity with body mass index of 40.0-49.9 ([facility])     Status post bariatric surgery 2012    sleeve resection / kirstin borja       Social History     Social History    Marital status: SINGLE     Spouse name: N/A    Number of children: N/A    Years of education: N/A     Occupational History    Not on file. Social History Main Topics    Smoking status: Never Smoker    Smokeless tobacco: Never Used    Alcohol use No    Drug use: No    Sexual activity: Yes     Partners: Male     Other Topics Concern    Not on file     Social History Narrative           REVIEW OF SYSTEMS:   CONSTITUTIONAL SYMPTOMS:  Negative. EYES:  Negative. EARS, NOSE, THROAT AND MOUTH:  Negative. CARDIOVASCULAR:  Negative. RESPIRATORY:  Negative. GENITOURINARY: Negative. GASTROINTESTINAL:  Negative. INTEGUMENTARY (SKIN AND/OR BREAST):  Negative. MUSCULOSKELETAL: Per HPI.   ENDOCRINE/RHEUMATOLOGIC:  Negative. NEUROLOGICAL:  Per HPI. HEMATOLOGIC/LYMPHATIC:  Negative. ALLERGIC/IMMUNOLOGIC:  Negative. PSYCHIATRIC:  Negative. PHYSICAL EXAMINATION:   Visit Vitals    /71    Pulse 81    Temp 97.8 °F (36.6 °C) (Oral)    Resp 20    Ht 5' 3\" (1.6 m)    Wt 205 lb (93 kg)    SpO2 100%    BMI 36.31 kg/m2    PAIN SCALE: 6/10    CONSTITUTIONAL: The patient is in no apparent distress and is alert and oriented x 3. HEENT: Normocephalic. Hearing grossly intact.   NECK: Supple and symmetric. no tenderness, or masses were felt. RESPIRATORY: No labored breathing. CARDIOVASCULAR: The carotid pulses were normal. Peripheral pulses were 2+. CHEST: Normal AP diameter and normal contour without any kyphoscoliosis. LYMPHATIC: No lymphadenopathy was appreciated in the neck, axillae or groin. SKIN:  Negative for scars, rashes, lesions, or ulcers on the right upper, right lower, left upper, left lower and trunk. NEUROLOGICAL: Alert and oriented x 3. Ambulation without assistive device. FWB. EXTREMITIES:  See musculoskeletal.  MUSCULOSKELETAL:   Head and Neck:  Negative for misalignment, asymmetry, crepitation, defects, tenderness masses or effusions.  Left Upper Extremity: Inspection, percussion and palpation preformed. Robbinss sign is negative.  Right Upper Extremity: Inspection, percussion and palpation preformed. Robbinss sign is negative.  Spine, Ribs and Pelvis: L sided low back pain into L buttock. Inspection, percussion and palpation preformed. Negative for misalignment, asymmetry, crepitation, defects, tenderness masses or effusions.  Left Lower Extremity: Inspection, percussion and palpation preformed. Negative straight leg raise.  Right Lower Extremity: Inspection, percussion and palpation preformed. Negative straight leg raise. SPINE EXAM:     Lumbar spine: No rash, ecchymosis, or gross obliquity. No focal atrophy is noted. ASSESSMENT    ICD-10-CM ICD-9-CM    1. Spondylolisthesis of lumbar region M43.16 738.4 XR SPINE ENTIRE T-L , SKULL TO SACRUM 2 OR 3 VWS SCOLIOSIS      CT SPINE LUMB WO CONT   2. History of spinal surgery Z98.890 V45.89 XR SPINE ENTIRE T-L , SKULL TO SACRUM 2 OR 3 VWS SCOLIOSIS      CT SPINE LUMB WO CONT    scoliosis surgery       Written by Bolivar Lennox, as dictated by Brannon Martinez MD.    I, Dr. Brannon Martinez MD, confirm that all documentation is accurate.

## 2018-02-02 NOTE — LETTER
NOTIFICATION OF RETURN TO WORK / SCHOOL 
 
2/2/2018 Ms. Lupe Watts [address] [facility] [address] To Whom It May Concern: 
 
Lupe Watts is under the care of Kandice Lehman. She is free to return to work with no limitations. If there are questions or concerns please have the patient contact our office.  
 
 
 
Sincerely, 
 
 
Jorge Ayers MD

## 2018-03-06 ENCOUNTER — TELEPHONE (OUTPATIENT)
Dept: ORTHOPEDIC SURGERY | Age: 33
End: 2018-03-06

## 2018-03-29 ENCOUNTER — OFFICE VISIT (OUTPATIENT)
Dept: SURGERY | Age: 33
End: 2018-03-29

## 2018-03-29 ENCOUNTER — HOSPITAL ENCOUNTER (OUTPATIENT)
Dept: LAB | Age: 33
Discharge: HOME | End: 2018-03-29

## 2018-03-29 VITALS
OXYGEN SATURATION: 98 % | WEIGHT: 190.9 LBS | SYSTOLIC BLOOD PRESSURE: 116 MMHG | DIASTOLIC BLOOD PRESSURE: 73 MMHG | BODY MASS INDEX: 33.82 KG/M2 | HEART RATE: 74 BPM | HEIGHT: 63 IN

## 2018-03-29 DIAGNOSIS — Z98.84 STATUS POST BARIATRIC SURGERY: ICD-10-CM

## 2018-03-29 DIAGNOSIS — K25.0 ACUTE GASTRIC ULCER WITH HEMORRHAGE: ICD-10-CM

## 2018-03-29 DIAGNOSIS — K90.9 INTESTINAL MALABSORPTION, UNSPECIFIED TYPE: ICD-10-CM

## 2018-03-29 DIAGNOSIS — K90.9 INTESTINAL MALABSORPTION, UNSPECIFIED TYPE: Primary | ICD-10-CM

## 2018-03-29 PROBLEM — E66.01 MORBID OBESITY WITH BMI OF 40.0-44.9, ADULT (HCC): Status: RESOLVED | Noted: 2017-09-26 | Resolved: 2018-03-29

## 2018-03-29 LAB — SENTARA SPECIMEN COL,SENBCF: NORMAL

## 2018-03-29 PROCEDURE — 99001 SPECIMEN HANDLING PT-LAB: CPT | Performed by: NURSE PRACTITIONER

## 2018-03-29 RX ORDER — PANTOPRAZOLE SODIUM 40 MG/1
40 TABLET, DELAYED RELEASE ORAL 2 TIMES DAILY
Qty: 60 TAB | Refills: 2 | Status: SHIPPED | OUTPATIENT
Start: 2018-03-29 | End: 2018-09-27 | Stop reason: SDUPTHER

## 2018-03-29 RX ORDER — SUCRALFATE 1 G/1
1 TABLET ORAL 3 TIMES DAILY
Qty: 120 TAB | Refills: 1 | Status: SHIPPED | OUTPATIENT
Start: 2018-03-29 | End: 2018-06-24

## 2018-03-29 NOTE — PROGRESS NOTES
Subjective:     Fatemeh Bueno  is a 34 y.o. female who presents for follow-up about 6 months following status post conversion from sleeve gastrectomy to gastric bypass. She has lost a total of 64 pounds since surgery. Body mass index is 33.82 kg/(m^2). . EBWL is 50%. The patient presents today to assess their progress toward their goal of weight loss and to address any issues that may be present. Today the patient and I have reviewed their diet and how appropriate their food choices are. The following issues have been identified - patient has start having epigastric pain again similar to previous gastric ulcer pain in Nov 2017 after receiving toradol. See records in [facility]. Initially, took Protonix with resolution of symptoms, but ran out of prescription and started taking prilosec instead. Also, never filled carafate liquid due to cost. Had acute GIB with last repeat Hgb still only 10 in Jan 2018. Surgery related complication: NA       She reports epigastric pain and denies vomiting, abdominal pain, diarrhea and nausea. The patients diet choices have been reviewed today and are as follows:      Breakfast: protein shake or egg and turkey sausage      Lunch: deli meat and cheese rollup      Snack: protein shake      Supper :fish and veggie    Patients pain score:0/10      The patient's exercise level: moderately active. Changes in her medical history and medications have been reviewed. Planning to have back surgery this summer for scoliosis surgery revision. Seeing pain management now.     Patient Active Problem List   Diagnosis Code    Morbid obesity with body mass index of 40.0-49.9 ([facility]) E66.01    Status post bariatric surgery Z98.84    Intestinal malabsorption K90.9    Nausea & vomiting R11.2    Incisional hernia K43.2    Morbid obesity with BMI of 40.0-44.9, [facility] E66.01, Z68.41    Spondylolisthesis of lumbar region M43.16    History of spinal surgery Z98.890 Past Medical History:   Diagnosis Date    Incisional hernia     Intestinal malabsorption     Morbid obesity with body mass index of 40.0-49.9 ([facility])     Status post bariatric surgery 2012    sleeve resection / kirstin borja     Past Surgical History:   Procedure Laterality Date    HX  SECTION      X 3    HX GI  2012    sleeve resection / rosaline borja    HX ORTHOPAEDIC      scoliosis correction surgery X 2    HX TONSIL AND ADENOIDECTOMY      HX TUBAL LIGATION      LAP GASTRIC BYPASS/TAMICA-EN-Y      revision from sleeve on 2017     Current Outpatient Prescriptions   Medication Sig Dispense Refill    ferrous sulfate ER (IRON) 160 mg (50 mg iron) TbER tablet Take 1 Tab by mouth daily.  Biotin 2,500 mcg cap Take  by mouth.  Omeprazole delayed release (PRILOSEC D/R) 20 mg tablet TAKE 1 TABLET BY MOUTH EVERY DAY 30 Tab 1    cyanocobalamin (VITAMIN B-12) 500 mcg tablet Take 500 mcg by mouth daily.  calcium citrate 200 mg (950 mg) tablet Take  by mouth daily.  multivitamin with iron (FLINTSTONES) chewable tablet Take 1 Tab by mouth two (2) times a day.           Review of Symptoms:       General - No history or complaints of unexpected fever or chills  Head/Neck - No history or complaints of headache or dizziness  Cardiac - No history or complaints of chest pain, palpitations, or shortness of breath  Pulmonary - No history or complaints of shortness of breath or productive cough  Gastrointestinal - as noted above  Genitourinary - No history or complaints of hematuria/dysuria or renal lithiasis  Musculoskeletal - No history or complaints of joint  muscular weakness  Hematologic - No history of any bleeding episodes  Neurologic - No history or complaints of  migraine headaches or neurologic symptoms                     Objective:     Visit Vitals    Ht 5' 3\" (1.6 m)    Wt 86.6 kg (190 lb 14.4 oz)    BMI 33.82 kg/m2        Physical Exam:    General:  alert, cooperative, no distress, appears stated age   Lungs:   clear to auscultation bilaterally   Heart:  Regular rate and rhythm, S1S2 present or without murmur or extra heart sounds   Abdomen:   abdomen is soft without significant tenderness, masses, organomegaly or guarding; Incisions: Well healed         Lab Results   Component Value Date/Time    WBC 6.2 01/10/2018 02:10 PM    HGB 10.0 (L) 01/10/2018 02:10 PM    HCT 33.6 (L) 01/10/2018 02:10 PM    PLATELET 925 33/65/0483 02:10 PM    MCV 88.0 01/10/2018 02:10 PM     Lab Results   Component Value Date/Time    Sodium 140 01/10/2018 02:10 PM    Potassium 3.9 01/10/2018 02:10 PM    Chloride 104 01/10/2018 02:10 PM    CO2 29 01/10/2018 02:10 PM    Anion gap 7 01/10/2018 02:10 PM    Glucose 85 01/10/2018 02:10 PM    BUN 12 01/10/2018 02:10 PM    Creatinine 0.56 (L) 01/10/2018 02:10 PM    BUN/Creatinine ratio 21 (H) 01/10/2018 02:10 PM    GFR est AA >60 01/10/2018 02:10 PM    GFR est non-AA >60 01/10/2018 02:10 PM    Calcium 9.2 01/10/2018 02:10 PM    Bilirubin, total 0.2 01/10/2018 02:10 PM    AST (SGOT) 24 01/10/2018 02:10 PM    Alk. phosphatase 109 01/10/2018 02:10 PM    Protein, total 7.7 01/10/2018 02:10 PM    Albumin 3.1 (L) 01/10/2018 02:10 PM    Globulin 4.6 (H) 01/10/2018 02:10 PM    A-G Ratio 0.7 (L) 01/10/2018 02:10 PM    ALT (SGPT) 28 01/10/2018 02:10 PM     Lab Results   Component Value Date/Time    Iron 147 06/29/2016 12:00 AM    Ferritin 18 06/29/2016 12:00 AM     Lab Results   Component Value Date/Time    Folate 15.5 06/29/2016 12:00 AM     Lab Results   Component Value Date/Time    VITAMIN D, 25-HYDROXY 29.5 (L) 06/29/2016 12:00 AM             Assessment:     1. History of Morbid obesity,   status post conversion from sleeve gastrectomy to gastric bypass. The patient is doing well, but I do have concerns that she may be experiencing ulcer pain, will resume protonix BID and carafate. When she returns in 3 months will see if repeat EGD indicated.  [facility]. Call if symptoms not improving within 1 week. Plan:     1. Remember to measure portions, continue low carbohydrate diet  2. Continue to concentrate on protein intake meeting daily requirements  3. Remember vitamin supplements. The importance of such was discussed regarding the malabsorptive issues that the surgery creates. 4. Exercise regimen appears to be: adequate  5. Try and attend support group if feasible. 6. Follow-up in 3 month(s). 7. Lab reviewed and given lab slip today. 8. Total time spent with the patient 30 minutes.

## 2018-03-29 NOTE — MR AVS SNAPSHOT
Onetha Catherine 
 
 
 [facility] [address] [facility] 
[phone] Patient: Nima Espinal MRN: [id number] :1985 Visit Information Date & Time Provider Department Dept. Phone Encounter #  
 3/29/2018 11:30 AM Kevin Gallegos NP [employer] [facility] [phone] [id number] Follow-up Instructions Return in about 3 months (around 2018). Your Appointments 2018 10:00 AM  
EST PATIENT PROBLEM with Stephanie Logan MD  
[address] [facility] 3651 [facility]) Appt Note: 9 mo  
 One [facility] [address] Upcoming Health Maintenance Date Due DTaP/Tdap/Td series (1 - Tdap) 2005 PAP AKA CERVICAL CYTOLOGY 2005 Influenza Age 5 to Adult 2017 Allergies as of 3/29/2018  Review Complete On: 3/29/2018 By: Min Klein LPN Severity Noted Reaction Type Reactions Nsaids (Non-steroidal Anti-inflammatory Drug) High 2017    Other (comments) GI Ulcer- Hx of Gastric Bypass Current Immunizations  Reviewed on 2017 No immunizations on file. Not reviewed this visit You Were Diagnosed With   
  
 Codes Comments Intestinal malabsorption, unspecified type    -  Primary ICD-10-CM: K90.9 ICD-9-CM: 579.9 Status post bariatric surgery     ICD-10-CM: Z98.84 ICD-9-CM: V45.86 Acute gastric ulcer with hemorrhage     ICD-10-CM: K25.0 ICD-9-CM: 531.00 Vitals BP Pulse Height(growth percentile) Weight(growth percentile) SpO2 BMI  
 116/73 (BP 1 Location: Left arm, BP Patient Position: Sitting) 74 5' 3\" (1.6 m) 190 lb 14.4 oz (86.6 kg) 98% 33.82 kg/m2 OB Status Smoking Status Having regular periods Never Smoker Vitals History BMI and BSA Data Body Mass Index Body Surface Area  
 33.82 kg/m 2 1.96 m 2 Preferred Pharmacy Pharmacy Name Phone [id number], [address] [place] [address] Your Updated Medication List  
  
   
This list is accurate as of 3/29/18 12:08 PM.  Always use your most recent med list.  
  
  
  
  
 Biotin 2,500 mcg Cap Take  by mouth.  
  
 calcium citrate 200 mg (950 mg) tablet Take  by mouth daily. Iron 160 mg (50 mg iron) Tber tablet Generic drug:  ferrous sulfate ER Take 1 Tab by mouth daily. multivitamin with iron chewable tablet Commonly known as:  Lyndle Canter Take 1 Tab by mouth two (2) times a day. pantoprazole 40 mg tablet Commonly known as:  PROTONIX Take 1 Tab by mouth two (2) times a day for 30 days. Indications: Gastric Ulcer  
  
 sucralfate 1 gram tablet Commonly known as:  Earlene Shutters Take 1 Tab by mouth three (3) times daily. Dissolve in 1 tablespoon of water. Indications: Gastric Ulcer VITAMIN B-12 500 mcg tablet Generic drug:  cyanocobalamin Take 500 mcg by mouth daily. Prescriptions Sent to Pharmacy Refills  
 pantoprazole (PROTONIX) 40 mg tablet 2 Sig: Take 1 Tab by mouth two (2) times a day for 30 days. Indications: Gastric Ulcer Class: Normal  
 Pharmacy: [address] [place] [address] Jaison Kong  Ph #: [phone] Route: Oral  
 sucralfate (CARAFATE) 1 gram tablet 1 Sig: Take 1 Tab by mouth three (3) times daily. Dissolve in 1 tablespoon of water. Indications: Gastric Ulcer Class: Normal  
 Pharmacy: [address] [place] [address] Jaison Kong  Ph #: [phone] Route: Oral  
  
Follow-up Instructions Return in about 3 months (around 6/29/2018). To-Do List   
 03/29/2018 Lab:  CBC WITH AUTOMATED DIFF   
  
 03/29/2018 Lab:  FERRITIN   
  
 03/29/2018 Lab:  IRON   
  
 03/29/2018 Lab: METABOLIC PANEL, COMPREHENSIVE Patient Instructions Patient Instructions 1. Remember hydration goals - minimum of 64 ounces of liquids per day (dehydration is the number one reason for hospital readmission). 2. Continue to monitor carbohydrate and protein intake you need a minimum of  Grams of protein daily- remember to keep your total carbohydrates to 50 grams or less per day for best results. 3. Continue to work towards exercise goals - 60-90 minutes, 5 times a week minimum of deliberate, aerobic exercise is the ultimate goal with strength training 2 times each week. Refer to Homesnap for  information. 4. Remember to take vitamins as directed. 5. Attend support group the 2nd Thursday of each month. 6. Use Miralax if you become constipated. 7. Call us at [phone] or email us through [place]" with questions,     concerns or worsening of condition, we have someone on call 24 hours a day. If you are unable to reach our office, you are to go to your Primary Care Physician or the Emergency Department. Supplement Resource Guide Importance of Protein:  
Maintains lean body mass, produces antibodies to fight off infections, heals wounds, minimizes hair loss, helps to give you energy, helps with satiety, and keeping you full between meals. Importance of Calcium: 
Needed for healthy bones and teeth, normal blood clotting, and nervous system functioning, higher risk of osteoporosis and bone disease with non-compliance. Importance of Multivitamins: Many functions. Supply you with extra nutrients that you may be missing from food. May lead to iron deficiency anemia, weakness, fatigue, and many other symptoms with non-compliance. Importance of B Vitamins: 
Important for red blood cell formation, metabolism, energy, and helps to maintain a healthy nervous system. Protein Supplement Find one you like now. Use immediately after surgery. Look for: 
35-50g protein each day from your protein supplement once you reach the progression diet. 0-3 g fat per serving 0-3 g sugar per serving Protein drinks should be split in separate dosages. Recommend: Lifelong 1 year + Calcium Supplement:  
 
Start taking within a month after surgery. Look for: Calcium Citrate Plus D (1500 mg per day) Recommend: Citracal 
 
 . Avoid chocolate chewable calcium. Can use chewable bariatric or GNC brand or similar chewable. The body cannot absorb more than 500-600 mg of calcium at a time. Take for Life Multi-vitamin Supplement:   
 
Start immediately after surgery: any complete chewable, such as: [place]s Complete chewables. Avoid [place] sours or gummies. They lack iron and other important nutrients and also have added sugar. Continue with chewable vitamin or change to adult complete multivitamin one month after surgery. Menstruating women can take a prenatal vitamin. Make sure has at least 18 mg iron and 601-082 mcg folic acid Vitamin B12, B Complex Vitamin, and Biotin Start taking within a month after surgery. Vitamin B12:  1000 mcg of Vitamin B12 three times weekly Must take sublingually (meaning you take it under your tongue) or in a liquid drop form for easy absorption. B Complex Vitamin: Take a pill or liquid drop form once daily. Biotin: This vitamin can help prevent hair loss. Recommend 5mg  
(5000 mcg) a day Biotin is Optional  
 
 
 
  
Walking for Exercise: Care Instructions Your Care Instructions Walking is one of the easiest ways to get the exercise you need for good health. A brisk, 30-minute walk each day can help you feel better and have more energy. It can help you lower your risk of disease. Walking can help you keep your bones strong and your heart healthy. Check with your doctor before you start a walking plan if you have heart problems, other health issues, or you have not been active in a long time. Follow your doctor's instructions for safe levels of exercise. Follow-up care is a key part of your treatment and safety. Be sure to make and go to all appointments, and call your doctor if you are having problems. It's also a good idea to know your test results and keep a list of the medicines you take. How can you care for yourself at home? Getting started · Start slowly and set a short-term goal. For example, walk for 5 or 10 minutes every day. · Bit by bit, increase the amount you walk every day. Try for at least 30 minutes on most days of the week. You also may want to swim, bike, or do other activities. · If finding enough time is a problem, it is fine to be active in blocks of 10 minutes or more throughout your day and week. · To get the heart-healthy benefits of walking, you need to walk briskly enough to increase your heart rate and breathing, but not so fast that you cannot talk comfortably. · Wear comfortable shoes that fit well and provide good support for your feet and ankles. Staying with your plan · After you've made walking a habit, set a longer-term goal. You may want to set a goal of walking briskly for longer or walking farther. Experts say to do 2½ hours of moderate activity a week. A faster heartbeat is what defines moderate-level activity. · To stay motivated, walk with friends, coworkers, or pets. · Use a phone mau or pedometer to track your steps each day. Set a goal to increase your steps. Once you get there, set a higher goal. Aim for 10,000 steps a day. · If the weather keeps you from walking outside, go for walks at the mall with a friend. Local schools and churches may have indoor gyms where you can walk. Fitting a walk into your workday · Park several blocks away from work, or get off the bus a few stops early. · Use the stairs instead of the elevator, at least for a few floors. · Suggest holding meetings with colleagues during a walk inside or outside the building. · Use the restroom that is the farthest from your desk or workstation. · Use your morning and afternoon breaks to take quick 15-minute walks. Staying safe · Know your surroundings. Walk in a well-lighted, safe place. If it is dark, walk with a partner. Wear light-colored clothing. If you can, buy a vest or jacket that reflects light. · Carry a cell phone for emergencies. · Drink plenty of water. Take a water bottle with you when you walk. This is very important if it is hot out. · Be careful not to slip on wet or icy ground. You can buy \"grippers\" for your shoes to help keep you from slipping. · Pay attention to your walking surface. Use sidewalks and paths. · If you have breathing problems like asthma or COPD, ask your doctor when it is safe for you to walk outdoors. Cold, dry air, smog, pollen, or other things in the air could cause breathing problems. Where can you learn more? Go to http://wilbert-loraine.info/. Enter R159 in the search box to learn more about \"Walking for Exercise: Care Instructions. \" Current as of: March 13, 2017 Content Version: 11.4 © 8958-0861 [employer]. Care instructions adapted under license by [employer] (which disclaims liability or warranty for this information). If you have questions about a medical condition or this instruction, always ask your healthcare professional. [place] [address] any warranty or liability for your use of this information. Introducing [facility]! [address] introduces LifeBlinx patient portal. Now you can access parts of your medical record, email your doctor's office, and request medication refills online. 1. In your internet browser, go to [email or web address]. [email or web address] Discovery 2. Click on the First Time User? Click Here link in the Sign In box. You will see the New Member Sign Up page. 3. Enter your VideoClix Access Code exactly as it appears below. You will not need to use this code after youve completed the sign-up process. If you do not sign up before the expiration date, you must request a new code. · VideoClix Access Code: [email or web address] Expires: 6/27/2018 11:58 AM 
 
4. Enter the last four digits of your Social Security Number (xxxx) and Date of Birth (mm/dd/yyyy) as indicated and click Submit. You will be taken to the next sign-up page. 5. Create a VideoClix ID. This will be your VideoClix login ID and cannot be changed, so think of one that is secure and easy to remember. 6. Create a VideoClix password. You can change your password at any time. 7. Enter your Password Reset Question and Answer. This can be used at a later time if you forget your password. 8. Enter your e-mail address. You will receive e-mail notification when new information is available in [address] 9. Click Sign Up. You can now view and download portions of your medical record. 10. Click the Download Summary menu link to download a portable copy of your medical information. If you have questions, please visit the Frequently Asked Questions section of the VideoClix website. Remember, VideoClix is NOT to be used for urgent needs. For medical emergencies, dial 911. Now available from your iPhone and Android! Please provide this summary of care documentation to your next provider. Your primary care clinician is listed as Karson Giraldo. If you have any questions after today's visit, please call [phone].

## 2018-03-29 NOTE — PATIENT INSTRUCTIONS
Patient Instructions      1. Remember hydration goals - minimum of 64 ounces of liquids per day (dehydration is the number one reason for hospital readmission). 2. Continue to monitor carbohydrate and protein intake you need a minimum of  Grams of protein daily- remember to keep your total carbohydrates to 50 grams or less per day for best results. 3. Continue to work towards exercise goals - 60-90 minutes, 5 times a week minimum of deliberate, aerobic exercise is the ultimate goal with strength training 2 times each week. Refer to [employer] for  information. 4. Remember to take vitamins as directed. 5. Attend support group the 2nd Thursday of each month. 6. Use Miralax if you become constipated. 7. Call us at [phone] or email us through [place]" with questions,     concerns or worsening of condition, we have someone on call 24 hours a day. If you are unable to reach our office, you are to go to your Primary Care Physician or the Emergency Department. Supplement Resource Guide    Importance of Protein:   Maintains lean body mass, produces antibodies to fight off infections, heals wounds, minimizes hair loss, helps to give you energy, helps with satiety, and keeping you full between meals. Importance of Calcium:  Needed for healthy bones and teeth, normal blood clotting, and nervous system functioning, higher risk of osteoporosis and bone disease with non-compliance. Importance of Multivitamins: Many functions. Supply you with extra nutrients that you may be missing from food. May lead to iron deficiency anemia, weakness, fatigue, and many other symptoms with non-compliance. Importance of B Vitamins:  Important for red blood cell formation, metabolism, energy, and helps to maintain a healthy nervous system. Protein Supplement  Find one you like now. Use immediately after surgery.    Look for:  35-50g protein each day from your protein supplement once you reach the progression diet. 0-3 g fat per serving  0-3 g sugar per serving    Protein drinks should be split in separate dosages. Recommend: Lifelong  1 year + Calcium Supplement:     Start taking within a month after surgery. Look for: Calcium Citrate Plus D (1500 mg per day)  Recommend: Citracal     .            Avoid chocolate chewable calcium. Can use chewable bariatric or GNC brand or similar chewable. The body cannot absorb more than 500-600 mg of calcium at a time. Take for Life Multi-vitamin Supplement:      Start immediately after surgery: any complete chewable, such as: [place]s Complete chewables. Avoid [place] sours or gummies. They lack iron and other important nutrients and also have added sugar. Continue with chewable vitamin or change to adult complete multivitamin one month after surgery. Menstruating women can take a prenatal vitamin. Make sure has at least 18 mg iron and 696-462 mcg folic acid   Vitamin D82, B Complex Vitamin, and Biotin  Start taking within a month after surgery. Vitamin B12:  1000 mcg of Vitamin B12 three times weekly    Must take sublingually (meaning you take it under your tongue) or in a liquid drop form for easy absorption. B Complex Vitamin: Take a pill or liquid drop form once daily. Biotin: This vitamin can help prevent hair loss. Recommend 5mg   (5000 mcg) a day  Biotin is Optional            Walking for Exercise: Care Instructions  Your Care Instructions    Walking is one of the easiest ways to get the exercise you need for good health. A brisk, 30-minute walk each day can help you feel better and have more energy. It can help you lower your risk of disease. Walking can help you keep your bones strong and your heart healthy.   Check with your doctor before you start a walking plan if you have heart problems, other health issues, or you have not been active in a long time. Follow your doctor's instructions for safe levels of exercise. Follow-up care is a key part of your treatment and safety. Be sure to make and go to all appointments, and call your doctor if you are having problems. It's also a good idea to know your test results and keep a list of the medicines you take. How can you care for yourself at home? Getting started  · Start slowly and set a short-term goal. For example, walk for 5 or 10 minutes every day. · Bit by bit, increase the amount you walk every day. Try for at least 30 minutes on most days of the week. You also may want to swim, bike, or do other activities. · If finding enough time is a problem, it is fine to be active in blocks of 10 minutes or more throughout your day and week. · To get the heart-healthy benefits of walking, you need to walk briskly enough to increase your heart rate and breathing, but not so fast that you cannot talk comfortably. · Wear comfortable shoes that fit well and provide good support for your feet and ankles. Staying with your plan  · After you've made walking a habit, set a longer-term goal. You may want to set a goal of walking briskly for longer or walking farther. Experts say to do 2½ hours of moderate activity a week. A faster heartbeat is what defines moderate-level activity. · To stay motivated, walk with friends, coworkers, or pets. · Use a phone mua or pedometer to track your steps each day. Set a goal to increase your steps. Once you get there, set a higher goal. Aim for 10,000 steps a day. · If the weather keeps you from walking outside, go for walks at the mall with a friend. Local schools and churches may have indoor gyms where you can walk. Fitting a walk into your workday  · Park several blocks away from work, or get off the bus a few stops early. · Use the stairs instead of the elevator, at least for a few floors.   · Suggest holding meetings with colleagues during a walk inside or outside the building. · Use the restroom that is the farthest from your desk or workstation. · Use your morning and afternoon breaks to take quick 15-minute walks. Staying safe  · Know your surroundings. Walk in a well-lighted, safe place. If it is dark, walk with a partner. Wear light-colored clothing. If you can, buy a vest or jacket that reflects light. · Carry a cell phone for emergencies. · Drink plenty of water. Take a water bottle with you when you walk. This is very important if it is hot out. · Be careful not to slip on wet or icy ground. You can buy \"grippers\" for your shoes to help keep you from slipping. · Pay attention to your walking surface. Use sidewalks and paths. · If you have breathing problems like asthma or COPD, ask your doctor when it is safe for you to walk outdoors. Cold, dry air, smog, pollen, or other things in the air could cause breathing problems. Where can you learn more? Go to http://wilbert-loraine.info/. Enter R159 in the search box to learn more about \"Walking for Exercise: Care Instructions. \"  Current as of: March 13, 2017  Content Version: 11.4  © 7041-7343 [employer], [employer]. Care instructions adapted under license by [employer] (which disclaims liability or warranty for this information). If you have questions about a medical condition or this instruction, always ask your healthcare professional. [place] [address] any warranty or liability for your use of this information.

## 2018-04-02 ENCOUNTER — TELEPHONE (OUTPATIENT)
Dept: SURGERY | Age: 33
End: 2018-04-02

## 2018-04-02 NOTE — TELEPHONE ENCOUNTER
Requested recent lab results from [facility] laboratory. Results being sent via fax.

## 2018-04-26 ENCOUNTER — TELEPHONE (OUTPATIENT)
Dept: SURGERY | Age: 33
End: 2018-04-26

## 2018-05-14 ENCOUNTER — TELEPHONE (OUTPATIENT)
Dept: ORTHOPEDIC SURGERY | Age: 33
End: 2018-05-14

## 2018-05-14 NOTE — TELEPHONE ENCOUNTER
Ms Desi Davis called this morning stating she was unable to attend her appointment this week-end for Ct of the Lumbar Spine. Please call Ms Desi Davis to reschedule at [phone]. Thank you.

## 2018-05-15 NOTE — TELEPHONE ENCOUNTER
Called patient and she stated she was scheduled for her CT at [facility] so I transferred the call to Jayshree Sommer to R/S the test.

## 2018-06-24 ENCOUNTER — HOSPITAL ENCOUNTER (EMERGENCY)
Age: 33
Discharge: HOME | End: 2018-06-24
Attending: EMERGENCY MEDICINE
Payer: COMMERCIAL

## 2018-06-24 VITALS
DIASTOLIC BLOOD PRESSURE: 90 MMHG | WEIGHT: 176 LBS | OXYGEN SATURATION: 100 % | HEART RATE: 86 BPM | HEIGHT: 63 IN | BODY MASS INDEX: 31.18 KG/M2 | TEMPERATURE: 98.4 F | RESPIRATION RATE: 18 BRPM | SYSTOLIC BLOOD PRESSURE: 143 MMHG

## 2018-06-24 DIAGNOSIS — N30.01 ACUTE CYSTITIS WITH HEMATURIA: Primary | ICD-10-CM

## 2018-06-24 LAB
APPEARANCE UR: ABNORMAL
BACTERIA URNS QL MICRO: ABNORMAL /HPF
BILIRUB UR QL: NEGATIVE
COLOR UR: ABNORMAL
EPITH CASTS URNS QL MICRO: ABNORMAL /LPF (ref 0–5)
GLUCOSE UR STRIP.AUTO-MCNC: NEGATIVE MG/DL
HCG UR QL: NEGATIVE
HGB UR QL STRIP: ABNORMAL
KETONES UR QL STRIP.AUTO: ABNORMAL MG/DL
LEUKOCYTE ESTERASE UR QL STRIP.AUTO: ABNORMAL
MUCOUS THREADS URNS QL MICRO: ABNORMAL /LPF
NITRITE UR QL STRIP.AUTO: NEGATIVE
PH UR STRIP: 5.5 [PH] (ref 5–8)
PROT UR STRIP-MCNC: 100 MG/DL
RBC #/AREA URNS HPF: ABNORMAL /HPF (ref 0–5)
SP GR UR REFRACTOMETRY: 1.04 (ref 1–1.03)
UROBILINOGEN UR QL STRIP.AUTO: 1 EU/DL (ref 0.2–1)
WBC URNS QL MICRO: ABNORMAL /HPF (ref 0–4)

## 2018-06-24 PROCEDURE — 99283 EMERGENCY DEPT VISIT LOW MDM: CPT

## 2018-06-24 PROCEDURE — 81001 URINALYSIS AUTO W/SCOPE: CPT | Performed by: PHYSICIAN ASSISTANT

## 2018-06-24 PROCEDURE — 81025 URINE PREGNANCY TEST: CPT | Performed by: PHYSICIAN ASSISTANT

## 2018-06-24 RX ORDER — PHENAZOPYRIDINE HYDROCHLORIDE 200 MG/1
200 TABLET, FILM COATED ORAL 3 TIMES DAILY
Qty: 6 TAB | Refills: 0 | Status: SHIPPED | OUTPATIENT
Start: 2018-06-24 | End: 2018-06-26

## 2018-06-24 RX ORDER — CEPHALEXIN 500 MG/1
500 CAPSULE ORAL 2 TIMES DAILY
Qty: 14 CAP | Refills: 0 | Status: SHIPPED | OUTPATIENT
Start: 2018-06-24 | End: 2018-07-01

## 2018-06-25 NOTE — DISCHARGE INSTRUCTIONS

## 2018-06-25 NOTE — ED PROVIDER NOTES
EMERGENCY DEPARTMENT HISTORY AND PHYSICAL EXAM    Date: 6/24/2018  Patient Name: Jessi Logan    History of Presenting Illness     Chief Complaint   Patient presents with    Urinary Frequency    Urinary Pain         History Provided By: Patient    Chief Complaint: UTI  Duration: Today  Timing:  Acute  Location:   Quality: Pressure  Severity: 10 out of 10  Modifying Factors: None. Associated Symptoms: dysuria, urinary frequency, urinary urgency, decreased void, suprapubic pressure    Additional History (Context):   8:12 PM  Jessi Logan is a 34 y.o. female with PMHX of UTI who presents to the emergency department C/O dysuria,  urinary frequency, urgency, decreased output and suprapubic pressure with urination that feels like prior UTI; states she used to get UTI's often, the last was about 8 years. Noticed slight blood after urination in the ED. Pt denies fevers, back pain, vaginal discharge, vomiting. States she has not been sexually active in 6 months and denies concern for STI. PCP: Lala Muniz MD    Current Outpatient Prescriptions   Medication Sig Dispense Refill    pantoprazole sodium (PROTONIX PO) Take  by mouth.  potassium 99 mg tablet Take 99 mg by mouth daily.  cephALEXin (KEFLEX) 500 mg capsule Take 1 Cap by mouth two (2) times a day for 7 days. 14 Cap 0    phenazopyridine (PYRIDIUM) 200 mg tablet Take 1 Tab by mouth three (3) times daily for 2 days. 6 Tab 0    ferrous sulfate ER (IRON) 160 mg (50 mg iron) TbER tablet Take 1 Tab by mouth daily.  cyanocobalamin (VITAMIN B-12) 500 mcg tablet Take 500 mcg by mouth daily.  calcium citrate 200 mg (950 mg) tablet Take  by mouth daily.  multivitamin with iron (FLINTSTONES) chewable tablet Take 1 Tab by mouth two (2) times a day.  Biotin 2,500 mcg cap Take  by mouth.          Past History     Past Medical History:  Past Medical History:   Diagnosis Date    Incisional hernia     Intestinal malabsorption     Morbid obesity with body mass index of 40.0-49.9 ([facility])     Status post bariatric surgery 2012    sleeve resection / kirstin Ramos Staff       Past Surgical History:  Past Surgical History:   Procedure Laterality Date    HX  SECTION      X 3    HX GI  2012    sleeve resection / a salzberg    HX ORTHOPAEDIC      scoliosis correction surgery X 2    HX TONSIL AND ADENOIDECTOMY      HX TUBAL LIGATION      LAP GASTRIC BYPASS/TAMICA-EN-Y      revision from sleeve on 2017       Family History:  History reviewed. No pertinent family history. Social History:  Social History   Substance Use Topics    Smoking status: Never Smoker    Smokeless tobacco: Never Used    Alcohol use No       Allergies: Allergies   Allergen Reactions    Nsaids (Non-Steroidal Anti-Inflammatory Drug) Other (comments)     GI Ulcer- Hx of Gastric Bypass         Review of Systems   Review of Systems   Constitutional: Negative for fever. Gastrointestinal: Positive for abdominal pain. Negative for vomiting. Genitourinary: Positive for difficulty urinating, dysuria, frequency, hematuria and urgency. Negative for vaginal bleeding and vaginal discharge. All other systems reviewed and are negative. Physical Exam     Vitals:    18   BP: 143/90   Pulse: 86   Resp: 18   Temp: 98.4 °F (36.9 °C)   SpO2: 100%   Weight: 79.8 kg (176 lb)   Height: 5' 3\" (1.6 m)     Physical Exam   Constitutional: She appears well-developed and well-nourished. No distress. HENT:   Head: Normocephalic and atraumatic. Right Ear: External ear normal.   Left Ear: External ear normal.   Nose: Nose normal.   Eyes: Conjunctivae are normal.   Neck: Normal range of motion. Cardiovascular: Normal rate, regular rhythm and normal heart sounds. Pulmonary/Chest: Effort normal and breath sounds normal. No respiratory distress. Abdominal: Soft. Bowel sounds are normal. She exhibits no distension. There is no tenderness.  There is no rebound and no guarding. Genitourinary:   Genitourinary Comments: Negative CVA tenderness bilaterally. Neurological: She is alert. Skin: Skin is warm and dry. She is not diaphoretic. Psychiatric: She has a normal mood and affect. Vitals reviewed. Diagnostic Study Results     Labs -     Recent Results (from the past 12 hour(s))   URINALYSIS W/ RFLX MICROSCOPIC    Collection Time: 06/24/18  8:15 PM   Result Value Ref Range    Color DARK YELLOW      Appearance TURBID      Specific gravity 1.044 (H) 1.005 - 1.030      pH (UA) 5.5 5.0 - 8.0      Protein 100 (A) NEG mg/dL    Glucose NEGATIVE  NEG mg/dL    Ketone TRACE (A) NEG mg/dL    Bilirubin NEGATIVE  NEG      Blood LARGE (A) NEG      Urobilinogen 1.0 0.2 - 1.0 EU/dL    Nitrites NEGATIVE  NEG      Leukocyte Esterase MODERATE (A) NEG     HCG URINE, QL    Collection Time: 06/24/18  8:15 PM   Result Value Ref Range    HCG urine, QL NEGATIVE  NEG     URINE MICROSCOPIC ONLY    Collection Time: 06/24/18  8:15 PM   Result Value Ref Range    WBC 36 to 50 0 - 4 /hpf    RBC 11 to 20 0 - 5 /hpf    Epithelial cells 1+ 0 - 5 /lpf    Bacteria FEW (A) NEG /hpf    Mucus 1+ (A) NEG /lpf       Radiologic Studies -   No orders to display     CT Results  (Last 48 hours)    None        CXR Results  (Last 48 hours)    None          Medications given in the ED-  Medications - No data to display      Medical Decision Making   I am the first provider for this patient. I reviewed the vital signs, available nursing notes, past medical history, past surgical history, family history and social history. Vital Signs-Reviewed the patient's vital signs. Records Reviewed: Nursing Notes    Provider Notes (Medical Decision Making): Well appearing, non-toxic, afebrile patient presenting with s/sx most c/w acute UTI with hematuria. Will treat with abx, pyridium for symptom relief and advise PCP follow up.  Based on today's assessment, I feel the patient is stable for discharge to home with outpatient follow up. Return precautions and referrals provided. Procedures:  Procedures      Diagnosis and Disposition       DISCHARGE NOTE:  8:54 PM  Evelina Timmons's  results have been reviewed with her. She has been counseled regarding her diagnosis, treatment, and plan. She verbally conveys understanding and agreement of the signs, symptoms, diagnosis, treatment and prognosis and additionally agrees to follow up as discussed. She also agrees with the care-plan and conveys that all of her questions have been answered. I have also provided discharge instructions for her that include: educational information regarding their diagnosis and treatment, and list of reasons why they would want to return to the ED prior to their follow-up appointment, should her condition change. She has been provided with education for proper emergency department utilization. CLINICAL IMPRESSION:    1. Acute cystitis with hematuria        PLAN:  1. D/C Home  2. Current Discharge Medication List      START taking these medications    Details   cephALEXin (KEFLEX) 500 mg capsule Take 1 Cap by mouth two (2) times a day for 7 days. Qty: 14 Cap, Refills: 0      phenazopyridine (PYRIDIUM) 200 mg tablet Take 1 Tab by mouth three (3) times daily for 2 days. Qty: 6 Tab, Refills: 0           3.    Follow-up Information     Follow up With Details Comments [address] [facility] Schedule an appointment as soon as possible for a visit  [address]  [address] [facility] [address] [address] [place] EMERGENCY DEPT  As needed, If symptoms worsen [address] [facility]  [phone]

## 2018-06-25 NOTE — ED TRIAGE NOTES
Pt states having suprapubic pressure, urinary frequency, urgency, onset today. States now seeing blood in her urine. Denies fevers.

## 2018-06-27 ENCOUNTER — OFFICE VISIT (OUTPATIENT)
Dept: SURGERY | Age: 33
End: 2018-06-27

## 2018-06-27 VITALS
RESPIRATION RATE: 16 BRPM | WEIGHT: 177.1 LBS | HEIGHT: 63 IN | OXYGEN SATURATION: 100 % | HEART RATE: 71 BPM | DIASTOLIC BLOOD PRESSURE: 75 MMHG | BODY MASS INDEX: 31.38 KG/M2 | SYSTOLIC BLOOD PRESSURE: 116 MMHG

## 2018-06-27 DIAGNOSIS — K90.9 INTESTINAL MALABSORPTION, UNSPECIFIED TYPE: ICD-10-CM

## 2018-06-27 DIAGNOSIS — J11.00 INFLUENZA AND PNEUMONIA: Primary | ICD-10-CM

## 2018-06-27 DIAGNOSIS — Z98.84 STATUS POST BARIATRIC SURGERY: ICD-10-CM

## 2018-06-27 DIAGNOSIS — K90.9 INTESTINAL MALABSORPTION, UNSPECIFIED TYPE: Primary | ICD-10-CM

## 2018-06-27 NOTE — PROGRESS NOTES
9 month follow-up    Subjective:     Julián Aguilera  is a 34 y.o. female who presents for follow-up about 9 months following conversion of salzberg laparoscopic sleeve gastrectomyto gastric bypass. She has lost a total of 78 pounds since surgery. Body mass index is 31.37 kg/(m^2). . EBWL is (61%). The patient presents today to assess their progress toward their goal of weight loss and to address any issues that may be present. Today the patient and I have reviewed their diet and how appropriate their food choices are. The following issues have been identified - doing well from a surgical standpoint. Pain assessment - 0/10  . Surgery related complication: NA - pt did have bleeding gastric ulcer at 2 month miranda treated at [facility]       She reports no real issues and denies vomiting, abdominal pain, diarrhea and difficulty breathing. The patient's exercise level: moderately active. Changes in her medical history and medications have been reviewed.     Patient Active Problem List   Diagnosis Code    Status post bariatric surgery Z98.84    Intestinal malabsorption K90.9    Nausea & vomiting R11.2    Incisional hernia K43.2    Spondylolisthesis of lumbar region M43.16    History of spinal surgery Z98.890    Acute gastric ulcer with hemorrhage K25.0     Past Medical History:   Diagnosis Date    Incisional hernia     Intestinal malabsorption     Morbid obesity with body mass index of 40.0-49.9 ([facility] [address]     Status post bariatric surgery 2012    sleeve resection / a. Lorenda Dye     Past Surgical History:   Procedure Laterality Date    HX  SECTION      X 3    HX GI  2012    sleeve resection / a salzberg    HX ORTHOPAEDIC      scoliosis correction surgery X 2    HX TONSIL AND ADENOIDECTOMY      HX TUBAL LIGATION      LAP GASTRIC BYPASS/TAMICA-EN-Y      revision from sleeve on 2017     Current Outpatient Prescriptions   Medication Sig Dispense Refill    pantoprazole sodium (PROTONIX PO) Take  by mouth.  cephALEXin (KEFLEX) 500 mg capsule Take 1 Cap by mouth two (2) times a day for 7 days. 14 Cap 0    ferrous sulfate ER (IRON) 160 mg (50 mg iron) TbER tablet Take 1 Tab by mouth daily.  Biotin 2,500 mcg cap Take  by mouth.  cyanocobalamin (VITAMIN B-12) 500 mcg tablet Take 500 mcg by mouth daily.  calcium citrate 200 mg (950 mg) tablet Take  by mouth daily.  multivitamin with iron (FLINTSTONES) chewable tablet Take 1 Tab by mouth two (2) times a day.  potassium 99 mg tablet Take 99 mg by mouth daily. Review of Symptoms:     General - No history or complaints of unexpected fever or chills  Head/Neck - No history or complaints of headache or dizziness  Cardiac - No history or complaints of chest pain, palpitations, or shortness of breath  Pulmonary - No history or complaints of shortness of breath or productive cough  Gastrointestinal - as noted above  Genitourinary - No history or complaints of hematuria/dysuria or renal lithiasis  Musculoskeletal - No history or complaints of joint  muscular weakness  Hematologic - No history of any bleeding episodes  Neurologic - No history or complaints of  migraine headaches or neurologic symptoms    Objective:     Visit Vitals    /75 (BP 1 Location: Left arm, BP Patient Position: Sitting)    Pulse 71    Resp 16    Ht 5' 3\" (1.6 m)    Wt 80.3 kg (177 lb 1.6 oz)    LMP 06/14/2018 (Approximate)    SpO2 100%    BMI 31.37 kg/m2        Physical Exam:    General:  alert, cooperative, no distress, appears stated age   Lungs:   clear to auscultation bilaterally   Heart:  Regular rate and rhythm   Abdomen:   abdomen is soft without significant tenderness, masses, organomegaly or guarding; Incisions: healing well, no significant drainage         Assessment:     1. History of Morbid obesity, status post sleeve to laparoscopic gastric bypass surgery conversion. Doing well, no concerns.   [facility] records reviewed to include. Aneta Kemp PROCEDURES:  Upper endoscopy- November 24, 2017  -Normal esophagus.  - Gastric bypass with a normal-sized pouch.  - Non-bleeding gastric ulcers with no stigmata of bleeding. Risk for further bleeding should be low. - Normal examined jejunum. She will need a repeat EGD to confirm healing of ulcer    She desires another 10 lbs of weight loss    Plan:     1. Remember to measure portions, continue low carbohydrate diet  2. Continue to watch portion size. 3. Remember vitamin supplements. 4. Exercise regimen appears adequate. 5. Attend support group  6. Follow-up in 1 month(s) for EGD. 7. Total time spent with the patient 30 minutes. 8. Obtain lab prior to next visit which will be 1 year anniversary. 9. The patient understands the plan of action         I have reviewed the patients history and clinical findings with my physician extender in person. The patient has had all of their questions answered today including both exercise and dietary issues. I have reviwed any relevant  data and agree with the current plan of action.           Coy Cee M.D.

## 2018-06-27 NOTE — MR AVS SNAPSHOT
[address] [facility] 
[phone] Patient: Jessi Logan MRN: [id number] :1985 Visit Information Date & Time Provider Department Dept. Phone Encounter #  
 2018 10:30 AM MD Leonidas Yoo [facility] [phone] [id number] Follow-up Instructions Return in about 3 months (around 2018). Upcoming Health Maintenance Date Due DTaP/Tdap/Td series (1 - Tdap) 2005 PAP AKA CERVICAL CYTOLOGY 2005 Influenza Age 5 to Adult 2018 Allergies as of 2018  Review Complete On: 2018 By: Gerry Spivey MD  
  
 Severity Noted Reaction Type Reactions Nsaids (Non-steroidal Anti-inflammatory Drug) High 2017    Other (comments) GI Ulcer- Hx of Gastric Bypass Current Immunizations  Reviewed on 2017 No immunizations on file. Not reviewed this visit You Were Diagnosed With   
  
 Codes Comments Intestinal malabsorption, unspecified type    -  Primary ICD-10-CM: K90.9 ICD-9-CM: 579.9 Status post bariatric surgery     ICD-10-CM: Z98.84 ICD-9-CM: V45.86 Vitals BP Pulse Resp Height(growth percentile) Weight(growth percentile) LMP  
 116/75 (BP 1 Location: Left arm, BP Patient Position: Sitting) 71 16 5' 3\" (1.6 m) 177 lb 1.6 oz (80.3 kg) 2018 (Approximate) SpO2 BMI OB Status Smoking Status 100% 31.37 kg/m2 Having regular periods Never Smoker Vitals History BMI and BSA Data Body Mass Index Body Surface Area  
 31.37 kg/m 2 1.89 m 2 Preferred Pharmacy Pharmacy Name Phone [id number], [address] [place] [address] Your Updated Medication List  
  
   
This list is accurate as of 18 11:21 AM.  Always use your most recent med list.  
  
  
  
  
 Biotin 2,500 mcg Cap Take  by mouth. calcium citrate 200 mg (950 mg) tablet Take  by mouth daily. cephALEXin 500 mg capsule Commonly known as:  Lamond Hutchinson Take 1 Cap by mouth two (2) times a day for 7 days. Iron 160 mg (50 mg iron) Tber tablet Generic drug:  ferrous sulfate ER Take 1 Tab by mouth daily. multivitamin with iron chewable tablet Commonly known as:  Lj Amy Take 1 Tab by mouth two (2) times a day. potassium 99 mg tablet Take 99 mg by mouth daily. PROTONIX PO Take  by mouth. VITAMIN B-12 500 mcg tablet Generic drug:  cyanocobalamin Take 500 mcg by mouth daily. Follow-up Instructions Return in about 3 months (around 9/27/2018). To-Do List   
 06/27/2018 Lab:  CBC WITH AUTOMATED DIFF   
  
 06/27/2018 Lab:  FERRITIN   
  
 06/27/2018 Lab:  FOLATE   
  
 06/27/2018 Lab:  IRON   
  
 06/27/2018 Lab:  METABOLIC PANEL, COMPREHENSIVE   
  
 06/27/2018 Lab:  VITAMIN B1, WHOLE BLOOD   
  
 06/27/2018 Lab:  VITAMIN B12   
  
 09/27/2018 Lab:  VITAMIN D, 25 HYDROXY Patient Instructions Body Mass Index: Care Instructions Your Care Instructions Body mass index (BMI) can help you see if your weight is raising your risk for health problems. It uses a formula to compare how much you weigh with how tall you are. · A BMI lower than 18.5 is considered underweight. · A BMI between 18.5 and 24.9 is considered healthy. · A BMI between 25 and 29.9 is considered overweight. A BMI of 30 or higher is considered obese. If your BMI is in the normal range, it means that you have a lower risk for weight-related health problems. If your BMI is in the overweight or obese range, you may be at increased risk for weight-related health problems, such as high blood pressure, heart disease, stroke, arthritis or joint pain, and diabetes.  If your BMI is in the underweight range, you may be at increased risk for health problems such as fatigue, lower protection (immunity) against illness, muscle loss, bone loss, hair loss, and hormone problems. BMI is just one measure of your risk for weight-related health problems. You may be at higher risk for health problems if you are not active, you eat an unhealthy diet, or you drink too much alcohol or use tobacco products. Follow-up care is a key part of your treatment and safety. Be sure to make and go to all appointments, and call your doctor if you are having problems. It's also a good idea to know your test results and keep a list of the medicines you take. How can you care for yourself at home? · Practice healthy eating habits. This includes eating plenty of fruits, vegetables, whole grains, lean protein, and low-fat dairy. · If your doctor recommends it, get more exercise. Walking is a good choice. Bit by bit, increase the amount you walk every day. Try for at least 30 minutes on most days of the week. · Do not smoke. Smoking can increase your risk for health problems. If you need help quitting, talk to your doctor about stop-smoking programs and medicines. These can increase your chances of quitting for good. · Limit alcohol to 2 drinks a day for men and 1 drink a day for women. Too much alcohol can cause health problems. If you have a BMI higher than 25 · Your doctor may do other tests to check your risk for weight-related health problems. This may include measuring the distance around your waist. A waist measurement of more than 40 inches in men or 35 inches in women can increase the risk of weight-related health problems. · Talk with your doctor about steps you can take to stay healthy or improve your health. You may need to make lifestyle changes to lose weight and stay healthy, such as changing your diet and getting regular exercise. If you have a BMI lower than 18.5 · Your doctor may do other tests to check your risk for health problems. · Talk with your doctor about steps you can take to stay healthy or improve your health. You may need to make lifestyle changes to gain or maintain weight and stay healthy, such as getting more healthy foods in your diet and doing exercises to build muscle. Where can you learn more? Go to http://wilbert-loraine.info/. Enter S176 in the search box to learn more about \"Body Mass Index: Care Instructions. \" Current as of: October 13, 2016 Content Version: 11.4 © 6442-4562 [employer]. Care instructions adapted under license by [employer] (which disclaims liability or warranty for this information). If you have questions about a medical condition or this instruction, always ask your healthcare professional. [address] any warranty or liability for your use of this information. Introducing [facility] & HE[facility]! [employer] introduces [employer] patient portal. Now you can access parts of your medical record, email your doctor's office, and request medication refills online. 1. In your internet browser, go to [email or web address] 2. Click on the First Time User? Click Here link in the Sign In box. You will see the New Member Sign Up page. 3. Enter your [employer] Access Code exactly as it appears below. You will not need to use this code after youve completed the sign-up process. If you do not sign up before the expiration date, you must request a new code. · [employer] Access Code: [email or web address] Expires: 6/27/2018 11:58 AM 
 
4. Enter the last four digits of your Social Security Number (xxxx) and Date of Birth (mm/dd/yyyy) as indicated and click Submit. You will be taken to the next sign-up page. 5. Create a [employer] ID. This will be your [employer] login ID and cannot be changed, so think of one that is secure and easy to remember. 6. Create a MobileHelp password. You can change your password at any time. 7. Enter your Password Reset Question and Answer. This can be used at a later time if you forget your password. 8. Enter your e-mail address. You will receive e-mail notification when new information is available in [address] 9. Click Sign Up. You can now view and download portions of your medical record. 10. Click the Download Summary menu link to download a portable copy of your medical information. If you have questions, please visit the Frequently Asked Questions section of the MobileHelp website. Remember, MobileHelp is NOT to be used for urgent needs. For medical emergencies, dial 911. Now available from your iPhone and Android! Please provide this summary of care documentation to your next provider. Your primary care clinician is listed as Darian Ashley. If you have any questions after today's visit, please call [phone].

## 2018-06-27 NOTE — PATIENT INSTRUCTIONS
Body Mass Index: Care Instructions  Your Care Instructions    Body mass index (BMI) can help you see if your weight is raising your risk for health problems. It uses a formula to compare how much you weigh with how tall you are. · A BMI lower than 18.5 is considered underweight. · A BMI between 18.5 and 24.9 is considered healthy. · A BMI between 25 and 29.9 is considered overweight. A BMI of 30 or higher is considered obese. If your BMI is in the normal range, it means that you have a lower risk for weight-related health problems. If your BMI is in the overweight or obese range, you may be at increased risk for weight-related health problems, such as high blood pressure, heart disease, stroke, arthritis or joint pain, and diabetes. If your BMI is in the underweight range, you may be at increased risk for health problems such as fatigue, lower protection (immunity) against illness, muscle loss, bone loss, hair loss, and hormone problems. BMI is just one measure of your risk for weight-related health problems. You may be at higher risk for health problems if you are not active, you eat an unhealthy diet, or you drink too much alcohol or use tobacco products. Follow-up care is a key part of your treatment and safety. Be sure to make and go to all appointments, and call your doctor if you are having problems. It's also a good idea to know your test results and keep a list of the medicines you take. How can you care for yourself at home? · Practice healthy eating habits. This includes eating plenty of fruits, vegetables, whole grains, lean protein, and low-fat dairy. · If your doctor recommends it, get more exercise. Walking is a good choice. Bit by bit, increase the amount you walk every day. Try for at least 30 minutes on most days of the week. · Do not smoke. Smoking can increase your risk for health problems. If you need help quitting, talk to your doctor about stop-smoking programs and medicines. These can increase your chances of quitting for good. · Limit alcohol to 2 drinks a day for men and 1 drink a day for women. Too much alcohol can cause health problems. If you have a BMI higher than 25  · Your doctor may do other tests to check your risk for weight-related health problems. This may include measuring the distance around your waist. A waist measurement of more than 40 inches in men or 35 inches in women can increase the risk of weight-related health problems. · Talk with your doctor about steps you can take to stay healthy or improve your health. You may need to make lifestyle changes to lose weight and stay healthy, such as changing your diet and getting regular exercise. If you have a BMI lower than 18.5  · Your doctor may do other tests to check your risk for health problems. · Talk with your doctor about steps you can take to stay healthy or improve your health. You may need to make lifestyle changes to gain or maintain weight and stay healthy, such as getting more healthy foods in your diet and doing exercises to build muscle. Where can you learn more? Go to http://wilbert-loraine.info/. Enter S176 in the search box to learn more about \"Body Mass Index: Care Instructions. \"  Current as of: October 13, 2016  Content Version: 11.4  © 5364-8758 [employer]. Care instructions adapted under license by [employer] (which disclaims liability or warranty for this information). If you have questions about a medical condition or this instruction, always ask your healthcare professional. [place] [address] any warranty or liability for your use of this information. Patient Instructions      1. Continue to monitor carbohydrate and protein intake- remember to keep your           total  carbohydrates to 50 grams or less per day for best results.   2. Remember hydration goals - usually 48 to 64 ounces of liquids per day  3. Continue to work towards exercise goals - minimum 3 days per week of 45          minutes to  1 hour at a time. 4. Remember to take vitamins as directed        Supplement Resource Guide    Importance of Protein:   Maintains lean body mass, produces antibodies to fight off infections, heals wounds, minimizes hair loss, helps to give you energy, helps with satiety, and keeping you full between meals. Importance of Calcium:  Needed for healthy bones and teeth, normal blood clotting, and nervous system functioning, higher risk of osteoporosis and bone disease with non-compliance. Importance of Multivitamins: Many functions. Supply you with extra nutrients that you may be missing from food. May lead to iron deficiency anemia, weakness, fatigue, and many other symptoms with non-compliance. Importance of B Vitamins:  Important for red blood cell formation, metabolism, energy, and helps to maintain a healthy nervous system. Protein Supplement  Find one you like now. Use immediately after surgery. Look for:  35-50g protein each day from your protein supplement once you reach the progression diet. 0-3 g fat per serving  0-3 g sugar per serving    Protein drinks should be split in separate dosages. Recommend: Lifelong  1 year + Calcium Supplement:     Start taking within a month after surgery. Look for: Calcium Citrate Plus D (1500 mg per day)  Recommend: Citracal     .          Avoid chocolate chewable calcium. Can use chewable bariatric or [employer] brand or similar chewable. The body cannot absorb more than 500-600 mg @ a time. Take for Life Multi-vitamin Supplement:      1st Month After Surgery: Any complete chewable, such as: [place]s Complete chewables. Avoid [place] sours or gummies. They lack iron and other important nutrients and also have added sugar.     Continue with chewable vitamin or change to adult complete multivitamin one month after surgery. Menstruating women can take a prenatal vitamin. Make sure has at least 18 mg iron and 705-685 mcg folic acid):    Vitamin B12, B Complex Vitamin, and Biotin  Start taking within a month after surgery. Vitamin B12:  1000 mcg of Vitamin B12 three times weekly    Must take sublingually (meaning you take it under your tongue) or in a liquid drop form for easy absorption. B Complex Vitamin: Take a pill or liquid drop form once daily. Biotin: This vitamin can help prevent hair loss.     Recommend 5mg   (5000 mcg) a day  Biotin is Optional

## 2018-07-08 ENCOUNTER — HOSPITAL ENCOUNTER (EMERGENCY)
Age: 33
Discharge: HOME | End: 2018-07-08
Attending: EMERGENCY MEDICINE
Payer: COMMERCIAL

## 2018-07-08 VITALS
WEIGHT: 176 LBS | DIASTOLIC BLOOD PRESSURE: 90 MMHG | HEART RATE: 91 BPM | OXYGEN SATURATION: 99 % | SYSTOLIC BLOOD PRESSURE: 126 MMHG | HEIGHT: 63 IN | BODY MASS INDEX: 31.18 KG/M2 | RESPIRATION RATE: 18 BRPM | TEMPERATURE: 99 F

## 2018-07-08 DIAGNOSIS — J02.9 PHARYNGITIS, UNSPECIFIED ETIOLOGY: Primary | ICD-10-CM

## 2018-07-08 DIAGNOSIS — K12.2 UVULITIS: ICD-10-CM

## 2018-07-08 PROCEDURE — 99283 EMERGENCY DEPT VISIT LOW MDM: CPT

## 2018-07-08 PROCEDURE — 74011636637 HC RX REV CODE- 636/637: Performed by: EMERGENCY MEDICINE

## 2018-07-08 PROCEDURE — 74011250637 HC RX REV CODE- 250/637: Performed by: EMERGENCY MEDICINE

## 2018-07-08 RX ORDER — PENICILLIN V POTASSIUM 500 MG/1
500 TABLET, FILM COATED ORAL 3 TIMES DAILY
Qty: 21 TAB | Refills: 0 | Status: SHIPPED | OUTPATIENT
Start: 2018-07-08 | End: 2018-07-15

## 2018-07-08 RX ORDER — PREDNISONE 20 MG/1
60 TABLET ORAL
Status: COMPLETED | OUTPATIENT
Start: 2018-07-08 | End: 2018-07-08

## 2018-07-08 RX ORDER — PENICILLIN V POTASSIUM 250 MG/1
500 TABLET, FILM COATED ORAL
Status: COMPLETED | OUTPATIENT
Start: 2018-07-08 | End: 2018-07-08

## 2018-07-08 RX ORDER — PREDNISONE 20 MG/1
60 TABLET ORAL DAILY
Qty: 12 TAB | Refills: 0 | Status: SHIPPED | OUTPATIENT
Start: 2018-07-09 | End: 2018-07-13

## 2018-07-08 RX ORDER — ACETAMINOPHEN 325 MG/1
650 TABLET ORAL
Status: COMPLETED | OUTPATIENT
Start: 2018-07-08 | End: 2018-07-08

## 2018-07-08 RX ADMIN — ACETAMINOPHEN 650 MG: 325 TABLET ORAL at 04:30

## 2018-07-08 RX ADMIN — PREDNISONE 60 MG: 20 TABLET ORAL at 04:30

## 2018-07-08 RX ADMIN — PENICILLIN V POTASIUM 500 MG: 250 TABLET ORAL at 04:30

## 2018-07-08 NOTE — ED PROVIDER NOTES
HPI Comments: Pt c/o sore throat, x 3-4 days. Seen by pcp 2 days ago, given zyrtec and another rx but couldn't afford them. No tx. No fever. Hurts to swallow. Getting worse. Says woke up this am and felt more swollen. No cp or cough. No nausea. No rash. No sob. Hurts to swallow but can swallow. Pt denies any chance of current preg and declines testing. Patient is a 34 y.o. female presenting with sore throat. Sore Throat    Associated symptoms include trouble swallowing. Pertinent negatives include no vomiting, no congestion, no shortness of breath and no cough. Past Medical History:   Diagnosis Date    Incisional hernia     Intestinal malabsorption     Morbid obesity with body mass index of 40.0-49.9 ([facility])     Status post bariatric surgery 2012    sleeve resection / kirstin Simon       Past Surgical History:   Procedure Laterality Date    HX  SECTION      X 3    HX GI  2012    sleeve resection / a salzberg    HX ORTHOPAEDIC      scoliosis correction surgery X 2    HX TONSIL AND ADENOIDECTOMY      HX TUBAL LIGATION      LAP GASTRIC BYPASS/TAMICA-EN-Y      revision from sleeve on 2017         History reviewed. No pertinent family history. Social History     Social History    Marital status: SINGLE     Spouse name: N/A    Number of children: N/A    Years of education: N/A     Occupational History    Not on file. Social History Main Topics    Smoking status: Never Smoker    Smokeless tobacco: Never Used    Alcohol use No    Drug use: No    Sexual activity: Yes     Partners: Male     Other Topics Concern    Not on file     Social History Narrative         ALLERGIES: Nsaids (non-steroidal anti-inflammatory drug)    Review of Systems   Constitutional: Negative for fever. HENT: Positive for sore throat and trouble swallowing. Negative for congestion. Respiratory: Negative for cough and shortness of breath. Cardiovascular: Negative for chest pain. Gastrointestinal: Negative for abdominal pain and vomiting. Musculoskeletal: Negative for back pain. Skin: Negative for rash. Neurological: Negative for light-headedness. All other systems reviewed and are negative. Vitals:    07/08/18 0357   BP: 126/90   Pulse: 91   Resp: 18   Temp: 99 °F (37.2 °C)   SpO2: 99%   Weight: 79.8 kg (176 lb)   Height: 5' 3\" (1.6 m)            Physical Exam   Constitutional: She is oriented to person, place, and time. She appears well-developed. HENT:   Head: Normocephalic. Mouth/Throat: Oropharynx is clear and moist.   Mild pharyngeal erythema/swelling inc mild uvula swelling. No pta   Eyes: Pupils are equal, round, and reactive to light. Neck: Normal range of motion. Cardiovascular: Normal rate and normal heart sounds. No murmur heard. Pulmonary/Chest: Effort normal. She has no wheezes. She has no rales. Abdominal: Soft. There is no tenderness. Musculoskeletal: Normal range of motion. She exhibits no edema. Neurological: She is alert and oriented to person, place, and time. Skin: Skin is warm and dry. Nursing note and vitals reviewed. [facility]      ED Course       Procedures    Vitals:  Patient Vitals for the past 12 hrs:   Temp Pulse Resp BP SpO2   07/08/18 0357 99 °F (37.2 °C) 91 18 126/90 99 %         Medications ordered:   Medications   acetaminophen (TYLENOL) tablet 650 mg (650 mg Oral Given 7/8/18 0430)   predniSONE (DELTASONE) tablet 60 mg (60 mg Oral Given 7/8/18 0430)   penicillin v potassium (VEETID) tablet 500 mg (500 mg Oral Given 7/8/18 0430)         Lab findings:  No results found for this or any previous visit (from the past 12 hour(s)). X-Ray, CT or other radiology findings or impressions:  No orders to display       Progress notes, Consult notes or additional Procedure notes:   5:02 AM pt feels better, declines further ed obs or tx. Driving so no benadryl given here, pt to take on arrival home.  Verb und of need for medication compliance w rx and benadryl. Verb und of detailed return instructions given. Af, nl vitals. Decreased swelling on recheck, very mild. Tolerating secretions s diff. No emc. To dc per req. Disposition:  Diagnosis:   1. Pharyngitis, unspecified etiology    2. Uvulitis        Disposition: home    Follow-up Information     Follow up With Details Comments [address] Schedule an appointment as soon as possible for a visit in 1 day  [phone] [place] [address] Schedule an appointment as soon as possible for a visit in 1 day  [address]           Patient's Medications   Start Taking    PENICILLIN V POTASSIUM (VEETID) 500 MG TABLET    Take 1 Tab by mouth three (3) times daily for 7 days. PREDNISONE (DELTASONE) 20 MG TABLET    Take 3 Tabs by mouth daily for 4 days. Continue Taking    BIOTIN 2,500 MCG CAP    Take  by mouth. CALCIUM CITRATE 200 MG (950 MG) TABLET    Take  by mouth daily. CYANOCOBALAMIN (VITAMIN B-12) 500 MCG TABLET    Take 500 mcg by mouth daily. FERROUS SULFATE ER (IRON) 160 MG (50 MG IRON) TBER TABLET    Take 1 Tab by mouth daily. MULTIVITAMIN WITH IRON (FLINTSTONES) CHEWABLE TABLET    Take 1 Tab by mouth two (2) times a day. PANTOPRAZOLE SODIUM (PROTONIX PO)    Take  by mouth. POTASSIUM 99 MG TABLET    Take 99 mg by mouth daily.    These Medications have changed    No medications on file   Stop Taking    No medications on file

## 2018-07-08 NOTE — ED TRIAGE NOTES
Pt. Reports a sore throat for the past 3 days and the \"punching bag thingy in the back of my throat was really swollen when I woke this morning. \"

## 2018-07-08 NOTE — ED NOTES
Pt. Reassessed and states the pain has improved and is going down. MD notified and will continue to monitor.

## 2018-07-08 NOTE — DISCHARGE INSTRUCTIONS
Return for pain, any difficulty swallowing or shortness of breath, vomiting, decreased fluid intake, weakness, numbness, dizziness, or any change or concerns. Use benadryl over the counter as directed for the next 24 hours, then as needed. Uvulitis: Care Instructions  Your Care Instructions  Uvulitis (say \"wre-hrpr-GY-tus\") is an inflammation of the uvula (say \"QOM-qhfb-cnn\"). This is the small piece of finger-shaped tissue that hangs down in the back of the throat. Uvulitis is most often caused by an infection. It can also be a reaction to an allergy or injury. Often the cause is not known. Your uvula may be red and swollen. You may feel like something is stuck at the back of your throat. Sometimes you may have a hard time swallowing. You may have a sore throat or a fever. Home treatment for a sore throat may be all that is needed to relieve uvulitis. If it is caused by an infection, your doctor may give you an antibiotic. Your doctor may prescribe an antihistamine or a steroid medicine if uvulitis is caused by an allergy. It may also go away without treatment. Follow-up care is a key part of your treatment and safety. Be sure to make and go to all appointments, and call your doctor if you are having problems. It's also a good idea to know your test results and keep a list of the medicines you take. How can you care for yourself at home? · Be safe with medicines. Take your medicines exactly as prescribed. Call your doctor if you think you are having a problem with your medicine. You will get more details on the specific medicines your doctor prescribes. · Gargle with warm salt water once an hour to help reduce swelling and relieve pain. Use 1 teaspoon of salt mixed in 1 cup of warm water. · Try an over-the-counter throat spray to relieve throat pain.   · Ask your doctor if you can take an over-the-counter pain medicine, such as acetaminophen (Tylenol), ibuprofen (Advil, Motrin), or naproxen (Alex). Read and follow all instructions on the label. · Drink plenty of fluids. Fluids may help soothe your throat. If you have kidney, heart, or liver disease and have to limit fluids, talk with your doctor before you increase the amount of fluids you drink. · Do not smoke or allow others to smoke around you. Smoking can make your throat problem worse. If you need help quitting, talk to your doctor about stop-smoking programs and medicines. These can increase your chances of quitting for good. When should you call for help? Call your doctor now or seek immediate medical care if:  ? · You have new or worse symptoms of infection, such as:  ¨ Increased pain, swelling, warmth, or redness. ¨ Red streaks leading from the area. ¨ Pus draining from the area. ¨ A fever. ? · You have new pain, or your pain gets worse. ? · You have new or worse trouble swallowing. ? · You seem to be getting sicker. ? · You have shortness of breath. ? Watch closely for changes in your health, and be sure to contact your doctor if:  ? · You do not get better as expected. Where can you learn more? Go to http://wilbert-loraine.info/. Enter V757 in the search box to learn more about \"Uvulitis: Care Instructions. \"  Current as of: May 12, 2017  Content Version: 11.4  © 6877-2544 [employer]. Care instructions adapted under license by [employer] (which disclaims liability or warranty for this information). If you have questions about a medical condition or this instruction, always ask your healthcare professional. [place] [address] any warranty or liability for your use of this information. Sore Throat: Care Instructions  Your Care Instructions    Infection by bacteria or a virus causes most sore throats. Cigarette smoke, dry air, air pollution, allergies, and yelling can also cause a sore throat. Sore throats can be painful and annoying.  Fortunately, most sore throats go away on their own. If you have a bacterial infection, your doctor may prescribe antibiotics. Follow-up care is a key part of your treatment and safety. Be sure to make and go to all appointments, and call your doctor if you are having problems. It's also a good idea to know your test results and keep a list of the medicines you take. How can you care for yourself at home? · If your doctor prescribed antibiotics, take them as directed. Do not stop taking them just because you feel better. You need to take the full course of antibiotics. · Gargle with warm salt water once an hour to help reduce swelling and relieve discomfort. Use 1 teaspoon of salt mixed in 1 cup of warm water. · Take an over-the-counter pain medicine, such as acetaminophen (Tylenol), ibuprofen (Advil, Motrin), or naproxen (Aleve). Read and follow all instructions on the label. · Be careful when taking over-the-counter cold or flu medicines and Tylenol at the same time. Many of these medicines have acetaminophen, which is Tylenol. Read the labels to make sure that you are not taking more than the recommended dose. Too much acetaminophen (Tylenol) can be harmful. · Drink plenty of fluids. Fluids may help soothe an irritated throat. Hot fluids, such as tea or soup, may help decrease throat pain. · Use over-the-counter throat lozenges to soothe pain. Regular cough drops or hard candy may also help. These should not be given to young children because of the risk of choking. · Do not smoke or allow others to smoke around you. If you need help quitting, talk to your doctor about stop-smoking programs and medicines. These can increase your chances of quitting for good. · Use a vaporizer or humidifier to add moisture to your bedroom. Follow the directions for cleaning the machine. When should you call for help? Call your doctor now or seek immediate medical care if:  ? · You have new or worse trouble swallowing.    ? · Your sore throat gets much worse on one side. ? Watch closely for changes in your health, and be sure to contact your doctor if you do not get better as expected. Where can you learn more? Go to http://wilbert-loraine.info/. Enter [id number] in the search box to learn more about \"Sore Throat: Care Instructions. \"  Current as of: May 12, 2017  Content Version: 11.4  © 5232-0584 [employer]. Care instructions adapted under license by [employer] (which disclaims liability or warranty for this information). If you have questions about a medical condition or this instruction, always ask your healthcare professional. [address] any warranty or liability for your use of this information.

## 2018-07-15 ENCOUNTER — HOSPITAL ENCOUNTER (EMERGENCY)
Age: 33
Discharge: HOME | End: 2018-07-16
Attending: EMERGENCY MEDICINE
Payer: COMMERCIAL

## 2018-07-15 DIAGNOSIS — R10.9 ABDOMINAL PAIN, UNSPECIFIED ABDOMINAL LOCATION: Primary | ICD-10-CM

## 2018-07-15 LAB
ALBUMIN SERPL-MCNC: 3.4 G/DL (ref 3.4–5)
ALBUMIN/GLOB SERPL: 0.8 {RATIO} (ref 0.8–1.7)
ALP SERPL-CCNC: 122 U/L (ref 45–117)
ALT SERPL-CCNC: 39 U/L (ref 13–56)
ANION GAP SERPL CALC-SCNC: 3 MMOL/L (ref 3–18)
APPEARANCE UR: CLEAR
AST SERPL-CCNC: 27 U/L (ref 15–37)
BASOPHILS # BLD: 0 K/UL (ref 0–0.1)
BASOPHILS NFR BLD: 0 % (ref 0–2)
BILIRUB DIRECT SERPL-MCNC: 0.1 MG/DL (ref 0–0.2)
BILIRUB SERPL-MCNC: 0.2 MG/DL (ref 0.2–1)
BILIRUB UR QL: NEGATIVE
BUN SERPL-MCNC: 12 MG/DL (ref 7–18)
BUN/CREAT SERPL: 19 (ref 12–20)
CALCIUM SERPL-MCNC: 9 MG/DL (ref 8.5–10.1)
CHLORIDE SERPL-SCNC: 102 MMOL/L (ref 100–108)
CO2 SERPL-SCNC: 34 MMOL/L (ref 21–32)
COLOR UR: YELLOW
CREAT SERPL-MCNC: 0.63 MG/DL (ref 0.6–1.3)
DIFFERENTIAL METHOD BLD: ABNORMAL
EOSINOPHIL # BLD: 0.1 K/UL (ref 0–0.4)
EOSINOPHIL NFR BLD: 2 % (ref 0–5)
ERYTHROCYTE [DISTWIDTH] IN BLOOD BY AUTOMATED COUNT: 14.3 % (ref 11.6–14.5)
GLOBULIN SER CALC-MCNC: 4.4 G/DL (ref 2–4)
GLUCOSE SERPL-MCNC: 111 MG/DL (ref 74–99)
GLUCOSE UR STRIP.AUTO-MCNC: NEGATIVE MG/DL
HCT VFR BLD AUTO: 37.9 % (ref 35–45)
HGB BLD-MCNC: 11.8 G/DL (ref 12–16)
HGB UR QL STRIP: NEGATIVE
KETONES UR QL STRIP.AUTO: NEGATIVE MG/DL
LEUKOCYTE ESTERASE UR QL STRIP.AUTO: NEGATIVE
LIPASE SERPL-CCNC: 117 U/L (ref 73–393)
LYMPHOCYTES # BLD: 2.4 K/UL (ref 0.9–3.6)
LYMPHOCYTES NFR BLD: 39 % (ref 21–52)
MCH RBC QN AUTO: 29.4 PG (ref 24–34)
MCHC RBC AUTO-ENTMCNC: 31.1 G/DL (ref 31–37)
MCV RBC AUTO: 94.5 FL (ref 74–97)
MONOCYTES # BLD: 0.7 K/UL (ref 0.05–1.2)
MONOCYTES NFR BLD: 12 % (ref 3–10)
NEUTS SEG # BLD: 2.9 K/UL (ref 1.8–8)
NEUTS SEG NFR BLD: 47 % (ref 40–73)
NITRITE UR QL STRIP.AUTO: NEGATIVE
PH UR STRIP: 7.5 [PH] (ref 5–8)
PLATELET # BLD AUTO: 264 K/UL (ref 135–420)
PMV BLD AUTO: 10.7 FL (ref 9.2–11.8)
POTASSIUM SERPL-SCNC: 3.6 MMOL/L (ref 3.5–5.5)
PROT SERPL-MCNC: 7.8 G/DL (ref 6.4–8.2)
PROT UR STRIP-MCNC: NEGATIVE MG/DL
RBC # BLD AUTO: 4.01 M/UL (ref 4.2–5.3)
SODIUM SERPL-SCNC: 139 MMOL/L (ref 136–145)
SP GR UR REFRACTOMETRY: 1.02 (ref 1–1.03)
UROBILINOGEN UR QL STRIP.AUTO: 0.2 EU/DL (ref 0.2–1)
WBC # BLD AUTO: 6.1 K/UL (ref 4.6–13.2)

## 2018-07-15 PROCEDURE — 99283 EMERGENCY DEPT VISIT LOW MDM: CPT

## 2018-07-15 PROCEDURE — 81003 URINALYSIS AUTO W/O SCOPE: CPT | Performed by: EMERGENCY MEDICINE

## 2018-07-15 PROCEDURE — 80076 HEPATIC FUNCTION PANEL: CPT | Performed by: EMERGENCY MEDICINE

## 2018-07-15 PROCEDURE — 83690 ASSAY OF LIPASE: CPT | Performed by: EMERGENCY MEDICINE

## 2018-07-15 PROCEDURE — 80048 BASIC METABOLIC PNL TOTAL CA: CPT | Performed by: EMERGENCY MEDICINE

## 2018-07-15 PROCEDURE — 85025 COMPLETE CBC W/AUTO DIFF WBC: CPT | Performed by: EMERGENCY MEDICINE

## 2018-07-15 PROCEDURE — 96361 HYDRATE IV INFUSION ADD-ON: CPT

## 2018-07-15 PROCEDURE — 74011250636 HC RX REV CODE- 250/636: Performed by: EMERGENCY MEDICINE

## 2018-07-15 RX ORDER — SODIUM CHLORIDE 9 MG/ML
1000 INJECTION, SOLUTION INTRAVENOUS ONCE
Status: COMPLETED | OUTPATIENT
Start: 2018-07-15 | End: 2018-07-16

## 2018-07-15 RX ORDER — FAMOTIDINE 10 MG/ML
20 INJECTION INTRAVENOUS
Status: COMPLETED | OUTPATIENT
Start: 2018-07-15 | End: 2018-07-16

## 2018-07-15 RX ORDER — ONDANSETRON 2 MG/ML
4 INJECTION INTRAMUSCULAR; INTRAVENOUS
Status: COMPLETED | OUTPATIENT
Start: 2018-07-15 | End: 2018-07-16

## 2018-07-15 RX ADMIN — SODIUM CHLORIDE 1000 ML: 900 INJECTION, SOLUTION INTRAVENOUS at 23:11

## 2018-07-16 VITALS
HEIGHT: 63 IN | HEART RATE: 72 BPM | OXYGEN SATURATION: 100 % | WEIGHT: 172 LBS | TEMPERATURE: 97.9 F | BODY MASS INDEX: 30.48 KG/M2 | DIASTOLIC BLOOD PRESSURE: 90 MMHG | SYSTOLIC BLOOD PRESSURE: 125 MMHG | RESPIRATION RATE: 16 BRPM

## 2018-07-16 PROCEDURE — 96374 THER/PROPH/DIAG INJ IV PUSH: CPT

## 2018-07-16 PROCEDURE — 74011000250 HC RX REV CODE- 250: Performed by: EMERGENCY MEDICINE

## 2018-07-16 PROCEDURE — 96375 TX/PRO/DX INJ NEW DRUG ADDON: CPT

## 2018-07-16 PROCEDURE — 74011250636 HC RX REV CODE- 250/636: Performed by: EMERGENCY MEDICINE

## 2018-07-16 RX ORDER — FAMOTIDINE 20 MG/1
20 TABLET, FILM COATED ORAL DAILY
Qty: 10 TAB | Refills: 0 | Status: SHIPPED | OUTPATIENT
Start: 2018-07-16 | End: 2018-07-26

## 2018-07-16 RX ORDER — ONDANSETRON 4 MG/1
4 TABLET, ORALLY DISINTEGRATING ORAL
Qty: 14 TAB | Refills: 0 | Status: SHIPPED | OUTPATIENT
Start: 2018-07-16 | End: 2019-02-16 | Stop reason: SDUPTHER

## 2018-07-16 RX ADMIN — FAMOTIDINE 20 MG: 10 INJECTION INTRAVENOUS at 00:05

## 2018-07-16 RX ADMIN — ONDANSETRON 4 MG: 2 INJECTION, SOLUTION INTRAMUSCULAR; INTRAVENOUS at 00:02

## 2018-07-16 NOTE — ED PROVIDER NOTES
EMERGENCY DEPARTMENT HISTORY AND PHYSICAL EXAM    11:35 PM      Date: 7/15/2018  Patient Name: Hilton Obrien    History of Presenting Illness     Chief Complaint   Patient presents with    Abdominal Pain    Back Pain         History Provided By: Patient    Chief Complaint: abdominal pain  Duration: 1 day  Timing:  Constant  Location:   Quality: Cramping  Severity: 8 out of 10  Modifying Factors: none reoprted  Associated Symptoms: nausea      Additional History (Context): Hilton Obrien is a 34 y.o. female who presents with cramping abdominal pain that began this morning. Her last bowel movement was today. Pt reports that She notes some nausea, but denies any back pain, cough or fever. No other symptoms or concerns were expressed. PCP: Marky Goldberg MD    Current Outpatient Prescriptions   Medication Sig Dispense Refill    ondansetron (ZOFRAN ODT) 4 mg disintegrating tablet Take 1 Tab by mouth every eight (8) hours as needed for Nausea. 14 Tab 0    famotidine (PEPCID) 20 mg tablet Take 1 Tab by mouth daily for 10 days. 10 Tab 0    pantoprazole sodium (PROTONIX PO) Take  by mouth.  potassium 99 mg tablet Take 99 mg by mouth daily.  ferrous sulfate ER (IRON) 160 mg (50 mg iron) TbER tablet Take 1 Tab by mouth daily.  Biotin 2,500 mcg cap Take  by mouth.  cyanocobalamin (VITAMIN B-12) 500 mcg tablet Take 500 mcg by mouth daily.  calcium citrate 200 mg (950 mg) tablet Take  by mouth daily.  multivitamin with iron (FLINTSTONES) chewable tablet Take 1 Tab by mouth two (2) times a day.          Past History     Past Medical History:  Past Medical History:   Diagnosis Date    Incisional hernia     Intestinal malabsorption     Morbid obesity with body mass index of 40.0-49.9 ([facility])     Status post bariatric surgery 2012    sleeve resection / a. [facility] [place]       Past Surgical History:  Past Surgical History:   Procedure Laterality Date    HX  SECTION      X 3    HX GI 7/2012    sleeve resection / a salzberg    HX ORTHOPAEDIC      scoliosis correction surgery X 2    HX TONSIL AND ADENOIDECTOMY      HX TUBAL LIGATION      LAP GASTRIC BYPASS/TAMICA-EN-Y      revision from sleeve on 9/26/2017       Family History:  History reviewed. No pertinent family history. Social History:  Social History   Substance Use Topics    Smoking status: Never Smoker    Smokeless tobacco: Never Used    Alcohol use No       Allergies: Allergies   Allergen Reactions    Nsaids (Non-Steroidal Anti-Inflammatory Drug) Other (comments)     GI Ulcer- Hx of Gastric Bypass         Review of Systems       Review of Systems   Constitutional: Negative for fever. HENT: Negative for congestion. Respiratory: Negative for cough and shortness of breath. Cardiovascular: Negative for chest pain. Gastrointestinal: Positive for abdominal pain. Negative for vomiting. Musculoskeletal: Negative for back pain. Skin: Negative for rash. Neurological: Negative for light-headedness. All other systems reviewed and are negative. Physical Exam     Visit Vitals    /90 (BP Patient Position: At rest)    Pulse 72    Temp 97.9 °F (36.6 °C)    Resp 16    Ht 5' 3\" (1.6 m)    Wt 78 kg (172 lb)    SpO2 100%    BMI 30.47 kg/m2         Physical Exam   Constitutional: She is oriented to person, place, and time. She appears well-developed. HENT:   Head: Normocephalic. Mouth/Throat: Oropharynx is clear and moist.   Eyes: Pupils are equal, round, and reactive to light. Neck: Normal range of motion. Cardiovascular: Normal rate and normal heart sounds. No murmur heard. Pulmonary/Chest: Effort normal. She has no wheezes. She has no rales. Abdominal: Soft. There is tenderness in the epigastric area. Musculoskeletal: Normal range of motion. She exhibits no edema. Neurological: She is alert and oriented to person, place, and time. Skin: Skin is warm and dry.    Nursing note and vitals reviewed. Diagnostic Study Results     Vitals:  No data found. Medications ordered:   Medications   0.9% sodium chloride infusion 1,000 mL (0 mL IntraVENous IV Completed 7/16/18 0035)   famotidine (PF) (PEPCID) injection 20 mg (20 mg IntraVENous Given 7/16/18 0005)   ondansetron (ZOFRAN) injection 4 mg (4 mg IntraVENous Given 7/16/18 0002)         Lab findings:  No results found for this or any previous visit (from the past 12 hour(s)). X-Ray, CT or other radiology findings or impressions:  No orders to display       Progress notes, Consult notes or additional Procedure notes:   Pt w neg initial work up. Says recurrent sim pain. Offered ct for further eval,   Long discussion with patient regarding risks/benefits of cat scan. Risks discussed including risk of cancer from radiation/fibrosis. Pt verbalizes understanding of risks and strongly declines ct or further imaging/testing. Requests dc at this time. Af, nl vitals. Mild epigastric ttp. Neg castro's, no peritoneal signs. To dc per pt request.  Detailed ret inst given. No emc. Disposition:  Diagnosis:   1. Abdominal pain, unspecified abdominal location        Disposition: home    Follow-up Information     Follow up With Details Comments [address], [place] Schedule an appointment as soon as possible for a visit in 2 days  [address]      Varun Francisco MD Schedule an appointment as soon as possible for a visit in 2 days  [address] [address]  [phone]             Discharge Medication List as of 7/16/2018 12:21 AM      START taking these medications    Details   ondansetron (ZOFRAN ODT) 4 mg disintegrating tablet Take 1 Tab by mouth every eight (8) hours as needed for Nausea. , Print, Disp-14 Tab, R-0      famotidine (PEPCID) 20 mg tablet Take 1 Tab by mouth daily for 10 days. , Print, Disp-10 Tab, R-0         CONTINUE these medications which have NOT CHANGED    Details   pantoprazole sodium (PROTONIX PO) Take  by mouth., Historical Med      potassium 99 mg tablet Take 99 mg by mouth daily. , Historical Med      ferrous sulfate ER (IRON) 160 mg (50 mg iron) TbER tablet Take 1 Tab by mouth daily. , Historical Med      Biotin 2,500 mcg cap Take  by mouth., Historical Med      cyanocobalamin (VITAMIN B-12) 500 mcg tablet Take 500 mcg by mouth daily. , Historical Med      calcium citrate 200 mg (950 mg) tablet Take  by mouth daily. , Historical Med      multivitamin with iron (FLINTSTONES) chewable tablet Take 1 Tab by mouth two (2) times a day., Historical Med         STOP taking these medications       penicillin v potassium (VEETID) 500 mg tablet Comments:   Reason for Stopping:                 Scribe Attestation     [facility] acting as a scribe for and in the presence of Vicente Vasquez MD      July 15, 2018 at 11:35 PM       Provider Attestation:      I personally performed the services described in the documentation, reviewed the documentation, as recorded by the scribe in my presence, and it accurately and completely records my words and actions.  July 15, 2018 at 11:35 PM - Vicente Vasquez MD

## 2018-07-16 NOTE — ED TRIAGE NOTES
Patient c/o burning and aching to abdomen radiating around to back. Last BM today after taking milk of mag.

## 2018-07-16 NOTE — DISCHARGE INSTRUCTIONS
Return for any new or worsening pain, fever, shortness of breath, vomiting, decreased fluid intake, weakness, numbness, dizziness, or any change or concerns. Use maalox over the counter. Abdominal Pain: Care Instructions  Your Care Instructions    Abdominal pain has many possible causes. Some aren't serious and get better on their own in a few days. Others need more testing and treatment. If your pain continues or gets worse, you need to be rechecked and may need more tests to find out what is wrong. You may need surgery to correct the problem. Don't ignore new symptoms, such as fever, nausea and vomiting, urination problems, pain that gets worse, and dizziness. These may be signs of a more serious problem. Your doctor may have recommended a follow-up visit in the next 8 to 12 hours. If you are not getting better, you may need more tests or treatment. The doctor has checked you carefully, but problems can develop later. If you notice any problems or new symptoms, get medical treatment right away. Follow-up care is a key part of your treatment and safety. Be sure to make and go to all appointments, and call your doctor if you are having problems. It's also a good idea to know your test results and keep a list of the medicines you take. How can you care for yourself at home? · Rest until you feel better. · To prevent dehydration, drink plenty of fluids, enough so that your urine is light yellow or clear like water. Choose water and other caffeine-free clear liquids until you feel better. If you have kidney, heart, or liver disease and have to limit fluids, talk with your doctor before you increase the amount of fluids you drink. · If your stomach is upset, eat mild foods, such as rice, dry toast or crackers, bananas, and applesauce. Try eating several small meals instead of two or three large ones.   · Wait until 48 hours after all symptoms have gone away before you have spicy foods, alcohol, and drinks that contain caffeine. · Do not eat foods that are high in fat. · Avoid anti-inflammatory medicines such as aspirin, ibuprofen (Advil, Motrin), and naproxen (Aleve). These can cause stomach upset. Talk to your doctor if you take daily aspirin for another health problem. When should you call for help? Call 911 anytime you think you may need emergency care. For example, call if:    · You passed out (lost consciousness).     · You pass maroon or very bloody stools.     · You vomit blood or what looks like coffee grounds.     · You have new, severe belly pain.    Call your doctor now or seek immediate medical care if:    · Your pain gets worse, especially if it becomes focused in one area of your belly.     · You have a new or higher fever.     · Your stools are black and look like tar, or they have streaks of blood.     · You have unexpected vaginal bleeding.     · You have symptoms of a urinary tract infection. These may include:  ¨ Pain when you urinate. ¨ Urinating more often than usual.  ¨ Blood in your urine.     · You are dizzy or lightheaded, or you feel like you may faint.    Watch closely for changes in your health, and be sure to contact your doctor if:    · You are not getting better after 1 day (24 hours). Where can you learn more? Go to http://wlibert-loraine.info/. Enter L825 in the search box to learn more about \"Abdominal Pain: Care Instructions. \"  Current as of: November 20, 2017  Content Version: 11.7  © 1926-4368 [employer]. Care instructions adapted under license by [employer] (which disclaims liability or warranty for this information). If you have questions about a medical condition or this instruction, always ask your healthcare professional. [address] any warranty or liability for your use of this information.

## 2018-07-17 ENCOUNTER — ANESTHESIA (OUTPATIENT)
Dept: SURGERY | Age: 33
End: 2018-07-17
Payer: COMMERCIAL

## 2018-07-17 ENCOUNTER — APPOINTMENT (OUTPATIENT)
Dept: CT IMAGING | Age: 33
End: 2018-07-17
Attending: EMERGENCY MEDICINE
Payer: COMMERCIAL

## 2018-07-17 ENCOUNTER — HOSPITAL ENCOUNTER (OUTPATIENT)
Age: 33
Setting detail: OBSERVATION
LOS: 1 days | Discharge: HOME | End: 2018-07-18
Attending: EMERGENCY MEDICINE | Admitting: SURGERY
Payer: COMMERCIAL

## 2018-07-17 ENCOUNTER — ANESTHESIA EVENT (OUTPATIENT)
Dept: SURGERY | Age: 33
End: 2018-07-17
Payer: COMMERCIAL

## 2018-07-17 ENCOUNTER — APPOINTMENT (OUTPATIENT)
Dept: GENERAL RADIOLOGY | Age: 33
End: 2018-07-17
Attending: EMERGENCY MEDICINE
Payer: COMMERCIAL

## 2018-07-17 DIAGNOSIS — Z98.890 S/P EXPLORATORY LAPAROTOMY: ICD-10-CM

## 2018-07-17 DIAGNOSIS — Z98.84 S/P GASTRIC BYPASS: ICD-10-CM

## 2018-07-17 DIAGNOSIS — R10.13 ABDOMINAL PAIN, EPIGASTRIC: Primary | ICD-10-CM

## 2018-07-17 DIAGNOSIS — L76.82 INCISIONAL PAIN: ICD-10-CM

## 2018-07-17 DIAGNOSIS — K45.8 INTERNAL HERNIA: ICD-10-CM

## 2018-07-17 PROBLEM — R10.9 ABDOMINAL PAIN: Status: ACTIVE | Noted: 2018-07-17

## 2018-07-17 LAB
ALBUMIN SERPL-MCNC: 3.2 G/DL (ref 3.4–5)
ALBUMIN/GLOB SERPL: 0.7 {RATIO} (ref 0.8–1.7)
ALP SERPL-CCNC: 108 U/L (ref 45–117)
ALT SERPL-CCNC: 34 U/L (ref 13–56)
ANION GAP SERPL CALC-SCNC: 6 MMOL/L (ref 3–18)
AST SERPL-CCNC: 24 U/L (ref 15–37)
BASOPHILS # BLD: 0 K/UL (ref 0–0.1)
BASOPHILS NFR BLD: 0 % (ref 0–2)
BILIRUB SERPL-MCNC: 0.2 MG/DL (ref 0.2–1)
BUN SERPL-MCNC: 10 MG/DL (ref 7–18)
BUN/CREAT SERPL: 13 (ref 12–20)
CALCIUM SERPL-MCNC: 8.9 MG/DL (ref 8.5–10.1)
CHLORIDE SERPL-SCNC: 105 MMOL/L (ref 100–108)
CK MB CFR SERPL CALC: 1 % (ref 0–4)
CK MB SERPL-MCNC: 1.6 NG/ML (ref 5–25)
CK SERPL-CCNC: 160 U/L (ref 26–192)
CO2 SERPL-SCNC: 31 MMOL/L (ref 21–32)
CREAT SERPL-MCNC: 0.77 MG/DL (ref 0.6–1.3)
DIFFERENTIAL METHOD BLD: ABNORMAL
EOSINOPHIL # BLD: 0.1 K/UL (ref 0–0.4)
EOSINOPHIL NFR BLD: 2 % (ref 0–5)
ERYTHROCYTE [DISTWIDTH] IN BLOOD BY AUTOMATED COUNT: 14.4 % (ref 11.6–14.5)
GLOBULIN SER CALC-MCNC: 4.3 G/DL (ref 2–4)
GLUCOSE SERPL-MCNC: 93 MG/DL (ref 74–99)
HCG SERPL QL: NEGATIVE
HCT VFR BLD AUTO: 36.6 % (ref 35–45)
HGB BLD-MCNC: 11.7 G/DL (ref 12–16)
LIPASE SERPL-CCNC: 59 U/L (ref 73–393)
LYMPHOCYTES # BLD: 2 K/UL (ref 0.9–3.6)
LYMPHOCYTES NFR BLD: 43 % (ref 21–52)
MCH RBC QN AUTO: 29.7 PG (ref 24–34)
MCHC RBC AUTO-ENTMCNC: 32 G/DL (ref 31–37)
MCV RBC AUTO: 92.9 FL (ref 74–97)
MONOCYTES # BLD: 0.6 K/UL (ref 0.05–1.2)
MONOCYTES NFR BLD: 14 % (ref 3–10)
NEUTS SEG # BLD: 1.9 K/UL (ref 1.8–8)
NEUTS SEG NFR BLD: 41 % (ref 40–73)
PLATELET # BLD AUTO: 267 K/UL (ref 135–420)
PMV BLD AUTO: 10.5 FL (ref 9.2–11.8)
POTASSIUM SERPL-SCNC: 4.3 MMOL/L (ref 3.5–5.5)
PROT SERPL-MCNC: 7.5 G/DL (ref 6.4–8.2)
RBC # BLD AUTO: 3.94 M/UL (ref 4.2–5.3)
SODIUM SERPL-SCNC: 142 MMOL/L (ref 136–145)
TROPONIN I SERPL-MCNC: <0.02 NG/ML (ref 0–0.06)
WBC # BLD AUTO: 4.6 K/UL (ref 4.6–13.2)

## 2018-07-17 PROCEDURE — 77030033200 HC PRT CLSR CRTR THOMP COOP -C: Performed by: SURGERY

## 2018-07-17 PROCEDURE — 77030008477 HC STYL SATN SLP COVD -A: Performed by: SPECIALIST

## 2018-07-17 PROCEDURE — 77030022704 HC SUT VLOC COVD -B: Performed by: SURGERY

## 2018-07-17 PROCEDURE — 77030032490 HC SLV COMPR SCD KNE COVD -B

## 2018-07-17 PROCEDURE — 74011250636 HC RX REV CODE- 250/636: Performed by: SURGERY

## 2018-07-17 PROCEDURE — 74011250636 HC RX REV CODE- 250/636

## 2018-07-17 PROCEDURE — 74011000250 HC RX REV CODE- 250: Performed by: EMERGENCY MEDICINE

## 2018-07-17 PROCEDURE — 80053 COMPREHEN METABOLIC PANEL: CPT | Performed by: EMERGENCY MEDICINE

## 2018-07-17 PROCEDURE — 96375 TX/PRO/DX INJ NEW DRUG ADDON: CPT

## 2018-07-17 PROCEDURE — 74011250636 HC RX REV CODE- 250/636: Performed by: EMERGENCY MEDICINE

## 2018-07-17 PROCEDURE — 65270000029 HC RM PRIVATE

## 2018-07-17 PROCEDURE — 74011250637 HC RX REV CODE- 250/637: Performed by: SURGERY

## 2018-07-17 PROCEDURE — 84703 CHORIONIC GONADOTROPIN ASSAY: CPT | Performed by: SPECIALIST

## 2018-07-17 PROCEDURE — 77030016151 HC PROTCTR LNS DFOG COVD -B: Performed by: SURGERY

## 2018-07-17 PROCEDURE — 77030038020 HC MANFLD NEPTUNE STRY -B: Performed by: SURGERY

## 2018-07-17 PROCEDURE — 74177 CT ABD & PELVIS W/CONTRAST: CPT

## 2018-07-17 PROCEDURE — 74011000250 HC RX REV CODE- 250: Performed by: INTERNAL MEDICINE

## 2018-07-17 PROCEDURE — 76010000153 HC OR TIME 1.5 TO 2 HR: Performed by: SURGERY

## 2018-07-17 PROCEDURE — 74011000250 HC RX REV CODE- 250

## 2018-07-17 PROCEDURE — 71045 X-RAY EXAM CHEST 1 VIEW: CPT

## 2018-07-17 PROCEDURE — 74011000250 HC RX REV CODE- 250: Performed by: SURGERY

## 2018-07-17 PROCEDURE — 82550 ASSAY OF CK (CPK): CPT | Performed by: EMERGENCY MEDICINE

## 2018-07-17 PROCEDURE — 74011250637 HC RX REV CODE- 250/637: Performed by: EMERGENCY MEDICINE

## 2018-07-17 PROCEDURE — 99284 EMERGENCY DEPT VISIT MOD MDM: CPT

## 2018-07-17 PROCEDURE — 77030008602 HC TRCR ENDOSC EPATH J&J -B: Performed by: SURGERY

## 2018-07-17 PROCEDURE — 77030009957 HC RELD ENDOSTCH COVD -C: Performed by: SURGERY

## 2018-07-17 PROCEDURE — 77030031139 HC SUT VCRL2 J&J -A: Performed by: SURGERY

## 2018-07-17 PROCEDURE — 77030013079 HC BLNKT BAIR HGGR 3M -A: Performed by: SPECIALIST

## 2018-07-17 PROCEDURE — 77030008683 HC TU ET CUF COVD -A: Performed by: SPECIALIST

## 2018-07-17 PROCEDURE — 76210000063 HC OR PH I REC FIRST 0.5 HR: Performed by: SURGERY

## 2018-07-17 PROCEDURE — 74011636320 HC RX REV CODE- 636/320: Performed by: EMERGENCY MEDICINE

## 2018-07-17 PROCEDURE — 85025 COMPLETE CBC W/AUTO DIFF WBC: CPT | Performed by: EMERGENCY MEDICINE

## 2018-07-17 PROCEDURE — 96376 TX/PRO/DX INJ SAME DRUG ADON: CPT

## 2018-07-17 PROCEDURE — 76060000034 HC ANESTHESIA 1.5 TO 2 HR: Performed by: SURGERY

## 2018-07-17 PROCEDURE — 77030018352 HC SHR COAG HARM2 J&J -E: Performed by: SURGERY

## 2018-07-17 PROCEDURE — 77030009426 HC FCPS BIOP ENDOSC BSC -B: Performed by: SURGERY

## 2018-07-17 PROCEDURE — 74011250636 HC RX REV CODE- 250/636: Performed by: SPECIALIST

## 2018-07-17 PROCEDURE — 77030032490 HC SLV COMPR SCD KNE COVD -B: Performed by: SURGERY

## 2018-07-17 PROCEDURE — 99218 HC RM OBSERVATION: CPT

## 2018-07-17 PROCEDURE — 77030003580 HC NDL INSUF VERES J&J -B: Performed by: SURGERY

## 2018-07-17 PROCEDURE — 77030002933 HC SUT MCRYL J&J -A: Performed by: SURGERY

## 2018-07-17 PROCEDURE — 83690 ASSAY OF LIPASE: CPT | Performed by: EMERGENCY MEDICINE

## 2018-07-17 PROCEDURE — 96374 THER/PROPH/DIAG INJ IV PUSH: CPT

## 2018-07-17 PROCEDURE — 77030008603 HC TRCR ENDOSC EPATH J&J -C: Performed by: SURGERY

## 2018-07-17 RX ORDER — FENTANYL CITRATE 50 UG/ML
25 INJECTION, SOLUTION INTRAMUSCULAR; INTRAVENOUS
Status: DISCONTINUED | OUTPATIENT
Start: 2018-07-17 | End: 2018-07-17 | Stop reason: HOSPADM

## 2018-07-17 RX ORDER — ONDANSETRON 2 MG/ML
4 INJECTION INTRAMUSCULAR; INTRAVENOUS
Status: DISCONTINUED | OUTPATIENT
Start: 2018-07-17 | End: 2018-07-18 | Stop reason: HOSPADM

## 2018-07-17 RX ORDER — ONDANSETRON 2 MG/ML
4 INJECTION INTRAMUSCULAR; INTRAVENOUS
Status: COMPLETED | OUTPATIENT
Start: 2018-07-17 | End: 2018-07-17

## 2018-07-17 RX ORDER — SODIUM CHLORIDE 0.9 % (FLUSH) 0.9 %
5-10 SYRINGE (ML) INJECTION AS NEEDED
Status: DISCONTINUED | OUTPATIENT
Start: 2018-07-17 | End: 2018-07-18 | Stop reason: HOSPADM

## 2018-07-17 RX ORDER — CEFAZOLIN SODIUM/WATER 2 G/20 ML
2 SYRINGE (ML) INTRAVENOUS ONCE
Status: COMPLETED | OUTPATIENT
Start: 2018-07-17 | End: 2018-07-17

## 2018-07-17 RX ORDER — ROCURONIUM BROMIDE 10 MG/ML
INJECTION, SOLUTION INTRAVENOUS AS NEEDED
Status: DISCONTINUED | OUTPATIENT
Start: 2018-07-17 | End: 2018-07-17 | Stop reason: HOSPADM

## 2018-07-17 RX ORDER — SODIUM CHLORIDE, SODIUM LACTATE, POTASSIUM CHLORIDE, CALCIUM CHLORIDE 600; 310; 30; 20 MG/100ML; MG/100ML; MG/100ML; MG/100ML
150 INJECTION, SOLUTION INTRAVENOUS CONTINUOUS
Status: DISCONTINUED | OUTPATIENT
Start: 2018-07-17 | End: 2018-07-18 | Stop reason: HOSPADM

## 2018-07-17 RX ORDER — SODIUM CHLORIDE, SODIUM LACTATE, POTASSIUM CHLORIDE, CALCIUM CHLORIDE 600; 310; 30; 20 MG/100ML; MG/100ML; MG/100ML; MG/100ML
50 INJECTION, SOLUTION INTRAVENOUS CONTINUOUS
Status: DISCONTINUED | OUTPATIENT
Start: 2018-07-17 | End: 2018-07-17 | Stop reason: HOSPADM

## 2018-07-17 RX ORDER — MIDAZOLAM HYDROCHLORIDE 1 MG/ML
INJECTION, SOLUTION INTRAMUSCULAR; INTRAVENOUS AS NEEDED
Status: DISCONTINUED | OUTPATIENT
Start: 2018-07-17 | End: 2018-07-17 | Stop reason: HOSPADM

## 2018-07-17 RX ORDER — ENOXAPARIN SODIUM 100 MG/ML
40 INJECTION SUBCUTANEOUS EVERY 24 HOURS
Status: DISCONTINUED | OUTPATIENT
Start: 2018-07-17 | End: 2018-07-17

## 2018-07-17 RX ORDER — ENOXAPARIN SODIUM 100 MG/ML
40 INJECTION SUBCUTANEOUS
Status: COMPLETED | OUTPATIENT
Start: 2018-07-17 | End: 2018-07-17

## 2018-07-17 RX ORDER — NEOSTIGMINE METHYLSULFATE 5 MG/5 ML
SYRINGE (ML) INTRAVENOUS AS NEEDED
Status: DISCONTINUED | OUTPATIENT
Start: 2018-07-17 | End: 2018-07-17 | Stop reason: HOSPADM

## 2018-07-17 RX ORDER — FAMOTIDINE 10 MG/ML
20 INJECTION INTRAVENOUS
Status: COMPLETED | OUTPATIENT
Start: 2018-07-17 | End: 2018-07-17

## 2018-07-17 RX ORDER — FENTANYL CITRATE 50 UG/ML
INJECTION, SOLUTION INTRAMUSCULAR; INTRAVENOUS AS NEEDED
Status: DISCONTINUED | OUTPATIENT
Start: 2018-07-17 | End: 2018-07-17 | Stop reason: HOSPADM

## 2018-07-17 RX ORDER — MORPHINE SULFATE 2 MG/ML
2 INJECTION, SOLUTION INTRAMUSCULAR; INTRAVENOUS
Status: DISCONTINUED | OUTPATIENT
Start: 2018-07-17 | End: 2018-07-17

## 2018-07-17 RX ORDER — OXYCODONE AND ACETAMINOPHEN 5; 325 MG/1; MG/1
1-2 TABLET ORAL
Status: DISCONTINUED | OUTPATIENT
Start: 2018-07-17 | End: 2018-07-18 | Stop reason: HOSPADM

## 2018-07-17 RX ORDER — NALOXONE HYDROCHLORIDE 0.4 MG/ML
0.1 INJECTION, SOLUTION INTRAMUSCULAR; INTRAVENOUS; SUBCUTANEOUS
Status: DISCONTINUED | OUTPATIENT
Start: 2018-07-17 | End: 2018-07-17 | Stop reason: HOSPADM

## 2018-07-17 RX ORDER — SODIUM CHLORIDE 0.9 % (FLUSH) 0.9 %
5-10 SYRINGE (ML) INJECTION AS NEEDED
Status: DISCONTINUED | OUTPATIENT
Start: 2018-07-17 | End: 2018-07-17 | Stop reason: HOSPADM

## 2018-07-17 RX ORDER — ESMOLOL HYDROCHLORIDE 10 MG/ML
INJECTION INTRAVENOUS AS NEEDED
Status: DISCONTINUED | OUTPATIENT
Start: 2018-07-17 | End: 2018-07-17 | Stop reason: HOSPADM

## 2018-07-17 RX ORDER — METOCLOPRAMIDE HYDROCHLORIDE 5 MG/ML
INJECTION INTRAMUSCULAR; INTRAVENOUS AS NEEDED
Status: DISCONTINUED | OUTPATIENT
Start: 2018-07-17 | End: 2018-07-17 | Stop reason: HOSPADM

## 2018-07-17 RX ORDER — ACETAMINOPHEN 325 MG/1
650 TABLET ORAL
Status: DISCONTINUED | OUTPATIENT
Start: 2018-07-17 | End: 2018-07-18 | Stop reason: HOSPADM

## 2018-07-17 RX ORDER — PROPOFOL 10 MG/ML
INJECTION, EMULSION INTRAVENOUS AS NEEDED
Status: DISCONTINUED | OUTPATIENT
Start: 2018-07-17 | End: 2018-07-17 | Stop reason: HOSPADM

## 2018-07-17 RX ORDER — BUPIVACAINE HYDROCHLORIDE AND EPINEPHRINE 2.5; 5 MG/ML; UG/ML
INJECTION, SOLUTION EPIDURAL; INFILTRATION; INTRACAUDAL; PERINEURAL AS NEEDED
Status: DISCONTINUED | OUTPATIENT
Start: 2018-07-17 | End: 2018-07-17 | Stop reason: HOSPADM

## 2018-07-17 RX ORDER — NALOXONE HYDROCHLORIDE 0.4 MG/ML
0.4 INJECTION, SOLUTION INTRAMUSCULAR; INTRAVENOUS; SUBCUTANEOUS AS NEEDED
Status: DISCONTINUED | OUTPATIENT
Start: 2018-07-17 | End: 2018-07-18 | Stop reason: HOSPADM

## 2018-07-17 RX ORDER — SODIUM CHLORIDE 0.9 % (FLUSH) 0.9 %
5-10 SYRINGE (ML) INJECTION EVERY 8 HOURS
Status: DISCONTINUED | OUTPATIENT
Start: 2018-07-17 | End: 2018-07-18 | Stop reason: HOSPADM

## 2018-07-17 RX ORDER — LIDOCAINE HYDROCHLORIDE 20 MG/ML
INJECTION, SOLUTION EPIDURAL; INFILTRATION; INTRACAUDAL; PERINEURAL AS NEEDED
Status: DISCONTINUED | OUTPATIENT
Start: 2018-07-17 | End: 2018-07-17 | Stop reason: HOSPADM

## 2018-07-17 RX ORDER — GLYCOPYRROLATE 0.2 MG/ML
INJECTION INTRAMUSCULAR; INTRAVENOUS AS NEEDED
Status: DISCONTINUED | OUTPATIENT
Start: 2018-07-17 | End: 2018-07-17 | Stop reason: HOSPADM

## 2018-07-17 RX ORDER — DICYCLOMINE HYDROCHLORIDE 10 MG/1
20 CAPSULE ORAL
Status: COMPLETED | OUTPATIENT
Start: 2018-07-17 | End: 2018-07-17

## 2018-07-17 RX ORDER — ONDANSETRON 2 MG/ML
4 INJECTION INTRAMUSCULAR; INTRAVENOUS ONCE
Status: DISCONTINUED | OUTPATIENT
Start: 2018-07-17 | End: 2018-07-17 | Stop reason: HOSPADM

## 2018-07-17 RX ORDER — HYDROMORPHONE HYDROCHLORIDE 2 MG/ML
0.5 INJECTION, SOLUTION INTRAMUSCULAR; INTRAVENOUS; SUBCUTANEOUS
Status: DISCONTINUED | OUTPATIENT
Start: 2018-07-17 | End: 2018-07-17 | Stop reason: HOSPADM

## 2018-07-17 RX ORDER — OXYCODONE AND ACETAMINOPHEN 5; 325 MG/1; MG/1
1 TABLET ORAL AS NEEDED
Status: DISCONTINUED | OUTPATIENT
Start: 2018-07-17 | End: 2018-07-17 | Stop reason: HOSPADM

## 2018-07-17 RX ADMIN — HYDROMORPHONE HYDROCHLORIDE 0.5 MG: 2 INJECTION, SOLUTION INTRAMUSCULAR; INTRAVENOUS; SUBCUTANEOUS at 16:01

## 2018-07-17 RX ADMIN — ESMOLOL HYDROCHLORIDE 10 MG: 10 INJECTION INTRAVENOUS at 14:56

## 2018-07-17 RX ADMIN — SODIUM CHLORIDE, SODIUM LACTATE, POTASSIUM CHLORIDE, AND CALCIUM CHLORIDE 150 ML/HR: 600; 310; 30; 20 INJECTION, SOLUTION INTRAVENOUS at 23:09

## 2018-07-17 RX ADMIN — OXYCODONE HYDROCHLORIDE AND ACETAMINOPHEN 1 TABLET: 5; 325 TABLET ORAL at 23:08

## 2018-07-17 RX ADMIN — ONDANSETRON 4 MG: 2 INJECTION INTRAMUSCULAR; INTRAVENOUS at 20:41

## 2018-07-17 RX ADMIN — DICYCLOMINE HYDROCHLORIDE 20 MG: 10 CAPSULE ORAL at 05:53

## 2018-07-17 RX ADMIN — Medication 2 G: at 14:14

## 2018-07-17 RX ADMIN — FAMOTIDINE 20 MG: 10 INJECTION INTRAVENOUS at 10:29

## 2018-07-17 RX ADMIN — FENTANYL CITRATE 100 MCG: 50 INJECTION, SOLUTION INTRAMUSCULAR; INTRAVENOUS at 13:57

## 2018-07-17 RX ADMIN — GLYCOPYRROLATE 0.2 MG: 0.2 INJECTION INTRAMUSCULAR; INTRAVENOUS at 14:17

## 2018-07-17 RX ADMIN — METOCLOPRAMIDE HYDROCHLORIDE 10 MG: 5 INJECTION INTRAMUSCULAR; INTRAVENOUS at 14:37

## 2018-07-17 RX ADMIN — LIDOCAINE HYDROCHLORIDE 100 MG: 20 INJECTION, SOLUTION EPIDURAL; INFILTRATION; INTRACAUDAL; PERINEURAL at 13:57

## 2018-07-17 RX ADMIN — ONDANSETRON 4 MG: 2 INJECTION INTRAMUSCULAR; INTRAVENOUS at 12:14

## 2018-07-17 RX ADMIN — ROCURONIUM BROMIDE 35 MG: 10 INJECTION, SOLUTION INTRAVENOUS at 13:57

## 2018-07-17 RX ADMIN — ONDANSETRON 4 MG: 2 INJECTION INTRAMUSCULAR; INTRAVENOUS at 05:52

## 2018-07-17 RX ADMIN — FENTANYL CITRATE 50 MCG: 50 INJECTION, SOLUTION INTRAMUSCULAR; INTRAVENOUS at 14:24

## 2018-07-17 RX ADMIN — ONDANSETRON 4 MG: 2 INJECTION INTRAMUSCULAR; INTRAVENOUS at 16:50

## 2018-07-17 RX ADMIN — PROPOFOL 200 MG: 10 INJECTION, EMULSION INTRAVENOUS at 13:57

## 2018-07-17 RX ADMIN — ROCURONIUM BROMIDE 5 MG: 10 INJECTION, SOLUTION INTRAVENOUS at 14:23

## 2018-07-17 RX ADMIN — Medication 10 ML: at 23:09

## 2018-07-17 RX ADMIN — IOHEXOL: 240 INJECTION, SOLUTION INTRATHECAL; INTRAVASCULAR; INTRAVENOUS; ORAL at 05:53

## 2018-07-17 RX ADMIN — Medication 10 ML: at 12:30

## 2018-07-17 RX ADMIN — IOPAMIDOL 100 ML: 612 INJECTION, SOLUTION INTRAVENOUS at 06:37

## 2018-07-17 RX ADMIN — ENOXAPARIN SODIUM 40 MG: 40 INJECTION, SOLUTION INTRAVENOUS; SUBCUTANEOUS at 16:50

## 2018-07-17 RX ADMIN — SODIUM CHLORIDE, SODIUM LACTATE, POTASSIUM CHLORIDE, AND CALCIUM CHLORIDE: 600; 310; 30; 20 INJECTION, SOLUTION INTRAVENOUS at 14:46

## 2018-07-17 RX ADMIN — FENTANYL CITRATE 50 MCG: 50 INJECTION, SOLUTION INTRAMUSCULAR; INTRAVENOUS at 14:05

## 2018-07-17 RX ADMIN — MIDAZOLAM HYDROCHLORIDE 2 MG: 1 INJECTION, SOLUTION INTRAMUSCULAR; INTRAVENOUS at 13:52

## 2018-07-17 RX ADMIN — FAMOTIDINE 20 MG: 10 INJECTION INTRAVENOUS at 05:52

## 2018-07-17 RX ADMIN — MORPHINE SULFATE 2 MG: 2 INJECTION, SOLUTION INTRAMUSCULAR; INTRAVENOUS at 12:14

## 2018-07-17 RX ADMIN — SODIUM CHLORIDE, SODIUM LACTATE, POTASSIUM CHLORIDE, AND CALCIUM CHLORIDE 150 ML/HR: 600; 310; 30; 20 INJECTION, SOLUTION INTRAVENOUS at 12:28

## 2018-07-17 RX ADMIN — HYDROMORPHONE HYDROCHLORIDE 0.5 MG: 2 INJECTION, SOLUTION INTRAMUSCULAR; INTRAVENOUS; SUBCUTANEOUS at 15:50

## 2018-07-17 RX ADMIN — GLYCOPYRROLATE 0.4 MG: 0.2 INJECTION INTRAMUSCULAR; INTRAVENOUS at 15:20

## 2018-07-17 RX ADMIN — OXYCODONE HYDROCHLORIDE AND ACETAMINOPHEN 1 TABLET: 5; 325 TABLET ORAL at 23:14

## 2018-07-17 RX ADMIN — Medication 3 MG: at 15:20

## 2018-07-17 RX ADMIN — ESMOLOL HYDROCHLORIDE 10 MG: 10 INJECTION INTRAVENOUS at 14:42

## 2018-07-17 NOTE — PROGRESS NOTES
Pt received via bed. Mom at the bedside VS WNL. Primary Nurse and Hector Ramires RN performed a dual skin assessment on this patient No impairment noted other than 4 abdominal surgical lap sites and back scar from previous surgery  Hollis score is 22. SCD reapplied. IS provided.

## 2018-07-17 NOTE — ED TRIAGE NOTES
Presented to ED to be evaluated for reported abdominal pain with onset x 1 day. Patient reports pain as documented. Patient states seen @ [facility] on yesterday for same. Patient reports no relief. Patient states hx of Gastric Bypass \"10 months ago\". Sepsis Screening completed    (  )Patient meets SIRS criteria. (x  )Patient does not meet SIRS criteria.       SIRS Criteria is achieved when two or more of the following are present   Temperature < 96.8°F (36°C) or > 100.9°F (38.3°C)   Heart Rate > 90 beats per minute   Respiratory Rate > 20 breaths per minute   WBC count > 12,000 or <4,000 or > 10% bands

## 2018-07-17 NOTE — PERIOP NOTES
TRANSFER - OUT REPORT:    Verbal report given to Stefan Tang RN(name) on [employer]  being transferred to room 311(unit) for routine post - op       Report consisted of patients Situation, Background, Assessment and   Recommendations(SBAR). Information from the following report(s) SBAR was reviewed with the receiving nurse. Lines:   Peripheral IV 07/17/18 Left Antecubital (Active)   Site Assessment Clean, dry, & intact 7/17/2018  4:04 PM   Phlebitis Assessment 0 7/17/2018  4:04 PM   Infiltration Assessment 0 7/17/2018  4:04 PM   Dressing Status Clean, dry, & intact 7/17/2018  4:04 PM   Dressing Type Transparent 7/17/2018  4:04 PM   Hub Color/Line Status Pink; Infusing 7/17/2018  4:04 PM        Opportunity for questions and clarification was provided.       Patient transported with:   O2 @ 2 liters

## 2018-07-17 NOTE — ED PROVIDER NOTES
EMERGENCY DEPARTMENT HISTORY AND PHYSICAL EXAM    Date: 7/17/2018  Patient Name: Carmel Griffith    History of Presenting Illness     Chief Complaint   Patient presents with    Abdominal Pain         History Provided By: Patient    Chief Complaint: Abdominal pain  Duration: 1 day   Timing:  Constant and Worsening  Location: Abdomen  Quality: Burning  Severity: 10 out of 10  Modifying Factors: Notes mild relief with Gas-X  Associated Symptoms: Nausea    Additional History (Context):   4:45 AM  Carmel Griffith is a 34 y.o. female with PMHx of peptic ulcers and PSHx of gastric bypass who presents to the emergency department C/O burning abdominal pain (rated 10/10), onset yesterday. Associated sxs include nausea. Notes mild relief with Gas-X. Pt takes Protonix daily Pt was seen 2 days ago at [facility] for the same and given Zofran IV but now states pain is worse than it was before. Pt had an endoscopy 8 months ago which revealed peptic ulcers. Pt advised to take NSAID's. Does not endorse tobacco, EtOH, or illicit drug use. Reports no known h/o cardiac or pulmonary issues. Notes FHx of DM. Pt denies vomiting, dysuria, diarrhea, constipation, black tarry stool, or any other sxs or complaints. PCP: Fiona Etienne MD    Current Outpatient Prescriptions   Medication Sig Dispense Refill    ondansetron (ZOFRAN ODT) 4 mg disintegrating tablet Take 1 Tab by mouth every eight (8) hours as needed for Nausea. 14 Tab 0    famotidine (PEPCID) 20 mg tablet Take 1 Tab by mouth daily for 10 days. 10 Tab 0    pantoprazole sodium (PROTONIX PO) Take  by mouth.  potassium 99 mg tablet Take 99 mg by mouth daily.  ferrous sulfate ER (IRON) 160 mg (50 mg iron) TbER tablet Take 1 Tab by mouth daily.  Biotin 2,500 mcg cap Take  by mouth.  cyanocobalamin (VITAMIN B-12) 500 mcg tablet Take 500 mcg by mouth daily.  calcium citrate 200 mg (950 mg) tablet Take  by mouth daily.       multivitamin with iron (FLINTSTONES) chewable tablet Take 1 Tab by mouth two (2) times a day. Past History     Past Medical History:  Past Medical History:   Diagnosis Date    Incisional hernia     Intestinal malabsorption     Morbid obesity with body mass index of 40.0-49.9 ([facility]ly [facility])     Status post bariatric surgery 2012    sleeve resection / aTee Ewing       Past Surgical History:  Past Surgical History:   Procedure Laterality Date    HX  SECTION      X 3    HX GI  2012    sleeve resection / a salzberg    HX ORTHOPAEDIC      scoliosis correction surgery X 2    HX TONSIL AND ADENOIDECTOMY      HX TUBAL LIGATION      LAP GASTRIC BYPASS/TAMICA-EN-Y      revision from sleeve on 2017       Family History:  History reviewed. No pertinent family history. Social History:  Social History   Substance Use Topics    Smoking status: Never Smoker    Smokeless tobacco: Never Used    Alcohol use No       Allergies: Allergies   Allergen Reactions    Nsaids (Non-Steroidal Anti-Inflammatory Drug) Other (comments)     GI Ulcer- Hx of Gastric Bypass         Review of Systems   Review of Systems   Constitutional: Negative for chills, diaphoresis, fever and unexpected weight change. HENT: Negative for congestion, drooling, ear pain, rhinorrhea, sore throat, tinnitus and trouble swallowing. Eyes: Negative for photophobia, pain, redness and visual disturbance. Respiratory: Negative for cough, choking, chest tightness, shortness of breath, wheezing and stridor. Cardiovascular: Negative for chest pain, palpitations and leg swelling. Gastrointestinal: Positive for abdominal pain and nausea. Negative for abdominal distention, anal bleeding, blood in stool, constipation, diarrhea and vomiting. Genitourinary: Negative for difficulty urinating, dysuria, flank pain, frequency, hematuria and urgency. Musculoskeletal: Negative for arthralgias, back pain and neck pain.    Skin: Negative for color change, rash and wound.   Neurological: Negative for dizziness, seizures, syncope, speech difficulty, light-headedness and headaches. Hematological: Does not bruise/bleed easily. Psychiatric/Behavioral: Negative for agitation, behavioral problems, hallucinations, self-injury and suicidal ideas. The patient is not hyperactive. Physical Exam     Vitals:    07/17/18 0420 07/17/18 1022   BP: (!) 134/99 122/81   Pulse: 83 72   Resp: 18 18   Temp: 97.4 °F (36.3 °C) 97.8 °F (36.6 °C)   SpO2: 100% 100%   Weight: 78 kg (171 lb 15.3 oz)    Height: 5' 3\" (1.6 m)      Physical Exam   Constitutional: She is oriented to person, place, and time. She appears well-developed and well-nourished. No distress. Anxious and uncomfortable appearing, nontoxic   HENT:   Head: Normocephalic and atraumatic. Right Ear: External ear normal.   Left Ear: External ear normal.   Mouth/Throat: Oropharynx is clear and moist. No oropharyngeal exudate. Eyes: Conjunctivae and EOM are normal. Pupils are equal, round, and reactive to light. No scleral icterus. No pallor, no icterus   Neck: Normal range of motion. Neck supple. No JVD present. No tracheal deviation present. No thyromegaly present. Cardiovascular: Normal rate, regular rhythm and normal heart sounds. Pulmonary/Chest: Effort normal and breath sounds normal. No stridor. No respiratory distress. Abdominal: Soft. Bowel sounds are normal. She exhibits no distension. There is tenderness in the epigastric area. There is guarding. There is no rebound. Tenderness with voluntary guarding to the epigastric region   Musculoskeletal: Normal range of motion. She exhibits no edema or tenderness. No soft tissue injuries   Lymphadenopathy:     She has no cervical adenopathy. Neurological: She is alert and oriented to person, place, and time. She has normal reflexes. No cranial nerve deficit. Coordination normal.   Skin: Skin is warm and dry. No rash noted. She is not diaphoretic. No erythema. Psychiatric: She has a normal mood and affect. Her behavior is normal. Judgment and thought content normal.   Nursing note and vitals reviewed. Diagnostic Study Results     Labs -     Recent Results (from the past 12 hour(s))   CBC WITH AUTOMATED DIFF    Collection Time: 07/17/18  5:55 AM   Result Value Ref Range    WBC 4.6 4.6 - 13.2 K/uL    RBC 3.94 (L) 4.20 - 5.30 M/uL    HGB 11.7 (L) 12.0 - 16.0 g/dL    HCT 36.6 35.0 - 45.0 %    MCV 92.9 74.0 - 97.0 FL    MCH 29.7 24.0 - 34.0 PG    MCHC 32.0 31.0 - 37.0 g/dL    RDW 14.4 11.6 - 14.5 %    PLATELET 784 493 - 437 K/uL    MPV 10.5 9.2 - 11.8 FL    NEUTROPHILS 41 40 - 73 %    LYMPHOCYTES 43 21 - 52 %    MONOCYTES 14 (H) 3 - 10 %    EOSINOPHILS 2 0 - 5 %    BASOPHILS 0 0 - 2 %    ABS. NEUTROPHILS 1.9 1.8 - 8.0 K/UL    ABS. LYMPHOCYTES 2.0 0.9 - 3.6 K/UL    ABS. MONOCYTES 0.6 0.05 - 1.2 K/UL    ABS. EOSINOPHILS 0.1 0.0 - 0.4 K/UL    ABS. BASOPHILS 0.0 0.0 - 0.1 K/UL    DF AUTOMATED     METABOLIC PANEL, COMPREHENSIVE    Collection Time: 07/17/18  5:55 AM   Result Value Ref Range    Sodium 142 136 - 145 mmol/L    Potassium 4.3 3.5 - 5.5 mmol/L    Chloride 105 100 - 108 mmol/L    CO2 31 21 - 32 mmol/L    Anion gap 6 3.0 - 18 mmol/L    Glucose 93 74 - 99 mg/dL    BUN 10 7.0 - 18 MG/DL    Creatinine 0.77 0.6 - 1.3 MG/DL    BUN/Creatinine ratio 13 12 - 20      GFR est AA >60 >60 ml/min/1.73m2    GFR est non-AA >60 >60 ml/min/1.73m2    Calcium 8.9 8.5 - 10.1 MG/DL    Bilirubin, total 0.2 0.2 - 1.0 MG/DL    ALT (SGPT) 34 13 - 56 U/L    AST (SGOT) 24 15 - 37 U/L    Alk.  phosphatase 108 45 - 117 U/L    Protein, total 7.5 6.4 - 8.2 g/dL    Albumin 3.2 (L) 3.4 - 5.0 g/dL    Globulin 4.3 (H) 2.0 - 4.0 g/dL    A-G Ratio 0.7 (L) 0.8 - 1.7     LIPASE    Collection Time: 07/17/18  5:55 AM   Result Value Ref Range    Lipase 59 (L) 73 - 393 U/L   CARDIAC PANEL,(CK, CKMB & TROPONIN)    Collection Time: 07/17/18  5:55 AM   Result Value Ref Range     26 - 192 U/L    CK - MB 1.6 <3.6 ng/ml    CK-MB Index 1.0 0.0 - 4.0 %    Troponin-I, Qt. <0.02 0.00 - 0.06 NG/ML       Radiologic Studies -    XR CHEST PORT   Final Result   IMPRESSION:    1. No acute cardiopulmonary process. As read by the radiologist.   CT ABD PELV W CONT   Final Result   IMPRESSION:      1. Surgical changes of previous Kori-en-Y gastric bypass. Swirled mesenteric  vasculature suggests postoperative internal hernia and partial small bowel  volvulus of herniated contents. This could certainly cause intermittent mild  abdominal pain, but there is no associatedbowel obstruction, ischemia or other  complication. 2. No additional acute findings are seen otherwise throughout the abdomen or  Pelvis. As read by the radiologist.     CT Results  (Last 48 hours)               07/17/18 0650  CT ABD PELV W CONT Final result    Impression:  IMPRESSION:           1. Surgical changes of previous Kori-en-Y gastric bypass. Swirled mesenteric   vasculature suggests postoperative internal hernia and partial small bowel   volvulus of herniated contents. This could certainly cause intermittent mild   abdominal pain, but there is no associatedbowel obstruction, ischemia or other   complication. 2. No additional acute findings are seen otherwise throughout the abdomen or   pelvis. Narrative:   CT OF THE ABDOMEN AND PELVIS, WITH CONTRAST       INDICATION: Abdominal pain       COMPARISON: CTA chest 1/10/2018; CT abdomen/pelvis 10/8/2017. TECHNIQUE: Standard helical CT performed through the abdomen and pelvis with IV   contrast.  Coronal and sagittal reformations were generated. One or more dose reduction techniques were used on this CT: automated exposure   control, adjustment of the mAs and/or kVp according to patient's size, and   iterative reconstruction techniques.  The specific techniques utilized on this CT   exam have been documented in the patient's electronic medical record.       ============       FINDINGS: INFERIOR THORAX: No acute pulmonary infiltrate. No global cardiomegaly or   pericardial effusion. LIVER/BILIARY: No suspicious liver lesion. No biliary dilation. Gallbladder   unremarkable. SPLEEN: Normal.       PANCREAS: Normal.       ADRENALS: Normal.       KIDNEYS: Parapelvic right renal cyst. No acute renal abnormality. LYMPH NODES: No mesenteric or retroperitoneal lymphadenopathy. GI TRACT: Surgical changes of previous Kori-en-Y gastric bypass. Normal caliber   small and large bowel loops are seen throughout the abdomen and pelvis. There is   a small internal hernia in the left paracentral abdomen with swirling mesenteric   vasculature, suggestive of a nonobstructed volvulus (axial images 40-54). No   morphology of bowel obstruction. No small or large bowel mural thickening in   this region or elsewhere. Mild amount of fecal material is seen throughout the   colon. The appendix is not clearly visualized, but there are no secondary   findings of acute appendicitis. VASCULATURE: Normal caliber distal thoracic and abdominal aorta. PELVIC ORGANS: Mildly distended bladder appears within normal limits. Left and   right adnexal surgical clips. There is minimal posterior cul-de-sac and right   adnexal free fluid, probably physiologic. BONES: Similar to prior study there is multilevel posterior instrumentation,   extending inferiorly to the level of L4. No lucency adjacent to the hardware to   suggest loosening or instability. There is unchanged L4-L5 anterolisthesis   related to what appears to be bilateral L4 pars interarticularis defects. Asymmetric L4-L5 intervertebral disc space height loss. OTHER: No ascites or free intraperitoneal air. Moderate-sized, fat filled   periumbilical ventral hernia.  Additional, fat filled supraumbilical ventral   hernia.       ============               CXR Results  (Last 48 hours)               07/17/18 0616  XR CHEST PORT Final result    Impression:  IMPRESSION:       1. No acute cardiopulmonary process. Narrative:  EXAM:Chest X-Ray         History: Abdominal pain, 10 months status post history of gastric bypass       Technique:  Portable Frontal View       Comparison: 01/10/2018       _______________       FINDINGS:       The trachea is midline. The cardiomediastinal silhouette is midline and, within normal limits. Minimal lateral left basilar linear atelectatic type scarring. No Focal   consolidation, effusion, or pneumothorax. Bilateral paraspinous scoliosis rods. No acute or destructive osseous process. _______________                   Medical Decision Making   I am the first provider for this patient. I reviewed the vital signs, available nursing notes, past medical history, past surgical history, family history and social history. Vital Signs-Reviewed the patient's vital signs. Pulse Oximetry Analysis - 100% on RA     Records Reviewed: Nursing Notes    Provider Notes (Medical Decision Making):   Ddx: Likely to be gastritis or PUD, possibly complication with previous surgery, also small intestine enteritis or colon spasm    Procedures:  Procedures    MEDICATIONS GIVEN:  Medications   iohexol (OMNIPAQUE) ORAL mixture ( Oral Given 7/17/18 0553)   famotidine (PF) (PEPCID) injection 20 mg (20 mg IntraVENous Given 7/17/18 0552)   dicyclomine (BENTYL) capsule 20 mg (20 mg Oral Given 7/17/18 0553)   ondansetron (ZOFRAN) injection 4 mg (4 mg IntraVENous Given 7/17/18 0552)   iopamidol (ISOVUE 300) 61 % contrast injection 100 mL (100 mL IntraVENous Given 7/17/18 0637)   famotidine (PF) (PEPCID) injection 20 mg (20 mg IntraVENous Given 7/17/18 1029)       ED Course:   4:45 AM   Initial assessment performed. The patients presenting problems have been discussed, and they are in agreement with the care plan formulated and outlined with them.   I have encouraged them to ask questions as they arise throughout their visit. SIGN OUT:  7:15 AM  Patient's presentation, labs/imaging and plan of care was reviewed with Amelie Noonan MD as part of sign out. They will await CT as part of the plan discussed with the patient. Amelie Noonan MD's assistance in completion of this plan is greatly appreciated but it should be noted that I will be the provider of record for this patient. Written by RAMON Rand, as dictated by Taty Palomo MD    720 AM pt evaluated and found to have tender epig area, w/o guarding or rebound, with NABS, soft, healed surgical scars; pt not in any distress at this time, does not appear toxic or septic. Pt aware of awaiting for ct scan abd/pelvis. 930 am pt informed awaiting final disposition, awaiting response from bariatric surgeon, as Dr Estefanía Alejandro is covering. Awaiting for Dr Estefanía Alejandro, paged through [place] [id number] . 10:32 AM  Dr. Estefanía Alejandro will call back after looking at images. She says t Pt may be admitted. CONSULT NOTE:   10:34 AM  Amelie Noonan MD spoke with Dr. Estefanía Alejandro   Specialty: Bariatric/general surgery  Discussed pt's hx, disposition, and available diagnostic and imaging results  over the telephone. Reviewed care plans. Consulting physician agrees with plans as outlined. Will admit under her service. Will send her PA to get pt this afternoon. 10:42 AM  Pt understands and agrees with admission. Diagnosis and Disposition     ADMISSION NOTE:  10:35 AM  Patient is being admitted to the hospital by Dr. Estefanía Alejandro. The results of their tests and reasons for their admission have been discussed with them and/or available family. They convey agreement and understanding for the need to be admitted and for their admission diagnosis. Written by Frankey Clarke, ED Scribe, as dictated by Amelie Noonan MD.    CONDITIONS ON ADMISSION:  Sepsis is not present at the time of admission. Deep Vein Thrombosis is not present at the time of admission.  Thrombosis is not present at the time of admission. Urinary Tract Infection is not present at the time of admission. Pneumonia is not present at the time of admission. MRSA is not present at the time of admission. Wound infection is not present at the time of admission. Pressure Ulcer is not present at the time of admission. CLINICAL IMPRESSION    1. Abdominal pain, epigastric    2. Internal hernia    3. S/P gastric bypass      _______________________________    Attestations: This note is prepared by Ronny Rodriguez, acting as Scribe for Stacie Gates. Roslyn To MD.    Stacie Gates. Roslyn To MD:  The scribe's documentation has been prepared under my direction and personally reviewed by me in its entirety. I confirm that the note above accurately reflects all work, treatment, procedures, and medical decision making performed by me. This note is prepared by Carlotta Jeff, acting as Scribe for Kari Lemus MD.    Kari Lemus MD:  The scribe's documentation has been prepared under my direction and personally reviewed by me in its entirety.   I confirm that the note above accurately reflects all work, treatment, procedures, and medical decision making performed by me.  _______________________________

## 2018-07-17 NOTE — H&P
Rigoberto Guevara is a 34 y.o. female with pmh sig for conversion of LSG to LRYGB in , who is being admitted for abdominal pain. She also has a history of marginal ulcer in . The pain is located in the mid epigastrium, with radiation to the back. She reports the pain started Saturday night after eating stuffed cabbage as a burning pain. She also noted bloating, nausea. She thought she needed to have a bm and took MOM and gas X. She said this seemed to help and the pain resolved. She woke up on Monday with no pain, but then it returned after eating red peppers with ranch dressing. She still has 8/10 pain at present. She has never had this pain before. She denies fevers, chills, vomiting. She denies chest pain, shortness of breath, dysuria, hematuria, change in urine color or stool color. Last bm was last night which was loose due to Milk of Magnesia, but no blood in stool or urine. Urine color is normal and clear per patient. She is still nauseated at present and is belching. She reports flatus. She had intended to call Dr. Saucedo Se office today, but because the pain was so unrelenting she decided to go to the ED last night.         Patient Active Problem List     Diagnosis Date Noted    Abdominal pain 2018    Internal hernia 2018    S/P gastric bypass 2018    Acute gastric ulcer with hemorrhage 2018    Spondylolisthesis of lumbar region 2018    History of spinal surgery 2018    Status post bariatric surgery      Intestinal malabsorption      Nausea & vomiting      Incisional hernia             Past Medical History:   Diagnosis Date    Incisional hernia      Intestinal malabsorption      Morbid obesity with body mass index of 40.0-49.9 ([facility])      Status post bariatric surgery 2012     sleeve resection / kirstin Malloy            Past Surgical History:   Procedure Laterality Date    HX  SECTION         X 3    HX GI   2012     sleeve resection / a huong    HX ORTHOPAEDIC         scoliosis correction surgery X 2    HX TONSIL AND ADENOIDECTOMY        HX TUBAL LIGATION        LAP GASTRIC BYPASS/TAMICA-EN-Y         revision from sleeve on 9/26/2017           Social History   Substance Use Topics    Smoking status: Never Smoker    Smokeless tobacco: Never Used    Alcohol use No      History reviewed. No pertinent family history. Prior to Admission medications    Medication Sig Start Date End Date Taking? Authorizing Provider   ondansetron (ZOFRAN ODT) 4 mg disintegrating tablet Take 1 Tab by mouth every eight (8) hours as needed for Nausea. 7/16/18   Yes Melvin Napoles MD   famotidine (PEPCID) 20 mg tablet Take 1 Tab by mouth daily for 10 days.  7/16/18 7/26/18 Yes Melvin Napoles MD   pantoprazole sodium (PROTONIX PO) Take  by mouth.     Yes Wood Randhawa MD   potassium 99 mg tablet Take 99 mg by mouth daily.     Yes Wood Randhawa MD   ferrous sulfate ER (IRON) 160 mg (50 mg iron) TbER tablet Take 1 Tab by mouth daily.     Yes Historical Provider   Biotin 2,500 mcg cap Take  by mouth.     Yes Historical Provider   cyanocobalamin (VITAMIN B-12) 500 mcg tablet Take 500 mcg by mouth daily.     Yes Wood Randhawa MD   calcium citrate 200 mg (950 mg) tablet Take  by mouth daily.     Yes Wood Randhawa MD   multivitamin with iron (FLINTSTONES) chewable tablet Take 1 Tab by mouth two (2) times a day.     Yes Historical Provider            Allergies   Allergen Reactions    Nsaids (Non-Steroidal Anti-Inflammatory Drug) Other (comments)       GI Ulcer- Hx of Gastric Bypass         Review of Systems:    No fevers, chills, dysuria, chest pain, hematuria, change in urine or stool color, ill contacts, shortness of breath.      Objective:           Visit Vitals    /87 (BP 1 Location: Left arm, BP Patient Position: At rest)    Pulse 75    Temp 98.4 °F (36.9 °C)    Resp 18    Ht 5' 3\" (1.6 m)    Wt 78 kg (171 lb 15.3 oz)    LMP 07/08/2018    SpO2 100%  BMI 30.46 kg/m2         Physical Exam:    AVSS Awake, alert, NAD, nontoxic, anicteric  Lungs clear jennifer  Cor-reg rate, rhythm  Abd soft, nondistended, mild tenderness in midepigastrium, no RUQ tenderness, no castro's, no rebound or guarding. Well healed lap incisions.      Imaging:  CT scan reviewed. Swirling of mesentery with concern for volvulus,no obstruction. No free air or free fluid.      Lab Review:     Recent Results          Recent Results (from the past 24 hour(s))   CBC WITH AUTOMATED DIFF     Collection Time: 07/17/18  5:55 AM   Result Value Ref Range     WBC 4.6 4.6 - 13.2 K/uL     RBC 3.94 (L) 4.20 - 5.30 M/uL     HGB 11.7 (L) 12.0 - 16.0 g/dL     HCT 36.6 35.0 - 45.0 %     MCV 92.9 74.0 - 97.0 FL     MCH 29.7 24.0 - 34.0 PG     MCHC 32.0 31.0 - 37.0 g/dL     RDW 14.4 11.6 - 14.5 %     PLATELET 701 381 - 824 K/uL     MPV 10.5 9.2 - 11.8 FL     NEUTROPHILS 41 40 - 73 %     LYMPHOCYTES 43 21 - 52 %     MONOCYTES 14 (H) 3 - 10 %     EOSINOPHILS 2 0 - 5 %     BASOPHILS 0 0 - 2 %     ABS. NEUTROPHILS 1.9 1.8 - 8.0 K/UL     ABS. LYMPHOCYTES 2.0 0.9 - 3.6 K/UL     ABS. MONOCYTES 0.6 0.05 - 1.2 K/UL     ABS. EOSINOPHILS 0.1 0.0 - 0.4 K/UL     ABS. BASOPHILS 0.0 0.0 - 0.1 K/UL     DF AUTOMATED      METABOLIC PANEL, COMPREHENSIVE     Collection Time: 07/17/18  5:55 AM   Result Value Ref Range     Sodium 142 136 - 145 mmol/L     Potassium 4.3 3.5 - 5.5 mmol/L     Chloride 105 100 - 108 mmol/L     CO2 31 21 - 32 mmol/L     Anion gap 6 3.0 - 18 mmol/L     Glucose 93 74 - 99 mg/dL     BUN 10 7.0 - 18 MG/DL     Creatinine 0.77 0.6 - 1.3 MG/DL     BUN/Creatinine ratio 13 12 - 20       GFR est AA >60 >60 ml/min/1.73m2     GFR est non-AA >60 >60 ml/min/1.73m2     Calcium 8.9 8.5 - 10.1 MG/DL     Bilirubin, total 0.2 0.2 - 1.0 MG/DL     ALT (SGPT) 34 13 - 56 U/L     AST (SGOT) 24 15 - 37 U/L     Alk.  phosphatase 108 45 - 117 U/L     Protein, total 7.5 6.4 - 8.2 g/dL     Albumin 3.2 (L) 3.4 - 5.0 g/dL     Globulin 4.3 (H) 2.0 - 4.0 g/dL     A-G Ratio 0.7 (L) 0.8 - 1.7     LIPASE     Collection Time: 07/17/18  5:55 AM   Result Value Ref Range     Lipase 59 (L) 73 - 393 U/L   CARDIAC PANEL,(CK, CKMB & TROPONIN)     Collection Time: 07/17/18  5:55 AM   Result Value Ref Range      26 - 192 U/L     CK - MB 1.6 <3.6 ng/ml     CK-MB Index 1.0 0.0 - 4.0 %     Troponin-I, Qt. <0.02 0.00 - 0.06 NG/ML            Assessment:      Abdominal pain with a history of LRYGB and over 100lbs wt loss, CT findings concerning for possible internal hernia. This pain could also be another ulcer though this seems less likely given intermittent nature of the pain and pt remains on ulcer treatment.      Plan:      1. I jo ann Green is a 34 y.o. female with pmh sig for conversion of LSG to LRYGB in 9/17, who is being admitted for abdominal pain. She also has a history of marginal ulcer in 11/17. The pain is located in the mid epigastrium, with radiation to the back. She reports the pain started Saturday night after eating stuffed cabbage as a burning pain. She also noted bloating, nausea. She thought she needed to have a bm and took MOM and gas X. She said this seemed to help and the pain resolved. She woke up on Monday with no pain, but then it returned after eating red peppers with ranch dressing. She still has 8/10 pain at present. She has never had this pain before. She denies fevers, chills, vomiting. She denies chest pain, shortness of breath, dysuria, hematuria, change in urine color or stool color. Last bm was last night which was loose due to Milk of Magnesia, but no blood in stool or urine. Urine color is normal and clear per patient. She is still nauseated at present and is belching. She reports flatus. She had intended to call Dr. Trev Vargas office today, but because the pain was so unrelenting she decided to go to the ED last night.         Patient Active Problem List     Diagnosis Date Noted    Abdominal pain 07/17/2018    Internal hernia 2018    S/P gastric bypass 2018    Acute gastric ulcer with hemorrhage 2018    Spondylolisthesis of lumbar region 2018    History of spinal surgery 2018    Status post bariatric surgery      Intestinal malabsorption      Nausea & vomiting      Incisional hernia             Past Medical History:   Diagnosis Date    Incisional hernia      Intestinal malabsorption      Morbid obesity with body mass index of 40.0-49.9 ([facility])      Status post bariatric surgery 2012     sleeve resection / kirstin Sousa            Past Surgical History:   Procedure Laterality Date    HX  SECTION         X 3    HX GI   2012     sleeve resection / a salzberg    HX ORTHOPAEDIC         scoliosis correction surgery X 2    HX TONSIL AND ADENOIDECTOMY        HX TUBAL LIGATION        LAP GASTRIC BYPASS/TAMICA-EN-Y         revision from sleeve on 2017           Social History   Substance Use Topics    Smoking status: Never Smoker    Smokeless tobacco: Never Used    Alcohol use No      History reviewed. No pertinent family history. Prior to Admission medications    Medication Sig Start Date End Date Taking? Authorizing Provider   ondansetron (ZOFRAN ODT) 4 mg disintegrating tablet Take 1 Tab by mouth every eight (8) hours as needed for Nausea. 18   Yes Hudson Kimble MD   famotidine (PEPCID) 20 mg tablet Take 1 Tab by mouth daily for 10 days.  18 Yes Hudson Kimble MD   pantoprazole sodium (PROTONIX PO) Take  by mouth.     Yes Wood Randhawa MD   potassium 99 mg tablet Take 99 mg by mouth daily.     Yes Wood Randhawa MD   ferrous sulfate ER (IRON) 160 mg (50 mg iron) TbER tablet Take 1 Tab by mouth daily.     Yes Historical Provider   Biotin 2,500 mcg cap Take  by mouth.     Yes Historical Provider   cyanocobalamin (VITAMIN B-12) 500 mcg tablet Take 500 mcg by mouth daily.     Yes Wood Randhawa MD   calcium citrate 200 mg (950 mg) tablet Take by mouth daily.     Yes Phys Other, MD   multivitamin with iron (FLINTSTONES) chewable tablet Take 1 Tab by mouth two (2) times a day.     Yes Historical Provider            Allergies   Allergen Reactions    Nsaids (Non-Steroidal Anti-Inflammatory Drug) Other (comments)       GI Ulcer- Hx of Gastric Bypass         Review of Systems:    No fevers, chills, dysuria, chest pain, hematuria, change in urine or stool color, ill contacts, shortness of breath.      Objective:           Visit Vitals    /87 (BP 1 Location: Left arm, BP Patient Position: At rest)    Pulse 75    Temp 98.4 °F (36.9 °C)    Resp 18    Ht 5' 3\" (1.6 m)    Wt 78 kg (171 lb 15.3 oz)    LMP 07/08/2018    SpO2 100%    BMI 30.46 kg/m2         Physical Exam:    AVSS Awake, alert, NAD, nontoxic, anicteric  Lungs clear jennifer  Cor-reg rate, rhythm  Abd soft, nondistended, mild tenderness in midepigastrium, no RUQ tenderness, no castro's, no rebound or guarding. Well healed lap incisions.      Imaging:  CT scan reviewed. Swirling of mesentery with concern for volvulus,no obstruction. No free air or free fluid.      Lab Review:     Recent Results          Recent Results (from the past 24 hour(s))   CBC WITH AUTOMATED DIFF     Collection Time: 07/17/18  5:55 AM   Result Value Ref Range     WBC 4.6 4.6 - 13.2 K/uL     RBC 3.94 (L) 4.20 - 5.30 M/uL     HGB 11.7 (L) 12.0 - 16.0 g/dL     HCT 36.6 35.0 - 45.0 %     MCV 92.9 74.0 - 97.0 FL     MCH 29.7 24.0 - 34.0 PG     MCHC 32.0 31.0 - 37.0 g/dL     RDW 14.4 11.6 - 14.5 %     PLATELET 341 490 - 887 K/uL     MPV 10.5 9.2 - 11.8 FL     NEUTROPHILS 41 40 - 73 %     LYMPHOCYTES 43 21 - 52 %     MONOCYTES 14 (H) 3 - 10 %     EOSINOPHILS 2 0 - 5 %     BASOPHILS 0 0 - 2 %     ABS. NEUTROPHILS 1.9 1.8 - 8.0 K/UL     ABS. LYMPHOCYTES 2.0 0.9 - 3.6 K/UL     ABS. MONOCYTES 0.6 0.05 - 1.2 K/UL     ABS. EOSINOPHILS 0.1 0.0 - 0.4 K/UL     ABS.  BASOPHILS 0.0 0.0 - 0.1 K/UL     DF AUTOMATED      METABOLIC PANEL, COMPREHENSIVE     Collection Time: 07/17/18  5:55 AM   Result Value Ref Range     Sodium 142 136 - 145 mmol/L     Potassium 4.3 3.5 - 5.5 mmol/L     Chloride 105 100 - 108 mmol/L     CO2 31 21 - 32 mmol/L     Anion gap 6 3.0 - 18 mmol/L     Glucose 93 74 - 99 mg/dL     BUN 10 7.0 - 18 MG/DL     Creatinine 0.77 0.6 - 1.3 MG/DL     BUN/Creatinine ratio 13 12 - 20       GFR est AA >60 >60 ml/min/1.73m2     GFR est non-AA >60 >60 ml/min/1.73m2     Calcium 8.9 8.5 - 10.1 MG/DL     Bilirubin, total 0.2 0.2 - 1.0 MG/DL     ALT (SGPT) 34 13 - 56 U/L     AST (SGOT) 24 15 - 37 U/L     Alk. phosphatase 108 45 - 117 U/L     Protein, total 7.5 6.4 - 8.2 g/dL     Albumin 3.2 (L) 3.4 - 5.0 g/dL     Globulin 4.3 (H) 2.0 - 4.0 g/dL     A-G Ratio 0.7 (L) 0.8 - 1.7     LIPASE     Collection Time: 07/17/18  5:55 AM   Result Value Ref Range     Lipase 59 (L) 73 - 393 U/L   CARDIAC PANEL,(CK, CKMB & TROPONIN)     Collection Time: 07/17/18  5:55 AM   Result Value Ref Range      26 - 192 U/L     CK - MB 1.6 <3.6 ng/ml     CK-MB Index 1.0 0.0 - 4.0 %     Troponin-I, Qt. <0.02 0.00 - 0.06 NG/ML            Assessment:      Abdominal pain with a history of LRYGB and over 100lbs wt loss, CT findings concerning for possible internal hernia. This pain could also be another ulcer though this seems less likely given intermittent nature of the pain and pt remains on ulcer treatment.      Plan:      1. I recommend proceeding with admission, dx laparoscopy, possible internal hernia repair, and upper endoscopy.       2. Discussed aspects of surgical intervention, methods, risks (including by not limited to infection, bleeding, injury to abdominal organs such as the bowel, conversion to open, bowel resection, DVT/PE, MI, pneumonia, death,  hematoma, and perforation of the intestines or solid organs), and the risks of general anesthetic.   The patient understands the risks; any and all questions were answered to the patient's satisfaction.        Signed By: Kristy Reyes MD      July 17, 2018                               ecommend proceeding with admission, dx laparoscopy, possible internal hernia repair, and upper endoscopy.       2. Discussed aspects of surgical intervention, methods, risks (including by not limited to infection, bleeding, injury to abdominal organs such as the bowel, conversion to open, bowel resection, DVT/PE, MI, pneumonia, death,  hematoma, and perforation of the intestines or solid organs), and the risks of general anesthetic. The patient understands the risks; any and all questions were answered to the patient's satisfaction.        Signed By: Kristy Reyes MD      July 17, 2018                               I was covering for Dr. Solomon Daigle during this time. Correction of dates to 2017 for initial procedures-please see above.

## 2018-07-17 NOTE — PROGRESS NOTES
Brief Operative Report    Pre-Operative Diagnosis: abdominal pain s/p gastric bypass    Post-Operative Diagnosis: Internal hernia    Procedure Performed:  1. Diagnostic laparoscopy, lysis of adhesions, internal hernia repair  2. Upper endoscopy    Surgeon/Assistant: Brittney  Anesthesia: General  Complications: None  EBL: 10ml  Specimen: None  Findings: Small internal hernia defect under lyndsey limb with small bowel through defect. Adhesions in upper abdomen from prior bypass.  EGD showed patent anastomosis with no ulcer  Dictation #[id number]

## 2018-07-17 NOTE — ANESTHESIA PREPROCEDURE EVALUATION
Anesthetic History   No history of anesthetic complications     Pertinent negatives: No PONV       Review of Systems / Medical History  Patient summary reviewed, nursing notes reviewed and pertinent labs reviewed    Pulmonary  Within defined limits                 Neuro/Psych   Within defined limits           Cardiovascular  Within defined limits                Exercise tolerance: >4 METS     GI/Hepatic/Renal     GERD: well controlled        Pertinent negatives: No PUD   Endo/Other          Pertinent negatives: No morbid obesity   Other Findings              Physical Exam    Airway  Mallampati: II  TM Distance: 4 - 6 cm  Neck ROM: normal range of motion   Mouth opening: Normal     Cardiovascular               Dental  No notable dental hx       Pulmonary                 Abdominal         Other Findings            Anesthetic Plan    ASA: 2, emergent  Anesthesia type: general          Induction: Intravenous  Anesthetic plan and risks discussed with: Patient      Permanent lower retainer

## 2018-07-17 NOTE — ANESTHESIA POSTPROCEDURE EVALUATION
Post-Anesthesia Evaluation and Assessment    Cardiovascular Function/Vital Signs  Visit Vitals    /90    Pulse 72    Temp 36.1 °C (97 °F)    Resp 13    Ht 5' 3\" (1.6 m)    Wt 78 kg (171 lb 15.3 oz)    SpO2 100%    BMI 30.46 kg/m2       Patient is status post Procedure(s):  DIAGNOSTIC LAPAROSCOPY WITH ENDOSCOPY, INTERNAL HERNIA REPAIR, LYSIS OF ADHESIONS. Nausea/Vomiting: Controlled. Postoperative hydration reviewed and adequate. Pain:  Pain Scale 1: Numeric (0 - 10) (07/17/18 1609)  Pain Intensity 1: 3 (07/17/18 1604)   Managed. Neurological Status: At baseline. Mental Status and Level of Consciousness: Baseline and appropriate for discharge. Pulmonary Status:   O2 Device: Non-rebreather mask (07/17/18 5749)   Adequate oxygenation and airway patent. Complications related to anesthesia: None    Post-anesthesia assessment completed. No concerns. Patient has met all discharge requirements.     Signed By: Mireya Herrera MD    July 17, 2018

## 2018-07-17 NOTE — PERIOP NOTES
Received patient from [place] [id number] and anesthesia provider. Reviewed surgical procedure, intraoperative course. Patient identified.

## 2018-07-17 NOTE — PERIOP NOTES
TRANSFER - IN REPORT:    Verbal report received from [facility] [id number] rn(name) on [employer]  being received from 3S(unit) for ordered procedure      Report consisted of patients Situation, Background, Assessment and   Recommendations(SBAR). Information from the following report(s) SBAR was reviewed with the receiving nurse. Opportunity for questions and clarification was provided. Assessment completed upon patients arrival to unit and care assumed.

## 2018-07-17 NOTE — ED NOTES
Attempt made to call report to receiving RN. Unable to give report d/t RN \"not ready yet\".  Charge RN notified

## 2018-07-17 NOTE — PROGRESS NOTES
TRANSFER - IN REPORT:    Verbal report received from BRENDA TurnerRN(name) on [employer]  being received from [employer] routine progression of care      Report consisted of patients Situation, Background, Assessment and   Recommendations(SBAR). Information from the following report(s) SBAR, Kardex, Intake/Output, MAR and Recent Results was reviewed with the receiving nurse. Opportunity for questions and clarification was provided. Assessment will be completed upon patients arrival to unit and care assumed.

## 2018-07-17 NOTE — PROGRESS NOTES
PM check -      Pt awake and alert C/O expected discomfort. Patient denies nausea. She has a great appetite and has eaten almost all of her dinner. She is a little too drowsy to walk at this time, but states she will walk as soon as she is more alert. She states she is feeling much better than before the surgery.     Visit Vitals    /86    Pulse 77    Temp 97.3 °F (36.3 °C)    Resp 14    Ht 5' 3\" (1.6 m)    Wt 78 kg (171 lb 15.3 oz)    LMP 07/08/2018    SpO2 100%    BMI 30.46 kg/m2        Assessment:  Patient with 10 mo Hx of conversion sleeve gastrectomy to laparoscopic gastric bypass and 2 mo s/p Hx of anastomotic ulcer. S/p diagnostic laparotomy with EGD for internal hernia and confirmation of healed anastomotic ulcer.     Plan:  -Continue medications  -Encourage ambulation  -SCD use when in bed  -IS 10 times an hour  -Bariatric Stage 4 diet

## 2018-07-17 NOTE — PROGRESS NOTES
TRANSFER - OUT REPORT:    Verbal report given to REG Orozco on [employer]  being transferred to [facility] for ordered procedure       Report consisted of patients Situation, Background, Assessment and   Recommendations(SBAR). Information from the following report(s) SBAR, Kardex, Intake/Output, MAR and Recent Results was reviewed with the receiving nurse. Lines:   Peripheral IV 07/17/18 Left Antecubital (Active)   Site Assessment Clean, dry, & intact 7/17/2018  6:03 AM   Phlebitis Assessment 0 7/17/2018  6:03 AM   Infiltration Assessment 0 7/17/2018  6:03 AM   Dressing Status Clean, dry, & intact 7/17/2018  6:03 AM   Dressing Type Transparent 7/17/2018  6:03 AM   Hub Color/Line Status Pink 7/17/2018  6:03 AM        Opportunity for questions and clarification was provided.       Patient transported with:   Tech    1330: Pt off unit

## 2018-07-17 NOTE — PERIOP NOTES
Patient transferred to room 311. Joe Wiggins RN assuming care. Blood pressure 135/86, pulse 77, temperature 97.3 °F (36.3 °C), resp. rate 14, height 5' 3\" (1.6 m), weight 78 kg (171 lb 15.3 oz), last menstrual period 07/08/2018, SpO2 100 %. Dual skin assessment completed.

## 2018-07-17 NOTE — IP AVS SNAPSHOT
[address] [address] 
[phone] Patient: Riya Delacruz MRN: [id number] :1985 About your hospitalization You were admitted on:  2018 You last received care in the:  [address] [place] [address] You were discharged on:  2018 Why you were hospitalized Your primary diagnosis was:  Not on File Your diagnoses also included:  Abdominal Pain, Internal Hernia, S/P Gastric Bypass Follow-up Information Follow up With Details Comments Contact Info Meir Simpson MD   [address] [place] [address] 200 [facility] 
[phone] Raheem Stone MD   [address] [facility] [address] 1700 [facility] 
[phone] Discharge Orders None A check miranda indicates which time of day the medication should be taken. My Medications START taking these medications Instructions Each Dose to Equal  
 Morning Noon Evening Bedtime  
 oxyCODONE IR 5 mg immediate release tablet Commonly known as:  Hari Nichole Your last dose was: Your next dose is: Take 1 Tab by mouth every six (6) hours as needed for Pain (moderate pain). Max Daily Amount: 20 mg.  
 5 mg CONTINUE taking these medications Instructions Each Dose to Equal  
 Morning Noon Evening Bedtime Biotin 2,500 mcg Cap Your last dose was: Your next dose is: Take  by mouth.  
     
   
   
   
  
 calcium citrate 200 mg (950 mg) tablet Your last dose was: Your next dose is: Take  by mouth daily. famotidine 20 mg tablet Commonly known as:  PEPCID Your last dose was: Your next dose is: Take 1 Tab by mouth daily for 10 days. 20 mg Iron 160 mg (50 mg iron) Tber tablet Generic drug:  ferrous sulfate ER Your last dose was: Your next dose is: Take 1 Tab by mouth daily. 1 Tab  
    
   
   
   
  
 multivitamin with iron chewable tablet Commonly known as:  Saralee Leak Your last dose was: Your next dose is: Take 1 Tab by mouth two (2) times a day. 1 Tab  
    
   
   
   
  
 ondansetron 4 mg disintegrating tablet Commonly known as:  ZOFRAN ODT Your last dose was: Your next dose is: Take 1 Tab by mouth every eight (8) hours as needed for Nausea. 4 mg PROTONIX PO Your last dose was: Your next dose is: Take  by mouth. VITAMIN B-12 500 mcg tablet Generic drug:  cyanocobalamin Your last dose was: Your next dose is: Take 500 mcg by mouth daily. 500 mcg STOP taking these medications   
 potassium 99 mg tablet Where to Get Your Medications Information on where to get these meds will be given to you by the nurse or doctor. ! Ask your nurse or doctor about these medications  
  oxyCODONE IR 5 mg immediate release tablet Opioid Education Prescription Opioids: What You Need to Know: 
 
Prescription opioids can be used to help relieve moderate-to-severe pain and are often prescribed following a surgery or injury, or for certain health conditions. These medications can be an important part of treatment but also come with serious risks. Opioids are strong pain medicines. Examples include hydrocodone, oxycodone, fentanyl, and morphine. Heroin is an example of an illegal opioid. It is important to work with your health care provider to make sure you are getting the safest, most effective care. WHAT ARE THE RISKS AND SIDE EFFECTS OF OPIOID USE?  
Prescription opioids carry serious risks of addiction and overdose, especially with prolonged use. An opioid overdose, often marked by slow breathing, can cause sudden death. The use of prescription opioids can have a number of side effects as well, even when taken as directed. · Tolerance-meaning you might need to take more of a medication for the same pain relief · Physical dependence-meaning you have symptoms of withdrawal when the medication is stopped. Withdrawal symptoms can include nausea, sweating, chills, diarrhea, stomach cramps, and muscle aches. Withdrawal can last up to several weeks, depending on which drug you took and how long you took it. · Increased sensitivity to pain · Constipation · Nausea, vomiting, and dry mouth · Sleepiness and dizziness · Confusion · Depression · Low levels of testosterone that can result in lower sex drive, energy, and strength · Itching and sweating RISKS ARE GREATER WITH:      
· History of drug misuse, substance use disorder, or overdose · Mental health conditions (such as depression or anxiety) · Sleep apnea · Older age (72 years or older) · Pregnancy Avoid alcohol while taking prescription opioids. Also, unless specifically advised by your health care provider, medications to avoid include: · Benzodiazepines (such as Xanax or Valium) · Muscle relaxants (such as Soma or Flexeril) · Hypnotics (such as Ambien or Lunesta) · Other prescription opioids KNOW YOUR OPTIONS Talk to your health care provider about ways to manage your pain that don't involve prescription opioids. Some of these options may actually work better and have fewer risks and side effects. Options may include: 
· Pain relievers such as acetaminophen, ibuprofen, and naproxen · Some medications that are also used for depression or seizures · Physical therapy and exercise · Counseling to help patients learn how to cope better with triggers of pain and stress. · Application of heat or cold compress · Massage therapy · Relaxation techniques Be Informed Make sure you know the name of your medication, how much and how often to take it, and its potential risks & side effects. IF YOU ARE PRESCRIBED OPIOIDS FOR PAIN: 
· Never take opioids in greater amounts or more often than prescribed. Remember the goal is not to be pain-free but to manage your pain at a tolerable level. · Follow up with your primary care provider to: · Work together to create a plan on how to manage your pain. · Talk about ways to help manage your pain that don't involve prescription opioids. · Talk about any and all concerns and side effects. · Help prevent misuse and abuse. · Never sell or share prescription opioids · Help prevent misuse and abuse. · Store prescription opioids in a secure place and out of reach of others (this may include visitors, children, friends, and family). · Safely dispose of unused/unwanted prescription opioids: Find your community drug take-back program or your pharmacy mail-back program, or flush them down the toilet, following guidance from the Food and Drug Administration (www.fda.gov/Drugs/ResourcesForYou). · Visit www.cdc.gov/drugoverdose to learn about the risks of opioid abuse and overdose. · If you believe you may be struggling with addiction, tell your health care provider and ask for guidance or call [address] [place] [address] at 6-128-500-HCNL. Discharge Instructions [employer] [facility] Newton Salinas M.D., F.A.C.S. 
[facility] [address]  [place] Susie Campos, [address] [place] [address] Office: [phone]    Fax:  [phone] Discharge Instruction for Patients Diet: 
? Goal for total fluid intake is a minimum of 64 ounces per day. ? Aim for  Grams of protein every day. Activity: 
? Get up and walk at least once an hour during normal waking hours. ? Walk a minimum of 30 minutes every day for exercise. Rest when you are tired. ? Use your Incentive Spirometry (plastic breathing machine) 10 times every hour for two weeks. Take a slow steady breath in until you can not inhale anymore. ? You may shower. No baths, hot tubs or swimming. ? You may climb stairs. Take your time. ? No lifting more than 10-15 lbs. ? If you feel discomfort during an activity, rest. 
? Do not drive for 1 week or until you are off of all narcotics. Wound Care: ? Clean incisions with soap and water when in the shower. Pat dry. ? Leave glue on incision sites until it falls off. 
? A small amount of drainage may be present from the incisions. Contact the office if you notice an increase in drainage, an odor, increased redness, or fever > 100.5. Medications: 
 
? You should have received a prescription for pain medication. Take as directed when needed. You can take Tylenol for your pain and use the prescribed pain medication for break through pain. The first day you may want to alternate doses of Tylenol and Roxicodone spaced ever 3 hours. Do not exceed 4,000mg of Tylenol in a 24 hour period. ? For upset stomach you may take over the counter medications such as Maalox, Mylanta, or Pepto Bismol. ? Gas X works very well for bloating when eating or drinking. ? Take a childrens or adult chewable multivitamin. ? Milk of Magnesia will provide immediate relief of constipation (not for daily use). Daily use of Miralax or Benefiber may be needed if you develop chronic constipation. ? It is fine to take your usual home medications. I have discontinued your prescription for potassium because your levels are back to normal. 
 
Follow-Up Appointment: 
? If you do not already have a follow-up appointment scheduled, contact the office in the next few days to obtain one. It is usually scheduled for 10-14 days after your surgery date. Office phone number:  [phone]. ? Call our office if you have questions, concerns or any worsening of condition. If you are not able to reach us, go to your primary care provider or the Emergency Department. [employer] Announcement We are excited to announce that we are making your provider's discharge notes available to you in [employer]. You will see these notes when they are completed and signed by the physician that discharged you from your recent hospital stay. If you have any questions or concerns about any information you see in [employer], please call the Health Information Department where you were seen or reach out to your Primary Care Provider for more information about your plan of care. Introducing [facility]! [employer] introduces [employer] patient portal. Now you can access parts of your medical record, email your doctor's office, and request medication refills online. 1. In your internet browser, go to [email or web address] 2. Click on the First Time User? Click Here link in the Sign In box. You will see the New Member Sign Up page. 3. Enter your [employer] Access Code exactly as it appears below. You will not need to use this code after youve completed the sign-up process. If you do not sign up before the expiration date, you must request a new code. · [employer] Access Code: [email or web address] Expires: 10/6/2018  3:49 AM 
 
4. Enter the last four digits of your Social Security Number (xxxx) and Date of Birth (mm/dd/yyyy) as indicated and click Submit. You will be taken to the next sign-up page. 5. Create a StreetSpark[employer] ID. This will be your [employer] login ID and cannot be changed, so think of one that is secure and easy to remember. 6. Create a [employer] password. You can change your password at any time. 7. Enter your Password Reset Question and Answer. This can be used at a later time if you forget your password. 8. Enter your e-mail address. You will receive e-mail notification when new information is available in [address] 9. Click Sign Up. You can now view and download portions of your medical record. 10. Click the Download Summary menu link to download a portable copy of your medical information. If you have questions, please visit the Frequently Asked Questions section of the [employer] website. Remember, [employer] is NOT to be used for urgent needs. For medical emergencies, dial 911. Now available from your iPhone and Android! Introducing Augustine Antunez As a [facility] patient, I wanted to make you aware of our electronic visit tool called Augustine Antunez. [employer] allows you to connect within minutes with a medical provider 24 hours a day, seven days a week via a mobile device or tablet or logging into a secure website from your computer. You can access Augustine Antunez from anywhere in the [place]. A virtual visit might be right for you when you have a simple condition and feel like you just dont want to get out of bed, or cant get away from work for an appointment, when your regular [facility] [employer] [facility] provider is not available (evenings, weekends or holidays), or when youre out of town and need minor care. Electronic visits cost only $49 and if the [employer] provider determines a prescription is needed to treat your condition, one can be electronically transmitted to a nearby pharmacy*. Please take a moment to enroll today if you have not already done so. The enrollment process is free and takes just a few minutes. To enroll, please download the [employer] mau to your tablet or phone, or visit [email or web address] to enroll on your computer.    
And, as an [address] [place] [address] patient with a [employer] account, the results of your visits will be scanned into your electronic medical record and your primary care provider will be able to view the scanned results. We urge you to continue to see your regular [employer] provider for your ongoing medical care. And while your primary care provider may not be the one available when you seek a [employer] virtual visit, the peace of mind you get from getting a real diagnosis real time can be priceless. For more information on [employer], view our Frequently Asked Questions (FAQs) at [email or web address]. Sincerely, 
 
Naveen Kwon MD 
Chief Medical Officer [employer] *:  certain medications cannot be prescribed via [employer] Providers Seen During Your Hospitalization Provider Specialty Primary office phone Lucille Barrett MD Emergency Medicine [phone] Kandice Jacques MD Emergency Medicine [phone] Xenia Cat MD General Surgery [phone] Your Primary Care Physician (PCP) Primary Care Physician Office Phone Office Fax  
 [address] [phone] You are allergic to the following Allergen Reactions Nsaids (Non-Steroidal Anti-Inflammatory Drug) Other (comments) GI Ulcer- Hx of Gastric Bypass Recent Documentation Height Weight Breastfeeding? BMI OB Status Smoking Status 1.6 m 78 kg No 30.46 kg/m2 Having regular periods Never Smoker Emergency Contacts Name Discharge Info Relation Home Work Mobile Alanis Timmons DISCHARGE CAREGIVER [3] Mother [14] [phone] Patient Belongings The following personal items are in your possession at time of discharge: 
  Dental Appliances: None  Visual Aid: None Please provide this summary of care documentation to your next provider. Signatures-by signing, you are acknowledging that this After Visit Summary has been reviewed with you and you have received a copy. Patient Signature:  ____________________________________________________________ Date:  ____________________________________________________________  
  
[facility] Provider Signature:  ____________________________________________________________ Date:  ____________________________________________________________

## 2018-07-17 NOTE — PROGRESS NOTES
Surgery History and Physical    Subjective:      Carmenza Banegas is a 34 y.o. female with pmh sig for conversion of LSG to LRYGB in , who is being admitted for abdominal pain. She also has a history of marginal ulcer in . The pain is located in the mid epigastrium, with radiation to the back. She reports the pain started Saturday night after eating stuffed cabbage as a burning pain. She also noted bloating, nausea. She thought she needed to have a bm and took MOM and gas X. She said this seemed to help and the pain resolved. She woke up on Monday with no pain, but then it returned after eating red peppers with ranch dressing. She still has 8/10 pain at present. She has never had this pain before. She denies fevers, chills, vomiting. She denies chest pain, shortness of breath, dysuria, hematuria, change in urine color or stool color. Last bm was last night which was loose due to Milk of Magnesia, but no blood in stool or urine. Urine color is normal and clear per patient. She is still nauseated at present and is belching. She reports flatus. She had intended to call Dr. Kishor Isabel office today, but because the pain was so unrelenting she decided to go to the ED last night.    Patient Active Problem List    Diagnosis Date Noted    Abdominal pain 2018    Internal hernia 2018    S/P gastric bypass 2018    Acute gastric ulcer with hemorrhage 2018    Spondylolisthesis of lumbar region 2018    History of spinal surgery 2018    Status post bariatric surgery     Intestinal malabsorption     Nausea & vomiting     Incisional hernia      Past Medical History:   Diagnosis Date    Incisional hernia     Intestinal malabsorption     Morbid obesity with body mass index of 40.0-49.9 ([facility])     Status post bariatric surgery 2012    sleeve resection / kirstin Persaud      Past Surgical History:   Procedure Laterality Date    HX  SECTION      X 3    HX GI  2012 sleeve resection / a salzberg    HX ORTHOPAEDIC      scoliosis correction surgery X 2    HX TONSIL AND ADENOIDECTOMY      HX TUBAL LIGATION      LAP GASTRIC BYPASS/TAMICA-EN-Y      revision from sleeve on 9/26/2017      Social History   Substance Use Topics    Smoking status: Never Smoker    Smokeless tobacco: Never Used    Alcohol use No      History reviewed. No pertinent family history. Prior to Admission medications    Medication Sig Start Date End Date Taking? Authorizing Provider   ondansetron (ZOFRAN ODT) 4 mg disintegrating tablet Take 1 Tab by mouth every eight (8) hours as needed for Nausea. 7/16/18  Yes Dharmesh Ortega MD   famotidine (PEPCID) 20 mg tablet Take 1 Tab by mouth daily for 10 days. 7/16/18 7/26/18 Yes Dharmesh Ortega MD   pantoprazole sodium (PROTONIX PO) Take  by mouth. Yes Wood Randhawa MD   potassium 99 mg tablet Take 99 mg by mouth daily. Yes Wood Randhawa MD   ferrous sulfate ER (IRON) 160 mg (50 mg iron) TbER tablet Take 1 Tab by mouth daily. Yes Historical Provider   Biotin 2,500 mcg cap Take  by mouth. Yes Historical Provider   cyanocobalamin (VITAMIN B-12) 500 mcg tablet Take 500 mcg by mouth daily. Yes Wood Randhawa MD   calcium citrate 200 mg (950 mg) tablet Take  by mouth daily. Yes Wood Randhawa MD   multivitamin with iron (FLINTSTONES) chewable tablet Take 1 Tab by mouth two (2) times a day. Yes Historical Provider     Allergies   Allergen Reactions    Nsaids (Non-Steroidal Anti-Inflammatory Drug) Other (comments)     GI Ulcer- Hx of Gastric Bypass        Review of Systems:    No fevers, chills, dysuria, chest pain, hematuria, change in urine or stool color, ill contacts, shortness of breath.      Objective:     Visit Vitals    /87 (BP 1 Location: Left arm, BP Patient Position: At rest)    Pulse 75    Temp 98.4 °F (36.9 °C)    Resp 18    Ht 5' 3\" (1.6 m)    Wt 78 kg (171 lb 15.3 oz)    LMP 07/08/2018    SpO2 100%    BMI 30.46 kg/m2 Physical Exam:    AVSS Awake, alert, NAD, nontoxic, anicteric  Lungs clear jennifer  Cor-reg rate, rhythm  Abd soft, nondistended, mild tenderness in midepigastrium, no RUQ tenderness, no castro's, no rebound or guarding. Well healed lap incisions. Imaging:  CT scan reviewed. Swirling of mesentery with concern for volvulus,no obstruction. No free air or free fluid. Lab Review:    Recent Results (from the past 24 hour(s))   CBC WITH AUTOMATED DIFF    Collection Time: 07/17/18  5:55 AM   Result Value Ref Range    WBC 4.6 4.6 - 13.2 K/uL    RBC 3.94 (L) 4.20 - 5.30 M/uL    HGB 11.7 (L) 12.0 - 16.0 g/dL    HCT 36.6 35.0 - 45.0 %    MCV 92.9 74.0 - 97.0 FL    MCH 29.7 24.0 - 34.0 PG    MCHC 32.0 31.0 - 37.0 g/dL    RDW 14.4 11.6 - 14.5 %    PLATELET 813 239 - 553 K/uL    MPV 10.5 9.2 - 11.8 FL    NEUTROPHILS 41 40 - 73 %    LYMPHOCYTES 43 21 - 52 %    MONOCYTES 14 (H) 3 - 10 %    EOSINOPHILS 2 0 - 5 %    BASOPHILS 0 0 - 2 %    ABS. NEUTROPHILS 1.9 1.8 - 8.0 K/UL    ABS. LYMPHOCYTES 2.0 0.9 - 3.6 K/UL    ABS. MONOCYTES 0.6 0.05 - 1.2 K/UL    ABS. EOSINOPHILS 0.1 0.0 - 0.4 K/UL    ABS. BASOPHILS 0.0 0.0 - 0.1 K/UL    DF AUTOMATED     METABOLIC PANEL, COMPREHENSIVE    Collection Time: 07/17/18  5:55 AM   Result Value Ref Range    Sodium 142 136 - 145 mmol/L    Potassium 4.3 3.5 - 5.5 mmol/L    Chloride 105 100 - 108 mmol/L    CO2 31 21 - 32 mmol/L    Anion gap 6 3.0 - 18 mmol/L    Glucose 93 74 - 99 mg/dL    BUN 10 7.0 - 18 MG/DL    Creatinine 0.77 0.6 - 1.3 MG/DL    BUN/Creatinine ratio 13 12 - 20      GFR est AA >60 >60 ml/min/1.73m2    GFR est non-AA >60 >60 ml/min/1.73m2    Calcium 8.9 8.5 - 10.1 MG/DL    Bilirubin, total 0.2 0.2 - 1.0 MG/DL    ALT (SGPT) 34 13 - 56 U/L    AST (SGOT) 24 15 - 37 U/L    Alk.  phosphatase 108 45 - 117 U/L    Protein, total 7.5 6.4 - 8.2 g/dL    Albumin 3.2 (L) 3.4 - 5.0 g/dL    Globulin 4.3 (H) 2.0 - 4.0 g/dL    A-G Ratio 0.7 (L) 0.8 - 1.7     LIPASE    Collection Time: 07/17/18 5:55 AM   Result Value Ref Range    Lipase 59 (L) 73 - 393 U/L   CARDIAC PANEL,(CK, CKMB & TROPONIN)    Collection Time: 07/17/18  5:55 AM   Result Value Ref Range     26 - 192 U/L    CK - MB 1.6 <3.6 ng/ml    CK-MB Index 1.0 0.0 - 4.0 %    Troponin-I, Qt. <0.02 0.00 - 0.06 NG/ML       Assessment:     Abdominal pain with a history of LRYGB and over 100lbs wt loss, CT findings concerning for possible internal hernia. This pain could also be another ulcer though this seems less likely given intermittent nature of the pain and pt remains on ulcer treatment. Plan:     1. I recommend proceeding with admission, dx laparoscopy, possible internal hernia repair, and upper endoscopy. 2. Discussed aspects of surgical intervention, methods, risks (including by not limited to infection, bleeding, injury to abdominal organs such as the bowel, conversion to open, bowel resection, DVT/PE, MI, pneumonia, death,  hematoma, and perforation of the intestines or solid organs), and the risks of general anesthetic. The patient understands the risks; any and all questions were answered to the patient's satisfaction.       Signed By: Neeraj Still MD     July 17, 2018

## 2018-07-18 VITALS
WEIGHT: 171.96 LBS | HEART RATE: 71 BPM | SYSTOLIC BLOOD PRESSURE: 113 MMHG | TEMPERATURE: 98 F | OXYGEN SATURATION: 96 % | DIASTOLIC BLOOD PRESSURE: 76 MMHG | BODY MASS INDEX: 30.47 KG/M2 | HEIGHT: 63 IN | RESPIRATION RATE: 18 BRPM

## 2018-07-18 PROBLEM — K45.8 INTERNAL HERNIA: Status: RESOLVED | Noted: 2018-07-17 | Resolved: 2018-07-18

## 2018-07-18 PROBLEM — R10.9 ABDOMINAL PAIN: Status: RESOLVED | Noted: 2018-07-17 | Resolved: 2018-07-18

## 2018-07-18 PROCEDURE — 74011250636 HC RX REV CODE- 250/636: Performed by: SURGERY

## 2018-07-18 PROCEDURE — 99218 HC RM OBSERVATION: CPT

## 2018-07-18 PROCEDURE — 74011250637 HC RX REV CODE- 250/637: Performed by: SURGERY

## 2018-07-18 RX ORDER — OXYCODONE HYDROCHLORIDE 5 MG/1
5 TABLET ORAL
Qty: 30 TAB | Refills: 0 | Status: SHIPPED | OUTPATIENT
Start: 2018-07-18 | End: 2018-08-08

## 2018-07-18 RX ADMIN — OXYCODONE HYDROCHLORIDE AND ACETAMINOPHEN 2 TABLET: 5; 325 TABLET ORAL at 05:34

## 2018-07-18 RX ADMIN — ACETAMINOPHEN 650 MG: 325 TABLET, FILM COATED ORAL at 09:25

## 2018-07-18 RX ADMIN — ONDANSETRON 4 MG: 2 INJECTION INTRAMUSCULAR; INTRAVENOUS at 08:12

## 2018-07-18 NOTE — PROGRESS NOTES
Chart reviewed. Pt admitted for hernia repair. Discharge order noted. CM will follow for discharge planning needs. 2660  Met with patient at bedside. Pts young daughter also asleep at bedside. Pt will dc today 7/18/18. Pts mother, Shama Martínez will drive her home at discharge. Pt states her mother will arrive at approx 12p to drive her home. Pt independent. Pt denies using any DME. No discharge concerns identified. CM will cont to be available. Reason for Admission:   Per H&P, pt is a 34 y. o. female with pmh sig for conversion of LSG to LRYGB in 9/18, who is being admitted for abdominal pain. She also has a history of marginal ulcer in 11/18.   The pain is located in the mid epigastrium, with radiation to the back. She reports the pain started Saturday night after eating stuffed cabbage as a burning pain. She also noted bloating, nausea. She thought she needed to have a bm and took MOM and gas X. She said this seemed to help and the pain resolved. She woke up on Monday with no pain, but then it returned after eating red peppers with ranch dressing. She still has 8/10 pain at present. She has never had this pain before. She denies fevers, chills, vomiting. She denies chest pain, shortness of breath, dysuria, hematuria, change in urine color or stool color. Last bm was last night which was loose due to Milk of Magnesia, but no blood in stool or urine. Urine color is normal and clear per patient. She is still nauseated at present and is belching. She reports flatus.  She had intended to call Dr. Sheryl Peterson office today, but because the pain was so unrelenting she decided to go to the ED last night.                            RRAT Score:          8, low           Plan for utilizing home health:     None at this time                     Likelihood of Readmission:  low                         Transition of Care Plan:          Discharge home with MD follow up and family assistance                Care Management Interventions  PCP Verified by CM: Yes  Mode of Transport at Discharge:  Other (see comment) (mother)  Transition of Care Consult (CM Consult): Discharge Planning  Current Support Network: Own Home  Confirm Follow Up Transport: Self  Plan discussed with Pt/Family/Caregiver: Yes  Discharge Location  Discharge Placement: Home with family assistance

## 2018-07-18 NOTE — PROGRESS NOTES
0745 - Bedside shift change report given to Dennice Gosselin, RN (oncoming nurse) by Wil Sy RN (offgoing nurse). Report included the following information SBAR, Kardex, ED Summary, OR Summary, Intake/Output, MAR and Recent Results. Pt resting comfortably in bed. Dr. Karol Forbes and SHAHID Ling in to see pt this morning; plan to discharge. Pt c/o mild nausea when eating sausage this morning. 1 small episode emesis, bilious. Zofran administered on pt request.  No additional nausea. Active BS and pt tolerated remainder of breakfast.  Has zofran Rx at home. Patient Vitals for the past 12 hrs:   Temp Pulse Resp BP SpO2   07/18/18 0732 98 °F (36.7 °C) 71 18 113/76 96 %   07/18/18 0043 98 °F (36.7 °C) 75 14 109/77 100 %   07/17/18 2244 97.6 °F (36.4 °C) 77 14 109/63 100 %     0930 - I have reviewed discharge instructions and medications with the patient. The patient verbalized understanding. Pt to discharge home driven by family.

## 2018-07-18 NOTE — PROGRESS NOTES
Bedside shift change report given to ARIELLE Gabriel. Report included the following information SBAR, Kardex, Intake/Output, MAR and Recent Results.

## 2018-07-18 NOTE — PROGRESS NOTES
Bariatric Surgery Progress Note    Patient Name: Lucas Bruno    AEQRX'G Date: 7/18/2018    Admit Date: 7/17/2018    Subjective:     Diet: Patient is on stage 4 bariatric diet and {IS tolerating. She was able to eat a tray last night easily. Nausea is not present. Vomiting is not present. Pain: Patient does not  complain of her pre operative abdominal pain. However, she does complain of mild incisional pain in the lower abdomen which is controlled by ice pack and oral percocet. Overall she is feeling much better since her surgery. Bowel Movements: No bm since surgery. Bleeding:  none    Current Facility-Administered Medications   Medication Dose Route Frequency    sodium chloride (NS) flush 5-10 mL  5-10 mL IntraVENous Q8H    sodium chloride (NS) flush 5-10 mL  5-10 mL IntraVENous PRN    naloxone (NARCAN) injection 0.4 mg  0.4 mg IntraVENous PRN    ondansetron (ZOFRAN) injection 4 mg  4 mg IntraVENous Q4H PRN    lactated Ringers infusion  150 mL/hr IntraVENous CONTINUOUS    oxyCODONE-acetaminophen (PERCOCET) 5-325 mg per tablet 1-2 Tab  1-2 Tab Oral Q6H PRN    acetaminophen (TYLENOL) tablet 650 mg  650 mg Oral Q8H PRN          Objective:     Visit Vitals    /76 (BP 1 Location: Left arm, BP Patient Position: At rest)    Pulse 71    Temp 98 °F (36.7 °C)    Resp 18    Ht 5' 3\" (1.6 m)    Wt 78 kg (171 lb 15.3 oz)    SpO2 96%    Breastfeeding No    BMI 30.46 kg/m2       07/16 1901 - 07/18 0700  In: 2590 [I.V.:2590]  Out: 805 [Urine:800]    AVSS  Awake, alert, NAD  Lungs clear jennifer  Cor reg  Abd soft, nondistended, min incisional tenderness, wounds clean, dry, intact with skin glue. No erythema.  No midepigastric tenderness    Data Review:    Labs: Results:       Chemistry Recent Labs      07/17/18   0555  07/15/18   2252   GLU  93  111*   NA  142  139   K  4.3  3.6   CL  105  102   CO2  31  34*   BUN  10  12   CREA  0.77  0.63   CA  8.9  9.0   AGAP  6  3   BUCR  13  19   AP 108  122*   TP  7.5  7.8   ALB  3.2*  3.4   GLOB  4.3*  4.4*   AGRAT  0.7*  0.8      CBC w/Diff Recent Labs      07/17/18   0555  07/15/18   2252   WBC  4.6  6.1   RBC  3.94*  4.01*   HGB  11.7*  11.8*   HCT  36.6  37.9   PLT  267  264   GRANS  41  47   LYMPH  43  39   EOS  2  2      Coagulation No results for input(s): PTP, INR, APTT in the last 72 hours. No lab exists for component: INREXT    Liver Enzymes Recent Labs      07/17/18   0555   TP  7.5   ALB  3.2*   AP  108   SGOT  24          Assessment:     Active Problems:    S/P gastric bypass (7/17/2018)      POD# 1 s/p diagnostic laparoscopy, lysis of adhesions, internal hernia repair, upper endoscopy-doing well  Plan:     Continue stage 4 diet. COnt OOB with ambulation. Cont pain control with oral roxicet. Discharge home with f/u in 2wks with Dr. Johana Galeano. Procedure reviewed with the patient including operataive findings. Given no marginal ulcer. Stop carafate and wean off protonix.                Kadie Walker MD  July 18, 2018

## 2018-07-18 NOTE — OP NOTES
[address] REPORT    Terra De Leon  MR#: [id number]  : 1985  ACCOUNT #: [de-identified]   DATE OF SERVICE: 2018    PREOPERATIVE DIAGNOSIS:  Abdominal pain status post gastric bypass. POSTOPERATIVE DIAGNOSIS:  Internal hernia. PROCEDURE PERFORMED:  Diagnostic laparoscopy, lysis of adhesions, internal hernia repair on upper endoscopy. SURGEON:  Carlos Sousa MD    ASSISTANT:  Shane Abbasi. ANESTHESIA:  General.    ESTIMATED BLOOD LOSS:  10 mL. FINDINGS:    1.  Small internal hernia defect under the Kori limb with small bowel through the defect. 2. Adhesions in the upper abdomen from prior bypass surgery. 3. Upper endoscopy demonstrated a patent anastomosis with no ulcer. SPECIMENS REMOVED:  None. COMPLICATIONS:  None apparent. IMPLANTS: NA    DRAINS:  None. HISTORY:  This is a 29-year-old female who has a past medical history significant for conversion of laparoscopic sleeve gastrectomy to laparoscopic Kori-en-Y gastric bypass performed in 2017. She also had a history of a marginal ulcer diagnosed in 2017 which she had been on therapy for. She presented to the emergency room with the complaint of abdominal pain. The pain was described as a burning in the upper abdomen with radiation to the back. She had never had pain like this before. It started following a meal on the evening of 18. The patient also noted that she was bloated. She reported taking medication which did seem to help. Her symptoms resolved on their own. However, when she tried to eat again on the  she had return of her abdominal pain. She came to the emergency room for evaluation. She had a CT scan which demonstrated swirling in the mesentery and volvulus of the small bowel suggesting an internal hernia. She was subsequently admitted.   After full disclosure of all risks and benefits including bleeding, infection, injury to surrounding structures, injury to the bowel, possible bowel resection, conversion to open, DVT/PE, MI, pneumonia, death, perforation during upper endoscopy as well as bleeding. The patient agreed to proceed with diagnostic laparoscopy and internal hernia repair as well as upper endoscopy. DESCRIPTION OF PROCEDURE:  The patient was brought to the operating room, properly identified, placed on the operating table in the supine position. After induction of anesthesia and successful intubation, the patient's abdomen was prepped and draped in the usual sterile fashion. Preoperative antibiotics were given. An active timeout was performed with all team members. The patient was placed in slight reverse Trendelenburg. The Veress needle was inserted in the left upper quadrant at Rowan's point. A successful placement was confirmed with eye opening pressures and the abdomen was insufflated to a pressure of 15 mmHg with CO2. After achieving pneumoperitoneum the skin inferior to the umbilicus was infiltrated with a local solution of 0.25% Marcaine with epinephrine. An incision was made. A 5 mm trocar was placed using Optiview technique. Next, we exchanged the 5 mm 0 degree laparoscope for the 5 mm 30 degree laparoscope and inspected the position of our Veress needle. There was noted to be no underlying injury and the Veress needle was removed. We did notice some adhesions, particularly the colon and the Kori limb in the upper abdomen. Next, after infiltrating the peritoneum and skin in the left lateral abdomen, a 5 mm trocar was placed under direct visualization. In a similar fashion after anesthetizing the skin and peritoneum in the left lower abdomen, a 5 mm trocar was placed under direct visualization. Next, attention was turned to the right abdomen where again we infiltrated vesicouterine peritoneum with the 0.25% Marcaine with epinephrine. An incision was made and a 5 mm trocar was placed under direct visualization.   We began adhesiolysis as the Kori limb appeared to be adhesed to the upper abdominal wall. Adhesiolysis was performed using the EndoShears as well as blunt dissection to bring down the Kori limb. There was also an omental adhesion to the anterior abdominal wall, which was taken down with the Harmonic scalpel. Next, we identified the cecum and began to run the small bowel starting at the cecum towards the jejunojejunostomy. In the course of doing this, we identified that there indeed was an internal hernia. As we were reducing bowel out of a defect. We ran the small bowel to the jejunojejunostomy and then grasped the common channel to further evaluate the bypass anatomy. We ran the Kori limb up to the gastrojejunostomy and the BP limb back. We identified that there was an internal hernia defect under the Kori limb. We inspected the Kori limb on both sides to ensure that yes there was a communication between the right abdomen and the left abdomen and indeed there was. Next we exchanged the left lateral abdomen 5mm trocar for a 12mm trocar. We repaired this defect by taking omentum and tacking it down to the small bowel mesentery under the Kori limb using the 20 cm V-Loc suture on the endostitch in a running fashion to seal the space. Once we completed this, we again inspected to ensure that we did close the defect. We then inspected under the jejunojejunostomy and found that there was no defect there. We then placed a bowel clamp across the Kori limb and performed upper endoscopy. Given her history of marginal ulcer, we are reevaluating for resolution and healing versus persistence. There was noted to be a widely patent gastrojejunostomy anastomosis. There was no friability and no bleeding. There was also no ulcer. The Kori limb was desufflated as the gastroscope was removed. Of note, during performance of the upper endoscopy her esophagus appears somewhat tortuous. No biopsies were taken.   We closed the fascia of the left lateral abdomen site which we had upsized to a 12 mm trocar in order to use the EndoStitch. The fascia was closed using 0 Vicryl on the Ankit-Ghazala suture passer. We then removed the remaining trocars under direct visualization and desufflated the abdomen. Additional local solution was infiltrated into all of the wounds. The skin was closed using 4-0 Vicryl in an inverted interrupted subcuticular technique at the umbilicus and the left   lower quadrant incisions. The right lateral abdomen incision and left lateral abdomen incisions were closed using 4-0 Vicryl in a running subcuticular fashion. The wounds were dressed with Exofix glue. All sponge, needle, instrument counts were correct. The patient was extubated and taken to recovery in stable condition. Operative findings were discussed with the patient's mother upon conclusion of the case. MD KENDALL Redding / MUNIR  D: 07/17/2018 16:24     T: 07/17/2018 22:35  JOB #: [id number]  I was covering for Dr. Sreedhar Spencer during this time. Correction of initial procedure dates to 2017.

## 2018-07-18 NOTE — PROGRESS NOTES
Inpatient Daily Progress Note    Subjective:   Patient states that she is feeling much better since the surgery. Pain is well controlled. She has not had nausea, has not vomited, has not passed flatus, and has not had a bowel movement. She has been ambulating multiple times since surgery and using IS 10x/hr. Patient states she learned her lesson about using IS after her bypass surgery. Objective:     Physical Exam:  Visit Vitals    /77 (BP 1 Location: Right arm, BP Patient Position: At rest)    Pulse 75    Temp 98 °F (36.7 °C)    Resp 14    Ht 5' 3\" (1.6 m)    Wt 78 kg (171 lb 15.3 oz)    LMP 07/08/2018    SpO2 100%    Breastfeeding No    BMI 30.46 kg/m2       Gen: Well developed, well nourished 34 y.o. female in no acute distress  Resp: clear to auscultation bilaterally, no wheezes   Cardio: Regular rate and rhythm, no murmurs, clicks, gallops or rubs, no edema  Abdomen: soft, appropriately tender, nondistended, with active bowel sounds, no hernias  Psych: alert and oriented to person, place and time  Incisions: healing well, no drainage, no erythema, no hernia, no seroma, no swelling, no dehiscence, incisions well approximated      No results found for this or any previous visit (from the past 12 hour(s)). Assessment:     Deanne Rodriguez is a 34 y.o. female with 10 mo Hx of conversion sleeve gastrectomy to laparoscopic gastric bypass and 2 mo s/p Hx of anastomotic ulcer. POD #1 s/p diagnostic laparotomy with EGD for internal hernia and confirmation of healed anastomotic ulcer.       Plan:     - Continue current medications  - Encourage ambulation once an hour  - IS 10x/hr  - Bariatric stage 4 diet  - Likely D/C later this AM  - Do not D/C until Dr. Zeferino Navarrete examines patient and gives clearance for D/C

## 2018-07-18 NOTE — ROUTINE PROCESS
Bedside and Verbal shift change report given to PIETER Payan RN (oncoming nurse) by Keri Emerson (offgoing nurse). Report included the following information SBAR, Kardex, Intake/Output and MAR.

## 2018-07-18 NOTE — PROGRESS NOTES
Shift Summary:  Patient required Percocet 2 tablets po overnight for pain to abdomen rating 6/10, which provided relief. Lap sites x 4 to abdomen YONG, intact. KASSIDY drain intact draining serosanguineous drainage. ABD dressings to abdomen CDI. Bowel sounds hypoactive. Patient not passing flatus at this time. Patient burping. Educated patient on I.S and reached to 2000 ml. Patient ambulated x 2 in hallway. Vital signs stable.

## 2018-07-18 NOTE — DISCHARGE INSTRUCTIONS
[facility]  Ayaz Marquez. Matthias Young M.D., F.A.C.S.  [facility] [address] [place] [address], [address] [place] [address]  Office: [phone]    Fax:  [phone]    Discharge Instruction for Patients    Diet:   Goal for total fluid intake is a minimum of 64 ounces per day.  Aim for  Grams of protein every day. Activity:   Get up and walk at least once an hour during normal waking hours.  Walk a minimum of 30 minutes every day for exercise. Rest when you are tired.  Use your Incentive Spirometry (plastic breathing machine) 10 times every hour for two weeks. Take a slow steady breath in until you can not inhale anymore.  You may shower. No baths, hot tubs or swimming.  You may climb stairs. Take your time.  No lifting more than 10-15 lbs.  If you feel discomfort during an activity, rest.   Do not drive for 1 week or until you are off of all narcotics. Wound Care:   Clean incisions with soap and water when in the shower. Pat dry.  Leave glue on incision sites until it falls off.  A small amount of drainage may be present from the incisions. Contact the office if you notice an increase in drainage, an odor, increased redness, or fever > 100.5. Medications:     You should have received a prescription for pain medication. Take as directed when needed. You can take Tylenol for your pain and use the prescribed pain medication for break through pain. The first day you may want to alternate doses of Tylenol and Roxicodone spaced ever 3 hours. Do not exceed 4,000mg of Tylenol in a 24 hour period.  For upset stomach you may take over the counter medications such as Maalox, Mylanta, or Pepto Bismol.  Gas X works very well for bloating when eating or drinking.  Take a childrens or adult chewable multivitamin.  Milk of Magnesia will provide immediate relief of constipation (not for daily use).   Daily use of Miralax or Benefiber may be needed if you develop chronic constipation.  It is fine to take your usual home medications. I have discontinued your prescription for potassium because your levels are back to normal.    Follow-Up Appointment:   If you do not already have a follow-up appointment scheduled, contact the office in the next few days to obtain one. It is usually scheduled for 10-14 days after your surgery date. Office phone number:  [phone].  Call our office if you have questions, concerns or any worsening of condition. If you are not able to reach us, go to your primary care provider or the Emergency Department.

## 2018-07-18 NOTE — PROGRESS NOTES
Problem: Falls - Risk of  Goal: *Absence of Falls  Document Neena Fall Risk and appropriate interventions in the flowsheet.   Fall Risk Interventions:            Medication Interventions: Assess postural VS orthostatic hypotension

## 2018-07-18 NOTE — DISCHARGE SUMMARY
Discharge Summary    Patient: Rigoberto Guevara               Sex: female          DOA: 7/17/2018         YOB: 1985      Age:  34 y.o.        LOS:  LOS: 1 day                Admit Date: 7/17/2018    Discharge Date: 7/18/2018    Admission Diagnoses: Internal hernia;S/P gastric bypass; Abdominal pain;INTERNAL *    Discharge Diagnoses:    Problem List as of 7/18/2018  Date Reviewed: 7/17/2018          Codes Class Noted - Resolved    Abdominal pain ICD-10-CM: R10.9  ICD-9-CM: 789.00  7/17/2018 - Present        Internal hernia ICD-10-CM: K45.8  ICD-9-CM: 553.8  7/17/2018 - Present        S/P gastric bypass ICD-10-CM: Z98.84  ICD-9-CM: V45.86  7/17/2018 - Present        Acute gastric ulcer with hemorrhage ICD-10-CM: K25.0  ICD-9-CM: 531.00  3/29/2018 - Present        Spondylolisthesis of lumbar region ICD-10-CM: M43.16  ICD-9-CM: 738.4  2/2/2018 - Present        History of spinal surgery ICD-10-CM: Z98.890  ICD-9-CM: V45.89  2/2/2018 - Present    Overview Signed 2/2/2018 10:44 AM by Bertin Weinstein     scoliosis surgery             Status post bariatric surgery ICD-10-CM: Z98.84  ICD-9-CM: V45.86  Unknown - Present        Intestinal malabsorption ICD-10-CM: K90.9  ICD-9-CM: 579.9  Unknown - Present        Nausea & vomiting ICD-10-CM: R11.2  ICD-9-CM: 787.01  Unknown - Present        Incisional hernia ICD-10-CM: K43.2  ICD-9-CM: 553.21  Unknown - Present        RESOLVED: Morbid obesity with BMI of 40.0-44.9, adult ([facility] ICD-10-CM: E66.01, Z68.41  ICD-9-CM: 278.01, V85.41  9/26/2017 - 3/29/2018        RESOLVED: Morbid obesity with body mass index of 40.0-49.9 ([facility].) ICD-10-CM: E66.01  ICD-9-CM: 278.01  Unknown - 3/29/2018              Discharge Condition: Good    Hospital Course: The patient underwent  diagnostic laparoscopy, internal hernia repair, lysis of adhesions, upper endoscopy  on 7/17/2018. The patient tolerated the procedure well.  Vital signs remained stable and the patient was transferred to  [facility] floor surgical unit without complications. The patient remained stable throughout the first night post operatively with stable vital signs and adequate urine output and pain control. Pain was controlled with Percocet orally and oral Tylenol . The patient on the first morning was tolerating a diet, ambulating, and performing incentive spirometry with good pain control. Her pre op pain was resolved. They were therefore discharged home with instructions to notify me with any issues that may arise. Significant Diagnostic Studies:   Recent Labs      07/17/18   0555  07/15/18   2252   HGB  11.7*  11.8*     Recent Labs      07/17/18 0555  07/15/18   2252   HCT  36.6  37.9       Current Discharge Medication List      START taking these medications    Details   oxyCODONE IR (ROXICODONE) 5 mg immediate release tablet Take 1 Tab by mouth every six (6) hours as needed for Pain (moderate pain). Max Daily Amount: 20 mg.  Qty: 30 Tab, Refills: 0    Associated Diagnoses: Incisional pain; S/P exploratory laparotomy         CONTINUE these medications which have NOT CHANGED    Details   ondansetron (ZOFRAN ODT) 4 mg disintegrating tablet Take 1 Tab by mouth every eight (8) hours as needed for Nausea. Qty: 14 Tab, Refills: 0      famotidine (PEPCID) 20 mg tablet Take 1 Tab by mouth daily for 10 days. Qty: 10 Tab, Refills: 0      pantoprazole sodium (PROTONIX PO) Take  by mouth. ferrous sulfate ER (IRON) 160 mg (50 mg iron) TbER tablet Take 1 Tab by mouth daily. Biotin 2,500 mcg cap Take  by mouth. Associated Diagnoses: Intestinal malabsorption, unspecified type; Status post bariatric surgery; Hypovitaminosis D      cyanocobalamin (VITAMIN B-12) 500 mcg tablet Take 500 mcg by mouth daily. calcium citrate 200 mg (950 mg) tablet Take  by mouth daily. multivitamin with iron (FLINTSTONES) chewable tablet Take 1 Tab by mouth two (2) times a day.          STOP taking these medications       potassium 99 mg tablet Comments:   Reason for Stopping:               Activity: activity as tolerated with no heavy lifting of greater than 20 pounds. No anti- inflammatory medications. Use stool softeners at home as needed while taking pain medications since they are constipating. Diet: Bariatric stage 4    Wound Care: Keep wound clean and dry. May shower today.      Follow-up: 14 days with Dr Karel Rockwell MD

## 2018-07-19 ENCOUNTER — TELEPHONE (OUTPATIENT)
Dept: SURGERY | Age: 33
End: 2018-07-19

## 2018-07-19 NOTE — TELEPHONE ENCOUNTER
I left a message for patient. She was discharged from the hospital yesterday after having an internal hernia repair. I was calling to make sure she is doing well since returning home. Patient is to contact our office with any problems, worsening of condition, questions or concerns. If we are not available or unable to be reached, patient is to proceed to the Emergency Department.

## 2018-07-19 NOTE — TELEPHONE ENCOUNTER
Patient returned my call. She states that she is constipated so she took MOM and has not had complete relief yet, but is feeling bloated. She is having no problem drinking and minimum problems eating at this time. I told her to work on having a proper BM and to call me tomorrow if this problem persists. I explained that our office closes at [address] [place] [address].  I also gave pt education on effects of narcotics on gut motility resulting in constipation. Patient verbalized understanding. Patient is to contact our office with any problems, worsening of condition, questions or concerns. If we are not available or unable to be reached, patient is to proceed to the Emergency Department.

## 2018-08-08 ENCOUNTER — OFFICE VISIT (OUTPATIENT)
Dept: SURGERY | Age: 33
End: 2018-08-08

## 2018-08-08 VITALS
HEIGHT: 63 IN | BODY MASS INDEX: 30.25 KG/M2 | HEART RATE: 78 BPM | WEIGHT: 170.7 LBS | SYSTOLIC BLOOD PRESSURE: 129 MMHG | DIASTOLIC BLOOD PRESSURE: 93 MMHG | RESPIRATION RATE: 16 BRPM

## 2018-08-08 DIAGNOSIS — K45.8 INTERNAL HERNIA: Primary | ICD-10-CM

## 2018-08-08 NOTE — MR AVS SNAPSHOT
[address] [facility] 
 
 
 [address] [facility] 150 
[phone] Patient: Carmel Griffith MRN: [id number] :1985 Visit Information Date & Time Provider Department Dept. Phone Encounter #  
 2018  9:00 AM [facility] [phone] [id number] Your Appointments 2018 10:30 AM  
Office Visit with MD Emily Martinez [facility]org[facility] App[facility] Note: Year  
  [facility] [address] [facility] 322 [facility]  
  
   
 [address] [place] [address] Upcoming Health Maintenance Date Due DTaP/Tdap/Td series (1 - Tdap) 2005 PAP AKA CERVICAL CYTOLOGY 2005 Influenza Age 5 to Adult 2018 Allergies as of 2018  Review Complete On: 2018 By: Alysia Steinberg LPN Severity Noted Reaction Type Reactions Nsaids (Non-steroidal Anti-inflammatory Drug) High 2017    Other (comments) GI Ulcer- Hx of Gastric Bypass Current Immunizations  Reviewed on 2018 No immunizations on file. Not reviewed this visit Vitals BP Pulse Resp Height(growth percentile) Weight(growth percentile) BMI  
 (!) 129/93 (BP 1 Location: Left arm, BP Patient Position: Sitting) 78 16 5' 3\" (1.6 m) 170 lb 11.2 oz (77.4 kg) 30.24 kg/m2 OB Status Smoking Status Having regular periods Never Smoker Vitals History BMI and BSA Data Body Mass Index Body Surface Area  
 30.24 kg/m 2 1.85 m 2 Preferred Pharmacy Pharmacy Name Phone [facility] [address] Charley Pelayo, [address] [place] [address] Your Updated Medication List  
  
   
This list is accurate as of 18  9:22 AM.  Always use your most recent med list.  
  
  
  
  
 Biotin 2,500 mcg Cap Take  by mouth. calcium citrate 200 mg (950 mg) tablet Take  by mouth daily. Iron 160 mg (50 mg iron) Tber tablet Generic drug:  ferrous sulfate ER Take 1 Tab by mouth daily. multivitamin with iron chewable tablet Commonly known as:  Janeice [employer] Take 1 Tab by mouth two (2) times a day. ondansetron 4 mg disintegrating tablet Commonly known as:  ZOFRAN ODT Take 1 Tab by mouth every eight (8) hours as needed for Nausea. VITAMIN B-12 500 mcg tablet Generic drug:  cyanocobalamin Take 500 mcg by mouth daily. Introducing [facility]! Dear Giovanni Hartman: Thank you for requesting a [employer] account. Our records indicate that you already have an active [employer] account. You can access your account anytime at [email or web address]. Share Your Brain/[employer] Did you know that you can access your hospital and ER discharge instructions at any time in [employer]? You can also review all of your test results from your hospital stay or ER visit. Additional Information If you have questions, please visit the Frequently Asked Questions section of the [employer] website at [email or web address]. Share [employer]/. Remember, [employer] is NOT to be used for urgent needs. For medical emergencies, dial 911. Now available from your iPhone and Android! Please provide this summary of care documentation to your next provider. Your primary care clinician is listed as Balaji Rasmussen. If you have any questions after today's visit, please call [phone].

## 2018-08-08 NOTE — PROGRESS NOTES
Subjective:     Rick Forbes  is a 34 y.o. female who presents for follow-up about 2 weeks following internal hernia repair. Body mass index is 30.24 kg/(m^2). Weight Loss Metrics 2018 2018 7/15/2018 2018 2018 2018 3/29/2018   Today's Wt 170 lb 11.2 oz 171 lb 15.3 oz 172 lb 176 lb 177 lb 1.6 oz 176 lb 190 lb 14.4 oz   BMI 30.24 kg/m2 30.46 kg/m2 30.47 kg/m2 31.18 kg/m2 31.37 kg/m2 31.18 kg/m2 33.82 kg/m2       Surgery related complication: none       She reports feeling better and denies vomiting and abdominal pain. Patients pain score:0      The patient's exercise level: moderately active. Changes in her medical history and medications have been reviewed. Patient Active Problem List   Diagnosis Code    Status post bariatric surgery Z98.84    Intestinal malabsorption K90.9    Nausea & vomiting R11.2    Incisional hernia K43.2    Spondylolisthesis of lumbar region M43.16    History of spinal surgery Z98.890    Acute gastric ulcer with hemorrhage K25.0    S/P gastric bypass Z98.84     Past Medical History:   Diagnosis Date    Incisional hernia     Intestinal malabsorption     Morbid obesity with body mass index of 40.0-49.9 ([facility] [address]     Status post bariatric surgery 2012    sleeve resection / kirstin David     Past Surgical History:   Procedure Laterality Date    HX  SECTION      X 3    HX GI  2012    sleeve resection / a huong    HX ORTHOPAEDIC      scoliosis correction surgery X 2    HX TONSIL AND ADENOIDECTOMY      HX TUBAL LIGATION      LAP GASTRIC BYPASS/TAMICA-EN-Y      revision from sleeve on 2017     Current Outpatient Prescriptions   Medication Sig Dispense Refill    ondansetron (ZOFRAN ODT) 4 mg disintegrating tablet Take 1 Tab by mouth every eight (8) hours as needed for Nausea. 14 Tab 0    ferrous sulfate ER (IRON) 160 mg (50 mg iron) TbER tablet Take 1 Tab by mouth daily.  Biotin 2,500 mcg cap Take  by mouth.       cyanocobalamin (VITAMIN B-12) 500 mcg tablet Take 500 mcg by mouth daily.  calcium citrate 200 mg (950 mg) tablet Take  by mouth daily.  multivitamin with iron (FLINTSTONES) chewable tablet Take 1 Tab by mouth two (2) times a day. Objective:     Visit Vitals    BP (!) 129/93 (BP 1 Location: Left arm, BP Patient Position: Sitting)    Pulse 78    Resp 16    Ht 5' 3\" (1.6 m)    Wt 77.4 kg (170 lb 11.2 oz)    BMI 30.24 kg/m2        Physical Exam:    General:  alert, cooperative, no distress, appears stated age   Heart:  Regular rate and rhythm   Abdomen:   abdomen is soft without significant tenderness, masses, organomegaly or guarding; Incisions: healing well       Assessment:     1. History of Morbid obesity, status post  internal hernia repair. Doing well; no concerns. .     Plan:     1. Obtain labs for yearly visit  2. Total time spent with the patient 20 minutes.

## 2018-08-08 NOTE — PATIENT INSTRUCTIONS

## 2018-08-30 ENCOUNTER — OFFICE VISIT (OUTPATIENT)
Dept: SURGERY | Age: 33
End: 2018-08-30

## 2018-08-30 VITALS
BODY MASS INDEX: 30.72 KG/M2 | HEIGHT: 63 IN | DIASTOLIC BLOOD PRESSURE: 80 MMHG | WEIGHT: 173.4 LBS | OXYGEN SATURATION: 100 % | HEART RATE: 81 BPM | SYSTOLIC BLOOD PRESSURE: 126 MMHG | TEMPERATURE: 97.8 F | RESPIRATION RATE: 16 BRPM

## 2018-08-30 DIAGNOSIS — Z98.84 S/P GASTRIC BYPASS: ICD-10-CM

## 2018-08-30 DIAGNOSIS — K90.9 INTESTINAL MALABSORPTION, UNSPECIFIED TYPE: Primary | ICD-10-CM

## 2018-08-30 NOTE — PROGRESS NOTES
Subjective:      Marta Topete is a 34 y.o. female is now 11 months status post conversion of sleeve gastrectomy to laparoscopic gastric bypass. Doing well overall. She has lost a total of 81 pounds since surgery. Body mass index is 30.72 kg/(m^2). Has lost 64% of EBW. Patient is accompanied by her youngest child today, who she states is tired. Taking in 50oz water daily. Sources of protein include grilled chicken and tuna. 30 min of activity 5 days a week, including elliptical.    For the past three days patient has been having pain when she eats. The food hurts going down and it takes a long time to digest. Pain is epigastric like it did when she was fresh out of surgery and trying to eat. It isn't unbearable pain, but it is noticeable. She has not changed her diet. Patient denies NSAID use, steroid use and smoking. She is still able to eat, but it is making her afraid to eat. She has been considering just going back on a liquid diet. Liquids are also causing pain, but not as much as eating. Nothing seems to make this pain better, the pain only happens when she eats. Bowel movements are constipated. The patient is not having any pain. . The patient is compliant with multivitamins, calcium, Vit D and B12 supplements.      Weight Loss Metrics 8/30/2018 8/8/2018 7/17/2018 7/15/2018 7/8/2018 6/27/2018 6/24/2018   Today's Wt 173 lb 6.4 oz 170 lb 11.2 oz 171 lb 15.3 oz 172 lb 176 lb 177 lb 1.6 oz 176 lb   BMI 30.72 kg/m2 30.24 kg/m2 30.46 kg/m2 30.47 kg/m2 31.18 kg/m2 31.37 kg/m2 31.18 kg/m2          Comorbidities:    Hypertension: not applicable  Diabetes: not applicable  Obstructive Sleep Apnea: not applicable  Hyperlipidemia: not applicable  Stress Urinary Incontinence: not applicable  Gastroesophageal Reflux: not applicable  Weight related arthropathy:improved     Patient Active Problem List   Diagnosis Code    Status post bariatric surgery Z98.84    Intestinal malabsorption K90.9    Nausea & vomiting R11.2    Incisional hernia K43.2    Spondylolisthesis of lumbar region M43.16    History of spinal surgery Z98.890    Acute gastric ulcer with hemorrhage K25.0    S/P gastric bypass Z98.84        Past Medical History:   Diagnosis Date    Incisional hernia     Intestinal malabsorption     Morbid obesity with body mass index of 40.0-49.9 ([facility])     Status post bariatric surgery 2012    sleeve resection / a. Marvella Fabry       Past Surgical History:   Procedure Laterality Date    HX  SECTION      X 3    HX GI  2012    sleeve resection / a salzberg    HX ORTHOPAEDIC      scoliosis correction surgery X 2    HX TONSIL AND ADENOIDECTOMY      HX TUBAL LIGATION      LAP GASTRIC BYPASS/TAMICA-EN-Y      revision from sleeve on 2017       Current Outpatient Prescriptions   Medication Sig Dispense Refill    ondansetron (ZOFRAN ODT) 4 mg disintegrating tablet Take 1 Tab by mouth every eight (8) hours as needed for Nausea. 14 Tab 0    ferrous sulfate ER (IRON) 160 mg (50 mg iron) TbER tablet Take 1 Tab by mouth daily.  Biotin 2,500 mcg cap Take  by mouth.  cyanocobalamin (VITAMIN B-12) 500 mcg tablet Take 500 mcg by mouth daily.  calcium citrate 200 mg (950 mg) tablet Take  by mouth daily.  multivitamin with iron (FLINTSTONES) chewable tablet Take 1 Tab by mouth two (2) times a day.          Allergies   Allergen Reactions    Nsaids (Non-Steroidal Anti-Inflammatory Drug) Other (comments)     GI Ulcer- Hx of Gastric Bypass       Review of Systems:  General - No history or complaints of unexpected fever or chills  Head/Neck - No history or complaints of headache or dizziness  Cardiac - No history or complaints of chest pain, palpitations, or shortness of breath  Pulmonary - No history or complaints of shortness of breath or productive cough  Gastrointestinal - as noted above  Genitourinary - No history or complaints of hematuria/dysuria or renal lithiasis  Musculoskeletal - No history or complaints of joint  muscular weakness  Hematologic - No history of any bleeding episodes  Neurologic - No history or complaints of  migraine headaches or neurologic symptoms    Objective:     Visit Vitals    /80 (BP 1 Location: Left arm, BP Patient Position: Sitting)    Pulse 81    Temp 97.8 °F (36.6 °C)    Resp 16    Ht 5' 3\" (1.6 m)    Wt 78.7 kg (173 lb 6.4 oz)    SpO2 100%    BMI 30.72 kg/m2       General:  alert, cooperative, no distress, appears stated age   Chest: lungs clear to auscultation, breath sounds equal and symmetric, no rhonchi, rales or wheezes, no accessory muscle use   Cor:   Regular rate and rhythm or without murmur or extra heart sounds   Abdomen: soft, bowel sounds active, non-tender, no masses or organomegaly   Incisions:   healing well, no drainage, no erythema, no hernia, no seroma, no swelling, no dehiscence, incision well approximated       Assessment:   History of Morbid obesity, status post conversion of sleeve gastrectomy to laparoscopic gastric bypass. Doing well postoperatively. Epigastric pain - patient already has carafate and protonix, I told her to start both of them and if she is worse, she is to call our office on Monday morning, if she is not worse, I will call her on Tuesday to check in with her and will possibly schedule her for an UGI on Wed if she is not improving. Plan:     1. Increase fluids  2. Discussed patients weight loss goals and dietary choices in relation to goals. 3. Reminded to measure portions, continue high protein, low carbohydrate diet. Reminded to eat regularly, to eat slowly & not to drink with meals. 4. Continue vitamin supplementation  5. Continue current medications and follow up with PCP for management of regimen. 6. Continue cardio exercise and add resistance exercises. 60-90 minutes of aerobic activity 5 days a week and strength training 2 days each week. 7. Encouraged to attend support group   8.  Patient to complete labs before next visit. Lab slip given today. 9. I have discussed this plan with patient and they verbalized understanding  10. Follow up in 6 months or sooner if patient has questions, concerns or worsening of condition, if unable to reach our office, patient should report to the ED. 6. Ms. Angela Cruz has a reminder for a \"due or due soon\" health maintenance. I have asked that she contact her primary care provider for a follow-up on this health maintenance.

## 2018-08-30 NOTE — PATIENT INSTRUCTIONS
Patient Instructions      1. Remember hydration goals - minimum of 64 ounces of liquids per day (dehydration is the number one reason for hospital readmission). 2. Continue to monitor carbohydrate and protein intake you need a minimum of  Grams of protein daily- remember to keep your total carbohydrates to 50 grams or less per day for best results. 3. Continue to work towards exercise goals - 60-90 minutes, 5 times a week minimum of deliberate, aerobic exercise is the ultimate goal with strength training 2 times each week. Refer to [employer] for  information. 4. Remember to take vitamins as directed in your handbook. 5. Attend support group the 2nd Thursday of each month. 6. Constipation: Milk of Magnesia is for immediate relief. Miralax is to be used every day if constipation is a chronic problem. 7. Diarrhea: patients will occasionally develop lactose intolerance after surgery. Check to see if your protein shake has whey in it. If it does try one that does not have whey and stop all yogurts, cheeses and milks to see if the diarrhea goes away. 8. Call us at [phone] or email us through [facility] with questions,     concerns or worsening of condition, we have someone on call 24 hours a day. If you are unable to reach our office, you are to go to your Primary Care Physician or the Emergency Department. Supplement Resource Guide    Importance of Protein:   Maintains lean body mass, produces antibodies to fight off infections, heals wounds, minimizes hair loss, helps to give you energy, helps with satiety, and keeping you full between meals. Importance of Calcium:  Needed for healthy bones and teeth, normal blood clotting, and nervous system functioning, higher risk of osteoporosis and bone disease with non-compliance. Importance of Multivitamins: Many functions.   Supply you with extra nutrients that you may be missing from food. May lead to iron deficiency anemia, weakness, fatigue, and many other symptoms with non-compliance. Importance of B Vitamins:  Important for red blood cell formation, metabolism, energy, and helps to maintain a healthy nervous system. Protein Supplement  Find one you like now. Use immediately after surgery. Look for:  35-50g protein each day from your protein supplement once you reach the progression diet. 0-3 g fat per serving  0-3 g sugar per serving    Protein drinks should be split in separate dosages. Recommend: Lifelong  1 year + Calcium Supplement:     Start taking within a month after surgery. Look for: Calcium Citrate Plus D (1500 mg per day)  Recommend: Citracal     .            Avoid chocolate chewable calcium. Can use chewable bariatric or [employer] brand or similar chewable. The body cannot absorb more than 500-600 mg of calcium at a time. Take for Life Multi-vitamin Supplement:      Start immediately after surgery: any complete chewable, such as: Corinths Complete chewables. Avoid Corinth sours or gummies. They lack iron and other important nutrients and also have added sugar. Continue with chewable vitamin or change to adult complete multivitamin one month after surgery. Menstruating women can take a prenatal vitamin. Make sure has at least 18 mg iron and 867-948 mcg folic acid   Vitamin K56, B Complex Vitamin, and Biotin  Start taking within a month after surgery. Vitamin B12:  1000 mcg of Vitamin B12 three times weekly    Must take sublingually (meaning you take it under your tongue) or in a liquid drop form for easy absorption. B Complex Vitamin: Take a pill or liquid drop form once daily. Biotin: This vitamin can help prevent hair loss.     Recommend 5mg   (5000 mcg) a day  Biotin is Optional

## 2018-09-04 ENCOUNTER — TELEPHONE (OUTPATIENT)
Dept: SURGERY | Age: 33
End: 2018-09-04

## 2018-09-04 NOTE — TELEPHONE ENCOUNTER
I called patient to see how she is doing. She states that she is starting to feel better by taking the PPI and carafate. She will let me know if her condition changes. Patient is to contact our office with any problems, worsening of condition, questions or concerns. If we are not available or unable to be reached, patient is to proceed to the Emergency Department.

## 2018-09-26 ENCOUNTER — OFFICE VISIT (OUTPATIENT)
Dept: SURGERY | Age: 33
End: 2018-09-26

## 2018-09-26 VITALS
RESPIRATION RATE: 16 BRPM | DIASTOLIC BLOOD PRESSURE: 78 MMHG | OXYGEN SATURATION: 100 % | HEIGHT: 63 IN | SYSTOLIC BLOOD PRESSURE: 113 MMHG | HEART RATE: 73 BPM | TEMPERATURE: 98 F | WEIGHT: 166.6 LBS | BODY MASS INDEX: 29.52 KG/M2

## 2018-09-26 DIAGNOSIS — R10.9 ABDOMINAL PAIN, UNSPECIFIED ABDOMINAL LOCATION: Primary | ICD-10-CM

## 2018-09-26 RX ORDER — PHENOL/SODIUM PHENOLATE
20 AEROSOL, SPRAY (ML) MUCOUS MEMBRANE 2 TIMES DAILY
Qty: 60 TAB | Refills: 1 | Status: SHIPPED | OUTPATIENT
Start: 2018-09-26 | End: 2018-09-27 | Stop reason: ALTCHOICE

## 2018-09-26 NOTE — PROGRESS NOTES
1 year follow-up / 2 months post-op (internal hernia) Subjective:  
 
Teresa Banegas  is a 34 y.o. female who presents for follow-up about 1 year following sleeve to laparoscopic gastric bypass surgery. She has lost a total of 90 pounds since surgery. Body mass index is 29.51 kg/(m^2). . EBWL is (71%). The patient presents today to assess their progress toward their goal of weight loss and to address any issues that may be present. Today the patient and I have reviewed their diet and how appropriate their food choices are. The following issues have been identified - She states she felt \"better\" for 2 weeks post internal hernia repair but then had dysphagia. She was started on PPI and carafate and states she felt better. She ran out of her PPI 4 days ago and her dysphagia has returned and states she has left shoulder pain. She has remained afebrile Pain assessment - (pt describes discomfort and \"just don't feel well\" Mel Jones Surgery related complication: NA - did have internal hernia repair via Dr. Myrna Gaming at the 10 month miranda Eliceo Hampton stated all previous ulcers had resolved at the time of her endoscopy performed in conjunction with internal hernia repair). She reports see above and denies diarrhea and difficulty breathing. The patient's exercise level: moderately active. Changes in her medical history and medications have been reviewed. Patient Active Problem List  
Diagnosis Code  Status post bariatric surgery Z98.84  
 Intestinal malabsorption K90.9  Nausea & vomiting R11.2  
 Incisional hernia K43.2  Spondylolisthesis of lumbar region M43.16  
 History of spinal surgery Z98.890  
 Acute gastric ulcer with hemorrhage K25.0  S/P gastric bypass Z98.84 Past Medical History:  
Diagnosis Date  Incisional hernia  Intestinal malabsorption  Morbid obesity with body mass index of 40.0-49.9 ([facility])  Status post bariatric surgery 7/2012 sleeve resection / a. Luke Situ Past Surgical History:  
Procedure Laterality Date  HX  SECTION    
 X 3  
 HX GI  2012  
 sleeve resection / a salzberg  HX ORTHOPAEDIC    
 scoliosis correction surgery X 2  
 HX TONSIL AND ADENOIDECTOMY  HX TUBAL LIGATION    
 LAP GASTRIC BYPASS/TAMICA-EN-Y    
 revision from sleeve on 2017 Current Outpatient Prescriptions Medication Sig Dispense Refill  Omeprazole delayed release (PRILOSEC D/R) 20 mg tablet Take 1 Tab by mouth two (2) times a day. 60 Tab 1  
 ferrous sulfate ER (IRON) 160 mg (50 mg iron) TbER tablet Take 1 Tab by mouth daily.  Biotin 2,500 mcg cap Take  by mouth.  calcium citrate 200 mg (950 mg) tablet Take  by mouth daily.  multivitamin with iron (FLINTSTONES) chewable tablet Take 1 Tab by mouth two (2) times a day.  ondansetron (ZOFRAN ODT) 4 mg disintegrating tablet Take 1 Tab by mouth every eight (8) hours as needed for Nausea. 14 Tab 0  
 cyanocobalamin (VITAMIN B-12) 500 mcg tablet Take 500 mcg by mouth daily. Review of Symptoms:  
 
General - No history or complaints of unexpected fever or chills Head/Neck - No history or complaints of headache or dizziness Cardiac - No history or complaints of chest pain, palpitations, or shortness of breath Pulmonary - No history or complaints of shortness of breath or productive cough Gastrointestinal - as noted above Genitourinary - No history or complaints of hematuria/dysuria or renal lithiasis Musculoskeletal - No history or complaints of joint  muscular weakness Hematologic - No history of any bleeding episodes Neurologic - No history or complaints of  migraine headaches or neurologic symptoms Objective:  
 
Visit Vitals  /78 (BP 1 Location: Left arm, BP Patient Position: Sitting)  Pulse 73  Temp 98 °F (36.7 °C)  Resp 16  
 Ht 5' 3\" (1.6 m)  Wt 75.6 kg (166 lb 9.6 oz)  SpO2 100%  BMI 29.51 kg/m2 Physical Exam: 
 
General:  alert, cooperative, no distress, appears stated age Lungs:   clear to auscultation bilaterally Heart:  Regular rate and rhythm Abdomen:   abdomen is soft without significant tenderness, masses, organomegaly or guarding; Incisions: healing well, no significant drainage Labs:  
 
Recent Results (from the past 2016 hour(s)) URINALYSIS W/ RFLX MICROSCOPIC Collection Time: 07/15/18 10:30 PM  
Result Value Ref Range Color YELLOW Appearance CLEAR Specific gravity 1.016 1.005 - 1.030    
 pH (UA) 7.5 5.0 - 8.0 Protein NEGATIVE  NEG mg/dL Glucose NEGATIVE  NEG mg/dL Ketone NEGATIVE  NEG mg/dL Bilirubin NEGATIVE  NEG Blood NEGATIVE  NEG Urobilinogen 0.2 0.2 - 1.0 EU/dL Nitrites NEGATIVE  NEG Leukocyte Esterase NEGATIVE  NEG    
CBC WITH AUTOMATED DIFF Collection Time: 07/15/18 10:52 PM  
Result Value Ref Range WBC 6.1 4.6 - 13.2 K/uL  
 RBC 4.01 (L) 4.20 - 5.30 M/uL  
 HGB 11.8 (L) 12.0 - 16.0 g/dL HCT 37.9 35.0 - 45.0 % MCV 94.5 74.0 - 97.0 FL  
 MCH 29.4 24.0 - 34.0 PG  
 MCHC 31.1 31.0 - 37.0 g/dL  
 RDW 14.3 11.6 - 14.5 % PLATELET 458 749 - 968 K/uL MPV 10.7 9.2 - 11.8 FL  
 NEUTROPHILS 47 40 - 73 % LYMPHOCYTES 39 21 - 52 % MONOCYTES 12 (H) 3 - 10 % EOSINOPHILS 2 0 - 5 % BASOPHILS 0 0 - 2 %  
 ABS. NEUTROPHILS 2.9 1.8 - 8.0 K/UL  
 ABS. LYMPHOCYTES 2.4 0.9 - 3.6 K/UL  
 ABS. MONOCYTES 0.7 0.05 - 1.2 K/UL  
 ABS. EOSINOPHILS 0.1 0.0 - 0.4 K/UL  
 ABS. BASOPHILS 0.0 0.0 - 0.1 K/UL  
 DF AUTOMATED METABOLIC PANEL, BASIC Collection Time: 07/15/18 10:52 PM  
Result Value Ref Range Sodium 139 136 - 145 mmol/L Potassium 3.6 3.5 - 5.5 mmol/L Chloride 102 100 - 108 mmol/L  
 CO2 34 (H) 21 - 32 mmol/L Anion gap 3 3.0 - 18 mmol/L Glucose 111 (H) 74 - 99 mg/dL BUN 12 7.0 - 18 MG/DL  Creatinine 0.63 0.6 - 1.3 MG/DL  
 BUN/Creatinine ratio 19 12 - 20    
 GFR est AA >60 >60 ml/min/1.73m2 GFR est non-AA >60 >60 ml/min/1.73m2 Calcium 9.0 8.5 - 10.1 MG/DL  
LIPASE Collection Time: 07/15/18 10:52 PM  
Result Value Ref Range Lipase 117 73 - 393 U/L  
HEPATIC FUNCTION PANEL Collection Time: 07/15/18 10:52 PM  
Result Value Ref Range Protein, total 7.8 6.4 - 8.2 g/dL Albumin 3.4 3.4 - 5.0 g/dL Globulin 4.4 (H) 2.0 - 4.0 g/dL A-G Ratio 0.8 0.8 - 1.7 Bilirubin, total 0.2 0.2 - 1.0 MG/DL Bilirubin, direct 0.1 0.0 - 0.2 MG/DL Alk. phosphatase 122 (H) 45 - 117 U/L  
 AST (SGOT) 27 15 - 37 U/L  
 ALT (SGPT) 39 13 - 56 U/L  
CBC WITH AUTOMATED DIFF Collection Time: 07/17/18  5:55 AM  
Result Value Ref Range WBC 4.6 4.6 - 13.2 K/uL  
 RBC 3.94 (L) 4.20 - 5.30 M/uL  
 HGB 11.7 (L) 12.0 - 16.0 g/dL HCT 36.6 35.0 - 45.0 % MCV 92.9 74.0 - 97.0 FL  
 MCH 29.7 24.0 - 34.0 PG  
 MCHC 32.0 31.0 - 37.0 g/dL  
 RDW 14.4 11.6 - 14.5 % PLATELET 628 026 - 621 K/uL MPV 10.5 9.2 - 11.8 FL  
 NEUTROPHILS 41 40 - 73 % LYMPHOCYTES 43 21 - 52 % MONOCYTES 14 (H) 3 - 10 % EOSINOPHILS 2 0 - 5 % BASOPHILS 0 0 - 2 %  
 ABS. NEUTROPHILS 1.9 1.8 - 8.0 K/UL  
 ABS. LYMPHOCYTES 2.0 0.9 - 3.6 K/UL  
 ABS. MONOCYTES 0.6 0.05 - 1.2 K/UL  
 ABS. EOSINOPHILS 0.1 0.0 - 0.4 K/UL  
 ABS. BASOPHILS 0.0 0.0 - 0.1 K/UL  
 DF AUTOMATED METABOLIC PANEL, COMPREHENSIVE Collection Time: 07/17/18  5:55 AM  
Result Value Ref Range Sodium 142 136 - 145 mmol/L Potassium 4.3 3.5 - 5.5 mmol/L Chloride 105 100 - 108 mmol/L  
 CO2 31 21 - 32 mmol/L Anion gap 6 3.0 - 18 mmol/L Glucose 93 74 - 99 mg/dL BUN 10 7.0 - 18 MG/DL Creatinine 0.77 0.6 - 1.3 MG/DL  
 BUN/Creatinine ratio 13 12 - 20 GFR est AA >60 >60 ml/min/1.73m2 GFR est non-AA >60 >60 ml/min/1.73m2 Calcium 8.9 8.5 - 10.1 MG/DL Bilirubin, total 0.2 0.2 - 1.0 MG/DL  
 ALT (SGPT) 34 13 - 56 U/L  
 AST (SGOT) 24 15 - 37 U/L Alk.  phosphatase 108 45 - 117 U/L  
 Protein, total 7.5 6.4 - 8.2 g/dL Albumin 3.2 (L) 3.4 - 5.0 g/dL Globulin 4.3 (H) 2.0 - 4.0 g/dL A-G Ratio 0.7 (L) 0.8 - 1.7 LIPASE Collection Time: 07/17/18  5:55 AM  
Result Value Ref Range Lipase 59 (L) 73 - 393 U/L  
CARDIAC PANEL,(CK, CKMB & TROPONIN) Collection Time: 07/17/18  5:55 AM  
Result Value Ref Range  26 - 192 U/L  
 CK - MB 1.6 <3.6 ng/ml CK-MB Index 1.0 0.0 - 4.0 % Troponin-I, Qt. <0.02 0.00 - 0.06 NG/ML  
HCG QL SERUM Collection Time: 07/17/18  5:55 AM  
Result Value Ref Range HCG, Ql. NEGATIVE  NEG Assessment:  
 
1. History of Morbid obesity, status post sleeve to laparoscopic gastric bypass surgery conversion 1 year ago with need for internal hernia repair at the 10 month miranda with Dr. Carlos Sousa. The patient presents today with the above mentioned symptoms. We have discussed the unlikelihood that her hernia has returned but it is possible. We will obtain a CT scan and if that is normal we will get an US of her gallbladder. We will also restart her on her PPI. She will try to have her CT in the AM and will alert the office once its complete. She is happy with her weight loss. She has no risks factors for ulcer disease Plan: 1. As above

## 2018-09-26 NOTE — PATIENT INSTRUCTIONS
Body Mass Index: Care Instructions Your Care Instructions Body mass index (BMI) can help you see if your weight is raising your risk for health problems. It uses a formula to compare how much you weigh with how tall you are. · A BMI lower than 18.5 is considered underweight. · A BMI between 18.5 and 24.9 is considered healthy. · A BMI between 25 and 29.9 is considered overweight. A BMI of 30 or higher is considered obese. If your BMI is in the normal range, it means that you have a lower risk for weight-related health problems. If your BMI is in the overweight or obese range, you may be at increased risk for weight-related health problems, such as high blood pressure, heart disease, stroke, arthritis or joint pain, and diabetes. If your BMI is in the underweight range, you may be at increased risk for health problems such as fatigue, lower protection (immunity) against illness, muscle loss, bone loss, hair loss, and hormone problems. BMI is just one measure of your risk for weight-related health problems. You may be at higher risk for health problems if you are not active, you eat an unhealthy diet, or you drink too much alcohol or use tobacco products. Follow-up care is a key part of your treatment and safety. Be sure to make and go to all appointments, and call your doctor if you are having problems. It's also a good idea to know your test results and keep a list of the medicines you take. How can you care for yourself at home? · Practice healthy eating habits. This includes eating plenty of fruits, vegetables, whole grains, lean protein, and low-fat dairy. · If your doctor recommends it, get more exercise. Walking is a good choice. Bit by bit, increase the amount you walk every day. Try for at least 30 minutes on most days of the week. · Do not smoke. Smoking can increase your risk for health problems.  If you need help quitting, talk to your doctor about stop-smoking programs and medicines. These can increase your chances of quitting for good. · Limit alcohol to 2 drinks a day for men and 1 drink a day for women. Too much alcohol can cause health problems. If you have a BMI higher than 25 · Your doctor may do other tests to check your risk for weight-related health problems. This may include measuring the distance around your waist. A waist measurement of more than 40 inches in men or 35 inches in women can increase the risk of weight-related health problems. · Talk with your doctor about steps you can take to stay healthy or improve your health. You may need to make lifestyle changes to lose weight and stay healthy, such as changing your diet and getting regular exercise. If you have a BMI lower than 18.5 · Your doctor may do other tests to check your risk for health problems. · Talk with your doctor about steps you can take to stay healthy or improve your health. You may need to make lifestyle changes to gain or maintain weight and stay healthy, such as getting more healthy foods in your diet and doing exercises to build muscle. Where can you learn more? Go to http://wilbert-loraine.info/. Enter S176 in the search box to learn more about \"Body Mass Index: Care Instructions. \" Current as of: October 9, 2017 Content Version: 11.7 © 1720-5690 [employer]. Care instructions adapted under license by [employer] (which disclaims liability or warranty for this information). If you have questions about a medical condition or this instruction, always ask your healthcare professional. [place] [address] any warranty or liability for your use of this information. Patient Instructions 1. Continue to monitor carbohydrate and protein intake- remember to keep your           total  carbohydrates to 50 grams or less per day for best results. 2. Remember hydration goals - usually 48 to 64 ounces of liquids per day 3. Continue to work towards exercise goals - minimum 3 days per week of 45          minutes to  1 hour at a time. 4. Remember to take vitamins as directed Supplement Resource Guide Importance of Protein:  
Maintains lean body mass, produces antibodies to fight off infections, heals wounds, minimizes hair loss, helps to give you energy, helps with satiety, and keeping you full between meals. Importance of Calcium: 
Needed for healthy bones and teeth, normal blood clotting, and nervous system functioning, higher risk of osteoporosis and bone disease with non-compliance. Importance of Multivitamins: Many functions. Supply you with extra nutrients that you may be missing from food. May lead to iron deficiency anemia, weakness, fatigue, and many other symptoms with non-compliance. Importance of B Vitamins: 
Important for red blood cell formation, metabolism, energy, and helps to maintain a healthy nervous system. Protein Supplement Find one you like now. Use immediately after surgery. Look for: 
35-50g protein each day from your protein supplement once you reach the progression diet. 0-3 g fat per serving 0-3 g sugar per serving Protein drinks should be split in separate dosages. Recommend: Lifelong 1 year + Calcium Supplement:  
 
Start taking within a month after surgery. Look for: Calcium Citrate Plus D (1500 mg per day) Recommend: Citracal 
 
 . Avoid chocolate chewable calcium. Can use chewable bariatric or GNC brand or similar chewable. The body cannot absorb more than 500-600 mg @ a time. Take for Life Multi-vitamin Supplement:   
 
1st Month After Surgery: Any complete chewable, such as: [place]s Complete chewables. Avoid [place] sours or gummies. They lack iron and other important nutrients and also have added sugar. Continue with chewable vitamin or change to adult complete multivitamin one month after surgery. Menstruating women can take a prenatal vitamin. Make sure has at least 18 mg iron and 942-759 mcg folic acid): Vitamin B12, B Complex Vitamin, and Biotin Start taking within a month after surgery. Vitamin B12:  1000 mcg of Vitamin B12 three times weekly Must take sublingually (meaning you take it under your tongue) or in a liquid drop form for easy absorption. B Complex Vitamin: Take a pill or liquid drop form once daily. Biotin: This vitamin can help prevent hair loss. Recommend 5mg  
(5000 mcg) a day Biotin is Optional

## 2018-09-26 NOTE — MR AVS SNAPSHOT
[address] [facility] 
 
 
 [facility] [address] [facility] 
[phone] Patient: Lucas Bruno MRN: [id number] :[id number] Visit Information Date & Time Provider Department Dept. Phone Encounter #  
 2018 10:30 AM Elfida Buerger, MD [facility] [place] [phone] Upcoming Health Maintenance Date Due DTaP/Tdap/Td series (1 - Tdap) 2005 PAP AKA CERVICAL CYTOLOGY 2005 Influenza Age 5 to Adult 2018 Allergies as of 2018  Review Complete On: 2018 By: Myles Thomas LPN Severity Noted Reaction Type Reactions Nsaids (Non-steroidal Anti-inflammatory Drug) High 2017    Other (comments) GI Ulcer- Hx of Gastric Bypass Current Immunizations  Reviewed on 2018 No immunizations on file. Not reviewed this visit You Were Diagnosed With   
  
 Codes Comments Abdominal pain, unspecified abdominal location    -  Primary ICD-10-CM: R10.9 ICD-9-CM: 789.00 Vitals BP Pulse Temp Resp Height(growth percentile) Weight(growth percentile) 113/78 (BP 1 Location: Left arm, BP Patient Position: Sitting) 73 98 °F (36.7 °C) 16 5' 3\" (1.6 m) 166 lb 9.6 oz (75.6 kg) SpO2 BMI OB Status Smoking Status 100% 29.51 kg/m2 Having regular periods Never Smoker Vitals History BMI and BSA Data Body Mass Index Body Surface Area  
 29.51 kg/m 2 1.83 m 2 Preferred Pharmacy Pharmacy Name Phone [id number], [address] [place] [address] Your Updated Medication List  
  
   
This list is accurate as of 18 11:05 AM.  Always use your most recent med list.  
  
  
  
  
 Biotin 2,500 mcg Cap Take  by mouth.  
  
 calcium citrate 200 mg (950 mg) tablet Take  by mouth daily. Iron 160 mg (50 mg iron) Tber tablet Generic drug:  ferrous sulfate ER  
 Take 1 Tab by mouth daily. multivitamin with iron chewable tablet Commonly known as:  Montgomery Shiley Take 1 Tab by mouth two (2) times a day. Omeprazole delayed release 20 mg tablet Commonly known as:  PRILOSEC D/R Take 1 Tab by mouth two (2) times a day. ondansetron 4 mg disintegrating tablet Commonly known as:  ZOFRAN ODT Take 1 Tab by mouth every eight (8) hours as needed for Nausea. VITAMIN B-12 500 mcg tablet Generic drug:  cyanocobalamin Take 500 mcg by mouth daily. Prescriptions Sent to Pharmacy Refills Omeprazole delayed release (PRILOSEC D/R) 20 mg tablet 1 Sig: Take 1 Tab by mouth two (2) times a day. Class: Normal  
 Pharmacy: [address] [place] [address], Jaison Kong  Ph #: [phone] Route: Oral  
  
To-Do List   
 09/28/2018 Imaging:  CT ABD W WO CONT   
  
 10/03/2018 Imaging:  US GALLBLADDER Referral Information Referral ID Referred By Referred To  
  
 [id number] Gustabo Nyhan Not Available Visits Status Start Date End Date 1 New Request 9/26/18 9/26/19 If your referral has a status of pending review or denied, additional information will be sent to support the outcome of this decision. Patient Instructions Body Mass Index: Care Instructions Your Care Instructions Body mass index (BMI) can help you see if your weight is raising your risk for health problems. It uses a formula to compare how much you weigh with how tall you are. · A BMI lower than 18.5 is considered underweight. · A BMI between 18.5 and 24.9 is considered healthy. · A BMI between 25 and 29.9 is considered overweight. A BMI of 30 or higher is considered obese. If your BMI is in the normal range, it means that you have a lower risk for weight-related health problems.  If your BMI is in the overweight or obese range, you may be at increased risk for weight-related health problems, such as high blood pressure, heart disease, stroke, arthritis or joint pain, and diabetes. If your BMI is in the underweight range, you may be at increased risk for health problems such as fatigue, lower protection (immunity) against illness, muscle loss, bone loss, hair loss, and hormone problems. BMI is just one measure of your risk for weight-related health problems. You may be at higher risk for health problems if you are not active, you eat an unhealthy diet, or you drink too much alcohol or use tobacco products. Follow-up care is a key part of your treatment and safety. Be sure to make and go to all appointments, and call your doctor if you are having problems. It's also a good idea to know your test results and keep a list of the medicines you take. How can you care for yourself at home? · Practice healthy eating habits. This includes eating plenty of fruits, vegetables, whole grains, lean protein, and low-fat dairy. · If your doctor recommends it, get more exercise. Walking is a good choice. Bit by bit, increase the amount you walk every day. Try for at least 30 minutes on most days of the week. · Do not smoke. Smoking can increase your risk for health problems. If you need help quitting, talk to your doctor about stop-smoking programs and medicines. These can increase your chances of quitting for good. · Limit alcohol to 2 drinks a day for men and 1 drink a day for women. Too much alcohol can cause health problems. If you have a BMI higher than 25 · Your doctor may do other tests to check your risk for weight-related health problems. This may include measuring the distance around your waist. A waist measurement of more than 40 inches in men or 35 inches in women can increase the risk of weight-related health problems. · Talk with your doctor about steps you can take to stay healthy or improve your health.  You may need to make lifestyle changes to lose weight and stay healthy, such as changing your diet and getting regular exercise. If you have a BMI lower than 18.5 · Your doctor may do other tests to check your risk for health problems. · Talk with your doctor about steps you can take to stay healthy or improve your health. You may need to make lifestyle changes to gain or maintain weight and stay healthy, such as getting more healthy foods in your diet and doing exercises to build muscle. Where can you learn more? Go to http://wilbert-loraine.info/. Enter S176 in the search box to learn more about \"Body Mass Index: Care Instructions. \" Current as of: October 9, 2017 Content Version: 11.7 © 6525-0888 [employer]. Care instructions adapted under license by [employer] (which disclaims liability or warranty for this information). If you have questions about a medical condition or this instruction, always ask your healthcare professional. [address] any warranty or liability for your use of this information. Introducing \[facility]\"" & HEALTH SERVICES! Dear Renae Raphael: Thank you for requesting a Scriptick account. Our records indicate that you already have an active Scriptick account. You can access your account anytime at [email or web address] Did you know that you can access your hospital and ER discharge instructions at any time in Scriptick? You can also review all of your test results from your hospital stay or ER visit. Additional Information If you have questions, please visit the Frequently Asked Questions section of the Scriptick website at [email or web address] Remember, Scriptick is NOT to be used for urgent needs. For medical emergencies, dial 911. Now available from your iPhone and Android! Please provide this summary of care documentation to your next provider. Your primary care clinician is listed as Giovana Gay.  If you have any questions after today's visit, please call [phone].

## 2018-09-27 ENCOUNTER — NURSE NAVIGATOR (OUTPATIENT)
Dept: SURGERY | Age: 33
End: 2018-09-27

## 2018-09-27 DIAGNOSIS — K90.9 INTESTINAL MALABSORPTION, UNSPECIFIED TYPE: ICD-10-CM

## 2018-09-27 RX ORDER — PANTOPRAZOLE SODIUM 40 MG/1
40 TABLET, DELAYED RELEASE ORAL 2 TIMES DAILY
Qty: 60 TAB | Refills: 2 | Status: SHIPPED | OUTPATIENT
Start: 2018-09-27 | End: 2019-03-04 | Stop reason: SDUPTHER

## 2018-09-27 NOTE — PROGRESS NOTES
Pt called and stated that Prilosec has never helped her in the past.  Requesting prescription for Protonix. Discussed with provider and prescription sent to pharmacy.

## 2019-02-02 ENCOUNTER — APPOINTMENT (OUTPATIENT)
Dept: CT IMAGING | Age: 34
DRG: 358 | End: 2019-02-02
Attending: PHYSICIAN ASSISTANT
Payer: COMMERCIAL

## 2019-02-02 ENCOUNTER — ANESTHESIA (OUTPATIENT)
Dept: SURGERY | Age: 34
DRG: 358 | End: 2019-02-02
Payer: COMMERCIAL

## 2019-02-02 ENCOUNTER — ANESTHESIA EVENT (OUTPATIENT)
Dept: SURGERY | Age: 34
DRG: 358 | End: 2019-02-02
Payer: COMMERCIAL

## 2019-02-02 ENCOUNTER — HOSPITAL ENCOUNTER (INPATIENT)
Age: 34
LOS: 4 days | Discharge: HOME | DRG: 358 | End: 2019-02-06
Attending: EMERGENCY MEDICINE | Admitting: SPECIALIST
Payer: COMMERCIAL

## 2019-02-02 DIAGNOSIS — E86.0 DEHYDRATION: ICD-10-CM

## 2019-02-02 DIAGNOSIS — R10.13 ABDOMINAL PAIN, EPIGASTRIC: ICD-10-CM

## 2019-02-02 DIAGNOSIS — Z98.890 POST-OPERATIVE NAUSEA AND VOMITING: ICD-10-CM

## 2019-02-02 DIAGNOSIS — B37.9 ANTIBIOTIC-INDUCED YEAST INFECTION: ICD-10-CM

## 2019-02-02 DIAGNOSIS — T36.95XA ANTIBIOTIC-INDUCED YEAST INFECTION: ICD-10-CM

## 2019-02-02 DIAGNOSIS — Z98.84 HISTORY OF GASTRIC BYPASS: ICD-10-CM

## 2019-02-02 DIAGNOSIS — K43.9 HERNIA OF ABDOMINAL WALL: Primary | ICD-10-CM

## 2019-02-02 DIAGNOSIS — R11.2 POST-OPERATIVE NAUSEA AND VOMITING: ICD-10-CM

## 2019-02-02 DIAGNOSIS — G89.18 POST-OP PAIN: ICD-10-CM

## 2019-02-02 PROBLEM — K46.9 HERNIA OF ABDOMINAL CAVITY: Status: ACTIVE | Noted: 2019-02-02

## 2019-02-02 PROBLEM — K56.609 BOWEL OBSTRUCTION (HCC): Status: ACTIVE | Noted: 2019-02-02

## 2019-02-02 LAB
ALBUMIN SERPL-MCNC: 3.3 G/DL (ref 3.4–5)
ALBUMIN/GLOB SERPL: 0.8 {RATIO} (ref 0.8–1.7)
ALP SERPL-CCNC: 128 U/L (ref 45–117)
ALT SERPL-CCNC: 29 U/L (ref 13–56)
ANION GAP SERPL CALC-SCNC: 4 MMOL/L (ref 3–18)
APPEARANCE UR: CLEAR
AST SERPL-CCNC: 23 U/L (ref 15–37)
BASOPHILS # BLD: 0 K/UL (ref 0–0.1)
BASOPHILS NFR BLD: 0 % (ref 0–2)
BILIRUB SERPL-MCNC: 0.3 MG/DL (ref 0.2–1)
BILIRUB UR QL: NEGATIVE
BUN SERPL-MCNC: 13 MG/DL (ref 7–18)
BUN/CREAT SERPL: 26 (ref 12–20)
CALCIUM SERPL-MCNC: 8.7 MG/DL (ref 8.5–10.1)
CHLORIDE SERPL-SCNC: 105 MMOL/L (ref 100–108)
CO2 SERPL-SCNC: 31 MMOL/L (ref 21–32)
COLOR UR: YELLOW
CREAT SERPL-MCNC: 0.5 MG/DL (ref 0.6–1.3)
DIFFERENTIAL METHOD BLD: ABNORMAL
EOSINOPHIL # BLD: 0.1 K/UL (ref 0–0.4)
EOSINOPHIL NFR BLD: 2 % (ref 0–5)
ERYTHROCYTE [DISTWIDTH] IN BLOOD BY AUTOMATED COUNT: 13.8 % (ref 11.6–14.5)
GLOBULIN SER CALC-MCNC: 4.2 G/DL (ref 2–4)
GLUCOSE SERPL-MCNC: 82 MG/DL (ref 74–99)
GLUCOSE UR STRIP.AUTO-MCNC: NEGATIVE MG/DL
HCG UR QL: NEGATIVE
HCT VFR BLD AUTO: 40 % (ref 35–45)
HGB BLD-MCNC: 12.8 G/DL (ref 12–16)
HGB UR QL STRIP: NEGATIVE
KETONES UR QL STRIP.AUTO: NEGATIVE MG/DL
LEUKOCYTE ESTERASE UR QL STRIP.AUTO: NEGATIVE
LIPASE SERPL-CCNC: 110 U/L (ref 73–393)
LYMPHOCYTES # BLD: 2.2 K/UL (ref 0.9–3.6)
LYMPHOCYTES NFR BLD: 43 % (ref 21–52)
MCH RBC QN AUTO: 30.6 PG (ref 24–34)
MCHC RBC AUTO-ENTMCNC: 32 G/DL (ref 31–37)
MCV RBC AUTO: 95.7 FL (ref 74–97)
MONOCYTES # BLD: 0.4 K/UL (ref 0.05–1.2)
MONOCYTES NFR BLD: 8 % (ref 3–10)
NEUTS SEG # BLD: 2.3 K/UL (ref 1.8–8)
NEUTS SEG NFR BLD: 47 % (ref 40–73)
NITRITE UR QL STRIP.AUTO: NEGATIVE
PH UR STRIP: 7.5 [PH] (ref 5–8)
PLATELET # BLD AUTO: 266 K/UL (ref 135–420)
PMV BLD AUTO: 10.3 FL (ref 9.2–11.8)
POTASSIUM SERPL-SCNC: 3.9 MMOL/L (ref 3.5–5.5)
PROT SERPL-MCNC: 7.5 G/DL (ref 6.4–8.2)
PROT UR STRIP-MCNC: NEGATIVE MG/DL
RBC # BLD AUTO: 4.18 M/UL (ref 4.2–5.3)
SODIUM SERPL-SCNC: 140 MMOL/L (ref 136–145)
SP GR UR REFRACTOMETRY: 1.02 (ref 1–1.03)
UROBILINOGEN UR QL STRIP.AUTO: 1 EU/DL (ref 0.2–1)
WBC # BLD AUTO: 5 K/UL (ref 4.6–13.2)

## 2019-02-02 PROCEDURE — 74011636320 HC RX REV CODE- 636/320: Performed by: PHYSICIAN ASSISTANT

## 2019-02-02 PROCEDURE — 74011250636 HC RX REV CODE- 250/636: Performed by: SPECIALIST

## 2019-02-02 PROCEDURE — 96365 THER/PROPH/DIAG IV INF INIT: CPT

## 2019-02-02 PROCEDURE — 77030018836 HC SOL IRR NACL ICUM -A: Performed by: SPECIALIST

## 2019-02-02 PROCEDURE — 96361 HYDRATE IV INFUSION ADD-ON: CPT

## 2019-02-02 PROCEDURE — 77030008683 HC TU ET CUF COVD -A: Performed by: NURSE ANESTHETIST, CERTIFIED REGISTERED

## 2019-02-02 PROCEDURE — 77030016151 HC PROTCTR LNS DFOG COVD -B: Performed by: SPECIALIST

## 2019-02-02 PROCEDURE — 76010000149 HC OR TIME 1 TO 1.5 HR: Performed by: SPECIALIST

## 2019-02-02 PROCEDURE — 96374 THER/PROPH/DIAG INJ IV PUSH: CPT

## 2019-02-02 PROCEDURE — 76060000033 HC ANESTHESIA 1 TO 1.5 HR: Performed by: SPECIALIST

## 2019-02-02 PROCEDURE — 77030003580 HC NDL INSUF VERES J&J -B: Performed by: SPECIALIST

## 2019-02-02 PROCEDURE — 77030008477 HC STYL SATN SLP COVD -A: Performed by: NURSE ANESTHETIST, CERTIFIED REGISTERED

## 2019-02-02 PROCEDURE — 65270000029 HC RM PRIVATE

## 2019-02-02 PROCEDURE — 76210000063 HC OR PH I REC FIRST 0.5 HR: Performed by: SPECIALIST

## 2019-02-02 PROCEDURE — 77030027138 HC INCENT SPIROMETER -A

## 2019-02-02 PROCEDURE — 77030013079 HC BLNKT BAIR HGGR 3M -A: Performed by: NURSE ANESTHETIST, CERTIFIED REGISTERED

## 2019-02-02 PROCEDURE — 0DJV4ZZ INSPECTION OF MESENTERY, PERCUTANEOUS ENDOSCOPIC APPROACH: ICD-10-PCS | Performed by: SPECIALIST

## 2019-02-02 PROCEDURE — 77030008603 HC TRCR ENDOSC EPATH J&J -C: Performed by: SPECIALIST

## 2019-02-02 PROCEDURE — 81003 URINALYSIS AUTO W/O SCOPE: CPT

## 2019-02-02 PROCEDURE — 80053 COMPREHEN METABOLIC PANEL: CPT

## 2019-02-02 PROCEDURE — 83690 ASSAY OF LIPASE: CPT

## 2019-02-02 PROCEDURE — 74011000258 HC RX REV CODE- 258: Performed by: SPECIALIST

## 2019-02-02 PROCEDURE — 74011250636 HC RX REV CODE- 250/636: Performed by: ANESTHESIOLOGY

## 2019-02-02 PROCEDURE — 93005 ELECTROCARDIOGRAM TRACING: CPT

## 2019-02-02 PROCEDURE — 81025 URINE PREGNANCY TEST: CPT

## 2019-02-02 PROCEDURE — 77030002933 HC SUT MCRYL J&J -A: Performed by: SPECIALIST

## 2019-02-02 PROCEDURE — 74011250636 HC RX REV CODE- 250/636

## 2019-02-02 PROCEDURE — 85025 COMPLETE CBC W/AUTO DIFF WBC: CPT

## 2019-02-02 PROCEDURE — 77030006643: Performed by: NURSE ANESTHETIST, CERTIFIED REGISTERED

## 2019-02-02 PROCEDURE — 99284 EMERGENCY DEPT VISIT MOD MDM: CPT

## 2019-02-02 PROCEDURE — 77030008602 HC TRCR ENDOSC EPATH J&J -B: Performed by: SPECIALIST

## 2019-02-02 PROCEDURE — 0DQV0ZZ REPAIR MESENTERY, OPEN APPROACH: ICD-10-PCS | Performed by: SPECIALIST

## 2019-02-02 PROCEDURE — 74011250637 HC RX REV CODE- 250/637: Performed by: PHYSICIAN ASSISTANT

## 2019-02-02 PROCEDURE — 77010033678 HC OXYGEN DAILY

## 2019-02-02 PROCEDURE — 77030010030: Performed by: SPECIALIST

## 2019-02-02 PROCEDURE — 77030032490 HC SLV COMPR SCD KNE COVD -B: Performed by: SPECIALIST

## 2019-02-02 PROCEDURE — 96375 TX/PRO/DX INJ NEW DRUG ADDON: CPT

## 2019-02-02 PROCEDURE — 74011000258 HC RX REV CODE- 258: Performed by: NURSE PRACTITIONER

## 2019-02-02 PROCEDURE — 74011000250 HC RX REV CODE- 250

## 2019-02-02 PROCEDURE — 77030008462 HC STPLR SKN PROX J&J -A: Performed by: SPECIALIST

## 2019-02-02 PROCEDURE — 74011000250 HC RX REV CODE- 250: Performed by: SPECIALIST

## 2019-02-02 PROCEDURE — 74011250636 HC RX REV CODE- 250/636: Performed by: PHYSICIAN ASSISTANT

## 2019-02-02 PROCEDURE — 74176 CT ABD & PELVIS W/O CONTRAST: CPT

## 2019-02-02 PROCEDURE — 74011250636 HC RX REV CODE- 250/636: Performed by: NURSE PRACTITIONER

## 2019-02-02 RX ORDER — GLYCOPYRROLATE 0.2 MG/ML
INJECTION INTRAMUSCULAR; INTRAVENOUS AS NEEDED
Status: DISCONTINUED | OUTPATIENT
Start: 2019-02-02 | End: 2019-02-02 | Stop reason: HOSPADM

## 2019-02-02 RX ORDER — SODIUM CHLORIDE 9 MG/ML
1000 INJECTION, SOLUTION INTRAVENOUS CONTINUOUS
Status: DISCONTINUED | OUTPATIENT
Start: 2019-02-02 | End: 2019-02-06 | Stop reason: HOSPADM

## 2019-02-02 RX ORDER — SODIUM CHLORIDE, SODIUM LACTATE, POTASSIUM CHLORIDE, CALCIUM CHLORIDE 600; 310; 30; 20 MG/100ML; MG/100ML; MG/100ML; MG/100ML
INJECTION, SOLUTION INTRAVENOUS
Status: DISCONTINUED | OUTPATIENT
Start: 2019-02-02 | End: 2019-02-02 | Stop reason: HOSPADM

## 2019-02-02 RX ORDER — MORPHINE SULFATE 4 MG/ML
2 INJECTION INTRAVENOUS
Status: COMPLETED | OUTPATIENT
Start: 2019-02-02 | End: 2019-02-02

## 2019-02-02 RX ORDER — HYDROMORPHONE HYDROCHLORIDE 2 MG/ML
INJECTION, SOLUTION INTRAMUSCULAR; INTRAVENOUS; SUBCUTANEOUS AS NEEDED
Status: DISCONTINUED | OUTPATIENT
Start: 2019-02-02 | End: 2019-02-02 | Stop reason: HOSPADM

## 2019-02-02 RX ORDER — SUCCINYLCHOLINE CHLORIDE 20 MG/ML
INJECTION INTRAMUSCULAR; INTRAVENOUS AS NEEDED
Status: DISCONTINUED | OUTPATIENT
Start: 2019-02-02 | End: 2019-02-02 | Stop reason: HOSPADM

## 2019-02-02 RX ORDER — DEXMEDETOMIDINE HYDROCHLORIDE 4 UG/ML
INJECTION, SOLUTION INTRAVENOUS AS NEEDED
Status: DISCONTINUED | OUTPATIENT
Start: 2019-02-02 | End: 2019-02-02 | Stop reason: HOSPADM

## 2019-02-02 RX ORDER — SODIUM CHLORIDE 0.9 % (FLUSH) 0.9 %
5-40 SYRINGE (ML) INJECTION AS NEEDED
Status: DISCONTINUED | OUTPATIENT
Start: 2019-02-02 | End: 2019-02-06 | Stop reason: HOSPADM

## 2019-02-02 RX ORDER — HYDROMORPHONE HYDROCHLORIDE 2 MG/ML
0.5 INJECTION, SOLUTION INTRAMUSCULAR; INTRAVENOUS; SUBCUTANEOUS
Status: DISCONTINUED | OUTPATIENT
Start: 2019-02-02 | End: 2019-02-02 | Stop reason: HOSPADM

## 2019-02-02 RX ORDER — NALOXONE HYDROCHLORIDE 0.4 MG/ML
0.1 INJECTION, SOLUTION INTRAMUSCULAR; INTRAVENOUS; SUBCUTANEOUS AS NEEDED
Status: DISCONTINUED | OUTPATIENT
Start: 2019-02-02 | End: 2019-02-06 | Stop reason: HOSPADM

## 2019-02-02 RX ORDER — SODIUM CHLORIDE 0.9 % (FLUSH) 0.9 %
5-40 SYRINGE (ML) INJECTION AS NEEDED
Status: DISCONTINUED | OUTPATIENT
Start: 2019-02-02 | End: 2019-02-02 | Stop reason: HOSPADM

## 2019-02-02 RX ORDER — MORPHINE SULFATE 4 MG/ML
4 INJECTION INTRAVENOUS
Status: COMPLETED | OUTPATIENT
Start: 2019-02-02 | End: 2019-02-02

## 2019-02-02 RX ORDER — NALOXONE HYDROCHLORIDE 0.4 MG/ML
0.4 INJECTION, SOLUTION INTRAMUSCULAR; INTRAVENOUS; SUBCUTANEOUS AS NEEDED
Status: DISCONTINUED | OUTPATIENT
Start: 2019-02-02 | End: 2019-02-06 | Stop reason: HOSPADM

## 2019-02-02 RX ORDER — FENTANYL CITRATE 50 UG/ML
INJECTION, SOLUTION INTRAMUSCULAR; INTRAVENOUS AS NEEDED
Status: DISCONTINUED | OUTPATIENT
Start: 2019-02-02 | End: 2019-02-02 | Stop reason: HOSPADM

## 2019-02-02 RX ORDER — MIDAZOLAM HYDROCHLORIDE 1 MG/ML
INJECTION, SOLUTION INTRAMUSCULAR; INTRAVENOUS AS NEEDED
Status: DISCONTINUED | OUTPATIENT
Start: 2019-02-02 | End: 2019-02-02 | Stop reason: HOSPADM

## 2019-02-02 RX ORDER — SODIUM CHLORIDE 0.9 % (FLUSH) 0.9 %
5-40 SYRINGE (ML) INJECTION AS NEEDED
Status: DISCONTINUED | OUTPATIENT
Start: 2019-02-02 | End: 2019-02-02

## 2019-02-02 RX ORDER — ONDANSETRON 2 MG/ML
4 INJECTION INTRAMUSCULAR; INTRAVENOUS
Status: DISCONTINUED | OUTPATIENT
Start: 2019-02-02 | End: 2019-02-06 | Stop reason: HOSPADM

## 2019-02-02 RX ORDER — SODIUM CHLORIDE 0.9 % (FLUSH) 0.9 %
5-40 SYRINGE (ML) INJECTION EVERY 8 HOURS
Status: DISCONTINUED | OUTPATIENT
Start: 2019-02-02 | End: 2019-02-06 | Stop reason: HOSPADM

## 2019-02-02 RX ORDER — SODIUM CHLORIDE, SODIUM LACTATE, POTASSIUM CHLORIDE, CALCIUM CHLORIDE 600; 310; 30; 20 MG/100ML; MG/100ML; MG/100ML; MG/100ML
150 INJECTION, SOLUTION INTRAVENOUS CONTINUOUS
Status: DISCONTINUED | OUTPATIENT
Start: 2019-02-02 | End: 2019-02-06 | Stop reason: HOSPADM

## 2019-02-02 RX ORDER — SODIUM CHLORIDE 0.9 % (FLUSH) 0.9 %
5-40 SYRINGE (ML) INJECTION EVERY 8 HOURS
Status: DISCONTINUED | OUTPATIENT
Start: 2019-02-02 | End: 2019-02-02 | Stop reason: HOSPADM

## 2019-02-02 RX ORDER — PROPOFOL 10 MG/ML
INJECTION, EMULSION INTRAVENOUS AS NEEDED
Status: DISCONTINUED | OUTPATIENT
Start: 2019-02-02 | End: 2019-02-02 | Stop reason: HOSPADM

## 2019-02-02 RX ORDER — DIPHENHYDRAMINE HYDROCHLORIDE 50 MG/ML
25 INJECTION, SOLUTION INTRAMUSCULAR; INTRAVENOUS
Status: DISCONTINUED | OUTPATIENT
Start: 2019-02-02 | End: 2019-02-06 | Stop reason: HOSPADM

## 2019-02-02 RX ORDER — NEOSTIGMINE METHYLSULFATE 5 MG/5 ML
SYRINGE (ML) INTRAVENOUS AS NEEDED
Status: DISCONTINUED | OUTPATIENT
Start: 2019-02-02 | End: 2019-02-02 | Stop reason: HOSPADM

## 2019-02-02 RX ORDER — LIDOCAINE HYDROCHLORIDE 20 MG/ML
INJECTION, SOLUTION EPIDURAL; INFILTRATION; INTRACAUDAL; PERINEURAL AS NEEDED
Status: DISCONTINUED | OUTPATIENT
Start: 2019-02-02 | End: 2019-02-02 | Stop reason: HOSPADM

## 2019-02-02 RX ORDER — SODIUM CHLORIDE, SODIUM LACTATE, POTASSIUM CHLORIDE, CALCIUM CHLORIDE 600; 310; 30; 20 MG/100ML; MG/100ML; MG/100ML; MG/100ML
125 INJECTION, SOLUTION INTRAVENOUS CONTINUOUS
Status: DISCONTINUED | OUTPATIENT
Start: 2019-02-02 | End: 2019-02-02 | Stop reason: HOSPADM

## 2019-02-02 RX ORDER — SODIUM CHLORIDE 0.9 % (FLUSH) 0.9 %
5-40 SYRINGE (ML) INJECTION EVERY 8 HOURS
Status: DISCONTINUED | OUTPATIENT
Start: 2019-02-02 | End: 2019-02-02

## 2019-02-02 RX ORDER — FAMOTIDINE 10 MG/ML
20 INJECTION INTRAVENOUS
Status: COMPLETED | OUTPATIENT
Start: 2019-02-02 | End: 2019-02-02

## 2019-02-02 RX ORDER — ACETAMINOPHEN 10 MG/ML
1000 INJECTION, SOLUTION INTRAVENOUS EVERY 6 HOURS
Status: COMPLETED | OUTPATIENT
Start: 2019-02-02 | End: 2019-02-03

## 2019-02-02 RX ORDER — ROCURONIUM BROMIDE 10 MG/ML
INJECTION, SOLUTION INTRAVENOUS AS NEEDED
Status: DISCONTINUED | OUTPATIENT
Start: 2019-02-02 | End: 2019-02-02 | Stop reason: HOSPADM

## 2019-02-02 RX ORDER — ONDANSETRON 2 MG/ML
INJECTION INTRAMUSCULAR; INTRAVENOUS AS NEEDED
Status: DISCONTINUED | OUTPATIENT
Start: 2019-02-02 | End: 2019-02-02 | Stop reason: HOSPADM

## 2019-02-02 RX ADMIN — GLYCOPYRROLATE 0.6 MG: 0.2 INJECTION INTRAMUSCULAR; INTRAVENOUS at 16:03

## 2019-02-02 RX ADMIN — IOHEXOL: 240 INJECTION, SOLUTION INTRATHECAL; INTRAVASCULAR; INTRAVENOUS; ORAL at 11:04

## 2019-02-02 RX ADMIN — DEXMEDETOMIDINE HYDROCHLORIDE 10 MCG: 4 INJECTION, SOLUTION INTRAVENOUS at 15:22

## 2019-02-02 RX ADMIN — ONDANSETRON HYDROCHLORIDE 4 MG: 2 INJECTION INTRAMUSCULAR; INTRAVENOUS at 18:11

## 2019-02-02 RX ADMIN — MORPHINE SULFATE 4 MG: 4 INJECTION INTRAVENOUS at 14:17

## 2019-02-02 RX ADMIN — Medication: at 16:35

## 2019-02-02 RX ADMIN — SODIUM CHLORIDE, SODIUM LACTATE, POTASSIUM CHLORIDE, CALCIUM CHLORIDE: 600; 310; 30; 20 INJECTION, SOLUTION INTRAVENOUS at 15:45

## 2019-02-02 RX ADMIN — SODIUM CHLORIDE, SODIUM LACTATE, POTASSIUM CHLORIDE, AND CALCIUM CHLORIDE 125 ML/HR: 600; 310; 30; 20 INJECTION, SOLUTION INTRAVENOUS at 16:35

## 2019-02-02 RX ADMIN — FENTANYL CITRATE 100 MCG: 50 INJECTION, SOLUTION INTRAMUSCULAR; INTRAVENOUS at 14:48

## 2019-02-02 RX ADMIN — SODIUM CHLORIDE, SODIUM LACTATE, POTASSIUM CHLORIDE, AND CALCIUM CHLORIDE 150 ML/HR: 600; 310; 30; 20 INJECTION, SOLUTION INTRAVENOUS at 18:11

## 2019-02-02 RX ADMIN — SODIUM CHLORIDE 1000 ML: 900 INJECTION, SOLUTION INTRAVENOUS at 11:44

## 2019-02-02 RX ADMIN — ROCURONIUM BROMIDE 45 MG: 10 INJECTION, SOLUTION INTRAVENOUS at 15:08

## 2019-02-02 RX ADMIN — GLYCOPYRROLATE 0.2 MG: 0.2 INJECTION INTRAMUSCULAR; INTRAVENOUS at 14:46

## 2019-02-02 RX ADMIN — ALUMINUM HYDROXIDE AND MAGNESIUM HYDROXIDE 30 ML: 200; 200 SUSPENSION ORAL at 11:48

## 2019-02-02 RX ADMIN — Medication 10 ML: at 22:19

## 2019-02-02 RX ADMIN — MORPHINE SULFATE 2 MG: 4 INJECTION INTRAVENOUS at 12:10

## 2019-02-02 RX ADMIN — SODIUM CHLORIDE, SODIUM LACTATE, POTASSIUM CHLORIDE, CALCIUM CHLORIDE: 600; 310; 30; 20 INJECTION, SOLUTION INTRAVENOUS at 14:53

## 2019-02-02 RX ADMIN — ACETAMINOPHEN 1000 MG: 10 INJECTION, SOLUTION INTRAVENOUS at 18:11

## 2019-02-02 RX ADMIN — SODIUM CHLORIDE 1000 ML: 900 INJECTION, SOLUTION INTRAVENOUS at 14:18

## 2019-02-02 RX ADMIN — MIDAZOLAM HYDROCHLORIDE 2 MG: 1 INJECTION, SOLUTION INTRAMUSCULAR; INTRAVENOUS at 14:46

## 2019-02-02 RX ADMIN — ROCURONIUM BROMIDE 5 MG: 10 INJECTION, SOLUTION INTRAVENOUS at 14:52

## 2019-02-02 RX ADMIN — HYDROMORPHONE HYDROCHLORIDE 1 MG: 2 INJECTION, SOLUTION INTRAMUSCULAR; INTRAVENOUS; SUBCUTANEOUS at 15:06

## 2019-02-02 RX ADMIN — PIPERACILLIN SODIUM,TAZOBACTAM SODIUM 3.38 G: 3; .375 INJECTION, POWDER, FOR SOLUTION INTRAVENOUS at 22:17

## 2019-02-02 RX ADMIN — SUCCINYLCHOLINE CHLORIDE 100 MG: 20 INJECTION INTRAMUSCULAR; INTRAVENOUS at 14:52

## 2019-02-02 RX ADMIN — FAMOTIDINE 20 MG: 10 INJECTION, SOLUTION INTRAVENOUS at 11:43

## 2019-02-02 RX ADMIN — PROPOFOL 200 MG: 10 INJECTION, EMULSION INTRAVENOUS at 14:52

## 2019-02-02 RX ADMIN — Medication 3 MG: at 16:03

## 2019-02-02 RX ADMIN — SODIUM CHLORIDE, SODIUM LACTATE, POTASSIUM CHLORIDE, AND CALCIUM CHLORIDE 150 ML/HR: 600; 310; 30; 20 INJECTION, SOLUTION INTRAVENOUS at 22:16

## 2019-02-02 RX ADMIN — PIPERACILLIN SODIUM,TAZOBACTAM SODIUM 3.38 G: 3; .375 INJECTION, POWDER, FOR SOLUTION INTRAVENOUS at 14:17

## 2019-02-02 RX ADMIN — SODIUM CHLORIDE, SODIUM LACTATE, POTASSIUM CHLORIDE, CALCIUM CHLORIDE: 600; 310; 30; 20 INJECTION, SOLUTION INTRAVENOUS at 15:22

## 2019-02-02 RX ADMIN — ACETAMINOPHEN 1000 MG: 10 INJECTION, SOLUTION INTRAVENOUS at 23:43

## 2019-02-02 RX ADMIN — LIDOCAINE HYDROCHLORIDE 100 MG: 20 INJECTION, SOLUTION EPIDURAL; INFILTRATION; INTRACAUDAL; PERINEURAL at 14:52

## 2019-02-02 RX ADMIN — ONDANSETRON 4 MG: 2 INJECTION INTRAMUSCULAR; INTRAVENOUS at 14:46

## 2019-02-02 NOTE — ED TRIAGE NOTES
Patient arrives ambulatory with her children with c/o epigastric, burning pain that radiates around her entire body.  Patient reports onset of 3am.

## 2019-02-02 NOTE — BRIEF OP NOTE
BRIEF OPERATIVE NOTE Date of Procedure: 2/2/2019 Preoperative Diagnosis: internal hernia Postoperative Diagnosis: internal hernia Procedure(s): DIAGNOSTIC LAPAROSCOPY 
EXPLORATORY LAPAROTOMY REDUCTION AND REPAIR INTERNAL HERNIA Surgeon(s) and Role: Erma Alvarado MD - Primary Surgical Staff: 
Circ-1: Kel Carroll RN Physician Assistant: Merlin Bushman, PA Scrub Tech-1: Yang Price Surg Asst-1: Candice Lozano Event Time In Time Out Incision Start [id number] Incision Close 1603 Anesthesia: General  
Estimated Blood Loss: less 20 cc Specimens: * No specimens in log * Findings: see dictation Complications: NA Implants: * No implants in log *

## 2019-02-02 NOTE — H&P
Surgery Consultation History of Present Illness:  
 Nazario Shukla is a prior laparoscopic gastric bypass surgery 
 patient who underwent their weight loss surgical procedure procedure approximately 18 months ago. They now present with a history of abdominal pain. Onset was 10 hours ago. The pain is described as crampy and is 10/10 in intensity. Starts in epigastric,does radiate to back. Symptoms have been worsening since onset. The patient also gives a history of nausea. The patient denies vomiting. The patient did go to the emergency room where a CT scan was performed and the radiologist immediately called me with the results. Of note is the fact that 6 months ago the patient underwent a laparoscopic evaluation for an internal hernia which was repaired by Dr Julio Wilder. Consultation was requested for assistance with evaluation of their symptoms. Past Medical History:  
Diagnosis Date  Acid reflux  Incisional hernia  Intestinal malabsorption  Morbid obesity with body mass index of 40.0-49.9 ([facility])  Status post bariatric surgery 2012  
 sleeve resection / kirstin Cazares Past Surgical History:  
Procedure Laterality Date  HX  SECTION    
 X 3  
 HX GI  2012  
 sleeve resection / rosaline borja  HX ORTHOPAEDIC    
 scoliosis correction surgery X 2  
 HX TONSIL AND ADENOIDECTOMY  HX TUBAL LIGATION    
 LAP GASTRIC BYPASS/TAMICA-EN-Y    
 revision from sleeve on 2017 History reviewed. No pertinent family history. Social History Socioeconomic History  Marital status: SINGLE Spouse name: Not on file  Number of children: Not on file  Years of education: Not on file  Highest education level: Not on file Tobacco Use  Smoking status: Never Smoker  Smokeless tobacco: Never Used Substance and Sexual Activity  Alcohol use: No  
  Alcohol/week: 0.0 oz  Drug use: No  
 Sexual activity: Yes  
  Partners: Male Current Facility-Administered Medications Medication Dose Route Frequency  0.9% sodium chloride infusion 1,000 mL  1,000 mL IntraVENous CONTINUOUS  piperacillin-tazobactam (ZOSYN) 3.375 g in 0.9% sodium chloride (MBP/ADV) 100 mL MBP  3.375 g IntraVENous NOW Current Outpatient Medications Medication Sig  
 ondansetron (ZOFRAN ODT) 4 mg disintegrating tablet Take 1 Tab by mouth every eight (8) hours as needed for Nausea.  ferrous sulfate ER (IRON) 160 mg (50 mg iron) TbER tablet Take 1 Tab by mouth daily.  Biotin 2,500 mcg cap Take  by mouth.  cyanocobalamin (VITAMIN B-12) 500 mcg tablet Take 500 mcg by mouth daily.  calcium citrate 200 mg (950 mg) tablet Take  by mouth daily.  multivitamin with iron (FLINTSTONES) chewable tablet Take 1 Tab by mouth two (2) times a day. Allergies Allergen Reactions  Nsaids (Non-Steroidal Anti-Inflammatory Drug) Other (comments) GI Ulcer- Hx of Gastric Bypass Review of Symptoms:  
 
General - No history or complaints of unexpected fever, chills, or weight loss Head/Neck - No history or complaints of headache, diplopia, dysphagia, hearing loss Cardiac - No history or complaints of chest pain, palpitations, murmur, or shortness of breath Pulmonary - No history or complaints of shortness of breath, productive cough, hemoptysis Gastrointestinal - As per the HPI above. Genitourinary - No history or complaints of hematuria/dysuria, stress urinary incontinence symptoms, or renal lithiasis Musculoskeletal - No history or complaints of joint pain or muscular weakness Hematologic - No history or complaints of bleeding disorders, blood transfusions, sickle cell anemia Neurologic - No history or complaints of  migraine headaches, seizure activity, syncopal episodes, TIA or stroke Integumentary - No history or complaints of rashes, abnormal nevi, skin cancer Gynecological - No history of heavy menses/abnormal menses Physical Exam: Physical Examination: General appearance - alert, well appearing, and in no distress and oriented to person, place, and time Mental status - alert, oriented to person, place, and time, normal mood, behavior, speech, dress, motor activity, and thought processes Eyes - pupils equal and reactive, extraocular eye movements intact, sclera anicteric, left eye normal, right eye normal 
Ears - right ear normal, left ear normal 
Nose - normal and patent, no erythema, discharge or polyps Mouth - mucous membranes moist, pharynx normal without lesions Neck - supple, no significant adenopathy Lymphatics - no palpable lymphadenopathy, no hepatosplenomegaly Chest - clear to auscultation, no wheezes, rales or rhonchi, symmetric air entry Heart - normal rate, regular rhythm, normal S1, S2, no murmurs, rubs, clicks or gallops Abdomen - soft, nontender, nondistended, no masses or organomegaly Back exam - full range of motion, no tenderness, palpable spasm or pain on motion Neurological - alert, oriented, normal speech, no focal findings or movement disorder noted Musculoskeletal - no joint tenderness, deformity or swelling Extremities - peripheral pulses normal, no pedal edema, no clubbing or cyanosis Skin - normal coloration and turgor, no rashes, no suspicious skin lesions noted Laboratory / X-Rays:  
  
Lab Results Component Value Date/Time WBC 5.0 02/02/2019 11:31 AM  
 HGB 12.8 02/02/2019 11:31 AM  
 HCT 40.0 02/02/2019 11:31 AM  
 PLATELET 342 30/00/0947 11:31 AM  
 MCV 95.7 02/02/2019 11:31 AM  
 
Lab Results Component Value Date/Time  Sodium 140 02/02/2019 11:31 AM  
 Potassium 3.9 02/02/2019 11:31 AM  
 Chloride 105 02/02/2019 11:31 AM  
 CO2 31 02/02/2019 11:31 AM  
 Anion gap 4 02/02/2019 11:31 AM  
 Glucose 82 02/02/2019 11:31 AM  
 BUN 13 02/02/2019 11:31 AM  
 Creatinine 0.50 (L) 02/02/2019 11:31 AM  
 BUN/Creatinine ratio 26 (H) 02/02/2019 11:31 AM  
 GFR est AA >60 02/02/2019 11:31 AM  
 GFR est non-AA >60 02/02/2019 11:31 AM  
 Calcium 8.7 02/02/2019 11:31 AM  
 Bilirubin, total 0.3 02/02/2019 11:31 AM  
 AST (SGOT) 23 02/02/2019 11:31 AM  
 Alk. phosphatase 128 (H) 02/02/2019 11:31 AM  
 Protein, total 7.5 02/02/2019 11:31 AM  
 Albumin 3.3 (L) 02/02/2019 11:31 AM  
 Globulin 4.2 (H) 02/02/2019 11:31 AM  
 A-G Ratio 0.8 02/02/2019 11:31 AM  
 ALT (SGPT) 29 02/02/2019 11:31 AM  
 
Lab Results Component Value Date/Time Iron 147 06/29/2016 12:00 AM  
 Ferritin 18 06/29/2016 12:00 AM  
 
Lab Results Component Value Date/Time Folate 15.5 06/29/2016 12:00 AM  
 
Lab Results Component Value Date/Time VITAMIN D, 25-HYDROXY 29.5 (L) 06/29/2016 12:00 AM  
   
 
 
All labs and x-rays reviewed from their ER visit and outside evaluations. It has all been scanned under the media tab or is included within [facility] or Care everywhere. Assessment:  
 
Chana Singh is a prior laparoscopic gastric bypass surgery 
 patient who underwent their weight loss surgical procedure 
 procedure approximately 18 months ago. They now possible evidence for a recurrent internal hernia. Recommendations: The diagnosis was discussed with the patient and evaluation and treatment plans outlined. Schedule for diagnostic laparoscopy and possible laparotomy for reduction and repair of recurrent internal hernia emergently. Signed By: Chasidy Tejada MD   
 February 2, 2019

## 2019-02-02 NOTE — PROGRESS NOTES
Pt alert and oriented X4. Drowsy but arouses easily. Pt on Dilaudid PCA pump. Rate verification performed with Sandy Lamas RN.  
 
17:30 pt rates pain of 6/10 shortly after using PCA. Is nauseous, waiting for pharmacy verification to administer medications. 19:50 Pt pain 6/10. PCA pump education reinforced. Visit Vitals /83 Pulse 83 Temp 97.4 °F (36.3 °C) Resp 17 Ht 5' 3\" (1.6 m) Wt 74.8 kg (165 lb) SpO2 100% BMI 29.23 kg/m²

## 2019-02-02 NOTE — INTERVAL H&P NOTE
H&P Update: 
Loyd Bailey was seen and examined. History and physical has been reviewed. The patient has been examined.  There have been no significant clinical changes since the completion of the originally dated History and Physical. 
 
Signed By: Ellie Marcelo MD   
 February 2, 2019 2:40 PM

## 2019-02-02 NOTE — ED NOTES
Patient is tearful, sitting on the edge of the bed. Patient is trying to drink contrast drink. Pt reports pain persists.

## 2019-02-02 NOTE — ANESTHESIA POSTPROCEDURE EVALUATION
Post-Anesthesia Evaluation and Assessment Cardiovascular Function/Vital Signs Visit Vitals /88 Pulse 75 Temp 36.2 °C (97.2 °F) Resp 10 Ht 5' 3\" (1.6 m) Wt 74.8 kg (165 lb) SpO2 100% BMI 29.23 kg/m² Patient is status post Procedure(s): LAPAROSCOPY, EXPLORATORY LAPAROTOMY, REDUCTION AND REPAIR INTERNAL HERNIA. Nausea/Vomiting: Controlled. Postoperative hydration reviewed and adequate. Pain: 
Pain Scale 1: FLACC (02/02/19 1631) Pain Intensity 1: 0 (02/02/19 1631) Managed. Neurological Status: At baseline. Mental Status and Level of Consciousness: Baseline and stable. Pulmonary Status:  
O2 Device: Nasal cannula (02/02/19 1631) Adequate oxygenation and airway patent. Complications related to anesthesia: None Post-anesthesia assessment completed. No concerns. Patient has met all discharge requirements.  
 
Signed By: Brooke Terry MD

## 2019-02-02 NOTE — PERIOP NOTES
TRANSFER - OUT REPORT: 
 
Verbal report given to ARIELLE Mohan(name) on [facility]  being transferred to (unit) for routine post - op Report consisted of patients Situation, Background, Assessment and  
Recommendations(SBAR). Information from the following report(s) SBAR, Kardex, OR Summary, Procedure Summary, Intake/Output and MAR was reviewed with the receiving nurse. Lines:  
Peripheral IV 02/02/19 Right Antecubital (Active) Site Assessment Clean, dry, & intact 2/2/2019  4:33 PM  
Phlebitis Assessment 0 2/2/2019  4:33 PM  
Infiltration Assessment 0 2/2/2019  4:33 PM  
Dressing Status Clean, dry, & intact 2/2/2019  4:33 PM  
Dressing Type Transparent;Tape 2/2/2019  4:33 PM  
Hub Color/Line Status Green; Infusing 2/2/2019  4:33 PM  
Action Taken Blood drawn 2/2/2019 11:35 AM  
  
 
Opportunity for questions and clarification was provided. Patient transported with: 
 Registered Nurse

## 2019-02-02 NOTE — ED NOTES
Patient alerted this writer that she had drank 1/2 of contrast at this time. Per bariatric/gastric bypass protocol, patient can be transported to CT in 30 minutes. CT is aware and this writer is keeping track of the time.

## 2019-02-02 NOTE — ROUTINE PROCESS
TRANSFER - IN REPORT: 
 
Verbal report received from PIETER Elam RN (name) on [employer]  being received from [facility]) for routine progression of care Report consisted of patients Situation, Background, Assessment and  
Recommendations(SBAR). Information from the following report(s) SBAR, Kardex, Procedure Summary, Intake/Output and MAR was reviewed with the receiving nurse. Opportunity for questions and clarification was provided. Assessment completed upon patients arrival to unit and care assumed.

## 2019-02-02 NOTE — ANESTHESIA PREPROCEDURE EVALUATION
Anesthetic History PONV Review of Systems / Medical History Patient summary reviewed, nursing notes reviewed and pertinent labs reviewed Pulmonary Within defined limits Neuro/Psych Within defined limits Cardiovascular Exercise tolerance: >4 METS 
  
GI/Hepatic/Renal 
  
 
 
 
PUD Endo/Other Morbid obesity Other Findings Physical Exam 
 
Airway Mallampati: III 
TM Distance: 4 - 6 cm Neck ROM: decreased range of motion Mouth opening: Normal 
 
 Cardiovascular Regular rate and rhythm,  S1 and S2 normal,  no murmur, click, rub, or gallop Rhythm: regular Rate: normal 
 
 
 
 Dental 
No notable dental hx Pulmonary Breath sounds clear to auscultation Abdominal 
GI exam deferred Other Findings Anesthetic Plan ASA: 2, emergent Anesthesia type: general 
 
 
 
 
Induction: Intravenous Anesthetic plan and risks discussed with: Patient Patient aware of risk of aspiriation

## 2019-02-02 NOTE — ED PROVIDER NOTES
EMERGENCY DEPARTMENT HISTORY AND PHYSICAL EXAM 
 
Date: 2/2/2019 Patient Name: Nazario Shukla History of Presenting Illness Chief Complaint Patient presents with  Epigastric Pain History Provided By: Patient Chief Complaint: abdominal pain Duration: 8 Hours Timing:  Acute Location: epigastric Modifying Factors: Pepcid and Gas-X. Associated Symptoms: back pain Additional History (Context):  
10:54 AM 
Nazario Shukla is a 28 y.o. female with PMHX of gastric bypass by 2 years ago by Dr. Hernesto [facility]) who presents to the emergency department C/O epigastric pain, described as burning, onset 8 hours ago that wraps around the bilateral back. Similar symptoms 8 months ago where she was diagnosed with internal hernia and admitted for exploratory laparotomy. She has been on Protonix daily for years but ran out 1 week ago, she didn't take any more because Dr. Eva Chacon said she probably would not need this for life. Took Pepcid and 2 Gas-X for symptoms. Pain is not associated with PO intake. Last PO intake today, one bite of a sandwich. Last bowel movement this morning. Pt denies fever, urinary symptoms, nausea, vomiting, diarrhea, chest pain, SOB, and any other Sx or complaints. PCP: Mitch Ramirez MD 
 
Current Facility-Administered Medications Medication Dose Route Frequency Provider Last Rate Last Dose  
 0.9% sodium chloride infusion 1,000 mL  1,000 mL IntraVENous CONTINUOUS Lamberto Pandey NP   1,000 mL at 02/02/19 1418  piperacillin-tazobactam (ZOSYN) 3.375 g in 0.9% sodium chloride (MBP/ADV) 100 mL MBP  3.375 g IntraVENous NOW Lamberto Pandey  mL/hr at 02/02/19 1417 3.375 g at 02/02/19 1417 Current Outpatient Medications Medication Sig Dispense Refill  ondansetron (ZOFRAN ODT) 4 mg disintegrating tablet Take 1 Tab by mouth every eight (8) hours as needed for Nausea.  14 Tab 0  
  ferrous sulfate ER (IRON) 160 mg (50 mg iron) TbER tablet Take 1 Tab by mouth daily.  Biotin 2,500 mcg cap Take  by mouth.  cyanocobalamin (VITAMIN B-12) 500 mcg tablet Take 500 mcg by mouth daily.  calcium citrate 200 mg (950 mg) tablet Take  by mouth daily.  multivitamin with iron (FLINTSTONES) chewable tablet Take 1 Tab by mouth two (2) times a day. Past History Past Medical History: 
Past Medical History:  
Diagnosis Date  Acid reflux  Incisional hernia  Intestinal malabsorption  Morbid obesity with body mass index of 40.0-49.9 ([facility])  Status post bariatric surgery 2012  
 sleeve resection / kirstin Ramos Staff Past Surgical History: 
Past Surgical History:  
Procedure Laterality Date  HX  SECTION    
 X 3  
 HX GI  2012  
 sleeve resection / rosaline borja  HX ORTHOPAEDIC    
 scoliosis correction surgery X 2  
 HX TONSIL AND ADENOIDECTOMY  HX TUBAL LIGATION    
 LAP GASTRIC BYPASS/TAMICA-EN-Y    
 revision from sleeve on 2017 Family History: 
History reviewed. No pertinent family history. Social History: 
Social History Tobacco Use  Smoking status: Never Smoker  Smokeless tobacco: Never Used Substance Use Topics  Alcohol use: No  
  Alcohol/week: 0.0 oz  Drug use: No  
 
 
Allergies: Allergies Allergen Reactions  Nsaids (Non-Steroidal Anti-Inflammatory Drug) Other (comments) GI Ulcer- Hx of Gastric Bypass Review of Systems Review of Systems Constitutional: Negative for fever. Respiratory: Negative for shortness of breath. Cardiovascular: Negative for chest pain. Gastrointestinal: Positive for abdominal pain. Negative for diarrhea, nausea and vomiting. Genitourinary: Negative for decreased urine volume, difficulty urinating, dysuria, frequency, hematuria and urgency. Musculoskeletal: Positive for back pain. All other systems reviewed and are negative.  
 
 
Physical Exam  
 
 Vitals:  
 02/02/19 1048 02/02/19 1326 BP: 121/77 127/86 Pulse: 84 67 Resp: 16 14 Temp: 97.3 °F (36.3 °C) SpO2: 100% 100% Weight: 74.8 kg (165 lb) Height: 5' 3\" (1.6 m) Physical Exam  
Constitutional: She appears well-developed and well-nourished. No distress. HENT:  
Head: Normocephalic and atraumatic. Right Ear: External ear normal.  
Left Ear: External ear normal.  
Nose: Nose normal.  
Eyes: Conjunctivae are normal.  
Neck: Normal range of motion. Cardiovascular: Normal rate, regular rhythm and normal heart sounds. Pulmonary/Chest: Effort normal and breath sounds normal. No respiratory distress. She has no wheezes. Abdominal: Soft. Bowel sounds are normal. She exhibits no distension. There is tenderness. There is guarding. Tenderness to palpation of the epigastrium with guarding, LUQ and left periumbilical region. Neurological: She is alert. Skin: Skin is warm and dry. She is not diaphoretic. Psychiatric: She has a normal mood and affect. Nursing note and vitals reviewed. Diagnostic Study Results Labs - Recent Results (from the past 12 hour(s)) URINALYSIS W/ RFLX MICROSCOPIC Collection Time: 02/02/19 11:07 AM  
Result Value Ref Range Color YELLOW Appearance CLEAR Specific gravity 1.025 1.005 - 1.030    
 pH (UA) 7.5 5.0 - 8.0 Protein NEGATIVE  NEG mg/dL Glucose NEGATIVE  NEG mg/dL Ketone NEGATIVE  NEG mg/dL Bilirubin NEGATIVE  NEG Blood NEGATIVE  NEG Urobilinogen 1.0 0.2 - 1.0 EU/dL Nitrites NEGATIVE  NEG Leukocyte Esterase NEGATIVE  NEG    
HCG URINE, QL. - POC Collection Time: 02/02/19 11:11 AM  
Result Value Ref Range Pregnancy test,urine (POC) NEGATIVE  NEG    
CBC WITH AUTOMATED DIFF Collection Time: 02/02/19 11:31 AM  
Result Value Ref Range WBC 5.0 4.6 - 13.2 K/uL  
 RBC 4.18 (L) 4.20 - 5.30 M/uL  
 HGB 12.8 12.0 - 16.0 g/dL HCT 40.0 35.0 - 45.0 %  MCV 95.7 74.0 - 97.0 FL  
 MCH 30.6 24.0 - 34.0 PG  
 MCHC 32.0 31.0 - 37.0 g/dL  
 RDW 13.8 11.6 - 14.5 % PLATELET 376 406 - 112 K/uL MPV 10.3 9.2 - 11.8 FL  
 NEUTROPHILS 47 40 - 73 % LYMPHOCYTES 43 21 - 52 % MONOCYTES 8 3 - 10 % EOSINOPHILS 2 0 - 5 % BASOPHILS 0 0 - 2 %  
 ABS. NEUTROPHILS 2.3 1.8 - 8.0 K/UL  
 ABS. LYMPHOCYTES 2.2 0.9 - 3.6 K/UL  
 ABS. MONOCYTES 0.4 0.05 - 1.2 K/UL  
 ABS. EOSINOPHILS 0.1 0.0 - 0.4 K/UL  
 ABS. BASOPHILS 0.0 0.0 - 0.1 K/UL  
 DF AUTOMATED METABOLIC PANEL, COMPREHENSIVE Collection Time: 02/02/19 11:31 AM  
Result Value Ref Range Sodium 140 136 - 145 mmol/L Potassium 3.9 3.5 - 5.5 mmol/L Chloride 105 100 - 108 mmol/L  
 CO2 31 21 - 32 mmol/L Anion gap 4 3.0 - 18 mmol/L Glucose 82 74 - 99 mg/dL BUN 13 7.0 - 18 MG/DL Creatinine 0.50 (L) 0.6 - 1.3 MG/DL  
 BUN/Creatinine ratio 26 (H) 12 - 20 GFR est AA >60 >60 ml/min/1.73m2 GFR est non-AA >60 >60 ml/min/1.73m2 Calcium 8.7 8.5 - 10.1 MG/DL Bilirubin, total 0.3 0.2 - 1.0 MG/DL  
 ALT (SGPT) 29 13 - 56 U/L  
 AST (SGOT) 23 15 - 37 U/L Alk. phosphatase 128 (H) 45 - 117 U/L Protein, total 7.5 6.4 - 8.2 g/dL Albumin 3.3 (L) 3.4 - 5.0 g/dL Globulin 4.2 (H) 2.0 - 4.0 g/dL A-G Ratio 0.8 0.8 - 1.7 LIPASE Collection Time: 02/02/19 11:31 AM  
Result Value Ref Range Lipase 110 73 - 393 U/L  
EKG, 12 LEAD, INITIAL Collection Time: 02/02/19  2:13 PM  
Result Value Ref Range Ventricular Rate 70 BPM  
 Atrial Rate 70 BPM  
 P-R Interval 124 ms QRS Duration 82 ms Q-T Interval 390 ms QTC Calculation (Bezet) 421 ms Calculated P Axis 46 degrees Calculated R Axis 44 degrees Calculated T Axis 36 degrees Diagnosis Normal sinus rhythm Normal ECG When compared with ECG of 18-NOV-2017 14:13, 
Nonspecific T wave abnormality now evident in Anterior leads Radiologic Studies -  
CT ABD PELV WO CONT Final Result IMPRESSION:  
  
 1. Abnormal swirling of the mesenteric vasculature with reversal of the normal  
SMA-SMV relationship. There is considerable edema noted throughout the small  
bowel mesentery. A similar configuration was demonstrated on preceding abdominal  
pelvic CT July 17, 2018, at which time the patient was diagnosed with an  
internal hernia. While no complete obstruction as demonstrated on today's  
examination as oral contrast is noted within the proximal portion of the colon,  
findings are concerning for potential recurrence of internal hernia given the  
distribution of small bowel and associated mesenteric findings. --Case discussed with Dr. Emmy Sheffield, surgeon caring for the patient prior to  
dictation at 1:47 PM on 2/2/2019. As read by the radiologist.  
 
CT Results  (Last 48 hours) 02/02/19 1313  CT ABD PELV WO CONT Final result Impression:  IMPRESSION:  
   
1. Abnormal swirling of the mesenteric vasculature with reversal of the normal  
SMA-SMV relationship. There is considerable edema noted throughout the small  
bowel mesentery. A similar configuration was demonstrated on preceding abdominal  
pelvic CT July 17, 2018, at which time the patient was diagnosed with an  
internal hernia. While no complete obstruction as demonstrated on today's  
examination as oral contrast is noted within the proximal portion of the colon,  
findings are concerning for potential recurrence of internal hernia given the  
distribution of small bowel and associated mesenteric findings. --Case discussed with Dr. Emmy Sheffield, surgeon caring for the patient prior to  
dictation at 1:47 PM on 2/2/2019. Narrative:  EXAM: CT of the abdomen and pelvis INDICATION: 60-year-old patient with history of epigastric abdominal pain. History of prior gastric bypass surgical procedure. COMPARISON: CT dated July 17, 2018 TECHNIQUE: Axial CT imaging of the abdomen and pelvis was performed with oral  
and without intravenous contrast. Multiplanar reformats were generated. One or more dose reduction techniques were used on this CT: automated exposure  
control, adjustment of the mAs and/or kVp according to patient size, and  
iterative reconstruction techniques. The specific techniques used on this CT  
exam have been documented in the patient's electronic medical record. Digital  
Imaging and Communications in Medicine (DICOM) format image data are available  
to nonaffiliated external healthcare facilities or entities on a secure, media  
free, reciprocally searchable basis with patient authorization for at least a  
12-month period after this study. _______________ FINDINGS:  
   
LOWER CHEST: Minor atelectasis noted at the lung bases bilaterally. No pleural  
effusion or pneumonic opacity. Normal cardiac size without pericardial effusion. LIVER, BILIARY: Unenhanced appearance of the liver demonstrates no focal  
abnormality. No biliary dilation. Gallbladder is unremarkable. PANCREAS: Normal unenhanced appearance SPLEEN: Normal.  
   
ADRENALS: Normal.  
   
KIDNEYS/URETERS/BLADDER: No hydronephrosis or urolithiasis. PELVIC ORGANS: Normal unenhanced appearance. No significant pelvic fluid  
accumulation. VASCULATURE: Unenhanced appearance of the intra-abdominal vasculature is  
remarkable for a swirled appearance to the mesentery with reversal of the normal  
SMA-SMV relationship. Mesenteric edema is noted throughout the central and lower  
abdominal portions of the small bowel mesentery. LYMPH NODES: No enlarged lymph nodes. GASTROINTESTINAL TRACT: Postoperative changes of gastric sleeve procedure and  
converted gastric bypass surgical procedure demonstrated. Oral contrast noted to  
extend through the [place] limb beyond the distal jejunojejunal anastomosis. Mildly asymmetric distribution of small bowel, predominantly within the left upper quadrant of the abdomen, with several additional normal caliber small  
bowel loops in the right lower quadrant. Oral contrast noted within the proximal  
portion of the colon. No gross free intraperitoneal gas. No pneumatosis. BONES: No acute or aggressive osseous abnormalities identified. Extended  
thoracic lumbar spinal fixation with laminar hooks again noted. Adjacent level  
spondylosis L4-L5 with unchanged anterolisthesis at this level. OTHER: None.  
   
_______________ CXR Results  (Last 48 hours) None Medications given in the ED- Medications  
0.9% sodium chloride infusion 1,000 mL (1,000 mL IntraVENous New Bag 2/2/19 1418) piperacillin-tazobactam (ZOSYN) 3.375 g in 0.9% sodium chloride (MBP/ADV) 100 mL MBP (3.375 g IntraVENous New Bag 2/2/19 1417)  
sodium chloride 0.9 % bolus infusion 1,000 mL (0 mL IntraVENous IV Completed 2/2/19 1244) iohexol (OMNIPAQUE) ORAL mixture ( Oral Given 2/2/19 1104)  
famotidine (PF) (PEPCID) injection 20 mg (20 mg IntraVENous Given 2/2/19 1143) aluminum-magnesium hydroxide (MAALOX) oral suspension 30 mL (30 mL Oral Given 2/2/19 1148) morphine injection 2 mg (2 mg IntraVENous Given 2/2/19 1210) morphine injection 4 mg (4 mg IntraVENous Given 2/2/19 1417) Medical Decision Making I am the first provider for this patient. I reviewed the vital signs, available nursing notes, past medical history, past surgical history, family history and social history. Vital Signs-Reviewed the patient's vital signs. Pulse Oximetry Analysis - 100% on RA  
 
EKG interpretation: (Preliminary) 
NSR. 70 bpm. No STEMI. EKG read by Laura Gupta NP at 2:20 PM 
 
Records Reviewed: Nursing Notes and Old Medical Records Provider Notes (Medical Decision Making): Appears well hydrated and non toxic, presenting with burning epigastric pain. Recently off protonix. Suspect gastritis, will tx for such while awaiting labs. Will get CT as patient states this feels similar to prior internal hernia. Internal hernia noted on Ct scan, discussed case with her surgeon who is admitting the patient and taking her to the OR from the ED. Patient was stable during her ER stay and agreeable to treatment plan 
 
Procedures: 
Procedures ED Course:  
10:54 AM Initial assessment performed. The patients presenting problems have been discussed, and they are in agreement with the care plan formulated and outlined with them. I have encouraged them to ask questions as they arise throughout their visit. Sign Out 
12:00 PM 
Patient's presentation, labs/imaging and plan of care was reviewed with Marco Antonio Luna NP as part of sign out. They will review CT as part of the plan discussed with the patient. Marco Antonio Luna NP's assistance in completion of this plan is greatly appreciated but it should be noted that I will be the provider of record for this patient. 1:48 PM Dr. Cornell Fernandez, radiologist, called and states it looks like shes having a recurrence of her hernia and he will call Dr. Janice Jett [facility]). 2:00 PM Discussed patient's history, exam, and available diagnostics results with Dr. Janice Jett [facility]), who will take her to the OR. Would like another liter of fluids and give 3.375 of Zosyn now. Pt will be admitted to medical after. 2:04 PM Discussed patient's history, exam, and available diagnostics results with Irina Harrison MD, ED attending, who agree with treatment plan. 2:05 PM Pt updated on all results, conversation with Dr. Janice Jett [facility]), and need for surgery. Diagnosis and Disposition Core Measures: 
For Hospitalized Patients: 
 
1. Hospitalization Decision Time: The decision to hospitalize the patient was made by Marco Antonio Luna NP at 1:48 PM on 2/2/2019 2. Aspirin: Aspirin was not given because the patient did not present with a stroke at the time of their Emergency Department evaluation 2:00 PM  Patient is being admitted to the hospital by Dr. Jennie Alvarez [facility]). The results of their tests and reasons for their admission have been discussed with them and/or available family. They convey agreement and understanding for the need to be admitted and for their admission diagnosis. CONDITIONS ON ADMISSION: 
Sepsis is not present at the time of admission. . Urinary Tract Infection is not present at the time of admission. MRSA is not present at the time of admission. Wound infection is not present at the time of admission. Pressure Ulcer is not present at the time of admission. CLINICAL IMPRESSION: 
 
1. Hernia of abdominal wall 2. Abdominal pain, epigastric 3. Dehydration 4. History of gastric bypass   
 
____________________________ Attestations: This note is prepared by James Beck, acting as Scribe for Lucero Mejia PA-C. Lucero Mejia PA-C:  The scribe's documentation has been prepared under my direction and personally reviewed by me in its entirety. I confirm that the note above accurately reflects all work, treatment, procedures, and medical decision making performed by me. This note is prepared by James Beck, acting as Scribe for Mark Evans NP. Mark Evans NP: The scribe's documentation has been prepared under my direction and personally reviewed by me in its entirety. I confirm that the note above accurately reflects all work, treatment, procedures, and medical decision making performed by me. 
 
_______________________________

## 2019-02-03 PROCEDURE — 77010033678 HC OXYGEN DAILY

## 2019-02-03 PROCEDURE — 74011250636 HC RX REV CODE- 250/636: Performed by: SPECIALIST

## 2019-02-03 PROCEDURE — 65270000029 HC RM PRIVATE

## 2019-02-03 PROCEDURE — 74011000258 HC RX REV CODE- 258: Performed by: SPECIALIST

## 2019-02-03 RX ADMIN — Medication: at 03:12

## 2019-02-03 RX ADMIN — DIPHENHYDRAMINE HYDROCHLORIDE 25 MG: 50 INJECTION, SOLUTION INTRAMUSCULAR; INTRAVENOUS at 09:28

## 2019-02-03 RX ADMIN — ONDANSETRON HYDROCHLORIDE 4 MG: 2 INJECTION INTRAMUSCULAR; INTRAVENOUS at 11:34

## 2019-02-03 RX ADMIN — Medication 10 ML: at 07:16

## 2019-02-03 RX ADMIN — ACETAMINOPHEN 1000 MG: 10 INJECTION, SOLUTION INTRAVENOUS at 06:37

## 2019-02-03 RX ADMIN — DIPHENHYDRAMINE HYDROCHLORIDE 25 MG: 50 INJECTION, SOLUTION INTRAMUSCULAR; INTRAVENOUS at 18:41

## 2019-02-03 RX ADMIN — ONDANSETRON HYDROCHLORIDE 4 MG: 2 INJECTION INTRAMUSCULAR; INTRAVENOUS at 20:27

## 2019-02-03 RX ADMIN — Medication 10 ML: at 20:32

## 2019-02-03 RX ADMIN — PIPERACILLIN SODIUM,TAZOBACTAM SODIUM 3.38 G: 3; .375 INJECTION, POWDER, FOR SOLUTION INTRAVENOUS at 23:16

## 2019-02-03 RX ADMIN — SODIUM CHLORIDE, SODIUM LACTATE, POTASSIUM CHLORIDE, AND CALCIUM CHLORIDE 150 ML/HR: 600; 310; 30; 20 INJECTION, SOLUTION INTRAVENOUS at 15:01

## 2019-02-03 RX ADMIN — DIPHENHYDRAMINE HYDROCHLORIDE 25 MG: 50 INJECTION, SOLUTION INTRAMUSCULAR; INTRAVENOUS at 13:29

## 2019-02-03 RX ADMIN — DIPHENHYDRAMINE HYDROCHLORIDE 25 MG: 50 INJECTION, SOLUTION INTRAMUSCULAR; INTRAVENOUS at 23:13

## 2019-02-03 RX ADMIN — ONDANSETRON HYDROCHLORIDE 4 MG: 2 INJECTION INTRAMUSCULAR; INTRAVENOUS at 00:00

## 2019-02-03 RX ADMIN — SODIUM CHLORIDE, SODIUM LACTATE, POTASSIUM CHLORIDE, AND CALCIUM CHLORIDE 150 ML/HR: 600; 310; 30; 20 INJECTION, SOLUTION INTRAVENOUS at 23:21

## 2019-02-03 RX ADMIN — PIPERACILLIN SODIUM,TAZOBACTAM SODIUM 3.38 G: 3; .375 INJECTION, POWDER, FOR SOLUTION INTRAVENOUS at 07:13

## 2019-02-03 RX ADMIN — PIPERACILLIN SODIUM,TAZOBACTAM SODIUM 3.38 G: 3; .375 INJECTION, POWDER, FOR SOLUTION INTRAVENOUS at 15:01

## 2019-02-03 RX ADMIN — ONDANSETRON HYDROCHLORIDE 4 MG: 2 INJECTION INTRAMUSCULAR; INTRAVENOUS at 06:13

## 2019-02-03 NOTE — PROGRESS NOTES
Pt resting quietly in bed, no distress noted. Ice pack provided for comfort. Encouraged to push button on PCA and use IS. Mother by bedside 
 
9193 Shift summary: pt is up with assist. Pain managed by PCA as needed, nausea relieved by Zofran every 6 hrs prn. Dressing to abdomen C/D/I. Syed draining to clear urine - passed on in report for removal per nurse driven protocol. Still  NPO at this time. Shift uneventful

## 2019-02-03 NOTE — PROGRESS NOTES
Problem: Falls - Risk of 
Goal: *Absence of Falls Document Gabriele Danielles Fall Risk and appropriate interventions in the flowsheet. Outcome: Progressing Towards Goal 
Fall Risk Interventions: 
  
 
  
 
Medication Interventions: Assess postural VS orthostatic hypotension, Evaluate medications/consider consulting pharmacy

## 2019-02-03 NOTE — PROGRESS NOTES
Bariatric Surgery                POD #1 Visit Vitals BP 98/60 (BP 1 Location: Left arm, BP Patient Position: At rest) Pulse 90 Temp 98.6 °F (37 °C) Resp 18 Ht 5' 3\" (1.6 m) Wt 81.7 kg (180 lb 1.9 oz) SpO2 100% BMI 31.91 kg/m² Patient has minimal complaints of pain, minimal nausea noted Exam: 
Appears well in no distress Lungs- clear bilaterally Abd - soft, incision look good without erythema Extremities- no new edema or swelling Data Review: 
 
Labs: Results:  
   
Chemistry Recent Labs 02/02/19 
1131 GLU 82   
K 3.9  CO2 31 BUN 13  
CREA 0.50* CA 8.7 AGAP 4  
BUCR 26* * TP 7.5 ALB 3.3*  
GLOB 4.2* AGRAT 0.8 CBC w/Diff Recent Labs 02/02/19 
1131 WBC 5.0  
RBC 4.18* HGB 12.8 HCT 40.0  GRANS 47 LYMPH 43 EOS 2 Coagulation No results for input(s): PTP, INR, APTT in the last 72 hours. No lab exists for component: INREXT Liver Enzymes Recent Labs 02/02/19 
1131 TP 7.5 ALB 3.3* * SGOT 23 Assessment/Plan: S/P  laparotomy for SBO - doing well without any issues - no sign of GI function return at this point 1. Start liquids - await full GI return prior to advancing further 2. Encourage ambulation 3.  Pt does not want any IV toradol at this point - she will cont with PCA

## 2019-02-03 NOTE — PROGRESS NOTES
7102 
Assumed care of pt at this time. Assessment complete. Pt alert and oriented x 4. Denies SOB and chest pain. Pt lungs clear bilaterally. Cap refill  less than 3 seconds. Pt denies numbness and tingling to all extremities. Stated pain 5/10. Pt has 18 G IV to R AC. Pt has ABD  dressing to abdomen CDI Active Bowel sounds. SCD's in place Pt encouraged to continue use of IS. Pt verbalized understanding. Ice pack applied. Call light and possessions within reach. Bed in low position. Will continue to monitor. 9936 Syed removed at this time. [address] Pt ambulated to bathroom at this time [place] ABD pad placed to abdomen. Incision site well approx staples intact no drainage noted.

## 2019-02-03 NOTE — ROUTINE PROCESS
Bedside and Verbal shift change report given to Noah Cooper RN (oncoming nurse) by Ricky Kumar RN 
 (offgoing nurse). Report included the following information SBAR, Kardex, OR Summary, Intake/Output, MAR and Recent Results.

## 2019-02-03 NOTE — ROUTINE PROCESS
19:45: Received verbal/bedside change of shift report from Malachy Siemens, RN. Report included SBAR, KARDEX, MAR and recent results. 20:05: Pt received resting quietly in bed with PCA infusing and in what appeared to be mild distress. Pt verbalized that she was experiencing\"more pain than I expected. \"  After further inquiry it appeared that pt was under the impression that she had a basal rate infusing and had not pressed the PCA button as of yet. Pt did so at this time and reported effective pain relief. Respirations even and unlabored, skin W&D. Syed patent and draining clear yellow urine. Call bell within easy reach. It should be noted that pt was sleeping restfully within five minutes of PCA dose administration as appended above. Monitoring ongoing. 00:01: Pt requested and received PRN zofran at this time for C/O nausea without vomiting. Bedside, Verbal and Written shift change report given to TWILA Boyer RN (oncoming nurse) by Jersey Leija (offgoing nurse). Report included the following information SBAR, Kardex, MAR and Recent Results.

## 2019-02-03 NOTE — ROUTINE PROCESS
Bedside shift change report given to JOY Morrissey RN (oncoming nurse) by JUNI Sanchez (offgoing nurse). Report included the following information SBAR, Kardex, Intake/Output and MAR.

## 2019-02-03 NOTE — PROGRESS NOTES
DC Plan: Discharge home with family assistance, MD follow up once medically stable Chart reviewed as CM on call. Pt admitted by MD Jade Dalton. Met with pt and pts mother at bedside. Pt gave verbal permission to discuss care in front of family. Pt independent. Pt states she drove to hospital, but can have family drive home at dc. Pts PCP is MD Rawls. No immediate dc concerns identified. CM will cont to follow as needed. Reason for Admission:    Per H&P pt \"is a prior laparoscopic gastric bypass surgery patient who underwent their weight loss surgical procedure procedure approximately 18 months ago. They now present with a history of abdominal pain. Onset was 10 hours ago. The pain is described as crampy and is 10/10 in intensity. Starts in epigastric,does radiate to back. Symptoms have been worsening since onset. The patient also gives a history of nausea. The patient denies vomiting. The patient did go to the emergency room where a CT scan was performed and the radiologist immediately called me with the results. Of note is the fact that 6 months ago the patient underwent a laparoscopic evaluation for an internal hernia which was repaired by Dr Leonidas Santizo. Consultation was requested for assistance with evaluation of their symptoms. \"  
 
RRAT Score:          8 low Plan for utilizing home health:     None at this time, if needed please place order Likelihood of Readmission:  low Transition of Care Plan:        MD follow up, family assistance Care Management Interventions PCP Verified by CM: Yes Mode of Transport at Discharge: Other (see comment)(family) Transition of Care Consult (CM Consult): Discharge Planning Current Support Network: Own Home Plan discussed with Pt/Family/Caregiver: Yes Discharge Location Discharge Placement: Home with family assistance

## 2019-02-04 ENCOUNTER — NURSE NAVIGATOR (OUTPATIENT)
Dept: SURGERY | Age: 34
End: 2019-02-04

## 2019-02-04 LAB
ANION GAP SERPL CALC-SCNC: 5 MMOL/L (ref 3–18)
BUN SERPL-MCNC: 6 MG/DL (ref 7–18)
BUN/CREAT SERPL: 13 (ref 12–20)
CALCIUM SERPL-MCNC: 8.3 MG/DL (ref 8.5–10.1)
CHLORIDE SERPL-SCNC: 104 MMOL/L (ref 100–108)
CO2 SERPL-SCNC: 30 MMOL/L (ref 21–32)
CREAT SERPL-MCNC: 0.48 MG/DL (ref 0.6–1.3)
GLUCOSE SERPL-MCNC: 69 MG/DL (ref 74–99)
MAGNESIUM SERPL-MCNC: 1.8 MG/DL (ref 1.6–2.6)
POTASSIUM SERPL-SCNC: 4.3 MMOL/L (ref 3.5–5.5)
SODIUM SERPL-SCNC: 139 MMOL/L (ref 136–145)

## 2019-02-04 PROCEDURE — 83735 ASSAY OF MAGNESIUM: CPT

## 2019-02-04 PROCEDURE — 74011000250 HC RX REV CODE- 250: Performed by: SPECIALIST

## 2019-02-04 PROCEDURE — 65270000029 HC RM PRIVATE

## 2019-02-04 PROCEDURE — 74011000258 HC RX REV CODE- 258: Performed by: SPECIALIST

## 2019-02-04 PROCEDURE — 77010033678 HC OXYGEN DAILY

## 2019-02-04 PROCEDURE — 74011250636 HC RX REV CODE- 250/636: Performed by: SPECIALIST

## 2019-02-04 PROCEDURE — 36415 COLL VENOUS BLD VENIPUNCTURE: CPT

## 2019-02-04 PROCEDURE — 80048 BASIC METABOLIC PNL TOTAL CA: CPT

## 2019-02-04 RX ORDER — KETOROLAC TROMETHAMINE 30 MG/ML
30 INJECTION, SOLUTION INTRAMUSCULAR; INTRAVENOUS EVERY 6 HOURS
Status: DISCONTINUED | OUTPATIENT
Start: 2019-02-04 | End: 2019-02-06 | Stop reason: HOSPADM

## 2019-02-04 RX ADMIN — KETOROLAC TROMETHAMINE 30 MG: 30 INJECTION, SOLUTION INTRAMUSCULAR at 18:03

## 2019-02-04 RX ADMIN — Medication: at 15:13

## 2019-02-04 RX ADMIN — Medication 10 ML: at 06:39

## 2019-02-04 RX ADMIN — SODIUM CHLORIDE, SODIUM LACTATE, POTASSIUM CHLORIDE, AND CALCIUM CHLORIDE 150 ML/HR: 600; 310; 30; 20 INJECTION, SOLUTION INTRAVENOUS at 07:45

## 2019-02-04 RX ADMIN — DIPHENHYDRAMINE HYDROCHLORIDE 25 MG: 50 INJECTION, SOLUTION INTRAMUSCULAR; INTRAVENOUS at 12:30

## 2019-02-04 RX ADMIN — SODIUM CHLORIDE 12.5 MG: 9 INJECTION INTRAMUSCULAR; INTRAVENOUS; SUBCUTANEOUS at 02:01

## 2019-02-04 RX ADMIN — DIPHENHYDRAMINE HYDROCHLORIDE 25 MG: 50 INJECTION, SOLUTION INTRAMUSCULAR; INTRAVENOUS at 21:35

## 2019-02-04 RX ADMIN — DIPHENHYDRAMINE HYDROCHLORIDE 25 MG: 50 INJECTION, SOLUTION INTRAMUSCULAR; INTRAVENOUS at 16:36

## 2019-02-04 RX ADMIN — SODIUM CHLORIDE, SODIUM LACTATE, POTASSIUM CHLORIDE, AND CALCIUM CHLORIDE 150 ML/HR: 600; 310; 30; 20 INJECTION, SOLUTION INTRAVENOUS at 15:53

## 2019-02-04 RX ADMIN — KETOROLAC TROMETHAMINE 30 MG: 30 INJECTION, SOLUTION INTRAMUSCULAR at 23:50

## 2019-02-04 RX ADMIN — PIPERACILLIN SODIUM,TAZOBACTAM SODIUM 3.38 G: 3; .375 INJECTION, POWDER, FOR SOLUTION INTRAVENOUS at 06:38

## 2019-02-04 RX ADMIN — SODIUM CHLORIDE, SODIUM LACTATE, POTASSIUM CHLORIDE, AND CALCIUM CHLORIDE 150 ML/HR: 600; 310; 30; 20 INJECTION, SOLUTION INTRAVENOUS at 21:35

## 2019-02-04 RX ADMIN — DIPHENHYDRAMINE HYDROCHLORIDE 25 MG: 50 INJECTION, SOLUTION INTRAMUSCULAR; INTRAVENOUS at 07:59

## 2019-02-04 RX ADMIN — Medication 10 ML: at 02:02

## 2019-02-04 NOTE — ROUTINE PROCESS
Spoke with Pharmacy regarding possible d/c of prophylactic zosyn. Spoke w/ Dr. Olya Ramires regarding zosyn. She said she will communicate this to . Pt lost her IV site. IV leaking. Dana Spencer EMT called to restart it. Pt itch relieved with IV Benadryl. 14:28 Pt Dilaudid near end. Pharmacy called to bring up more PCA Dilaudid. Spoke to [place], [place] about d/cing pt zosyn. [place] will call Jade Dalton now and will report back shortly. 14:49 Per SHAHID Thomas d/c pt zosyn. 13:15 Pt dilaudid complete. Called pharmacy to bring more to the floor. New syringe of PCA dilaudid set-up with Paola Montelongo RN. Pt pain of 10/10. Pt remains itch free. 16:38 Pt itch present. Given IV Benadryl. Visit Vitals /73 Pulse 81 Temp 98 °F (36.7 °C) Resp 13 Ht 5' 3\" (1.6 m) Wt 85 kg (187 lb 6.4 oz) SpO2 100% BMI 33.20 kg/m²

## 2019-02-04 NOTE — PROGRESS NOTES
Bariatric Surgery                POD #2 Visit Vitals /73 Pulse 81 Temp 98 °F (36.7 °C) Resp 13 Ht 5' 3\" (1.6 m) Wt 85 kg (187 lb 6.4 oz) SpO2 100% BMI 33.20 kg/m² Patient has minimal complaints of pain, minimal nausea noted Belching only, no flatus. Exam: 
Appears well in no distress Lungs- clear bilaterally Abd - soft, dressing dry No BS yet Extremities- no new edema or swelling Data Review: 
 
Labs: Results:  
   
Chemistry Recent Labs 02/04/19 
0944 02/02/19 
1131 GLU 69* 82  140  
K 4.3 3.9  105 CO2 30 31 BUN 6* 13  
CREA 0.48* 0.50* CA 8.3* 8.7 AGAP 5 4 BUCR 13 26* AP  --  128* TP  --  7.5 ALB  --  3.3*  
GLOB  --  4.2* AGRAT  --  0.8 CBC w/Diff Recent Labs 02/02/19 
1131 WBC 5.0  
RBC 4.18* HGB 12.8 HCT 40.0  GRANS 47 LYMPH 43 EOS 2 Coagulation No results for input(s): PTP, INR, APTT in the last 72 hours. No lab exists for component: INREXT Liver Enzymes Recent Labs 02/02/19 
1131 TP 7.5 ALB 3.3* * SGOT 23 Assessment/Plan: S/P  laparotomy for internal hernia - doing well without any issues. Still witgh ileus 1. Start gum chewing 2. OOB and ambulate 3. Add Toradol for pain and limit narcotics

## 2019-02-04 NOTE — ROUTINE PROCESS
19:30: Received verbal/bedside change of shift report from RAVEN Archibald RN. Report included SBAR, KARDEX, MAR and recent results. PCA pump rate verfied with off going RN at this time. 20:00: Pt received resting quietly in bed with PCA infusing as ordered and in NAD. Respirations even and unlabored, skin W&D. IVF infusing as ordered, SCD's in place bilaterally. Denies C/O at this time. 05:30: Pt slept soundly overnight with brief periods of wakefulness noted. Waking periods coincided with requests for PRN anti-nausea/anti-pruritic meds as follows: Zofran @ 20:20, benadryl @23;13, Phenergan @ 02:01. Pt has utilized the BR X2 and voided on both occasions (urinary output 500 overall). Denies C/O at this time. Bedside, Verbal and Written shift change report given to JUNI Zuniga (oncoming nurse) by Ho Haro (offgoing nurse). Report included the following information SBAR, Kardex, MAR and Recent Results\.

## 2019-02-04 NOTE — PROGRESS NOTES
Patient complaining of expected pain, no nausea. Date 02/03/19 0700 - 02/04/19 0659 02/04/19 0700 - 02/05/19 0659   Shift [id number] 1900-0659 24 Hour Total 3142-3692 5668-3788 24 Hour Total   INTAKE   P.O. 0  0 240  240     P. O. 0  0 240  240   Shift Total 0  0 240  240   OUTPUT   Urine 7979 214 6508 1650  1650     Urine Voided  1650  1650     Urine Output (mL) ([REMOVED] Urinary Catheter 02/02/19 2- way; Syed) 500  500      Shift Total 7305 530 7475 1650  1650   NET -1300 -500 -1800 -1410  -1410   Weight (kg)             General: Alert & oriented to person, place, time, and situation  Pulmonary: Lungs clear to auscultation in all fields. Cardiac: 88, Regular rate and rhythm  Abdomen: Appropriate tenderness, soft, non-distented; no s/s of infection    Patient is tolerating liquid diet. She is ambulating in the hallway and using the IS 10x every hour. This RN Bariatric Coordinator spoke with patient's nurse, Anh Vinson, re: getting new IV placement, as patient is having some discomfort and was requesting pain medication. She stated she was \"headed that way now. \"

## 2019-02-04 NOTE — ROUTINE PROCESS
Bedside and Verbal shift change report given to Tabatha Angeles RN (oncoming nurse) by Dionicio Berg RN (offgoing nurse). Report included the following information SBAR, Kardex, MAR and Recent Results.

## 2019-02-04 NOTE — ROUTINE PROCESS
Bedside shift change report given to JUNI Pearl (oncoming nurse) by Rozina Fierro RN (offgoing nurse). Report included the following information SBAR, Kardex, Intake/Output and MAR.

## 2019-02-04 NOTE — PROGRESS NOTES
Problem: Falls - Risk of 
Goal: *Absence of Falls Document London Quick Fall Risk and appropriate interventions in the flowsheet. Outcome: Progressing Towards Goal 
Fall Risk Interventions: 
Mobility Interventions: Assess mobility with egress test, Communicate number of staff needed for ambulation/transfer Medication Interventions: Assess postural VS orthostatic hypotension, Evaluate medications/consider consulting pharmacy Elimination Interventions: Call light in reach, Patient to call for help with toileting needs

## 2019-02-04 NOTE — PROGRESS NOTES
Problem: Falls - Risk of 
Goal: *Absence of Falls Document Ed Miles Fall Risk and appropriate interventions in the flowsheet. Outcome: Progressing Towards Goal 
Fall Risk Interventions: 
Mobility Interventions: Patient to call before getting OOB, Communicate number of staff needed for ambulation/transfer Medication Interventions: Teach patient to arise slowly, Patient to call before getting OOB Elimination Interventions: Call light in reach, Patient to call for help with toileting needs

## 2019-02-05 ENCOUNTER — NURSE NAVIGATOR (OUTPATIENT)
Dept: SURGERY | Age: 34
End: 2019-02-05

## 2019-02-05 PROCEDURE — 74011250636 HC RX REV CODE- 250/636: Performed by: SPECIALIST

## 2019-02-05 PROCEDURE — 65270000029 HC RM PRIVATE

## 2019-02-05 RX ADMIN — DIPHENHYDRAMINE HYDROCHLORIDE 25 MG: 50 INJECTION, SOLUTION INTRAMUSCULAR; INTRAVENOUS at 20:11

## 2019-02-05 RX ADMIN — KETOROLAC TROMETHAMINE 30 MG: 30 INJECTION, SOLUTION INTRAMUSCULAR at 05:32

## 2019-02-05 RX ADMIN — SODIUM CHLORIDE, SODIUM LACTATE, POTASSIUM CHLORIDE, AND CALCIUM CHLORIDE 150 ML/HR: 600; 310; 30; 20 INJECTION, SOLUTION INTRAVENOUS at 11:49

## 2019-02-05 RX ADMIN — Medication: at 19:48

## 2019-02-05 RX ADMIN — KETOROLAC TROMETHAMINE 30 MG: 30 INJECTION, SOLUTION INTRAMUSCULAR at 11:56

## 2019-02-05 RX ADMIN — KETOROLAC TROMETHAMINE 30 MG: 30 INJECTION, SOLUTION INTRAMUSCULAR at 17:57

## 2019-02-05 RX ADMIN — DIPHENHYDRAMINE HYDROCHLORIDE 25 MG: 50 INJECTION, SOLUTION INTRAMUSCULAR; INTRAVENOUS at 07:37

## 2019-02-05 RX ADMIN — DIPHENHYDRAMINE HYDROCHLORIDE 25 MG: 50 INJECTION, SOLUTION INTRAMUSCULAR; INTRAVENOUS at 16:26

## 2019-02-05 RX ADMIN — Medication 10 ML: at 17:57

## 2019-02-05 RX ADMIN — DIPHENHYDRAMINE HYDROCHLORIDE 25 MG: 50 INJECTION, SOLUTION INTRAMUSCULAR; INTRAVENOUS at 11:56

## 2019-02-05 RX ADMIN — DIPHENHYDRAMINE HYDROCHLORIDE 25 MG: 50 INJECTION, SOLUTION INTRAMUSCULAR; INTRAVENOUS at 02:30

## 2019-02-05 NOTE — PROGRESS NOTES
Problem: Falls - Risk of 
Goal: *Absence of Falls Document Gabriele Danielles Fall Risk and appropriate interventions in the flowsheet. Outcome: Progressing Towards Goal 
Fall Risk Interventions: 
Mobility Interventions: Assess mobility with egress test, Communicate number of staff needed for ambulation/transfer Medication Interventions: Assess postural VS orthostatic hypotension, Evaluate medications/consider consulting pharmacy Elimination Interventions: Call light in reach, Patient to call for help with toileting needs

## 2019-02-05 NOTE — PROGRESS NOTES
10:25 am Pt alert and oriented, Rates pain of 6/10, states that this is tolerable. Scheduled IV toradol Q6hrs, next dose at 12 pm. Also, has PCA Dilaudid. Advised to try to use Dilaudid minimally. 16:29 IV benadryl administered for pt itch. Visit Vitals /87 Pulse 70 Temp 99.6 °F (37.6 °C) Resp 18 Ht 5' 3\" (1.6 m) Wt 84.1 kg (185 lb 6.5 oz) SpO2 100% BMI 32.84 kg/m²

## 2019-02-05 NOTE — ROUTINE PROCESS
Bedside shift change report given to MOLLY Rey RN (oncoming nurse) by JUNI Arireta (offgoing nurse). Report included the following information SBAR, Kardex, Intake/Output and MAR.

## 2019-02-05 NOTE — PROGRESS NOTES
1940 - Bedside report obtained. Assumed care of patient. Dilaudid PCA verified with Fredi Sparks RN.  
 
2038 - Vital signs obtained by CNA: Blood pressure (!) 132/99, pulse 100, temperature 98.3 °F (36.8 °C), resp. rate 18, height 5' 3\" (1.6 m), weight 85 kg (187 lb 6.4 oz), SpO2 99 %. 2115 - Shift assessment completed at this time. 2339 - Vital signs obtained by CNA: Blood pressure 112/77, pulse 69, temperature 97.8 °F (36.6 °C), resp. rate 17, height 5' 3\" (1.6 m), weight 84.1 kg (185 lb 6.5 oz), SpO2 100 %. 2350 - Toradol administered. 0230 - Benadryl administered by T. Claudetta Norse, RN.  
 
9037 - Vital signs obtained by CNA: Blood pressure 109/77, pulse 99, temperature 98.3 °F (36.8 °C), resp. rate 17, height 5' 3\" (1.6 m), weight 84.1 kg (185 lb 6.5 oz), SpO2 96 %. 0507 - Patient resting in bed.  
 
0530 - Toradol administered by Adrian Sanchez RN.  
 
5092 - Attempted to administer Benadryl, patient requesting to use bathroom prior to administration. 1691 - Dilaudid PCA verified with Fredi Sparks RN. Benadryl administered.

## 2019-02-05 NOTE — ROUTINE PROCESS
Bedside and Verbal shift change report given to Kim Libman, RN (oncoming nurse) by Neetu Munoz RN (offgoing nurse). Report included the following information SBAR, Kardex, ED Summary, OR Summary, Procedure Summary, Intake/Output, MAR, Recent Results and Med Rec Status.

## 2019-02-05 NOTE — PROGRESS NOTES
Inpatient Daily Progress Note Subjective:  
Patient states she has started passing flatus. Pain is well controlled. She has not had nausea, has not vomited, has passed flatus, and has not had a bowel movement. Objective:  
 
Physical Exam: 
Visit Vitals /84 (BP 1 Location: Right arm, BP Patient Position: Sitting) Pulse 77 Temp 98.3 °F (36.8 °C) Resp 18 Ht 5' 3\" (1.6 m) Wt 84.1 kg (185 lb 6.5 oz) SpO2 99% BMI 32.84 kg/m² Gen: Well developed, well nourished 28 y.o. female in no acute distress Resp: clear to auscultation bilaterally, no wheezes Cardio: Regular rate and rhythm, no murmurs, clicks, gallops or rubs, no edema Abdomen: soft, appropriately tender, nondistended, with active bowel sounds, no hernias Psych: alert and oriented to person, place and time Incision: c/d/i No results found for this or any previous visit (from the past 12 hour(s)). Assessment:  
 
Reginaldo Tovar is a 28 y.o. female s/p laparotomy for internal hernia. Doing well without any issues. Plan:  
 
-Continue current medications 
-Encourage ambulation - Incentive Spirometry 10x/hr

## 2019-02-05 NOTE — PROGRESS NOTES
PM Rounds    Patient sitting up along side of bed upon this RN's arrival.  She is tolerating her diet. Pain is being managed with PCA medication. She is \"passing gas. \"

## 2019-02-05 NOTE — ROUTINE PROCESS
Bedside shift change report given to JUNI Nichols (oncoming nurse) by Neha Hernandez RN (offgoing nurse). Report included the following information SBAR, Kardex, Intake/Output and MAR.

## 2019-02-05 NOTE — PROGRESS NOTES
Bariatric Surgery                POD #3 (PM check) Visit Vitals /83 (BP 1 Location: Right arm, BP Patient Position: Sitting) Pulse 75 Temp 98.3 °F (36.8 °C) Resp 18 Ht 5' 3\" (1.6 m) Wt 84.1 kg (185 lb 6.5 oz) SpO2 100% BMI 32.84 kg/m² Patient asking about D/C plan Exam: 
Appears well in no distress Lungs- clear bilaterally Abd - soft, incision look good without erythema Extremities- no new edema or swelling Data Review: 
 
Labs: Results:  
   
Chemistry Recent Labs 02/04/19 
2333 GLU 69*   
K 4.3  CO2 30 BUN 6*  
CREA 0.48* CA 8.3* AGAP 5  
BUCR 13  
  
CBC w/Diff No results for input(s): WBC, RBC, HGB, HCT, PLT, GRANS, LYMPH, EOS, RETIC, HGBEXT, HCTEXT, PLTEXT in the last 72 hours. Coagulation No results for input(s): PTP, INR, APTT in the last 72 hours. No lab exists for component: INREXT Liver Enzymes No results for input(s): TP, ALB, TBIL, AP, SGOT, GPT in the last 72 hours. No lab exists for component: DBIL Assessment/Plan: S/P  laparotomy for SBO - doing well without any issues. Pt reports return of flatus and tolerating PO 
 
1. Cont PO intake 2. D/C PCA in the AM and switch to PO pain meds 3. Likely D/C home within 24 hours if cont ok

## 2019-02-05 NOTE — PROGRESS NOTES
CM continuing to follow. Anticipate pt will be discharged with in the next 24-48 hours with physician follow up and family assistance. No transition of care needs have been identified. Care Management Interventions PCP Verified by CM: Yes Mode of Transport at Discharge: Other (see comment)(family) Transition of Care Consult (CM Consult): Discharge Planning Current Support Network: Own Home Plan discussed with Pt/Family/Caregiver: Yes Discharge Location Discharge Placement: Home with family assistance

## 2019-02-06 ENCOUNTER — NURSE NAVIGATOR (OUTPATIENT)
Dept: SURGERY | Age: 34
End: 2019-02-06

## 2019-02-06 VITALS
BODY MASS INDEX: 31.55 KG/M2 | RESPIRATION RATE: 18 BRPM | SYSTOLIC BLOOD PRESSURE: 138 MMHG | DIASTOLIC BLOOD PRESSURE: 82 MMHG | HEIGHT: 63 IN | HEART RATE: 70 BPM | WEIGHT: 178.1 LBS | OXYGEN SATURATION: 100 % | TEMPERATURE: 97.9 F

## 2019-02-06 PROCEDURE — 74011250636 HC RX REV CODE- 250/636: Performed by: SPECIALIST

## 2019-02-06 RX ORDER — OXYCODONE AND ACETAMINOPHEN 5; 325 MG/1; MG/1
1 TABLET ORAL
Qty: 30 TAB | Refills: 0 | Status: SHIPPED | OUTPATIENT
Start: 2019-02-06 | End: 2019-04-02

## 2019-02-06 RX ORDER — ONDANSETRON 4 MG/1
4 TABLET, ORALLY DISINTEGRATING ORAL
Qty: 60 TAB | Refills: 0 | Status: SHIPPED | OUTPATIENT
Start: 2019-02-06 | End: 2019-02-14

## 2019-02-06 RX ORDER — FLUCONAZOLE 150 MG/1
150 TABLET ORAL DAILY
Qty: 1 TAB | Refills: 1 | Status: SHIPPED | OUTPATIENT
Start: 2019-02-06 | End: 2019-02-07

## 2019-02-06 RX ADMIN — SODIUM CHLORIDE, SODIUM LACTATE, POTASSIUM CHLORIDE, AND CALCIUM CHLORIDE 150 ML/HR: 600; 310; 30; 20 INJECTION, SOLUTION INTRAVENOUS at 02:37

## 2019-02-06 RX ADMIN — DIPHENHYDRAMINE HYDROCHLORIDE 25 MG: 50 INJECTION, SOLUTION INTRAMUSCULAR; INTRAVENOUS at 00:18

## 2019-02-06 RX ADMIN — KETOROLAC TROMETHAMINE 30 MG: 30 INJECTION, SOLUTION INTRAMUSCULAR at 00:18

## 2019-02-06 RX ADMIN — Medication 10 ML: at 07:01

## 2019-02-06 RX ADMIN — DIPHENHYDRAMINE HYDROCHLORIDE 25 MG: 50 INJECTION, SOLUTION INTRAMUSCULAR; INTRAVENOUS at 09:33

## 2019-02-06 RX ADMIN — KETOROLAC TROMETHAMINE 30 MG: 30 INJECTION, SOLUTION INTRAMUSCULAR at 07:00

## 2019-02-06 RX ADMIN — DIPHENHYDRAMINE HYDROCHLORIDE 25 MG: 50 INJECTION, SOLUTION INTRAMUSCULAR; INTRAVENOUS at 04:48

## 2019-02-06 RX ADMIN — KETOROLAC TROMETHAMINE 30 MG: 30 INJECTION, SOLUTION INTRAMUSCULAR at 11:57

## 2019-02-06 NOTE — PROGRESS NOTES
Problem: Falls - Risk of 
Goal: *Absence of Falls Document Shelia Dumont Fall Risk and appropriate interventions in the flowsheet. Outcome: Progressing Towards Goal 
Fall Risk Interventions: 
Mobility Interventions: Assess mobility with egress test, Communicate number of staff needed for ambulation/transfer Medication Interventions: Assess postural VS orthostatic hypotension, Evaluate medications/consider consulting pharmacy Elimination Interventions: Call light in reach, Patient to call for help with toileting needs

## 2019-02-06 NOTE — PROGRESS NOTES
Inpatient Daily Progress Note Subjective:  
 Patient states she is tolerating liquids well and is anxious to go home. Pain is well controlled. She has not had nausea, has not vomited, has passed flatus, and has not had a bowel movement. Objective:  
 
Physical Exam: 
Visit Vitals BP (!) 133/96 (BP 1 Location: Right arm, BP Patient Position: Sitting) Comment: RN notified Pulse 73 Temp 99.1 °F (37.3 °C) Resp 18 Ht 5' 3\" (1.6 m) Wt 80.8 kg (178 lb 1.6 oz) SpO2 100% BMI 31.55 kg/m² Gen: Well developed, well nourished 28 y.o. female in no acute distress Resp: clear to auscultation bilaterally, no wheezes Cardio: Regular rate and rhythm, no murmurs, clicks, gallops or rubs, no edema Abdomen: soft, appropriately tender, nondistended, with active bowel sounds, no hernias Psych: alert and oriented to person, place and time Incision: c/d/i No results found for this or any previous visit (from the past 12 hour(s)). Assessment:  
 
Robert Nolasco is a 28 y.o. female s/p open laparotomy for internal hernia repair Plan:  
 
-Continue current medications 
-encourage ambulation 
-encourage IS 10x/hr 
-likely d/c later today

## 2019-02-06 NOTE — PROGRESS NOTES
Bariatric Surgery                POD #4 (PM check) - note reflects encounter Dr. James Dubno had with patient Visit Vitals /82 (BP 1 Location: Right arm, BP Patient Position: At rest) Pulse 70 Temp 97.9 °F (36.6 °C) Resp 18 Ht 5' 3\" (1.6 m) Wt 80.8 kg (178 lb 1.6 oz) SpO2 100% BMI 31.55 kg/m² Patient has minimal complaints of pain, minimal nausea noted Exam: 
Appears well in no distress Lungs- clear bilaterally Abd - soft, incision look good without erythema Extremities- no new edema or swelling Data Review: 
 
Labs: Results:  
   
Chemistry Recent Labs 02/04/19 
8762 GLU 69*   
K 4.3  CO2 30 BUN 6*  
CREA 0.48* CA 8.3* AGAP 5  
BUCR 13  
  
CBC w/Diff No results for input(s): WBC, RBC, HGB, HCT, PLT, GRANS, LYMPH, EOS, RETIC, HGBEXT, HCTEXT, PLTEXT, HGBEXT, HCTEXT, PLTEXT in the last 72 hours. Coagulation No results for input(s): PTP, INR, APTT in the last 72 hours. No lab exists for component: INREXT, INREXT Liver Enzymes No results for input(s): TP, ALB, TBIL, AP, SGOT, GPT in the last 72 hours. No lab exists for component: DBIL Assessment/Plan: S/P  laparotomy for SBO related to internal hernia post gastric bypass - doing well without any issues 1. D/C home with PO pain meds and diet instructions

## 2019-02-06 NOTE — ROUTINE PROCESS
Bedside and Verbal shift change report given to RAFAEL Kim RN (oncoming nurse) by Randolph Mclean RN 
 (offgoing nurse). Report included the following information SBAR, Kardex, Procedure Summary, Intake/Output and MAR.

## 2019-02-06 NOTE — PROGRESS NOTES
Discharge instructions reviewed with the patient. Patient verbalized understanding. All questions answered. IV discontinued, no redness, swelling or pain noted. Patient awaiting for mother for transportation home, with an ETA of 25 min. Patient armband removed and shredded

## 2019-02-06 NOTE — DISCHARGE INSTRUCTIONS
Patient Education     DISCHARGE SUMMARY from Nurse    PATIENT INSTRUCTIONS:    After general anesthesia or intravenous sedation, for 24 hours or while taking prescription Narcotics:  · Limit your activities  · Do not drive and operate hazardous machinery  · Do not make important personal or business decisions  · Do  not drink alcoholic beverages  · If you have not urinated within 8 hours after discharge, please contact your surgeon on call. Report the following to your surgeon:  · Excessive pain, swelling, redness or odor of or around the surgical area  · Temperature over 100.5  · Nausea and vomiting lasting longer than 4 hours or if unable to take medications  · Any signs of decreased circulation or nerve impairment to extremity: change in color, persistent  numbness, tingling, coldness or increase pain  · Any questions    What to do at Home:  Recommended activity: Activity as tolerated, no strenuous or execercising no lifting over 10 lbs. *  Please give a list of your current medications to your Primary Care Provider. *  Please update this list whenever your medications are discontinued, doses are      changed, or new medications (including over-the-counter products) are added. *  Please carry medication information at all times in case of emergency situations. These are general instructions for a healthy lifestyle:    No smoking/ No tobacco products/ Avoid exposure to second hand smoke  Surgeon General's Warning:  Quitting smoking now greatly reduces serious risk to your health.     Obesity, smoking, and sedentary lifestyle greatly increases your risk for illness    A healthy diet, regular physical exercise & weight monitoring are important for maintaining a healthy lifestyle    You may be retaining fluid if you have a history of heart failure or if you experience any of the following symptoms:  Weight gain of 3 pounds or more overnight or 5 pounds in a week, increased swelling in our hands or feet or shortness of breath while lying flat in bed. Please call your doctor as soon as you notice any of these symptoms; do not wait until your next office visit. Recognize signs and symptoms of STROKE:    F-face looks uneven    A-arms unable to move or move unevenly    S-speech slurred or non-existent    T-time-call 911 as soon as signs and symptoms begin-DO NOT go       Back to bed or wait to see if you get better-TIME IS BRAIN. Warning Signs of HEART ATTACK     Call 911 if you have these symptoms:   Chest discomfort. Most heart attacks involve discomfort in the center of the chest that lasts more than a few minutes, or that goes away and comes back. It can feel like uncomfortable pressure, squeezing, fullness, or pain.  Discomfort in other areas of the upper body. Symptoms can include pain or discomfort in one or both arms, the back, neck, jaw, or stomach.  Shortness of breath with or without chest discomfort.  Other signs may include breaking out in a cold sweat, nausea, or lightheadedness. Don't wait more than five minutes to call 911 - MINUTES MATTER! Fast action can save your life. Calling 911 is almost always the fastest way to get lifesaving treatment. Emergency Medical Services staff can begin treatment when they arrive -- up to an hour sooner than if someone gets to the hospital by car. The discharge information has been reviewed with the patient. The patient verbalized understanding. Discharge medications reviewed with the patient and appropriate educational materials and side effects teaching were provided. ___________________________________________________________________________________________________________________________________     Hernia Repair: What to Expect at [address] are likely to have pain for the next few days. You may also feel like you have the flu, and you may have a low fever and feel tired and nauseated. This is common.   You should feel better after a few days and will probably feel much better in 7 days. For several weeks you may feel twinges or pulling in the hernia repair when you move. You may have some bruising on the scrotum and along the penis. This is normal. Men will need to wear a jockstrap or briefs, not boxers, for scrotal support for several days after a groin (inguinal) hernia repair. [employer] bicycle shorts may provide good support. This care sheet gives you a general idea about how long it will take for you to recover. But each person recovers at a different pace. Follow the steps below to get better as quickly as possible. How can you care for yourself at home? Activity    · Rest when you feel tired. Getting enough sleep will help you recover.     · Try to walk each day. Start by walking a little more than you did the day before. Bit by bit, increase the amount you walk. Walking boosts blood flow and helps prevent pneumonia and constipation.     · Avoid strenuous activities, such as biking, jogging, weight lifting, or aerobic exercise, until your doctor says it is okay.     · Avoid lifting anything that would make you strain. This may include heavy grocery bags and milk containers, a heavy briefcase or backpack, cat litter or dog food bags, a vacuum , or a child.     · You may drive when you are no longer taking pain medicine and can quickly move your foot from the gas pedal to the brake. You must also be able to sit comfortably for a long period of time, even if you do not plan to go far. You might get caught in traffic.     · Most people are able to return to work within 1 to 2 weeks after surgery.     · You may shower 24 to 48 hours after surgery, if your doctor okays it. Pat the cut (incision) dry. Do not take a bath for the first 2 weeks, or until your doctor tells you it is okay.     · Your doctor will tell you when you can have sex again. Diet    · You can eat your normal diet.  If your stomach is upset, try bland, low-fat foods like plain rice, broiled chicken, toast, and yogurt.     · Drink plenty of fluids (unless your doctor tells you not to).     · You may notice that your bowel movements are not regular right after your surgery. This is common. Avoid constipation and straining with bowel movements. You may want to take a fiber supplement every day. If you have not had a bowel movement after a couple of days, ask your doctor about taking a mild laxative. Medicines    · Your doctor will tell you if and when you can restart your medicines. He or she will also give you instructions about taking any new medicines.     · If you take blood thinners, such as warfarin (Coumadin), clopidogrel (Plavix), or aspirin, be sure to talk to your doctor. He or she will tell you if and when to start taking those medicines again. Make sure that you understand exactly what your doctor wants you to do.     · Be safe with medicines. Take pain medicines exactly as directed. ? If the doctor gave you a prescription medicine for pain, take it as prescribed. ? If you are not taking a prescription pain medicine, take an over-the-counter medicine such as acetaminophen (Tylenol), ibuprofen (Advil, Motrin), or naproxen (Aleve). Read and follow all instructions on the label. ? Do not take two or more pain medicines at the same time unless the doctor told you to. Many pain medicines have acetaminophen, which is Tylenol. Too much acetaminophen (Tylenol) can be harmful.     · If your doctor prescribed antibiotics, take them as directed. Do not stop taking them just because you feel better. You need to take the full course of antibiotics.     · If you think your pain medicine is making you sick to your stomach:  ? Take your medicine after meals (unless your doctor has told you not to). ? Ask your doctor for a different pain medicine.    Incision care    · If you have strips of tape on the cut (incision) the doctor made, leave the tape on for a week or until it falls off.     · If you have staples closing the cut, you will need to visit your doctor in 1 to 2 weeks to have them removed.     · Wash the area daily with warm, soapy water and pat it dry. Follow-up care is a key part of your treatment and safety. Be sure to make and go to all appointments, and call your doctor if you are having problems. It's also a good idea to know your test results and keep a list of the medicines you take. When should you call for help? Call 911 anytime you think you may need emergency care. For example, call if:    · You passed out (lost consciousness).     · You are short of breath.    Call your doctor now or seek immediate medical care if:    · You have pain that does not get better after you take pain medicine.     · You are sick to your stomach and cannot keep fluids down.     · You have signs of a blood clot in your leg (called a deep vein thrombosis), such as:  ? Pain in your calf, back of the knee, thigh, or groin. ? Redness and swelling in your leg or groin.     · You cannot pass stools or gas.     · Bright red blood has soaked through the bandage over your incision.     · You have loose stitches, or your incision comes open.     · You have signs of infection, such as:  ? Increased pain, swelling, warmth, or redness. ? Red streaks leading from the incision. ? Pus draining from the incision. ? A fever.    Watch closely for any changes in your health, and be sure to contact your doctor if you have any problems. Where can you learn more? Go to http://wilbert-loraine.info/. Enter X477 in the search box to learn more about \"Hernia Repair: What to Expect at Home. \"  Current as of: March 27, 2018  Content Version: 11.9  © 9170-0516 [employer]. Care instructions adapted under license by [employer] (which disclaims liability or warranty for this information).  If you have questions about a medical condition or this instruction, always ask your healthcare professional. [place] [address] any warranty or liability for your use of this information.

## 2019-02-06 NOTE — PROGRESS NOTES
Patient lying in bed in semi-Cummings's position upon this RN's arrival.  Patient has yet to have a BM, but is having flatus. Patient is tolerating diet well. Pain is currently managed with PCA.

## 2019-02-06 NOTE — ROUTINE PROCESS
Bedside shift change report given to CLIVE Mcmillan RN (oncoming nurse) by JUNI Magaña (offgoing nurse). Report included the following information SBAR, Kardex, Intake/Output and MAR.

## 2019-02-07 ENCOUNTER — TELEPHONE (OUTPATIENT)
Dept: SURGERY | Age: 34
End: 2019-02-07

## 2019-02-07 NOTE — TELEPHONE ENCOUNTER
This RN Bariatric Coordinator spoke with patient at 24 hour post-discharge. She stated her pain is being [employer] not as good as it was on the IV pain meds in the hospital.\"  She is walking around her house throughout the day and is using the incentive spirometer. She is tolerating a soft diet and plans on making mashed sweet potatoes and guacamole. She denies fevers and/or chills. Her incision site is without redness, drainage, and/or swelling per patient. This RN reminded patient to call the office if any change.

## 2019-02-08 ENCOUNTER — TELEPHONE (OUTPATIENT)
Dept: SURGERY | Age: 34
End: 2019-02-08

## 2019-02-08 NOTE — TELEPHONE ENCOUNTER
Patient called today and states she had a substantial BM, which she was waiting for. She also asked if she could bring us her Percocet and get Roxicodone instead. I told her that Roxicodone is just percocet without tylenol in it. I explained that she can alternate doses of Tylenol and Percocet. Starting at noon take 1 percocet and at 15:00 take 2 (325mg) tylenol, and at 18:00 take 1 Percocet and so on. So every 3 hours alternate doses until she is feeling better. She verbalized understanding and wrote this down so she would not forget. Patient is to contact our office with any problems, worsening of condition, questions or concerns. If we are not available or unable to be reached, patient is to proceed to the Emergency Department.

## 2019-02-09 NOTE — DISCHARGE SUMMARY
[facility] SUMMARY    Name:  Ricardo Martin  MR#:   [id number]  :  1985  ACCOUNT #:  [de-identified]  ADMIT DATE:  2019  DISCHARGE DATE:  2019        INTERMITTENT DIAGNOSIS:  Abdominal pain secondary to internal hernia status post  gastric bypass procedure. SECONDARY DIAGNOSES:  1.  Obesity. 2.  Status post gastric bypass. 3.  Intestinal malabsorption. 4.  History of incisional hernia repair. 5.  Gastroesophageal reflux disease. PROCEDURES PERFORMED:  1. Exploratory laparoscopy. 2.  Exploratory laparotomy with open reduction and repair of internal hernia. (The  above procedure was performed by Dr. Kallie Alcala). STATEMENT OF MEDICAL NECESSITY:  The patient is a 39-year-old female who underwent a  gastric bypass procedure by Dr. Kallie Alcala approximately 18 months ago. Prior  to this admission, she began having severe acute abdominal pains. She presented to  the [facility] Emergency Room where a CT was obtained, which did show an  internal hernia which the radiologist found very concerning. He called Dr. Sukumar Castro  directly to discuss the findings. Dr. Sukumar Castro came to the hospital and evaluated  the patient. Physical exam did confirm that she had an internal hernia. She was  taken to the operating room where the above-mentioned procedure was performed. The patient underwent the above-mentioned procedures on the afternoon of 2019  by Dr. Kallie Alcala. Following her surgical procedure, she was transferred to  the third floor for observation. By the morning of postoperative day #1, she was  allowed to start a clear liquid diet which she tolerated well. We had a long  discussion regarding the nature of her operative findings. She was allowed to start  chewing gum and encouraged to ambulate as this would hopefully prompt the return of  her GI function. Her pain was controlled immediately postop with Dilaudid PCA. She  progressed well throughout her four-day hospitalization. By the afternoon of  postoperative day #3, the patient had reported return of flatus and desired to have  her diet advanced which was performed, and this was tolerated well. Her PCA was  discharged on the morning of postoperative day #4. She was anxious for discharge. She had been tolerating her diet well. Pain was controlled. She was discharged home  in stable condition on the afternoon of postop day #4 with p.o. pain medications,  instructions on the advancement of her diet as well as instructions for her  laparotomy incision. She was told to call the office with any questions or concerns  and to follow up in the office in one week for evaluation of her incision.       Franko Terry PA-C MS      [id number]  D:  02/08/2019 11:39  T:  02/09/2019 8:04  JOB #:  [id number]

## 2019-02-13 ENCOUNTER — DOCUMENTATION ONLY (OUTPATIENT)
Dept: SURGERY | Age: 34
End: 2019-02-13

## 2019-02-13 NOTE — PROGRESS NOTES
Patient requested note sent to employer for time out of work. [employer] care letter sent stating patient will be out for thirty days.

## 2019-02-14 ENCOUNTER — OFFICE VISIT (OUTPATIENT)
Dept: SURGERY | Age: 34
End: 2019-02-14

## 2019-02-14 VITALS
HEART RATE: 79 BPM | SYSTOLIC BLOOD PRESSURE: 129 MMHG | TEMPERATURE: 97.6 F | OXYGEN SATURATION: 100 % | WEIGHT: 156.8 LBS | HEIGHT: 63 IN | DIASTOLIC BLOOD PRESSURE: 83 MMHG | BODY MASS INDEX: 27.78 KG/M2

## 2019-02-14 DIAGNOSIS — Z98.84 S/P BARIATRIC SURGERY: ICD-10-CM

## 2019-02-14 DIAGNOSIS — K90.9 INTESTINAL MALABSORPTION, UNSPECIFIED TYPE: Primary | ICD-10-CM

## 2019-02-14 NOTE — PROGRESS NOTES
Subjective:      Judson Leiva is a 28 y.o. female presents for postop care 1 weeks status post open internal hernia repair. Patient has a gastric bypass and this was her second internal hernia repair. Pain is well controlled. Appetite is good. Eating a regular diet without difficulty. Bowel movements are regular. She denies fever, chills, n/v/d. She has been apprehensive about eating, but she has made herself eat and she is tolerating food very well. Objective:     Visit Vitals  /83 (BP 1 Location: Left arm, BP Patient Position: Sitting)   Pulse 79   Temp 97.6 °F (36.4 °C)   Ht 5' 3\" (1.6 m)   Wt 71.1 kg (156 lb 12.8 oz)   SpO2 100%   BMI 27.78 kg/m²       General:  alert, cooperative, no distress, appears stated age   Abdomen: soft, bowel sounds active, non-tender   Incision:   healing well, no drainage, no erythema, no hernia, no seroma, no swelling, no dehiscence, incision well approximated       Pathology:    Assessment:   Patient is s/p open internal hernia repair here for staple removal.   Doing well postoperatively. Plan:   - staples were removed and area was cleaned well with an alcohol swab  - Wound care discussed  - Pt is to increase activities as tolerated. - followup in 1 week(s) on Feb 20th for 2 week post op check or sooner if patient has questions, concerns or worsening of condition. If we are not available, patient is to go to the Emergency Department.

## 2019-02-14 NOTE — PATIENT INSTRUCTIONS
Patient Instructions      1. Remember hydration goals - minimum of 64 ounces of liquids per day (dehydration is the number one reason for hospital readmission). 2. Continue to monitor carbohydrate and protein intake you need a minimum of  Grams of protein daily- remember to keep your total carbohydrates to 50 grams or less per day for best results. 3. Continue to work towards exercise goals - 60-90 minutes, 5 times a week minimum of deliberate, aerobic exercise is the ultimate goal with strength training 2 times each week. Refer to [employer] for  information. 4. Remember to take vitamins as directed in your handbook. 5. Attend support group the 2nd Thursday of each month. 6. Constipation: Milk of Magnesia is for immediate relief. Miralax is to be used every day if constipation is a chronic problem. 7. Diarrhea: patients will occasionally develop lactose intolerance after surgery. Check to see if your protein shake has whey in it. If it does try one that does not have whey and stop all yogurts, cheeses and milks to see if the diarrhea goes away. 8. If you have had labs drawn. We will only call you if you have abnormal results. Otherwise you can access the lab results in \"[facility]\"  9. Call us at [phone] or email us through [email or web address]" with questions,     concerns or worsening of condition, we have someone on call 24 hours a day. If you are unable to reach our office, you are to go to your Primary Care Physician or the Emergency Department. Supplement Resource Guide    Importance of Protein:   Maintains lean body mass, produces antibodies to fight off infections, heals wounds, minimizes hair loss, helps to give you energy, helps with satiety, and keeping you full between meals.     Importance of Calcium:  Needed for healthy bones and teeth, normal blood clotting, and nervous system functioning, higher risk of osteoporosis and bone disease with non-compliance. Importance of Multivitamins: Many functions. Supply you with extra nutrients that you may be missing from food. May lead to iron deficiency anemia, weakness, fatigue, and many other symptoms with non-compliance. Importance of B Vitamins:  Important for red blood cell formation, metabolism, energy, and helps to maintain a healthy nervous system. Protein Supplement  Find one you like now. Use immediately after surgery. Look for:  35-50g protein each day from your protein supplement once you reach the progression diet. 0-3 g fat per serving  0-3 g sugar per serving    Protein drinks should be split in separate dosages. Recommend: Lifelong  1 year + Calcium Supplement:     Start taking within a month after surgery. Look for: Calcium Citrate Plus D (1500 mg per day)  Recommend: Citracal     .            Avoid chocolate chewable calcium. Can use chewable bariatric or [employer] brand or similar chewable. The body cannot absorb more than 500-600 mg of calcium at a time. Take for Life Multi-vitamin Supplement:      Start immediately after surgery: any complete chewable, such as: [place]s Complete chewables. Avoid [place] sours or gummies. They lack iron and other important nutrients and also have added sugar. Continue with chewable vitamin or change to adult complete multivitamin one month after surgery. Menstruating women can take a prenatal vitamin. Make sure has at least 18 mg iron and 012-483 mcg folic acid   Vitamin N71, B Complex Vitamin, and Biotin  Start taking within a month after surgery. Vitamin B12:  1000 mcg of Vitamin B12 three times weekly    Must take sublingually (meaning you take it under your tongue) or in a liquid drop form for easy absorption. B Complex Vitamin: Take a pill or liquid drop form once daily. Biotin: This vitamin can help prevent hair loss.     Recommend 5mg   (5000 mcg) a day  Biotin is Optional

## 2019-02-16 RX ORDER — ONDANSETRON 4 MG/1
4 TABLET, ORALLY DISINTEGRATING ORAL
Qty: 30 TAB | Refills: 0 | Status: SHIPPED | OUTPATIENT
Start: 2019-02-16 | End: 2019-04-02

## 2019-02-18 ENCOUNTER — HOSPITAL ENCOUNTER (OUTPATIENT)
Dept: LAB | Age: 34
Discharge: HOME | End: 2019-02-18
Payer: COMMERCIAL

## 2019-02-18 DIAGNOSIS — K90.9 INTESTINAL MALABSORPTION, UNSPECIFIED TYPE: Primary | ICD-10-CM

## 2019-02-18 LAB
ALBUMIN SERPL-MCNC: 3.3 G/DL (ref 3.4–5)
ALBUMIN/GLOB SERPL: 0.8 {RATIO} (ref 0.8–1.7)
ALP SERPL-CCNC: 119 U/L (ref 45–117)
ALT SERPL-CCNC: 30 U/L (ref 13–56)
ANION GAP SERPL CALC-SCNC: 5 MMOL/L (ref 3–18)
AST SERPL-CCNC: 22 U/L (ref 15–37)
BASOPHILS # BLD: 0 K/UL (ref 0–0.1)
BASOPHILS NFR BLD: 0 % (ref 0–2)
BILIRUB SERPL-MCNC: 0.4 MG/DL (ref 0.2–1)
BUN SERPL-MCNC: 14 MG/DL (ref 7–18)
BUN/CREAT SERPL: 25 (ref 12–20)
CALCIUM SERPL-MCNC: 9.3 MG/DL (ref 8.5–10.1)
CHLORIDE SERPL-SCNC: 104 MMOL/L (ref 100–108)
CO2 SERPL-SCNC: 31 MMOL/L (ref 21–32)
CREAT SERPL-MCNC: 0.57 MG/DL (ref 0.6–1.3)
DIFFERENTIAL METHOD BLD: ABNORMAL
EOSINOPHIL # BLD: 0.6 K/UL (ref 0–0.4)
EOSINOPHIL NFR BLD: 10 % (ref 0–5)
ERYTHROCYTE [DISTWIDTH] IN BLOOD BY AUTOMATED COUNT: 13.6 % (ref 11.6–14.5)
GLOBULIN SER CALC-MCNC: 4.1 G/DL (ref 2–4)
GLUCOSE SERPL-MCNC: 79 MG/DL (ref 74–99)
HCT VFR BLD AUTO: 37.2 % (ref 35–45)
HGB BLD-MCNC: 11.6 G/DL (ref 12–16)
LYMPHOCYTES # BLD: 2.2 K/UL (ref 0.9–3.6)
LYMPHOCYTES NFR BLD: 37 % (ref 21–52)
MAGNESIUM SERPL-MCNC: 2.1 MG/DL (ref 1.6–2.6)
MCH RBC QN AUTO: 30.2 PG (ref 24–34)
MCHC RBC AUTO-ENTMCNC: 31.2 G/DL (ref 31–37)
MCV RBC AUTO: 96.9 FL (ref 74–97)
MONOCYTES # BLD: 0.6 K/UL (ref 0.05–1.2)
MONOCYTES NFR BLD: 10 % (ref 3–10)
NEUTS SEG # BLD: 2.6 K/UL (ref 1.8–8)
NEUTS SEG NFR BLD: 43 % (ref 40–73)
PLATELET # BLD AUTO: 347 K/UL (ref 135–420)
PMV BLD AUTO: 10.8 FL (ref 9.2–11.8)
POTASSIUM SERPL-SCNC: 5 MMOL/L (ref 3.5–5.5)
PROT SERPL-MCNC: 7.4 G/DL (ref 6.4–8.2)
RBC # BLD AUTO: 3.84 M/UL (ref 4.2–5.3)
SODIUM SERPL-SCNC: 140 MMOL/L (ref 136–145)
WBC # BLD AUTO: 6 K/UL (ref 4.6–13.2)

## 2019-02-18 PROCEDURE — 80053 COMPREHEN METABOLIC PANEL: CPT

## 2019-02-18 PROCEDURE — 83735 ASSAY OF MAGNESIUM: CPT

## 2019-02-18 PROCEDURE — 85025 COMPLETE CBC W/AUTO DIFF WBC: CPT

## 2019-02-18 PROCEDURE — 36415 COLL VENOUS BLD VENIPUNCTURE: CPT

## 2019-02-20 ENCOUNTER — OFFICE VISIT (OUTPATIENT)
Dept: SURGERY | Age: 34
End: 2019-02-20

## 2019-02-20 VITALS
OXYGEN SATURATION: 99 % | SYSTOLIC BLOOD PRESSURE: 120 MMHG | HEART RATE: 76 BPM | DIASTOLIC BLOOD PRESSURE: 80 MMHG | TEMPERATURE: 97.6 F | RESPIRATION RATE: 16 BRPM | HEIGHT: 63 IN | WEIGHT: 157.7 LBS | BODY MASS INDEX: 27.94 KG/M2

## 2019-02-20 DIAGNOSIS — K45.8 INTERNAL HERNIA: Primary | ICD-10-CM

## 2019-02-20 NOTE — PATIENT INSTRUCTIONS
Body Mass Index: Care Instructions  Your Care Instructions    Body mass index (BMI) can help you see if your weight is raising your risk for health problems. It uses a formula to compare how much you weigh with how tall you are. · A BMI lower than 18.5 is considered underweight. · A BMI between 18.5 and 24.9 is considered healthy. · A BMI between 25 and 29.9 is considered overweight. A BMI of 30 or higher is considered obese. If your BMI is in the normal range, it means that you have a lower risk for weight-related health problems. If your BMI is in the overweight or obese range, you may be at increased risk for weight-related health problems, such as high blood pressure, heart disease, stroke, arthritis or joint pain, and diabetes. If your BMI is in the underweight range, you may be at increased risk for health problems such as fatigue, lower protection (immunity) against illness, muscle loss, bone loss, hair loss, and hormone problems. BMI is just one measure of your risk for weight-related health problems. You may be at higher risk for health problems if you are not active, you eat an unhealthy diet, or you drink too much alcohol or use tobacco products. Follow-up care is a key part of your treatment and safety. Be sure to make and go to all appointments, and call your doctor if you are having problems. It's also a good idea to know your test results and keep a list of the medicines you take. How can you care for yourself at home? · Practice healthy eating habits. This includes eating plenty of fruits, vegetables, whole grains, lean protein, and low-fat dairy. · If your doctor recommends it, get more exercise. Walking is a good choice. Bit by bit, increase the amount you walk every day. Try for at least 30 minutes on most days of the week. · Do not smoke. Smoking can increase your risk for health problems. If you need help quitting, talk to your doctor about stop-smoking programs and medicines. These can increase your chances of quitting for good. · Limit alcohol to 2 drinks a day for men and 1 drink a day for women. Too much alcohol can cause health problems. If you have a BMI higher than 25  · Your doctor may do other tests to check your risk for weight-related health problems. This may include measuring the distance around your waist. A waist measurement of more than 40 inches in men or 35 inches in women can increase the risk of weight-related health problems. · Talk with your doctor about steps you can take to stay healthy or improve your health. You may need to make lifestyle changes to lose weight and stay healthy, such as changing your diet and getting regular exercise. If you have a BMI lower than 18.5  · Your doctor may do other tests to check your risk for health problems. · Talk with your doctor about steps you can take to stay healthy or improve your health. You may need to make lifestyle changes to gain or maintain weight and stay healthy, such as getting more healthy foods in your diet and doing exercises to build muscle. Where can you learn more? Go to http://wilbert-loraine.info/. Enter S176 in the search box to learn more about \"Body Mass Index: Care Instructions. \"  Current as of: June 25, 2018  Content Version: 11.9  © 8693-4845 [employer]. Care instructions adapted under license by [employer] (which disclaims liability or warranty for this information). If you have questions about a medical condition or this instruction, always ask your healthcare professional. [address] any warranty or liability for your use of this information.

## 2019-02-20 NOTE — PROGRESS NOTES
Subjective:     Jason Jenkins  is a 28 y.o. female who presents for follow-up about 18 days following internal hernia. Body mass index is 27.94 kg/m². . Liver biopsy results were reviewed with the patient today. Weight Loss Metrics 2019   Today's Wt 157 lb 11.2 oz 156 lb 12.8 oz 178 lb 1.6 oz 166 lb 9.6 oz 173 lb 6.4 oz 170 lb 11.2 oz 171 lb 15.3 oz   BMI 27.94 kg/m2 27.78 kg/m2 31.55 kg/m2 29.51 kg/m2 30.72 kg/m2 30.24 kg/m2 30.46 kg/m2       Surgery related complication: none       She reports no issues and denies vomiting and abdominal pain. Patients pain score:0      The patient's exercise level: moderately active. Changes in her medical history and medications have been reviewed.     Patient Active Problem List   Diagnosis Code    Status post bariatric surgery Z98.84    Intestinal malabsorption K90.9    Nausea & vomiting R11.2    Incisional hernia K43.2    Spondylolisthesis of lumbar region M43.16    History of spinal surgery Z98.890    Acute gastric ulcer with hemorrhage K25.0    S/P gastric bypass Z98.84    Hernia of abdominal cavity K46.9    Bowel obstruction ([address] K56.609     Past Medical History:   Diagnosis Date    Acid reflux     Incisional hernia     Intestinal malabsorption     Morbid obesity with body mass index of 40.0-49.9 ([address]     Status post bariatric surgery 2012    sleeve resection / kirstin Sommer     Past Surgical History:   Procedure Laterality Date    HX  SECTION      X 3    HX GI  2012    Sleeve gastrectomy- Dr Ken Em [facility] GI      Conversion to gastric bypass-2017    HX GI      Internal hernia ( dr Peggy Banks) -2018    HX GI      Internal hernia - 2019    HX ORTHOPAEDIC      scoliosis correction surgery X 2    HX TONSIL AND ADENOIDECTOMY      HX TUBAL LIGATION      LAP GASTRIC BYPASS/TAMICA-EN-Y      revision from sleeve on 2017     Current Outpatient Medications Medication Sig Dispense Refill    ondansetron (ZOFRAN ODT) 4 mg disintegrating tablet Take 1 Tab by mouth every eight (8) hours as needed for Nausea. 30 Tab 0    Biotin 2,500 mcg cap Take  by mouth.  cyanocobalamin (VITAMIN B-12) 500 mcg tablet Take 500 mcg by mouth daily.  calcium citrate 200 mg (950 mg) tablet Take  by mouth daily.  multivitamin with iron (FLINTSTONES) chewable tablet Take 1 Tab by mouth two (2) times a day.  oxyCODONE-acetaminophen (PERCOCET) 5-325 mg per tablet Take 1 Tab by mouth every six (6) hours as needed for Pain. Max Daily Amount: 4 Tabs. 30 Tab 0    ferrous sulfate ER (IRON) 160 mg (50 mg iron) TbER tablet Take 1 Tab by mouth daily. Objective:     Visit Vitals  /80 (BP 1 Location: Left arm, BP Patient Position: Sitting)   Pulse 76   Temp 97.6 °F (36.4 °C)   Resp 16   Ht 5' 3\" (1.6 m)   Wt 71.5 kg (157 lb 11.2 oz)   SpO2 99%   BMI 27.94 kg/m²        Physical Exam:    General:  alert, cooperative, no distress, appears stated age   Heart:  Regular rate and rhythm   Abdomen:   abdomen is soft without significant tenderness, masses, organomegaly or guarding; Incisions: healing well       Assessment:     1. History of Morbid obesity, status post  internal hernia repair. Doing well; no concerns. .     Plan:     1. Remember to measure portions, continue low carbohydrate diet  2. Advance diet to puree phase  3. Remember vitamin supplements. 4. Start cardio exercise. 5. Attend support group  6. Follow-up in 2 month(s). 7. Total time spent with the patient 20 minutes.

## 2019-02-22 RX ORDER — FLUCONAZOLE 150 MG/1
150 TABLET ORAL DAILY
Qty: 2 TAB | Refills: 0 | Status: SHIPPED | OUTPATIENT
Start: 2019-02-22 | End: 2019-02-24

## 2019-03-04 RX ORDER — PANTOPRAZOLE SODIUM 40 MG/1
TABLET, DELAYED RELEASE ORAL
Qty: 60 TAB | Refills: 2 | Status: SHIPPED | OUTPATIENT
Start: 2019-03-04 | End: 2019-07-03 | Stop reason: SDUPTHER

## 2019-03-20 ENCOUNTER — OFFICE VISIT (OUTPATIENT)
Dept: SURGERY | Age: 34
End: 2019-03-20

## 2019-03-20 VITALS
RESPIRATION RATE: 16 BRPM | SYSTOLIC BLOOD PRESSURE: 120 MMHG | BODY MASS INDEX: 29.36 KG/M2 | WEIGHT: 165.7 LBS | TEMPERATURE: 98 F | OXYGEN SATURATION: 100 % | HEART RATE: 68 BPM | HEIGHT: 63 IN | DIASTOLIC BLOOD PRESSURE: 78 MMHG

## 2019-03-20 DIAGNOSIS — K90.9 INTESTINAL MALABSORPTION, UNSPECIFIED TYPE: Primary | ICD-10-CM

## 2019-03-20 DIAGNOSIS — Z98.84 S/P GASTRIC BYPASS: ICD-10-CM

## 2019-03-20 RX ORDER — MULTIVIT WITH MINERALS/HERBS
1 TABLET ORAL DAILY
COMMUNITY

## 2019-03-20 NOTE — PROGRESS NOTES
Subjective:     Augustine Tavera  is a 28 y.o. female who presents for follow-up about 6.5 weeks following laparotomy for her 2nd internal hernia. Body mass index is 29.35 kg/m². She has maintained 100 lb wt loss since her 2012 sleeve at [facility]    Pain assessment - 010    Surgery related complication: (see OP note)    Pt's surgical hx -     2012 - Sleeve resection at [facility]. - [facility])    2017 - sleeve to bypass conversion Kevin Piggs)    2018 - Dx lap'scope for internal hernia Jeffry Hazard)    2019 - Laparotomy for a return of her internal hernia Kevin Piggs)        She reports no issues and has returned to full daily activities and denies vomiting, abdominal pain, diarrhea and difficulty breathing. The patient's exercise level: moderately active. Changes in her medical history and medications have been reviewed.     Patient Active Problem List   Diagnosis Code    Status post bariatric surgery Z98.84    Intestinal malabsorption K90.9    Nausea & vomiting R11.2    Incisional hernia K43.2    Spondylolisthesis of lumbar region M43.16    History of spinal surgery Z98.890    Acute gastric ulcer with hemorrhage K25.0    S/P gastric bypass Z98.84    Hernia of abdominal cavity K46.9    Bowel obstruction ([address] K56.609     Past Medical History:   Diagnosis Date    Acid reflux     Incisional hernia     Intestinal malabsorption     Morbid obesity with body mass index of 40.0-49.9 ([address]     Status post bariatric surgery 2012    sleeve resection / kirstin Delacruz     Past Surgical History:   Procedure Laterality Date    HX  SECTION      X 3    HX GI  2012    Sleeve gastrectomy- Dr Alida Pérez HX GI      Conversion to gastric bypass-2017    HX GI      Internal hernia ( dr Benoit Small) -2018    HX GI      Internal hernia - 2019    HX ORTHOPAEDIC      scoliosis correction surgery X 2    HX TONSIL AND ADENOIDECTOMY      HX TUBAL LIGATION      LAP GASTRIC BYPASS/TAMICA-EN-Y revision from sleeve on 9/26/2017       Objective:     Visit Vitals  /78 (BP 1 Location: Left arm, BP Patient Position: Sitting)   Pulse 68   Temp 98 °F (36.7 °C)   Resp 16   Ht 5' 3\" (1.6 m)   Wt 75.2 kg (165 lb 11.2 oz)   SpO2 100%   BMI 29.35 kg/m²        Physical Exam:    General:  alert, cooperative, no distress, appears stated age   Pulm: clear to auscultation bilaterally   Heart:  Regular rate and rhythm   Abdomen:   abdomen is soft without significant tenderness, masses, organomegaly or guarding; Incisions: healing well       Assessment:     1. History of Morbid obesity, status post  laparotomy for internal hernia X 6.5 weeks. Doing well, no concerns. Cont routine F/U. Plan:     1. Remember to measure portions, continue low carbohydrate diet  2. Tolerated transition to solids without issue  3. Remember vitamin supplements. 4. Exercise regimen appears adequate. 5. Attend support group  6. Follow-up in 1 year(s).   7. The patient understands the plan of action

## 2019-03-20 NOTE — PATIENT INSTRUCTIONS
Body Mass Index: Care Instructions  Your Care Instructions    Body mass index (BMI) can help you see if your weight is raising your risk for health problems. It uses a formula to compare how much you weigh with how tall you are. · A BMI lower than 18.5 is considered underweight. · A BMI between 18.5 and 24.9 is considered healthy. · A BMI between 25 and 29.9 is considered overweight. A BMI of 30 or higher is considered obese. If your BMI is in the normal range, it means that you have a lower risk for weight-related health problems. If your BMI is in the overweight or obese range, you may be at increased risk for weight-related health problems, such as high blood pressure, heart disease, stroke, arthritis or joint pain, and diabetes. If your BMI is in the underweight range, you may be at increased risk for health problems such as fatigue, lower protection (immunity) against illness, muscle loss, bone loss, hair loss, and hormone problems. BMI is just one measure of your risk for weight-related health problems. You may be at higher risk for health problems if you are not active, you eat an unhealthy diet, or you drink too much alcohol or use tobacco products. Follow-up care is a key part of your treatment and safety. Be sure to make and go to all appointments, and call your doctor if you are having problems. It's also a good idea to know your test results and keep a list of the medicines you take. How can you care for yourself at home? · Practice healthy eating habits. This includes eating plenty of fruits, vegetables, whole grains, lean protein, and low-fat dairy. · If your doctor recommends it, get more exercise. Walking is a good choice. Bit by bit, increase the amount you walk every day. Try for at least 30 minutes on most days of the week. · Do not smoke. Smoking can increase your risk for health problems. If you need help quitting, talk to your doctor about stop-smoking programs and medicines. These can increase your chances of quitting for good. · Limit alcohol to 2 drinks a day for men and 1 drink a day for women. Too much alcohol can cause health problems. If you have a BMI higher than 25  · Your doctor may do other tests to check your risk for weight-related health problems. This may include measuring the distance around your waist. A waist measurement of more than 40 inches in men or 35 inches in women can increase the risk of weight-related health problems. · Talk with your doctor about steps you can take to stay healthy or improve your health. You may need to make lifestyle changes to lose weight and stay healthy, such as changing your diet and getting regular exercise. If you have a BMI lower than 18.5  · Your doctor may do other tests to check your risk for health problems. · Talk with your doctor about steps you can take to stay healthy or improve your health. You may need to make lifestyle changes to gain or maintain weight and stay healthy, such as getting more healthy foods in your diet and doing exercises to build muscle. Where can you learn more? Go to http://wilbert-loraine.info/. Enter S176 in the search box to learn more about \"Body Mass Index: Care Instructions. \"  Current as of: June 25, 2018  Content Version: 11.9  © 4720-9981 [employer]. Care instructions adapted under license by [employer] (which disclaims liability or warranty for this information). If you have questions about a medical condition or this instruction, always ask your healthcare professional. [address] any warranty or liability for your use of this information.

## 2019-04-02 ENCOUNTER — APPOINTMENT (OUTPATIENT)
Dept: CT IMAGING | Age: 34
End: 2019-04-02
Attending: EMERGENCY MEDICINE
Payer: COMMERCIAL

## 2019-04-02 ENCOUNTER — HOSPITAL ENCOUNTER (EMERGENCY)
Age: 34
Discharge: HOME | End: 2019-04-02
Attending: EMERGENCY MEDICINE
Payer: COMMERCIAL

## 2019-04-02 ENCOUNTER — TELEPHONE (OUTPATIENT)
Dept: SURGERY | Age: 34
End: 2019-04-02

## 2019-04-02 VITALS
OXYGEN SATURATION: 100 % | RESPIRATION RATE: 16 BRPM | BODY MASS INDEX: 28.17 KG/M2 | TEMPERATURE: 99 F | WEIGHT: 159 LBS | SYSTOLIC BLOOD PRESSURE: 122 MMHG | HEART RATE: 72 BPM | DIASTOLIC BLOOD PRESSURE: 81 MMHG | HEIGHT: 63 IN

## 2019-04-02 DIAGNOSIS — R10.13 ABDOMINAL PAIN, EPIGASTRIC: Primary | ICD-10-CM

## 2019-04-02 LAB
ALBUMIN SERPL-MCNC: 3.5 G/DL (ref 3.4–5)
ALBUMIN/GLOB SERPL: 0.9 {RATIO} (ref 0.8–1.7)
ALP SERPL-CCNC: 123 U/L (ref 45–117)
ALT SERPL-CCNC: 32 U/L (ref 13–56)
ANION GAP SERPL CALC-SCNC: 5 MMOL/L (ref 3–18)
APPEARANCE UR: CLEAR
AST SERPL-CCNC: 24 U/L (ref 15–37)
BASOPHILS # BLD: 0 K/UL (ref 0–0.1)
BASOPHILS NFR BLD: 0 % (ref 0–2)
BILIRUB SERPL-MCNC: 0.4 MG/DL (ref 0.2–1)
BILIRUB UR QL: NEGATIVE
BUN SERPL-MCNC: 12 MG/DL (ref 7–18)
BUN/CREAT SERPL: 19 (ref 12–20)
CALCIUM SERPL-MCNC: 9 MG/DL (ref 8.5–10.1)
CHLORIDE SERPL-SCNC: 107 MMOL/L (ref 100–108)
CO2 SERPL-SCNC: 27 MMOL/L (ref 21–32)
COLOR UR: NORMAL
CREAT SERPL-MCNC: 0.62 MG/DL (ref 0.6–1.3)
DIFFERENTIAL METHOD BLD: ABNORMAL
EOSINOPHIL # BLD: 0.1 K/UL (ref 0–0.4)
EOSINOPHIL NFR BLD: 2 % (ref 0–5)
ERYTHROCYTE [DISTWIDTH] IN BLOOD BY AUTOMATED COUNT: 13.2 % (ref 11.6–14.5)
GLOBULIN SER CALC-MCNC: 4.1 G/DL (ref 2–4)
GLUCOSE SERPL-MCNC: 82 MG/DL (ref 74–99)
GLUCOSE UR STRIP.AUTO-MCNC: NEGATIVE MG/DL
HCG SERPL QL: NEGATIVE
HCG UR QL: NEGATIVE
HCT VFR BLD AUTO: 37.4 % (ref 35–45)
HGB BLD-MCNC: 11.9 G/DL (ref 12–16)
HGB UR QL STRIP: NEGATIVE
KETONES UR QL STRIP.AUTO: NEGATIVE MG/DL
LEUKOCYTE ESTERASE UR QL STRIP.AUTO: NEGATIVE
LIPASE SERPL-CCNC: 85 U/L (ref 73–393)
LYMPHOCYTES # BLD: 2 K/UL (ref 0.9–3.6)
LYMPHOCYTES NFR BLD: 39 % (ref 21–52)
MCH RBC QN AUTO: 30.1 PG (ref 24–34)
MCHC RBC AUTO-ENTMCNC: 31.8 G/DL (ref 31–37)
MCV RBC AUTO: 94.4 FL (ref 74–97)
MONOCYTES # BLD: 0.5 K/UL (ref 0.05–1.2)
MONOCYTES NFR BLD: 9 % (ref 3–10)
NEUTS SEG # BLD: 2.5 K/UL (ref 1.8–8)
NEUTS SEG NFR BLD: 50 % (ref 40–73)
NITRITE UR QL STRIP.AUTO: NEGATIVE
PH UR STRIP: 6.5 [PH] (ref 5–8)
PLATELET # BLD AUTO: 263 K/UL (ref 135–420)
PMV BLD AUTO: 10.8 FL (ref 9.2–11.8)
POTASSIUM SERPL-SCNC: 4 MMOL/L (ref 3.5–5.5)
PROT SERPL-MCNC: 7.6 G/DL (ref 6.4–8.2)
PROT UR STRIP-MCNC: NEGATIVE MG/DL
RBC # BLD AUTO: 3.96 M/UL (ref 4.2–5.3)
SODIUM SERPL-SCNC: 139 MMOL/L (ref 136–145)
SP GR UR REFRACTOMETRY: 1.03 (ref 1–1.03)
UROBILINOGEN UR QL STRIP.AUTO: 1 EU/DL (ref 0.2–1)
WBC # BLD AUTO: 5 K/UL (ref 4.6–13.2)

## 2019-04-02 PROCEDURE — 74177 CT ABD & PELVIS W/CONTRAST: CPT

## 2019-04-02 PROCEDURE — 74011636320 HC RX REV CODE- 636/320: Performed by: EMERGENCY MEDICINE

## 2019-04-02 PROCEDURE — 80053 COMPREHEN METABOLIC PANEL: CPT

## 2019-04-02 PROCEDURE — 83690 ASSAY OF LIPASE: CPT

## 2019-04-02 PROCEDURE — 81003 URINALYSIS AUTO W/O SCOPE: CPT

## 2019-04-02 PROCEDURE — 81025 URINE PREGNANCY TEST: CPT

## 2019-04-02 PROCEDURE — 74011250636 HC RX REV CODE- 250/636: Performed by: EMERGENCY MEDICINE

## 2019-04-02 PROCEDURE — 96375 TX/PRO/DX INJ NEW DRUG ADDON: CPT

## 2019-04-02 PROCEDURE — 99284 EMERGENCY DEPT VISIT MOD MDM: CPT

## 2019-04-02 PROCEDURE — 84703 CHORIONIC GONADOTROPIN ASSAY: CPT

## 2019-04-02 PROCEDURE — 96374 THER/PROPH/DIAG INJ IV PUSH: CPT

## 2019-04-02 PROCEDURE — 85025 COMPLETE CBC W/AUTO DIFF WBC: CPT

## 2019-04-02 PROCEDURE — 93005 ELECTROCARDIOGRAM TRACING: CPT

## 2019-04-02 RX ORDER — MORPHINE SULFATE 4 MG/ML
4 INJECTION INTRAVENOUS
Status: COMPLETED | OUTPATIENT
Start: 2019-04-02 | End: 2019-04-02

## 2019-04-02 RX ORDER — ONDANSETRON 2 MG/ML
4 INJECTION INTRAMUSCULAR; INTRAVENOUS
Status: COMPLETED | OUTPATIENT
Start: 2019-04-02 | End: 2019-04-02

## 2019-04-02 RX ORDER — DIPHENHYDRAMINE HYDROCHLORIDE 50 MG/ML
25 INJECTION, SOLUTION INTRAMUSCULAR; INTRAVENOUS ONCE
Status: COMPLETED | OUTPATIENT
Start: 2019-04-02 | End: 2019-04-02

## 2019-04-02 RX ORDER — FAMOTIDINE 10 MG/ML
20 INJECTION INTRAVENOUS
Status: COMPLETED | OUTPATIENT
Start: 2019-04-02 | End: 2019-04-02

## 2019-04-02 RX ADMIN — MORPHINE SULFATE 4 MG: 4 INJECTION INTRAVENOUS at 11:46

## 2019-04-02 RX ADMIN — FAMOTIDINE 20 MG: 10 INJECTION, SOLUTION INTRAVENOUS at 11:46

## 2019-04-02 RX ADMIN — IOHEXOL: 240 INJECTION, SOLUTION INTRATHECAL; INTRAVASCULAR; INTRAVENOUS; ORAL at 11:47

## 2019-04-02 RX ADMIN — DIPHENHYDRAMINE HYDROCHLORIDE 25 MG: 50 INJECTION, SOLUTION INTRAMUSCULAR; INTRAVENOUS at 13:23

## 2019-04-02 RX ADMIN — ONDANSETRON 4 MG: 2 INJECTION INTRAMUSCULAR; INTRAVENOUS at 11:46

## 2019-04-02 RX ADMIN — IOPAMIDOL 100 ML: 612 INJECTION, SOLUTION INTRAVENOUS at 12:35

## 2019-04-02 NOTE — ED TRIAGE NOTES
Patient reports abdominal pain since last night, patient states, \"It feels like a hernia. \" patient has hx of hernia repair in February 2019, a/ox3, ambulating independently

## 2019-04-02 NOTE — TELEPHONE ENCOUNTER
Pt called and said  told her if she had any pain she should go to to ER and she is having pain and  called  to give us a heads up that she is on her way.

## 2019-04-02 NOTE — DISCHARGE INSTRUCTIONS

## 2019-04-02 NOTE — ED NOTES
Patient is drinking contrast medium and advised use call bell when she drinks to the line on the cup (8 oz.). Pt hourly rounding competed. Safety Pt (x) resting on stretcher with side rails up and call bell in reach. () in chair 
  () in parents arms. Toileting Pt offered ()Bedpan 
   (x)Assistance to Restroom: refused 
   ()Urinal 
Ongoing UpdatesUpdated on plan of care and status of test results. Pain Management Inquired as to comfort and offered comfort measures: 
  () warm blankets (x) dimmed lights

## 2019-04-02 NOTE — ED NOTES
I have reviewed discharge instructions with the patient. Patient advised to reduce intake of acidic foods and take medications as prescribed. The patient verbalized understanding. Patient wristband and patient labels removed and placed in shred bin at this time. Patient seen ambulating to ED exit with steady gait, even-non labored breathing noted, and no s/s of acute distress. Patient will travel home in privately owned vehicle. Signed By: Adan Azul April 2, 2019

## 2019-04-02 NOTE — ED PROVIDER NOTES
EMERGENCY DEPARTMENT HISTORY AND PHYSICAL EXAM 
 
Date: 2019 Patient Name: Sarah Fonseca History of Presenting Illness Chief Complaint Patient presents with  Abdominal Pain History Provided By: Patient 11:15 AM 
Sarah Fonseca is a 28 y.o. female with PMHX of gastric bypass with Dr. Vazquez Osuna in  who presents to the emergency department C/O epigastric pain that started last night accompanied with nausea. She states it feels similar to when she had complications from a hernia in February of this year requiring surgical repair with Dr. Vazquez Osuna. She denies any vomiting, bowel or urinary complaints, chance of pregnancy. She states the pain radiates up into her chest into her left shoulder. She says the pain was worse after she ate this morning. No clear relieving factors. She rates the pain 8 out of 10. PCP: Val Welsh MD 
 
Current Outpatient Medications Medication Sig Dispense Refill  b complex vitamins tablet Take 1 Tab by mouth daily.  pantoprazole (PROTONIX) 40 mg tablet TAKE 1 TABLET BY MOUTH TWO TIMES A DAY FOR 30 DAYS. INDICATIONS: GASTRIC ULCER 60 Tab 2  Biotin 2,500 mcg cap Take  by mouth.  cyanocobalamin (VITAMIN B-12) 500 mcg tablet Take 500 mcg by mouth daily.  calcium citrate 200 mg (950 mg) tablet Take  by mouth daily.  multivitamin with iron (FLINTSTONES) chewable tablet Take 1 Tab by mouth two (2) times a day. Past History Past Medical History: 
Past Medical History:  
Diagnosis Date  Acid reflux  Incisional hernia  Intestinal malabsorption  Morbid obesity with body mass index of 40.0-49.9 ([facility])  Status post bariatric surgery 2012  
 sleeve resection / kirstin Ewing Past Surgical History: 
Past Surgical History:  
Procedure Laterality Date  HX  SECTION    
 X 3  
 HX GI  2012 Sleeve gastrectomy- Dr Gwen Salinas  HX GI Conversion to gastric bypass-2017  HX GI    
 Internal hernia ( dr Beth Marques) -7/2018  HX GI Internal hernia - 2/2/2019  HX ORTHOPAEDIC    
 scoliosis correction surgery X 2  
 HX TONSIL AND ADENOIDECTOMY  HX TUBAL LIGATION    
 LAP GASTRIC BYPASS/TAMICA-EN-Y    
 revision from sleeve on 9/26/2017 Family History: No family history on file. Social History: 
Social History Tobacco Use  Smoking status: Never Smoker  Smokeless tobacco: Never Used Substance Use Topics  Alcohol use: No  
  Alcohol/week: 0.0 oz  Drug use: No  
 
 
Allergies: Allergies Allergen Reactions  Nsaids (Non-Steroidal Anti-Inflammatory Drug) Other (comments) GI Ulcer- Hx of Gastric Bypass Review of Systems Review of Systems Constitutional: Negative for fever. Gastrointestinal: Positive for abdominal pain and nausea. Negative for vomiting. Genitourinary: Negative for dysuria. All other systems reviewed and are negative. Physical Exam  
 
Vitals:  
 04/02/19 1101 04/02/19 1401 BP: 139/88 122/81 Pulse: 75 72 Resp: 18 16 Temp: 99 °F (37.2 °C) SpO2: 100% 100% Weight: 72.1 kg (159 lb) Height: 5' 3\" (1.6 m) Physical Exam 
 
Nursing notes and vital signs reviewed Constitutional: Non toxic appearing, no acute distress Head: Normocephalic, Atraumatic Eyes: EOMI Neck: Supple Cardiovascular: Regular rate and rhythm, no murmurs, rubs, or gallops Chest: Normal work of breathing and chest excursion bilaterally Lungs: Clear to ausculation bilaterally Abdomen: Soft, epigastric tenderness without rebound or guarding, non distended, normoactive bowel sounds Back: No evidence of trauma or deformity Skin: Warm and dry, normal cap refill Neuro: Alert and appropriate Psychiatric: Normal mood and affect Diagnostic Study Results Labs - Recent Results (from the past 12 hour(s)) URINALYSIS W/ RFLX MICROSCOPIC Collection Time: 04/02/19 11:15 AM  
Result Value Ref Range Color DARK YELLOW Appearance CLEAR Specific gravity 1.027 1.005 - 1.030    
 pH (UA) 6.5 5.0 - 8.0 Protein NEGATIVE  NEG mg/dL Glucose NEGATIVE  NEG mg/dL Ketone NEGATIVE  NEG mg/dL Bilirubin NEGATIVE  NEG Blood NEGATIVE  NEG Urobilinogen 1.0 0.2 - 1.0 EU/dL Nitrites NEGATIVE  NEG Leukocyte Esterase NEGATIVE  NEG    
CBC WITH AUTOMATED DIFF Collection Time: 04/02/19 11:20 AM  
Result Value Ref Range WBC 5.0 4.6 - 13.2 K/uL  
 RBC 3.96 (L) 4.20 - 5.30 M/uL  
 HGB 11.9 (L) 12.0 - 16.0 g/dL HCT 37.4 35.0 - 45.0 % MCV 94.4 74.0 - 97.0 FL  
 MCH 30.1 24.0 - 34.0 PG  
 MCHC 31.8 31.0 - 37.0 g/dL  
 RDW 13.2 11.6 - 14.5 % PLATELET 138 285 - 356 K/uL MPV 10.8 9.2 - 11.8 FL  
 NEUTROPHILS 50 40 - 73 % LYMPHOCYTES 39 21 - 52 % MONOCYTES 9 3 - 10 % EOSINOPHILS 2 0 - 5 % BASOPHILS 0 0 - 2 %  
 ABS. NEUTROPHILS 2.5 1.8 - 8.0 K/UL  
 ABS. LYMPHOCYTES 2.0 0.9 - 3.6 K/UL  
 ABS. MONOCYTES 0.5 0.05 - 1.2 K/UL  
 ABS. EOSINOPHILS 0.1 0.0 - 0.4 K/UL  
 ABS. BASOPHILS 0.0 0.0 - 0.1 K/UL  
 DF AUTOMATED METABOLIC PANEL, COMPREHENSIVE Collection Time: 04/02/19 11:20 AM  
Result Value Ref Range Sodium 139 136 - 145 mmol/L Potassium 4.0 3.5 - 5.5 mmol/L Chloride 107 100 - 108 mmol/L  
 CO2 27 21 - 32 mmol/L Anion gap 5 3.0 - 18 mmol/L Glucose 82 74 - 99 mg/dL BUN 12 7.0 - 18 MG/DL Creatinine 0.62 0.6 - 1.3 MG/DL  
 BUN/Creatinine ratio 19 12 - 20 GFR est AA >60 >60 ml/min/1.73m2 GFR est non-AA >60 >60 ml/min/1.73m2 Calcium 9.0 8.5 - 10.1 MG/DL Bilirubin, total 0.4 0.2 - 1.0 MG/DL  
 ALT (SGPT) 32 13 - 56 U/L  
 AST (SGOT) 24 15 - 37 U/L Alk. phosphatase 123 (H) 45 - 117 U/L Protein, total 7.6 6.4 - 8.2 g/dL Albumin 3.5 3.4 - 5.0 g/dL Globulin 4.1 (H) 2.0 - 4.0 g/dL A-G Ratio 0.9 0.8 - 1.7 HCG QL SERUM Collection Time: 04/02/19 11:20 AM  
Result Value Ref Range HCG, Ql. NEGATIVE  NEG    
LIPASE Collection Time: 04/02/19 11:20 AM  
Result Value Ref Range Lipase 85 73 - 393 U/L  
EKG, 12 LEAD, INITIAL Collection Time: 04/02/19 11:33 AM  
Result Value Ref Range Ventricular Rate 69 BPM  
 Atrial Rate 69 BPM  
 P-R Interval 122 ms QRS Duration 82 ms Q-T Interval 374 ms QTC Calculation (Bezet) 400 ms Calculated P Axis 47 degrees Calculated R Axis 40 degrees Calculated T Axis 34 degrees Diagnosis Normal sinus rhythm Normal ECG When compared with ECG of 02-FEB-2019 14:13, No significant change was found HCG URINE, QL. - POC Collection Time: 04/02/19 11:43 AM  
Result Value Ref Range Pregnancy test,urine (POC) NEGATIVE  NEG Radiologic Studies -  
CT ABD PELV W CONT Final Result IMPRESSION:  
  
1. Postoperative changes from Kori-en-Y gastric bypass surgical procedure  
without evidence of bowel obstruction or free intraperitoneal gas. The  
considerable mesenteric edema and reversal of the SMA SMV relationship noted on  
prior examination is not present on today's examination to suggest a recurrent  
internal hernia. 2. Small rim-enhancing right adnexal cyst or follicle with a physiologic volume  
pelvic fluid. CT Results  (Last 48 hours) 04/02/19 1243  CT ABD PELV W CONT Final result Impression:  IMPRESSION:  
   
1. Postoperative changes from Kori-en-Y gastric bypass surgical procedure  
without evidence of bowel obstruction or free intraperitoneal gas. The  
considerable mesenteric edema and reversal of the SMA SMV relationship noted on  
prior examination is not present on today's examination to suggest a recurrent  
internal hernia. 2. Small rim-enhancing right adnexal cyst or follicle with a physiologic volume  
pelvic fluid. Narrative:  EXAM: CT of the abdomen and pelvis INDICATION: 78-year-old patient with gastric bypass surgical procedure, history of prior internal hernia repair. Patient presents with abdominal pain COMPARISON: Several prior CT exams, most recently 2/7/2019. TECHNIQUE: Axial CT imaging of the abdomen and pelvis was performed with oral  
and intravenous contrast. Multiplanar reformats were generated. One or more dose  
reduction techniques were used on this CT: automated exposure control,  
adjustment of the mAs and/or kVp according to patient size, and iterative  
reconstruction techniques. The specific techniques used on this CT exam have  
been documented in the patient's electronic medical record. Digital Imaging and  
Communications in Medicine (DICOM) format image data are available to  
nonaffiliated external healthcare facilities or entities on a secure, media  
free, reciprocally searchable basis with patient authorization for at least a  
12-month period after this study. _______________ FINDINGS:  
   
LOWER CHEST: Unremarkable. LIVER, BILIARY: Normal hepatic enhancement. No focal hepatic lesion. No biliary  
dilation. Gallbladder is unremarkable. PANCREAS: Normal.  
   
SPLEEN: Normal.  
   
ADRENALS: Normal.  
   
KIDNEYS/URETERS/BLADDER: No hydronephrosis or urolithiasis. Lateral interpolar  
right renal cyst present. PELVIC ORGANS: Uterus has an unremarkable CT appearance. Small rim-enhancing  
right adnexal structure measures approximately 2.1 x 1.9 cm in size. Small,  
physiologic volume of pelvic fluid is present. VASCULATURE: Unremarkable LYMPH NODES: No enlarged lymph nodes. GASTROINTESTINAL TRACT: Postoperative changes of gastric bypass surgical  
procedure are noted. No bowel obstruction. No free intraperitoneal gas. Contrast  
outlines normal caliber loops of small bowel within the left midabdomen and  
pelvis. Small region of likely transient small bowel-small bowel intussusception  
noted within the left midabdomen (image 52). In contrast to prior exam, there is no reversal of the usual SMA/SMV relationship noted, nor is there is significant  
mesenteric vascular edema present. Normal appendix. BONES: No acute or aggressive osseous abnormalities identified. Dextrorotatory  
curvature of the thoracic/lumbar spine noted. Unchanged spinal thierry fixation,  
partially imaged within the included field of view. Grade 1 anterolisthesis L4  
on L5 unchanged. OTHER: None.  
   
_______________ CXR Results  (Last 48 hours) None Medications given in the ED- Medications  
iohexol (OMNIPAQUE) ORAL mixture ( Oral Given 4/2/19 1147) ondansetron (ZOFRAN) injection 4 mg (4 mg IntraVENous Given 4/2/19 1146) famotidine (PF) (PEPCID) injection 20 mg (20 mg IntraVENous Given 4/2/19 1146) morphine injection 4 mg (4 mg IntraVENous Given 4/2/19 1146) iopamidol (ISOVUE 300) 61 % contrast injection 100 mL (100 mL IntraVENous Given 4/2/19 1235) diphenhydrAMINE (BENADRYL) injection 25 mg (25 mg IntraVENous Given 4/2/19 1323) Medical Decision Making I am the first provider for this patient. I reviewed the vital signs, available nursing notes, past medical history, past surgical history, family history and social history. Vital Signs-Reviewed the patient's vital signs. EKG interpretation: (Preliminary) EKG read by Dr. Diego Hernandez at 11:37 AM 
Normal sinus rhythm at a rate of 69 bpm, FL interval of 122 ms, QRS of 82 ms Records Reviewed: Nursing Notes and Old Medical Records Provider Notes (Medical Decision Making): Bj Grimm is a 28 y.o. female history of gastric bypass surgery complicated by internal hernia presenting for epigastric pain. Labs and imaging without acute abnormality today. Discussed with her bariatric surgeon and plan for discharge with early bariatric follow-upand  return precautions. Patient understands and agrees with this plan 
 
Procedures: 
Procedures ED Course:  
1:14 PM 
 Called to room this patient is reporting itching after her return from CAT scan. There are no visible lesions on the skin, and patient denies difficulty breathing or shortness of breath. Will order some Benadryl for patient's symptoms. CONSULT NOTE:  
2:13 PM 
Dr. Lacey Coates spoke with Dr. Laureano Berry Specialty: Gastric surgery Discussed pt's hx, disposition, and available diagnostic and imaging results over the telephone. Reviewed care plans. Recommends discharge with follow-up in his office. 2:16 PM 
Updated patient on all results, discussion with her bariatric surgeon, and plan for discharge. All questions were answered. Diagnosis and Disposition DISCHARGE NOTE: 
 
Ross Timmons's  results have been reviewed with her. She has been counseled regarding her diagnosis, treatment, and plan. She verbally conveys understanding and agreement of the signs, symptoms, diagnosis, treatment and prognosis and additionally agrees to follow up as discussed. She also agrees with the care-plan and conveys that all of her questions have been answered. I have also provided discharge instructions for her that include: educational information regarding their diagnosis and treatment, and list of reasons why they would want to return to the ED prior to their follow-up appointment, should her condition change. She has been provided with education for proper emergency department utilization. CLINICAL IMPRESSION: 
 
1. Abdominal pain, epigastric PLAN: 
1. D/C Home 2. Current Discharge Medication List  
  
 
3. Follow-up Information Follow up With Specialties Details Why Contact Info Jude Heard MD Family Practice Schedule an appointment as soon as possible for a visit  [address] [facility] [address] [facility] 
[phone] Mulu Paris MD General Surgery Schedule an appointment as soon as possible for a visit  [address] [place] [address] [phone] [facility] EMERGENCY DEPT Emergency Medicine  If symptoms worsen [address] [place] [address] 
[phone]  
  
 
_______________________________ Please note that this dictation was completed with copygram, the computer voice recognition software. Quite often unanticipated grammatical, syntax, homophones, and other interpretive errors are inadvertently transcribed by the computer software. Please disregard these errors. Please excuse any errors that have escaped final proofreading.

## 2019-05-12 LAB
ATRIAL RATE: 69 BPM
CALCULATED P AXIS, ECG09: 47 DEGREES
CALCULATED R AXIS, ECG10: 40 DEGREES
CALCULATED T AXIS, ECG11: 34 DEGREES
DIAGNOSIS, 93000: NORMAL
P-R INTERVAL, ECG05: 122 MS
Q-T INTERVAL, ECG07: 374 MS
QRS DURATION, ECG06: 82 MS
QTC CALCULATION (BEZET), ECG08: 400 MS
VENTRICULAR RATE, ECG03: 69 BPM

## 2019-05-14 LAB
ATRIAL RATE: 70 BPM
CALCULATED P AXIS, ECG09: 46 DEGREES
CALCULATED R AXIS, ECG10: 44 DEGREES
CALCULATED T AXIS, ECG11: 36 DEGREES
DIAGNOSIS, 93000: NORMAL
P-R INTERVAL, ECG05: 124 MS
Q-T INTERVAL, ECG07: 390 MS
QRS DURATION, ECG06: 82 MS
QTC CALCULATION (BEZET), ECG08: 421 MS
VENTRICULAR RATE, ECG03: 70 BPM

## 2019-07-03 RX ORDER — PANTOPRAZOLE SODIUM 40 MG/1
TABLET, DELAYED RELEASE ORAL
Qty: 60 TAB | Refills: 2 | Status: SHIPPED | OUTPATIENT
Start: 2019-07-03 | End: 2019-09-07 | Stop reason: SDUPTHER

## 2019-07-09 ENCOUNTER — OFFICE VISIT (OUTPATIENT)
Dept: ORTHOPEDIC SURGERY | Age: 34
End: 2019-07-09

## 2019-07-09 VITALS
HEIGHT: 63 IN | SYSTOLIC BLOOD PRESSURE: 113 MMHG | TEMPERATURE: 98.1 F | RESPIRATION RATE: 16 BRPM | DIASTOLIC BLOOD PRESSURE: 78 MMHG | OXYGEN SATURATION: 98 % | HEART RATE: 77 BPM | WEIGHT: 161 LBS | BODY MASS INDEX: 28.53 KG/M2

## 2019-07-09 DIAGNOSIS — M54.16 LUMBAR RADICULOPATHY: ICD-10-CM

## 2019-07-09 DIAGNOSIS — M43.16 SPONDYLOLISTHESIS OF LUMBAR REGION: Primary | ICD-10-CM

## 2019-07-09 RX ORDER — GABAPENTIN 600 MG/1
600 TABLET ORAL 3 TIMES DAILY
Qty: 90 TAB | Refills: 2 | Status: SHIPPED | OUTPATIENT
Start: 2019-07-09 | End: 2022-05-01

## 2019-07-09 NOTE — PROGRESS NOTES
[facility]  [address], [address]  [place], [address] [place] [address] Street  Phone: [phone]  Fax: [phone]  PROGRESS NOTE  Patient: Sunday Camilo                MRN: [id number]       SSN: [id number]  YOB: 1985        AGE: 28 y.o. SEX: female  Body mass index is 28.52 kg/m². PCP: Sonia Gaston MD  07/09/19    Chief Complaint   Patient presents with    Back Pain     low back pain       HISTORY OF PRESENT ILLNESS:  Sunday Camilo is a 28 y.o.  female with history of chronic back pain for 20+ years and previous leg pain prior to surgery. Prior history of back problems: recurrent self limited episodes of low back pain in the past and previous spinal surgery - thoracolumbar fusion by Dr. Justyna Gordon at [facility] utilizing hook thierry instrumentation to L4. At last OV 2/2018, she saw Dr. Crystla Merchant. He recommended weight loss, CT scan and scoliosis imaging prior to surgery. According to his note, she needs a reconstructive fusion. Today, pain has been radiating down RLE in the L4-5 distribution intermittently. She has lost a lot of weight and looks good. She is now ready to re-consider surgery. She reports a sense of instability when she is riding or driving. Pain is aching, burning, stabbing and throbbing, pain is worse with manual/sedentary work, riding /driving car or truck, walking and affects work and recreational activities. Pain is better with nothing. Denies bladder/bowel dysfunction, saddle paresthesia, weakness, gait disturbance, or other neurological deficits. Medications: Gabapentin 300mg TID ( with no, benefit    PMHx: Gastric Bypass, Hernia repair x2,     Radiographs  LS MRI 1/2018 Per Dr. Khoi Wan thierry construct at L4. Solid fusion grade II mobile degenerative spondylolisthesis at L4-5, with severe motion with flexion and extension but most to a grade III slip on flexion. L5-S1 appears to be preserved.        ASSESSMENT   Diagnoses and all orders for this visit:    1. Spondylolisthesis of lumbar region  -     MRI LUMB SPINE W WO CONT; Future  -     AMB POC XRAY, SPINE, LUMBOSACRAL; 4+    2. Lumbar radiculopathy  -     gabapentin (NEURONTIN) 600 mg tablet; Take 1 Tab by mouth three (3) times daily. Max Daily Amount: 1,800 mg.  -     MRI LUMB SPINE W WO CONT; Future  -     AMB POC XRAY, SPINE, LUMBOSACRAL; 4+         IMPRESSION AND PLAN:  This is a pt with grade III spondy who is now ready to reconsider surgery. She needs updated imaging. > Pt was given information on Gabapentin   > LS MRI, X-rays today  > Increase Gabapentin  > Ms. Veronika Martinez has a reminder for a \"due or due soon\" health maintenance. I have asked that she contact her primary care provider, Bre Jesus MD, for follow-up on this health maintenance.  > We have informed patient to notify us for immediate appointment if he has any worsening neurogical symptoms or if an emergency situation presents, then call 911  >  has been reviewed and is appropriate  > Pt will follow-up in 4 wks w/ Dr Du Mandujano. Subjective    Work     Smoking Status non-smoker    Pain Scale: 7/10    Pain Assessment  7/9/2019   Location of Pain Back   Location Modifiers Inferior   Severity of Pain 7   Quality of Pain Throbbing;Dull;Aching   Duration of Pain Persistent   Frequency of Pain Constant   Aggravating Factors Bending;Stretching;Walking;Standing   Limiting Behavior Yes   Relieving Factors Rest   Result of Injury No         REVIEW OF SYSTEMS  Constitutional: Negative for fever, chills, or weight change. Respiratory: Negative for cough or shortness of breath. Cardiovascular: Negative for chest pain or palpitations. Gastrointestinal: Negative for incontinence, acid reflux, change in bowel habits, or constipation. Genitourinary: Negative for incontinence, dysuria and flank pain. Musculoskeletal: Positive for back and RLE pain. See HPI. Skin: Negative for rash. Neurological:RLE L5  radiculopathy. See HPI. Endo/Heme/Allergies: Negative. Psychiatric/Behavioral: Negative. PHYSICAL EXAMINATION  Visit Vitals  /78   Pulse 77   Temp 98.1 °F (36.7 °C)   Resp 16   Ht 5' 3\" (1.6 m)   Wt 161 lb (73 kg)   SpO2 98%   BMI 28.52 kg/m²         Accompanied by small child. Constitutional:  Well developed, well nourished, in no acute distress. Psychiatric: Affect and mood are appropriate. Integumentary: No rashes or abrasions noted on exposed areas. Cardiovascular/Peripheral Vascular: +2 radial & pedal pulses. No peripheral edema is noted. Lymphatic:  No evidence of lymphedema. No cervical lymphadenopathy. SPINE/MUSCULOSKELETAL EXAM     Lumbar spine:  No rash, ecchymosis, or gross obliquity. No fasciculations. No focal atrophy is noted. Range of motion is pain with flexion, extension. Tenderness to palpation bilateral low back. No tenderness to palpation at the sciatic notch. SI joints non-tender. Trochanters non tender. Straight leg raise negative    Sensation grossly intact to light touch. MOTOR:     Hip Flex Quads Hamstrings Ankle DF EHL Ankle PF   Right +4/5 +4/5 +4/5 +4/5 +4/5 +4/5   Left +4/5 +4/5 +4/5 +4/5 +4/5 +4/5       Ambulation without assistive device. FWB.     Non antalgic gait        PAST MEDICAL HISTORY   Past Medical History:   Diagnosis Date    Acid reflux     Incisional hernia     Intestinal malabsorption     Morbid obesity with body mass index of 40.0-49.9 ([facility])     Status post bariatric surgery 2012    sleeve resection / kirstin Cortes       Past Surgical History:   Procedure Laterality Date    HX  SECTION      X 3    HX GI  2012    Sleeve gastrectomy- Dr Samuel Millard HX GI      Conversion to gastric bypass-2017    HX GI      Internal hernia ( dr Vira Raza) -2018    HX GI      Internal hernia - 2019    HX ORTHOPAEDIC      scoliosis correction surgery X 2    HX TONSIL AND ADENOIDECTOMY      HX TUBAL LIGATION      LAP GASTRIC BYPASS/TAMICA-EN-Y      revision from sleeve on 9/26/2017   . MEDICATIONS      Current Outpatient Medications   Medication Sig Dispense Refill    gabapentin (NEURONTIN) 600 mg tablet Take 1 Tab by mouth three (3) times daily. Max Daily Amount: 1,800 mg. 90 Tab 2    pantoprazole (PROTONIX) 40 mg tablet TAKE 1 TABLET BY MOUTH TWO TIMES A DAY FOR 30 DAYS. INDICATIONS: GASTRIC ULCER 60 Tab 2    b complex vitamins tablet Take 1 Tab by mouth daily.  Biotin 2,500 mcg cap Take  by mouth.  cyanocobalamin (VITAMIN B-12) 500 mcg tablet Take 500 mcg by mouth daily.  calcium citrate 200 mg (950 mg) tablet Take  by mouth daily.  multivitamin with iron (FLINTSTONES) chewable tablet Take 1 Tab by mouth two (2) times a day.           ALLERGIES    Allergies   Allergen Reactions    Nsaids (Non-Steroidal Anti-Inflammatory Drug) Other (comments)     GI Ulcer- Hx of Gastric Bypass    Buprenorphine Hcl Hives and Other (comments)          SOCIAL HISTORY    Social History     Socioeconomic History    Marital status: SINGLE     Spouse name: Not on file    Number of children: Not on file    Years of education: Not on file    Highest education level: Not on file   Occupational History    Not on file   Social Needs    Financial resource strain: Not on file    Food insecurity:     Worry: Not on file     Inability: Not on file    Transportation needs:     Medical: Not on file     Non-medical: Not on file   Tobacco Use    Smoking status: Never Smoker    Smokeless tobacco: Never Used   Substance and Sexual Activity    Alcohol use: No     Alcohol/week: 0.0 oz    Drug use: No    Sexual activity: Yes     Partners: Male   Lifestyle    Physical activity:     Days per week: Not on file     Minutes per session: Not on file    Stress: Not on file   Relationships    Social connections:     Talks on phone: Not on file     Gets together: Not on file     Attends Jewish service: Not on file     Active member of club or organization: Not on file     Attends meetings of clubs or organizations: Not on file     Relationship status: Not on file    Intimate partner violence:     Fear of current or ex partner: Not on file     Emotionally abused: Not on file     Physically abused: Not on file     Forced sexual activity: Not on file   Other Topics Concern    Not on file   Social History Narrative    Not on file       FAMILY HISTORY  No family history on file.       Chelsea Penn NP

## 2019-07-09 NOTE — PATIENT INSTRUCTIONS
Gabapentin (By mouth)   Gabapentin (tod-a-PEN-tin)  Treats seizures and pain caused by shingles. Brand Name(s): ACTIVE-PAC with Gabapentin, Convenience Edinson, Cyclo/Pablo 10/300 Pack, FusePaq Fanatrex, Pablo-V, Gralise, [address] Pack, Neurontin, SmartRx Pablo Kit   There may be other brand names for this medicine. When This Medicine Should Not Be Used: This medicine is not right for everyone. Do not use it if you had an allergic reaction to gabapentin. How to Use This Medicine:   Capsule, Liquid, Tablet  · Take your medicine as directed. Your dose may need to be changed several times to find what works best for you. If you have epilepsy, do not allow more than 12 hours to pass between doses. · Capsule: Swallow the capsule whole with plenty of water. Do not open, crush, or chew it. · Gralise® tablet: Swallow the tablet whole . Do not crush, break, or chew it. · Neurontin® tablet: If you break a tablet into 2 pieces, use the second half as your next dose. If you don't use it within 28 days, throw it away. · Measure the oral liquid medicine with a marked measuring spoon, oral syringe, or medicine cup. · This medicine should come with a Medication Guide. Ask your pharmacist for a copy if you do not have one. · Missed dose: Take a dose as soon as you remember. If it is almost time for your next dose, wait until then and take a regular dose. Do not take extra medicine to make up for a missed dose. · Store the medicine in a closed container at room temperature, away from heat, moisture, and direct light. Store the Neurontin® oral liquid in the refrigerator. Do not freeze. Drugs and Foods to Avoid:   Ask your doctor or pharmacist before using any other medicine, including over-the-counter medicines, vitamins, and herbal products. · Some medicines can affect how gabapentin works.  Tell your doctor if you also use any of the following:   ¨ Hydrocodone  ¨ Morphine  · If you take an antacid, wait at least 2 hours before you take gabapentin. · Tell your doctor if you use anything else that makes you sleepy. Some examples are allergy medicine, narcotic pain medicine, and alcohol. Warnings While Using This Medicine:   · Tell your doctor if you are pregnant or breastfeeding, or if you have kidney problems or are receiving dialysis. Tell your doctor if you have a history of depression or mental health problems. · This medicine may increase depression or thoughts of suicide. Tell your doctor right away if you start to feel more depressed or think about hurting yourself. · This medicine may cause a serious allergic reaction called multiorgan hypersensitivity, which can damage organs and be life-threatening. · Do not stop using this medicine suddenly. Your doctor will need to slowly decrease your dose before you stop it completely. If you take this medicine to prevent seizures, your seizures may return or occur more often if you stop this medicine suddenly. · This medicine may make you dizzy or drowsy. Do not drive or do anything else that could be dangerous until you know how this medicine affects you. · Tell any doctor or dentist who treats you that you are using this medicine. This medicine may affect certain medical test results. · Your doctor will check your progress and the effects of this medicine at regular visits. Keep all appointments. · Keep all medicine out of the reach of children. Never share your medicine with anyone.   Possible Side Effects While Using This Medicine:   Call your doctor right away if you notice any of these side effects:  · Allergic reaction: Itching or hives, swelling in your face or hands, swelling or tingling in your mouth or throat, chest tightness, trouble breathing  · Behavior problems, aggression, restlessness, trouble concentrating, moodiness (especially in children)  · Blistering, peeling, red skin rash  · Change in how much or how often you urinate, bloody or cloudy urine,  · Chest pain, fast heartbeat, trouble breathing  · Dark urine or pale stools, nausea, vomiting, loss of appetite, stomach pain, yellow skin or eyes  · Fever, rash, swollen or tender glands in the neck, armpit, or groin  · Problems with coordination, shakiness, unsteadiness  · Rapid weight gain, swelling in your hands, ankles, or feet  · Unusual moods or behaviors, thoughts of hurting yourself, feeling depressed  If you notice these less serious side effects, talk with your doctor:   · Dizziness, drowsiness, sleepiness, tiredness  If you notice other side effects that you think are caused by this medicine, tell your doctor. Call your doctor for medical advice about side effects. You may report side effects to FDA at 7-666-FDA-5221  © 2017 [facility] Information is for End User's use only and may not be sold, redistributed or otherwise used for commercial purposes. The above information is an  only. It is not intended as medical advice for individual conditions or treatments. Talk to your doctor, nurse or pharmacist before following any medical regimen to see if it is safe and effective for you.

## 2019-07-22 ENCOUNTER — DOCUMENTATION ONLY (OUTPATIENT)
Dept: ORTHOPEDIC SURGERY | Age: 34
End: 2019-07-22

## 2019-07-22 NOTE — PROGRESS NOTES
[facility] to give pertinent clinical info supporting the medical necessity of the MRI.   Call [phone]  Reference # [id number]  ID [id number]

## 2019-07-23 NOTE — PROGRESS NOTES
Info sent to Boo Magdaleno with [facility] Noemy Ceballos dept.

## 2019-08-02 ENCOUNTER — TELEPHONE (OUTPATIENT)
Dept: ORTHOPEDIC SURGERY | Age: 34
End: 2019-08-02

## 2019-08-02 DIAGNOSIS — F40.240 CLAUSTROPHOBIA: Primary | ICD-10-CM
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
## 2019-08-05 RX ORDER — DIAZEPAM 5 MG/1
TABLET ORAL
Qty: 1 TAB | Refills: 0 | OUTPATIENT
Start: 2019-08-05 | End: 2019-08-22

## 2019-08-22 ENCOUNTER — HOSPITAL ENCOUNTER (EMERGENCY)
Age: 34
Discharge: HOME | End: 2019-08-22
Attending: EMERGENCY MEDICINE
Payer: COMMERCIAL

## 2019-08-22 VITALS
TEMPERATURE: 99.1 F | SYSTOLIC BLOOD PRESSURE: 128 MMHG | WEIGHT: 165 LBS | HEIGHT: 63 IN | OXYGEN SATURATION: 100 % | RESPIRATION RATE: 18 BRPM | HEART RATE: 78 BPM | DIASTOLIC BLOOD PRESSURE: 90 MMHG | BODY MASS INDEX: 29.23 KG/M2

## 2019-08-22 DIAGNOSIS — L03.114 CELLULITIS OF LEFT HAND: Primary | ICD-10-CM

## 2019-08-22 PROCEDURE — 99282 EMERGENCY DEPT VISIT SF MDM: CPT

## 2019-08-22 RX ORDER — CEPHALEXIN 500 MG/1
500 CAPSULE ORAL 2 TIMES DAILY
Qty: 14 CAP | Refills: 0 | Status: SHIPPED | OUTPATIENT
Start: 2019-08-22 | End: 2019-08-29

## 2019-08-22 NOTE — ED TRIAGE NOTES
C/o being bitten during night to left hand by unknown insect. States swelling, warmth and pain to affected area on hand. Redness noted to hand. Mild swelling noted.

## 2019-08-23 NOTE — DISCHARGE INSTRUCTIONS

## 2019-08-23 NOTE — ED PROVIDER NOTES
EMERGENCY DEPARTMENT HISTORY AND PHYSICAL EXAM    Date: 2019  Patient Name: Rosey Begum    History of Presenting Illness     Chief Complaint   Patient presents with    Hand Swelling    Insect Bite         History Provided By: Patient    8:01 PM  Rosey Begum is a 28 y.o. female who presents to the emergency department C/O right hand swelling and itching x 2 days, today developed increased swelling and discomfort. Possible insect bite to hand, but did not actually see an insect. Pt denies fever, injury, trauma, streaking, and any other sxs or complaints. PCP: Sil Decker MD    Current Outpatient Medications   Medication Sig Dispense Refill    cephALEXin (KEFLEX) 500 mg capsule Take 1 Cap by mouth two (2) times a day for 7 days. 14 Cap 0    gabapentin (NEURONTIN) 600 mg tablet Take 1 Tab by mouth three (3) times daily. Max Daily Amount: 1,800 mg. 90 Tab 2    pantoprazole (PROTONIX) 40 mg tablet TAKE 1 TABLET BY MOUTH TWO TIMES A DAY FOR 30 DAYS. INDICATIONS: GASTRIC ULCER 60 Tab 2    b complex vitamins tablet Take 1 Tab by mouth daily.  Biotin 2,500 mcg cap Take  by mouth.  cyanocobalamin (VITAMIN B-12) 500 mcg tablet Take 500 mcg by mouth daily.  calcium citrate 200 mg (950 mg) tablet Take  by mouth daily.  multivitamin with iron (FLINTSTONES) chewable tablet Take 1 Tab by mouth two (2) times a day.          Past History     Past Medical History:  Past Medical History:   Diagnosis Date    Acid reflux     Incisional hernia     Intestinal malabsorption     Morbid obesity with body mass index of 40.0-49.9 ([facility])     Status post bariatric surgery 2012    sleeve resection / kirstin Lockwood       Past Surgical History:  Past Surgical History:   Procedure Laterality Date    HX  SECTION      X 3    HX GI  2012    Sleeve gastrectomy- Dr Sina Ribeiro HX GI      Conversion to gastric bypass-2017    HX GI      Internal hernia ( dr Thomason) -2018    HX GI Internal hernia - 2/2/2019    HX ORTHOPAEDIC      scoliosis correction surgery X 2    HX TONSIL AND ADENOIDECTOMY      HX TUBAL LIGATION      LAP GASTRIC BYPASS/TAMICA-EN-Y      revision from sleeve on 9/26/2017       Family History:  History reviewed. No pertinent family history. Social History:  Social History     Tobacco Use    Smoking status: Never Smoker    Smokeless tobacco: Never Used   Substance Use Topics    Alcohol use: No     Alcohol/week: 0.0 standard drinks    Drug use: No       Allergies: Allergies   Allergen Reactions    Nsaids (Non-Steroidal Anti-Inflammatory Drug) Other (comments)     GI Ulcer- Hx of Gastric Bypass    Buprenorphine Hcl Hives and Other (comments)         Review of Systems   Review of Systems   Constitutional: Negative for fever. Musculoskeletal: Positive for arthralgias and joint swelling. Skin: Positive for color change. All other systems reviewed and are negative. Physical Exam     Vitals:    08/22/19 2002   BP: 128/90   Pulse: 78   Resp: 18   Temp: 99.1 °F (37.3 °C)   SpO2: 100%   Weight: 74.8 kg (165 lb)   Height: 5' 3\" (1.6 m)     Physical Exam  Vital signs and nursing notes reviewed. CONSTITUTIONAL: Alert. Well-appearing; well-nourished; in no apparent distress. EXT: LUE: Mildly pruritic 1 cm papule mid dorsum of left hand with approximately 3 inches of surrounding mild swelling, erythema, increased warmth and mild tenderness. No streaking. Not circumferential.  No open wounds or fluctuance. No vesicular lesions or ulcerations. SKIN: Normal for age and race; warm; dry; good turgor; no apparent lesions or exudate. NEURO: A & O x3. PSYCH:  Mood and affect appropriate. Diagnostic Study Results     Labs -   No results found for this or any previous visit (from the past 12 hour(s)).     Radiologic Studies - none  No orders to display     CT Results  (Last 48 hours)    None        CXR Results  (Last 48 hours)    None          Medications given in the ED-  Medications - No data to display      Medical Decision Making   I am the first provider for this patient. I reviewed the vital signs, available nursing notes, past medical history, past surgical history, family history and social history. Vital Signs-Reviewed the patient's vital signs. Records Reviewed: Nursing Notes      Procedures:  Procedures    ED Course:  8:01 PM   Initial assessment performed. The patients presenting problems have been discussed, and they are in agreement with the care plan formulated and outlined with them. I have encouraged them to ask questions as they arise throughout their visit. Provider Notes (Medical Decision Making): Yasir Doan is a 28 y.o. female with mild early cellulitis status post probable insect bite. Local inflammatory response with mild early cellulitis so we will start empirically on Keflex. Cool compresses and Benadryl as needed. Follow-up if symptoms worsen. Diagnosis and Disposition       DISCHARGE NOTE:    Devora Timmons's  results have been reviewed with her. She has been counseled regarding her diagnosis, treatment, and plan. She verbally conveys understanding and agreement of the signs, symptoms, diagnosis, treatment and prognosis and additionally agrees to follow up as discussed. She also agrees with the care-plan and conveys that all of her questions have been answered. I have also provided discharge instructions for her that include: educational information regarding their diagnosis and treatment, and list of reasons why they would want to return to the ED prior to their follow-up appointment, should her condition change. She has been provided with education for proper emergency department utilization. CLINICAL IMPRESSION:    1. Cellulitis of left hand        PLAN:  1. D/C Home  2.    Current Discharge Medication List      START taking these medications    Details   cephALEXin (KEFLEX) 500 mg capsule Take 1 Cap by mouth two (2) times a day for 7 days. Qty: 14 Cap, Refills: 0           3. Follow-up Information     Follow up With Specialties Details Why Contact Info    Fredis Rawls MD Family Practice Schedule an appointment as soon as possible for a visit  [address] [address]  [phone] [address] [facility] EMERGENCY DEPT Emergency Medicine  As needed, If symptoms worsen [address] [facility]  [phone]        _______________________________      Please note that this dictation was completed with [employer], the computer voice recognition software. Quite often unanticipated grammatical, syntax, homophones, and other interpretive errors are inadvertently transcribed by the computer software. Please disregard these errors. Please excuse any errors that have escaped final proofreading.

## 2019-08-26 ENCOUNTER — HOSPITAL ENCOUNTER (EMERGENCY)
Age: 34
Discharge: LEFT BEFORE DISCHARGE | End: 2019-08-26
Attending: EMERGENCY MEDICINE
Payer: COMMERCIAL

## 2019-08-26 VITALS
RESPIRATION RATE: 20 BRPM | TEMPERATURE: 98.5 F | HEIGHT: 63 IN | DIASTOLIC BLOOD PRESSURE: 100 MMHG | BODY MASS INDEX: 29.23 KG/M2 | OXYGEN SATURATION: 100 % | SYSTOLIC BLOOD PRESSURE: 136 MMHG | HEART RATE: 80 BPM | WEIGHT: 165 LBS

## 2019-08-26 PROCEDURE — 75810000275 HC EMERGENCY DEPT VISIT NO LEVEL OF CARE

## 2019-08-27 ENCOUNTER — HOSPITAL ENCOUNTER (EMERGENCY)
Age: 34
Discharge: HOME | End: 2019-08-27
Attending: EMERGENCY MEDICINE
Payer: COMMERCIAL

## 2019-08-27 ENCOUNTER — HOSPITAL ENCOUNTER (OUTPATIENT)
Age: 34
Discharge: HOME | End: 2019-08-27
Attending: NURSE PRACTITIONER
Payer: COMMERCIAL

## 2019-08-27 VITALS
WEIGHT: 156 LBS | HEART RATE: 88 BPM | HEIGHT: 63 IN | TEMPERATURE: 97.8 F | OXYGEN SATURATION: 100 % | DIASTOLIC BLOOD PRESSURE: 94 MMHG | SYSTOLIC BLOOD PRESSURE: 133 MMHG | BODY MASS INDEX: 27.64 KG/M2 | RESPIRATION RATE: 18 BRPM

## 2019-08-27 DIAGNOSIS — M54.42 CHRONIC LEFT-SIDED LOW BACK PAIN WITH LEFT-SIDED SCIATICA: Primary | ICD-10-CM

## 2019-08-27 DIAGNOSIS — G89.29 CHRONIC LEFT-SIDED LOW BACK PAIN WITH LEFT-SIDED SCIATICA: Primary | ICD-10-CM

## 2019-08-27 PROCEDURE — 74011250636 HC RX REV CODE- 250/636: Performed by: EMERGENCY MEDICINE

## 2019-08-27 PROCEDURE — 72158 MRI LUMBAR SPINE W/O & W/DYE: CPT

## 2019-08-27 PROCEDURE — A9575 INJ GADOTERATE MEGLUMI 0.1ML: HCPCS | Performed by: NURSE PRACTITIONER

## 2019-08-27 PROCEDURE — 99283 EMERGENCY DEPT VISIT LOW MDM: CPT

## 2019-08-27 PROCEDURE — 74011636320 HC RX REV CODE- 636/320: Performed by: NURSE PRACTITIONER

## 2019-08-27 PROCEDURE — 74011250637 HC RX REV CODE- 250/637: Performed by: EMERGENCY MEDICINE

## 2019-08-27 PROCEDURE — 96372 THER/PROPH/DIAG INJ SC/IM: CPT

## 2019-08-27 RX ORDER — HYDROCODONE BITARTRATE AND ACETAMINOPHEN 5; 325 MG/1; MG/1
1 TABLET ORAL
Status: COMPLETED | OUTPATIENT
Start: 2019-08-27 | End: 2019-08-27

## 2019-08-27 RX ORDER — METHOCARBAMOL 500 MG/1
500 TABLET, FILM COATED ORAL 4 TIMES DAILY
Qty: 20 TAB | Refills: 0 | Status: SHIPPED | OUTPATIENT
Start: 2019-08-27 | End: 2020-03-23

## 2019-08-27 RX ORDER — HYDROCODONE BITARTRATE AND ACETAMINOPHEN 5; 325 MG/1; MG/1
1 TABLET ORAL
Qty: 12 TAB | Refills: 0 | Status: SHIPPED | OUTPATIENT
Start: 2019-08-27 | End: 2019-08-30

## 2019-08-27 RX ORDER — KETOROLAC TROMETHAMINE 30 MG/ML
60 INJECTION, SOLUTION INTRAMUSCULAR; INTRAVENOUS
Status: COMPLETED | OUTPATIENT
Start: 2019-08-27 | End: 2019-08-27

## 2019-08-27 RX ADMIN — GADOTERATE MEGLUMINE 16 ML: 376.9 INJECTION INTRAVENOUS at 07:00

## 2019-08-27 RX ADMIN — HYDROCODONE BITARTRATE AND ACETAMINOPHEN 1 TABLET: 5; 325 TABLET ORAL at 06:58

## 2019-08-27 RX ADMIN — KETOROLAC TROMETHAMINE 60 MG: 30 INJECTION, SOLUTION INTRAMUSCULAR at 06:58

## 2019-08-27 NOTE — ED PROVIDER NOTES
EMERGENCY DEPARTMENT HISTORY AND PHYSICAL EXAM    6:54 AM      Date: 8/27/2019  Patient Name: Marcie York    History of Presenting Illness     Chief Complaint   Patient presents with    Back Pain         History Provided By: Patient    Additional History (Context): Marcie York is a 28 y.o. female with No significant past medical history who presents with back pain. Patient's back pain is on the left side is not out of 10, radiating down the left leg to the foot, constant, nothing makes better or worse. Patient denies any trauma or injuries. Patient has had this problem in the past.  Patient just had an MRI this morning. Patient sees Dr. Annelise Sauceda. Patient denies any fever, incontinence, or saddle anesthesia. Patient denies smoking and alcohol use. Denies any recreational drug use. PCP: Cristobal Vilchis MD        Current Outpatient Medications   Medication Sig Dispense Refill    HYDROcodone-acetaminophen (NORCO) 5-325 mg per tablet Take 1 Tab by mouth every four (4) hours as needed for Pain for up to 3 days. Max Daily Amount: 6 Tabs. 12 Tab 0    methocarbamol (ROBAXIN) 500 mg tablet Take 1 Tab by mouth four (4) times daily. 20 Tab 0    cephALEXin (KEFLEX) 500 mg capsule Take 1 Cap by mouth two (2) times a day for 7 days. 14 Cap 0    gabapentin (NEURONTIN) 600 mg tablet Take 1 Tab by mouth three (3) times daily. Max Daily Amount: 1,800 mg. 90 Tab 2    pantoprazole (PROTONIX) 40 mg tablet TAKE 1 TABLET BY MOUTH TWO TIMES A DAY FOR 30 DAYS. INDICATIONS: GASTRIC ULCER 60 Tab 2    b complex vitamins tablet Take 1 Tab by mouth daily.  Biotin 2,500 mcg cap Take  by mouth.  cyanocobalamin (VITAMIN B-12) 500 mcg tablet Take 500 mcg by mouth daily.  calcium citrate 200 mg (950 mg) tablet Take  by mouth daily.  multivitamin with iron (FLINTSTONES) chewable tablet Take 1 Tab by mouth two (2) times a day.          Past History     Past Medical History:  Past Medical History:   Diagnosis Date  Acid reflux     Incisional hernia     Intestinal malabsorption     Morbid obesity with body mass index of 40.0-49.9 ([facility])     Status post bariatric surgery 2012    sleeve resection / kirstin Singh       Past Surgical History:  Past Surgical History:   Procedure Laterality Date    HX  SECTION      X 3    HX GI  2012    Sleeve gastrectomy- Dr Shubham Khan HX GI      Conversion to gastric bypass-2017    HX GI      Internal hernia ( dr Sloan Martinez) -2018    HX GI      Internal hernia - 2019    HX ORTHOPAEDIC      scoliosis correction surgery X 2    HX TONSIL AND ADENOIDECTOMY      HX TUBAL LIGATION      LAP GASTRIC BYPASS/TAMICA-EN-Y      revision from sleeve on 2017       Family History:  History reviewed. No pertinent family history. Social History:  Social History     Tobacco Use    Smoking status: Never Smoker    Smokeless tobacco: Never Used   Substance Use Topics    Alcohol use: No     Alcohol/week: 0.0 standard drinks    Drug use: No       Allergies: Allergies   Allergen Reactions    Nsaids (Non-Steroidal Anti-Inflammatory Drug) Other (comments)     GI Ulcer- Hx of Gastric Bypass    Buprenorphine Hcl Hives and Other (comments)         Review of Systems       Review of Systems   Constitutional: Negative. Negative for chills, diaphoresis and fever. HENT: Negative. Negative for congestion, rhinorrhea and sore throat. Eyes: Negative. Negative for pain, discharge and redness. Respiratory: Negative. Negative for cough, chest tightness, shortness of breath and wheezing. Cardiovascular: Negative. Negative for chest pain. Gastrointestinal: Negative. Negative for abdominal pain, constipation, diarrhea, nausea and vomiting. Genitourinary: Negative. Negative for dysuria, flank pain, frequency, hematuria and urgency. Musculoskeletal: Negative. Negative for back pain and neck pain. Skin: Negative. Negative for rash. Neurological: Negative.   Negative for syncope, weakness, numbness and headaches. Psychiatric/Behavioral: Negative. All other systems reviewed and are negative. Physical Exam     Visit Vitals  BP (!) 133/94   Pulse 88   Temp 97.8 °F (36.6 °C)   Resp 18   Ht 5' 3\" (1.6 m)   Wt 70.8 kg (156 lb)   LMP 08/07/2019   SpO2 100%   BMI 27.63 kg/m²         Physical Exam   Constitutional: She appears well-developed and well-nourished. Non-toxic appearance. She does not have a sickly appearance. She does not appear ill. No distress. HENT:   Head: Normocephalic and atraumatic. Mouth/Throat: Oropharynx is clear and moist. No oropharyngeal exudate. Eyes: Pupils are equal, round, and reactive to light. Conjunctivae and EOM are normal. No scleral icterus. Neck: Normal range of motion. Neck supple. No hepatojugular reflux and no JVD present. No tracheal deviation present. No thyromegaly present. Cardiovascular: Normal rate, regular rhythm, S1 normal, S2 normal, normal heart sounds, intact distal pulses and normal pulses. Exam reveals no gallop, no S3 and no S4. No murmur heard. Pulses:       Radial pulses are 2+ on the right side, and 2+ on the left side. Dorsalis pedis pulses are 2+ on the right side, and 2+ on the left side. Pulmonary/Chest: Effort normal and breath sounds normal. No respiratory distress. She has no decreased breath sounds. She has no wheezes. She has no rhonchi. She has no rales. Abdominal: Soft. Normal appearance and bowel sounds are normal. She exhibits no distension and no mass. There is no hepatosplenomegaly. There is no tenderness. There is no rigidity, no rebound, no guarding, no CVA tenderness, no tenderness at McBurney's point and negative Rangel's sign. Musculoskeletal: Normal range of motion. She exhibits no edema or tenderness. Back:    Lymphadenopathy:        Head (right side): No submental, no submandibular, no preauricular and no occipital adenopathy present. Head (left side):  No submental, no submandibular, no preauricular and no occipital adenopathy present. She has no cervical adenopathy. Right: No supraclavicular adenopathy present. Left: No supraclavicular adenopathy present. Neurological: She is alert. She has normal strength and normal reflexes. She is not disoriented. No cranial nerve deficit or sensory deficit. Coordination and gait normal. GCS eye subscore is 4. GCS verbal subscore is 5. GCS motor subscore is 6. Grossly intact. Skin: Skin is warm, dry and intact. No rash noted. She is not diaphoretic. Psychiatric: She has a normal mood and affect. Her speech is normal and behavior is normal. Judgment and thought content normal. Cognition and memory are normal.   Nursing note and vitals reviewed. Diagnostic Study Results     Labs -  No results found for this or any previous visit (from the past 12 hour(s)). Radiologic Studies -   No orders to display         Medical Decision Making   Provider Notes (Medical Decision Making):  MDM  Number of Diagnoses or Management Options  Diagnosis management comments: Back pain  Back strain  Herniated disc      I am the first provider for this patient. I reviewed the vital signs, available nursing notes, past medical history, past surgical history, family history and social history. Vital Signs-Reviewed the patient's vital signs. Records Reviewed: Nursing Notes (Time of Review: 6:54 AM)    ED Course: Progress Notes, Reevaluation, and Consults:      She just had an MRI. At this time I do not feel further imaging needed. Will just treat pain. Patient given Toradol and Vicodin with improvement of pain. Patient follow-up with Dr. Crystal Merchant for results of MRI. Diagnosis       I have reassessed the patient. Patient is feeling better. Patient will be prescribed Vicodin. Patient was discharged in stable condition. Patient is to return to emergency department if any new or worsening condition. Clinical Impression:   1. Chronic left-sided low back pain with left-sided sciatica        Disposition: Discharge home. Follow-up Information     Follow up With Specialties Details Why Contact Info    Cesar Shetty MD Family Practice   [address]      Samanta Whittaker MD Orthopedic Surgery In 2 days  [address] [place] [address] [phone]               Attestation        Provider Attestation:     I personally performed the services described in the documentation, reviewed the documentation and it accurately and completely records my words and actions utilizing the [address] [place] August 27, 2019 at 9:14 AM - Stephanie Leung DO    Disclaimer. It is dictated using utilizing voice recognition software. Unfortunately this leads to occasional typographical errors. I apologize in advance if the situation occurs. If questions arise please do not hesitate to contact me or call our department.

## 2019-08-27 NOTE — ED TRIAGE NOTES
Patient c/o lower back pain. She states hx of lower back pain issues. States taking gabapentin without relief.

## 2019-08-27 NOTE — ED TRIAGE NOTES
Pt c/o lower back pain for approx. 2 years that has gotten worse over the past 2 days. Pt had MRI here at [facility] this morning & is currently on Gabapentin & took valium this AM prior to having MRI.

## 2019-08-27 NOTE — DISCHARGE INSTRUCTIONS
Patient Education        Back Pain: Care Instructions  Your Care Instructions    Back pain has many possible causes. It is often related to problems with muscles and ligaments of the back. It may also be related to problems with the nerves, discs, or bones of the back. Moving, lifting, standing, sitting, or sleeping in an awkward way can strain the back. Sometimes you don't notice the injury until later. Arthritis is another common cause of back pain. Although it may hurt a lot, back pain usually improves on its own within several weeks. Most people recover in 12 weeks or less. Using good home treatment and being careful not to stress your back can help you feel better sooner. Follow-up care is a key part of your treatment and safety. Be sure to make and go to all appointments, and call your doctor if you are having problems. It's also a good idea to know your test results and keep a list of the medicines you take. How can you care for yourself at home? · Sit or lie in positions that are most comfortable and reduce your pain. Try one of these positions when you lie down:  ? Lie on your back with your knees bent and supported by large pillows. ? Lie on the floor with your legs on the seat of a sofa or chair. ? Lie on your side with your knees and hips bent and a pillow between your legs. ? Lie on your stomach if it does not make pain worse. · Do not sit up in bed, and avoid soft couches and twisted positions. Bed rest can help relieve pain at first, but it delays healing. Avoid bed rest after the first day of back pain. · Change positions every 30 minutes. If you must sit for long periods of time, take breaks from sitting. Get up and walk around, or lie in a comfortable position. · Try using a heating pad on a low or medium setting for 15 to 20 minutes every 2 or 3 hours. Try a warm shower in place of one session with the heating pad. · You can also try an ice pack for 10 to 15 minutes every 2 to 3 hours. Put a thin cloth between the ice pack and your skin. · Take pain medicines exactly as directed. ? If the doctor gave you a prescription medicine for pain, take it as prescribed. ? If you are not taking a prescription pain medicine, ask your doctor if you can take an over-the-counter medicine. · Take short walks several times a day. You can start with 5 to 10 minutes, 3 or 4 times a day, and work up to longer walks. Walk on level surfaces and avoid hills and stairs until your back is better. · Return to work and other activities as soon as you can. Continued rest without activity is usually not good for your back. · To prevent future back pain, do exercises to stretch and strengthen your back and stomach. Learn how to use good posture, safe lifting techniques, and proper body mechanics. When should you call for help? Call your doctor now or seek immediate medical care if:    · You have new or worsening numbness in your legs.     · You have new or worsening weakness in your legs. (This could make it hard to stand up.)     · You lose control of your bladder or bowels.    Watch closely for changes in your health, and be sure to contact your doctor if:    · You have a fever, lose weight, or don't feel well.     · You do not get better as expected. Where can you learn more? Go to http://wilbert-loraine.info/. Enter V067 in the search box to learn more about \"Back Pain: Care Instructions. \"  Current as of: September 20, 2018  Content Version: 12.1  © 7924-5200 Healthwise, [employer]. Care instructions adapted under license by [employer] (which disclaims liability or warranty for this information). If you have questions about a medical condition or this instruction, always ask your healthcare professional. [place] [address] any warranty or liability for your use of this information.          Patient Education        Chronic Pain: Care Instructions  Your Care Instructions    Chronic pain is pain that lasts a long time (months or even years) and may or may not have a clear cause. It is different from acute pain, which usually does have a clear cause--like an injury or illness--and gets better over time. Chronic pain:  · Lasts over time but may vary from day to day. · Does not go away despite efforts to end it. · May disrupt your sleep and lead to fatigue. · May cause depression or anxiety. · May make your muscles tense, causing more pain. · Can disrupt your work, hobbies, home life, and relationships with friends and family. Chronic pain is a very real condition. It is not just in your head. Treatment can help and usually includes several methods used together, such as medicines, physical therapy, exercise, and other treatments. Learning how to relax and changing negative thought patterns can also help you cope. Chronic pain is complex. Taking an active role in your treatment will help you better manage your pain. Tell your doctor if you have trouble dealing with your pain. You may have to try several things before you find what works best for you. Follow-up care is a key part of your treatment and safety. Be sure to make and go to all appointments, and call your doctor if you are having problems. It's also a good idea to know your test results and keep a list of the medicines you take. How can you care for yourself at home? · Pace yourself. Break up large jobs into smaller tasks. Save harder tasks for days when you have less pain, or go back and forth between hard tasks and easier ones. Take rest breaks. · Relax, and reduce stress. Relaxation techniques such as deep breathing or meditation can help. · Keep moving. Gentle, daily exercise can help reduce pain over the long run. Try low- or no-impact exercises such as walking, swimming, and stationary biking. Do stretches to stay flexible. · Try heat, cold packs, and massage. · Get enough sleep.  Chronic pain can make you tired and drain your energy. Talk with your doctor if you have trouble sleeping because of pain. · Think positive. Your thoughts can affect your pain level. Do things that you enjoy to distract yourself when you have pain instead of focusing on the pain. See a movie, read a book, listen to music, or spend time with a friend. · If you think you are depressed, talk to your doctor about treatment. · Keep a daily pain diary. Record how your moods, thoughts, sleep patterns, activities, and medicine affect your pain. You may find that your pain is worse during or after certain activities or when you are feeling a certain emotion. Having a record of your pain can help you and your doctor find the best ways to treat your pain. · Take pain medicines exactly as directed. ? If the doctor gave you a prescription medicine for pain, take it as prescribed. ? If you are not taking a prescription pain medicine, ask your doctor if you can take an over-the-counter medicine. Reducing constipation caused by pain medicine  · Include fruits, vegetables, beans, and whole grains in your diet each day. These foods are high in fiber. · Drink plenty of fluids, enough so that your urine is light yellow or clear like water. If you have kidney, heart, or liver disease and have to limit fluids, talk with your doctor before you increase the amount of fluids you drink. · If your doctor recommends it, get more exercise. Walking is a good choice. Bit by bit, increase the amount you walk every day. Try for at least 30 minutes on most days of the week. · Schedule time each day for a bowel movement. A daily routine may help. Take your time and do not strain when having a bowel movement. When should you call for help? Call your doctor now or seek immediate medical care if:    · Your pain gets worse or is out of control.     · You feel down or blue, or you do not enjoy things like you once did.  You may be depressed, which is common in people with chronic pain. Depression can be treated.     · You have vomiting or cramps for more than 2 hours.    Watch closely for changes in your health, and be sure to contact your doctor if:    · You cannot sleep because of pain.     · You are very worried or anxious about your pain.     · You have trouble taking your pain medicine.     · You have any concerns about your pain medicine.     · You have trouble with bowel movements, such as:  ? No bowel movement in 3 days. ? Blood in the anal area, in your stool, or on the toilet paper. ? Diarrhea for more than 24 hours. Where can you learn more? Go to http://wilbert-loraine.info/. Enter N004 in the search box to learn more about \"Chronic Pain: Care Instructions. \"  Current as of: March 28, 2019  Content Version: 12.1  © 9459-5302 Healthwise, [employer]. Care instructions adapted under license by [employer] (which disclaims liability or warranty for this information). If you have questions about a medical condition or this instruction, always ask your healthcare professional. [address] any warranty or liability for your use of this information.

## 2019-09-04 ENCOUNTER — OFFICE VISIT (OUTPATIENT)
Dept: ORTHOPEDIC SURGERY | Age: 34
End: 2019-09-04

## 2019-09-04 VITALS — DIASTOLIC BLOOD PRESSURE: 85 MMHG | TEMPERATURE: 98.1 F | HEART RATE: 91 BPM | SYSTOLIC BLOOD PRESSURE: 127 MMHG

## 2019-09-04 DIAGNOSIS — M43.06 PARS DEFECT OF LUMBAR SPINE: ICD-10-CM

## 2019-09-04 DIAGNOSIS — M43.16 SPONDYLOLISTHESIS OF LUMBAR REGION: ICD-10-CM

## 2019-09-04 DIAGNOSIS — M54.16 LUMBAR RADICULOPATHY: Primary | ICD-10-CM

## 2019-09-04 DIAGNOSIS — Z98.890 HISTORY OF SPINAL SURGERY: ICD-10-CM

## 2019-09-04 RX ORDER — KETOROLAC TROMETHAMINE 15.75 MG/1
SPRAY, METERED NASAL
Qty: 5 EACH | Refills: 5 | Status: CANCELLED | OUTPATIENT
Start: 2019-09-04

## 2019-09-04 NOTE — PROGRESS NOTES
[address]  [address], [address]  [place], [address] [place] [address] Street  Phone: [phone]  Fax: [phone]        Jacob Stockton  : 1985  PCP: Sil Decker MD  2019    PROGRESS NOTE      HISTORY OF PRESENT ILLNESS  Rosey Begum is a 28 y.o. female who was seen as a new patient 17 with c/o chronic low back pain with a hx of severe scoliosis which was treated with a long fusion surgery ending at the L4 level when she was 5 y.o. This surgery was performed by Dr. Tito David at [facility] utilizing hook thierry instrumentation. She did well until 2-3 years ago. The procedure presented with some complications which included left-sided paralysis. This was treated with a second surgery. She continues to have weakness on the left side related to this. She reports her pain started to worsen in 2017. She notes that sitting will help alleviate her pain however, she will have to change positions after a prolonged period. She will have intermittent neuropathic radicular symptoms down the posterior aspect of the LLE. Her pain is worse with bending forward. She has recently lost approximately 50 lbs due to a gastric bypass surgery, which has helped decrease her pain. She was prescribed Gabapentin 300 mg TID and referred to pain management. Pt saw Dr. Adan Lopez 18 with c/o progressive severe low back pain, left paracentral, with some buttock pain and posterior thigh pain on the left. He noted that she had junctional degeneration below a solid thoracolumbar fusion. He recommended a reconstructive fusion to either L5 or to the sacrum. He recommended further weight loss and further imaging. She returned to see Mamie Son NP 19 with c/o low back pain radiating into the LLE in an L4/5 distribution intermittently. Since her previous OV, she has lost another 50+ lbs, and noted that for a while, it improved her pain. She is ready to re-consider surgery.  She reports a sense of instability when she is riding or driving. Pain is aching, burning, stabbing and throbbing, pain is worse with manual/sedentary work, riding /driving car or truck, walking and affects work and recreational activities. Pain is better with nothing. She did not find relief from Gabapentin 300 mg TID. Nolberto Shin comes in to the office today for f/u. Pt notes that she went to the ED 19 with c/o severe low back pain radiating into LLE to the foot. She received a Toradol injection and Vicodin that improved her pain. Pt reports episodic burning pain radiating into her LLE in an L5 distribution. Lumbar MRI 19: L4 pars defects with anterolisthesis of L4 on L5. Likely mild to moderate canal narrowing with grossly patent neural foramina. Extensive posterior spinal fusion from above the field of view to L4. Pt has been out of work due to pain. ASSESSMENT  Her symptoms are likely due to lumbar radiculopathy. We discussed the option of a lumbar JOVANNY vs surgical consult. PLAN  1. Left L5 SNRB. 2. She can call if she would like a prescription for Sprix. 3. Provided work note for her to remain out of work while undergoing treatment. Pt will f/u with Dr. Carreon Pac 2 weeks after injection. Diagnoses and all orders for this visit:    1. Lumbar radiculopathy  -     SCHEDULE SURGERY    2. Pars defect of lumbar spine    3. Spondylolisthesis of lumbar region    4.  History of spinal surgery               PAST MEDICAL HISTORY   Past Medical History:   Diagnosis Date    Acid reflux     Incisional hernia     Intestinal malabsorption     Morbid obesity with body mass index of 40.0-49.9 [facility])     Status post bariatric surgery 2012    sleeve resection / kirstin Ayers       Past Surgical History:   Procedure Laterality Date    HX  SECTION      X 3    HX GI  2012    Sleeve gastrectomy- Dr Puneet Mckeon HX GI      Conversion to gastric bypass-2017    HX GI      Internal hernia ( dr [facility]) -7/2018    HX GI      Internal hernia - 2/2/2019    HX ORTHOPAEDIC      scoliosis correction surgery X 2    HX TONSIL AND ADENOIDECTOMY      HX TUBAL LIGATION      LAP GASTRIC BYPASS/TAMICA-EN-Y      revision from sleeve on 9/26/2017   . MEDICATIONS    Current Outpatient Medications   Medication Sig Dispense Refill    methocarbamol (ROBAXIN) 500 mg tablet Take 1 Tab by mouth four (4) times daily. 20 Tab 0    gabapentin (NEURONTIN) 600 mg tablet Take 1 Tab by mouth three (3) times daily. Max Daily Amount: 1,800 mg. 90 Tab 2    pantoprazole (PROTONIX) 40 mg tablet TAKE 1 TABLET BY MOUTH TWO TIMES A DAY FOR 30 DAYS. INDICATIONS: GASTRIC ULCER 60 Tab 2    b complex vitamins tablet Take 1 Tab by mouth daily.  Biotin 2,500 mcg cap Take  by mouth.  cyanocobalamin (VITAMIN B-12) 500 mcg tablet Take 500 mcg by mouth daily.  calcium citrate 200 mg (950 mg) tablet Take  by mouth daily.  multivitamin with iron (FLINTSTONES) chewable tablet Take 1 Tab by mouth two (2) times a day. ALLERGIES  Allergies   Allergen Reactions    Nsaids (Non-Steroidal Anti-Inflammatory Drug) Other (comments)     GI Ulcer- Hx of Gastric Bypass    Buprenorphine Hcl Hives and Other (comments)          SOCIAL HISTORY    Social History     Socioeconomic History    Marital status: SINGLE     Spouse name: Not on file    Number of children: Not on file    Years of education: Not on file    Highest education level: Not on file   Tobacco Use    Smoking status: Never Smoker    Smokeless tobacco: Never Used   Substance and Sexual Activity    Alcohol use: No     Alcohol/week: 0.0 standard drinks    Drug use: No    Sexual activity: Yes     Partners: Male       FAMILY HISTORY  No family history on file. REVIEW OF SYSTEMS  Review of Systems   Musculoskeletal: Positive for back pain.         LLE paraesthesia          PHYSICAL EXAMINATION  Visit Vitals  LMP 08/07/2019       Pain Assessment  7/9/2019 Location of Pain Back   Location Modifiers Inferior   Severity of Pain 7   Quality of Pain Throbbing;Dull;Aching   Duration of Pain Persistent   Frequency of Pain Constant   Aggravating Factors Bending;Stretching;Walking;Standing   Limiting Behavior Yes   Relieving Factors Rest   Result of Injury No           Constitutional:  Well developed, well nourished, in no acute distress. Psychiatric: Affect and mood are appropriate. Integumentary: No rashes or abrasions noted on exposed areas. SPINE/MUSCULOSKELETAL EXAM    Cervical spine:  Neck is midline. Normal muscle tone. No focal atrophy is noted. ROM pain free. Shoulder ROM intact. No tenderness to palpation. Negative Spurling's sign. Negative Tinel's sign. Negative Robbins's sign. Sensation in the bilateral arms grossly intact to light touch.      Lumbar spine:  No rash, ecchymosis, or gross obliquity. No fasciculations. No focal atrophy is noted. No pain with hip ROM. Full range of motion. Tenderness to palpation. No tenderness to palpation at the sciatic notch. SI joints non-tender. Trochanters non tender. Pain with lumbar flexion and extension      Sensation in the bilateral legs grossly intact to light touch. Updates from Dr. Moreno [place] 2/6/18:  Her physical exam demonstrates normal strength, sensation, and reflexes with back and radiating leg pain. She has to stand with some flexed posture to improve her symptoms. She does not have bad sagittal or coronal balance when attempting to stand erect. She has a well healed thoracolumbar scar and normal strength and sensation throughout.       Updates 9/4/19:  Back pain reproduced with stabilization    MOTOR:      Biceps  Triceps Deltoids Wrist Ext Wrist Flex Hand Intrin   Right 5/5 5/5 5/5 5/5 5/5 5/5   Left 5/5 5/5 5/5 5/5 5/5 5/5             Hip Flex Quads Hamstrings Ankle DF EHL Ankle PF   Right 5/5 5/5 5/5 5/5 5/5 5/5   Left 5/5 5/5 5/5 5/5 5/5 5/5     DTRs are 1+ biceps, triceps, brachioradialis, patella, and Achilles.     Positive left Straight Leg raise. Squat not tested. No difficulty with tandem gait.      Ambulation without assistive device. FWB. RADIOGRAPHS  Lumbar MRI images taken on 8/27/19 personally reviewed with patient:    The patient has 5 lumbar-type vertebra.     Postoperative Changes: Patient has extensive posterior spinal fusion with  fixation rods from above the field-of-view to L4. Artifact from the hooks, rods  and cerclage wires mildly limits evaluation of the canal.     Alignment:  There is dextrocurvature of the thoracolumbar spine as visualized. Anterolisthesis of L4 on L5 is noted which is similar to prior imaging at 8 mm.      Osseous structures/Marrow: Evaluating the marrow of the posterior elements is  mildly limited due to metallic artifact. The marrow signal at the vertebral  bodies is unremarkable. No fracture identified. Best appreciated on the CT, the  patient has bilateral pars defects of L4.     The cord terminates at L1. The cord as visualized is limited evaluation due to  metallic artifact.     Retroperitoneum/Incidental: The abdominal aorta is without evidence of aneurysm. No paraaortic adenopathy appreciated. A cyst is noted in the right kidney. No  focus of abnormal enhancement appreciated.      Lower Thoracic Spine: The majority of the lower thoracic spine is only  visualized on the sagittal views. No significant canal or neural foraminal  stenosis appreciated.      Lumbar spine: The evaluation of the canal is severely limited due to metallic  artifact. However, when correlating with CT, there does not appear to be  significant canal stenosis from T12-L1 through L3-L4. The L4-L5 level has  anterolisthesis as result of the bilateral pars defects.  There is likely mild to  moderate canal narrowing at this level. The neural foramina are grossly patent. The L5-S1 level canal and neural foramina are grossly patent.      IMPRESSION  Impression:  1.  L4 pars defects with anterolisthesis of L4 on L5. Likely mild to moderate  canal narrowing with grossly patent neural foramina.      2. Extensive posterior spinal fusion from above the field of view to L4. Lumbar MRI images taken on 1/22/18 personally reviewed with patient:    Limited examination due to thoracolumbar Gomez rods extending from the  lower thoracic levels to L4. Susceptibility artifact and field inhomogeneity  limiting the evaluation. There is grade 1 anterolisthesis L4 on L5 with severe  disc height loss and associated chronic discogenic reactive bone marrow changes. There is rightward thoracolumbar scoliotic curvature apex right at L1/2. The  vertebral body heights are maintained. Multilevel disc space narrowing. No  suspicious bone marrow lesion or infiltrative bone marrow process.     Intraspinal contents are partially obscured due to susceptibility artifact. The  conus medullaris is not visualized.     Correlation of sagittal and axial images demonstrates the following:     T12/L1: Disc space narrowing. No significant disc herniation or disc bulge. Obscuration of the central canal and neural foramina due to susceptibility  artifact.     L1/2:  Disc space narrowing. No significant disc herniation or significant disc  bulge. Partially obscure central canal but appears adequate. Neural foramina  patent.     L2/3:  Disc space narrowing. No significant disc herniation or disc bulge. Partially obscured central canal but overall central canal and neural foramina.     L3/4:  Heights maintained. No significant disc pathology. Partially obscured  central canal. No high-grade central canal stenosis. Patent neural foramina.     L4/5:  Severe disc height loss. Grade 1 anterolisthesis with pseudodisc bulge.   Obscuration of the central canal at this level at the level of the disc below  which the central canal is patent. Partially secured neural foramina due to  artifact but at least mild or moderate foraminal stenosis and potential for  compression of the foraminal nerve roots.      L5/S1:  The spinal canal and neural foramina are widely patent.     There is 1 cm T2 hyperintense lesion in the right mid kidney.     IMPRESSION  IMPRESSION:       1. Limited examination due to thoracolumbar Gomez rods artifacts partially  obscuring the central canal and neural foramina.     2. Thoracolumbar rightward scoliotic curvature. Grade 1 anterolisthesis L4 on L5  and degenerative disc disease with obscuration of the central canal at this  level, cannot determine degree of stenosis at this level. Below and above this  level there is no high-grade central canal stenosis. There is mild to moderate  foraminal stenosis at L4/5 with potential for compression of the foraminal L4  nerve roots.     3. Indeterminate 1 cm right mid kidney lesion, probably cyst but poorly  evaluated on prior CT due to artifact, can be evaluated with ultrasound. Lumbar XR images taken on 12/26/2017 personally reviewed with patient:  Spondylolisthesis at approximately 1 cm with flexion and .5 cm with extension. Lumbar XR images taken on 11/14/2017 personally reviewed with patient:  FINDINGS:     Two views obtained. Long Gomez thierry bilaterally from T2 through L4. 1 cm of  anterior offset of L4 on L5 with marked disc space narrowing. .  The pedicles are  intact. There is no evidence of fracture or dislocation. Mineralization is  normal.     IMPRESSION  IMPRESSION:     Stable 1 cm spondylolisthesis L4/5 with marked degenerative disc disease. Long Gomez thierry intact    17 minutes of face-to-face contact were spent with the patient during today's visit extensively discussing symptoms and treatment plan. All questions were answered. More than half of this visit today was spent on counseling. Written by Familia Javier as dictated by Compa Solo MD

## 2019-09-04 NOTE — LETTER
NOTIFICATION RETURN TO WORK / SCHOOL 
 
9/4/2019 9:41 AM 
 
Ms. Jose Pagan [address] [facility] [address] [facility] To Whom It May Concern: 
 
Jose Pagan is currently under the care of [address] [place] [address]. She was seen in our office today, Wednesday, 94/19. She will remain out of work while she is undergoing treatment. She will be reevaluated in approximately 3-4 weeks. If there are questions or concerns please have the patient contact our office.  
 
 
 
Sincerely, 
 
 
Essence Delaney MD

## 2019-09-09 RX ORDER — PANTOPRAZOLE SODIUM 40 MG/1
TABLET, DELAYED RELEASE ORAL
Qty: 60 TAB | Refills: 2 | Status: SHIPPED | OUTPATIENT
Start: 2019-09-09 | End: 2019-10-11 | Stop reason: SDUPTHER

## 2019-09-12 ENCOUNTER — HOSPITAL ENCOUNTER (OUTPATIENT)
Age: 34
Setting detail: OUTPATIENT SURGERY
Discharge: HOME | End: 2019-09-12
Attending: PHYSICAL MEDICINE & REHABILITATION | Admitting: PHYSICAL MEDICINE & REHABILITATION
Payer: COMMERCIAL

## 2019-09-12 ENCOUNTER — APPOINTMENT (OUTPATIENT)
Dept: GENERAL RADIOLOGY | Age: 34
End: 2019-09-12
Attending: PHYSICAL MEDICINE & REHABILITATION
Payer: COMMERCIAL

## 2019-09-12 VITALS
TEMPERATURE: 98.3 F | RESPIRATION RATE: 18 BRPM | HEIGHT: 63 IN | HEART RATE: 74 BPM | WEIGHT: 158 LBS | OXYGEN SATURATION: 93 % | BODY MASS INDEX: 28 KG/M2 | SYSTOLIC BLOOD PRESSURE: 134 MMHG | DIASTOLIC BLOOD PRESSURE: 90 MMHG

## 2019-09-12 PROCEDURE — 74011250637 HC RX REV CODE- 250/637: Performed by: PHYSICAL MEDICINE & REHABILITATION

## 2019-09-12 PROCEDURE — 76010000009 HC PAIN MGT 0 TO 30 MIN PROC: Performed by: PHYSICAL MEDICINE & REHABILITATION

## 2019-09-12 PROCEDURE — 74011250636 HC RX REV CODE- 250/636: Performed by: PHYSICAL MEDICINE & REHABILITATION

## 2019-09-12 PROCEDURE — 74011636320 HC RX REV CODE- 636/320: Performed by: PHYSICAL MEDICINE & REHABILITATION

## 2019-09-12 PROCEDURE — 77030039433 HC TY MYLEOGRAM BD -B: Performed by: PHYSICAL MEDICINE & REHABILITATION

## 2019-09-12 PROCEDURE — 77030003676 HC NDL SPN MPRI -A: Performed by: PHYSICAL MEDICINE & REHABILITATION

## 2019-09-12 PROCEDURE — 77030003669 HC NDL SPN COOK -B: Performed by: PHYSICAL MEDICINE & REHABILITATION

## 2019-09-12 PROCEDURE — 74011000250 HC RX REV CODE- 250: Performed by: PHYSICAL MEDICINE & REHABILITATION

## 2019-09-12 RX ORDER — DIAZEPAM 5 MG/1
5-20 TABLET ORAL ONCE
Status: COMPLETED | OUTPATIENT
Start: 2019-09-12 | End: 2019-09-12

## 2019-09-12 RX ORDER — DEXAMETHASONE SODIUM PHOSPHATE 100 MG/10ML
INJECTION INTRAMUSCULAR; INTRAVENOUS AS NEEDED
Status: DISCONTINUED | OUTPATIENT
Start: 2019-09-12 | End: 2019-09-12 | Stop reason: HOSPADM

## 2019-09-12 RX ORDER — LIDOCAINE HYDROCHLORIDE 10 MG/ML
INJECTION, SOLUTION EPIDURAL; INFILTRATION; INTRACAUDAL; PERINEURAL AS NEEDED
Status: DISCONTINUED | OUTPATIENT
Start: 2019-09-12 | End: 2019-09-12 | Stop reason: HOSPADM

## 2019-09-12 RX ADMIN — DIAZEPAM 10 MG: 5 TABLET ORAL at 12:23

## 2019-09-12 NOTE — PROCEDURES
PROCEDURE NOTE      Patient Name: Cristal Richter    Date of Procedure: September 12, 2019    Preoperative Diagnosis:  LUMBAR RADICULOPATHY    Post Operative Diagnosis:  LUMBAR RADICULOPATHY    Procedure :    left L5 Selective Nerve Root Block      Consent:  Informed consent was obtained prior to the procedure. The patient was given the opportunity to ask questions regarding the procedure and its associated risks. In addition to the potential risks associated with the procedure itself, the patient was informed both verbally and in writing of the potential side effects of the use of glucocorticoid. The patient appeared to comprehend the informed consent and desired to have the procedure performed. Procedure: The patient was placed in the prone position on the fluoroscopy table and the back was prepped and draped in the usual sterile manner. The exact spinal level was  identified using fluoroscopy, and Lidocaine 1 % was injected locally, a # 22 gauge spinal needle was passed to the transverse process. The depth was noted and the needle redirected to pass inferior and approximately one cm anterior to the transverse process. YES  1 cc of Isovue M-200 was used to verify positioning in the epidural and paravertebral space and outlined the course of the spinal nerve into the epidural space. The same procedure was repeated at each spinal level indicated above. A total of 30 mg of preservative free Dexamethasone and 4 cc of Lidocaine was slowly injected. The patient tolerated the procedure well. The injection area was cleaned and bandaids applied. Not excessive bleeding was noted. Patient dressed and discharged to home with instructions. Discussion: The patient tolerated the procedure well.                                               Flor Lomeli MD  September 12, 2019

## 2019-09-12 NOTE — DISCHARGE INSTRUCTIONS
[facility] AND [facility]   (RONNY)  Dr. Salome Hernandez, Dr. Inder Bedoya, Dr. Annamaria Sánchez not drive a car, operate heavy machinery or dangerous equipment for 24 hours. * Activity as tolerated; rest for the remainder of the day. * Resume pre-block medications including those for your family doctor. * Do not drink alcoholic beverages for 24 hours. Alcohol and the medications you have received may interact and cause an adverse reaction. * You may feel better this evening and worse tomorrow, as the numbing medications wears off and the steroid has yet to begin to work. After 48 hrs the steroid should begin to release bringing you relief. * You may shower this evening and remove any bandages. * Avoid hot tubs and heating pads for 24 hours. You may use cold packs on the procedure site as tolerated for the first 24 hours. * If a headache develops, drink plenty of fluids and rest.  Take over the counter medications for headache if needed. If the headache continues longer than 24 hours, call MD at the [address] [place] [address] Avenue. [phone]    * Continue taking pain medications as needed. * You may resume your regular diet if tolerated. Otherwise, start with sips of water and advance slowly. * If Diabetic: check your blood sugar three times a day for the next 3 days. If your sugar is greater than 300 call your family doctor. If your sugar is greater than 400, have someone transport you to the nearest Emergency Room. * If you experience any of the following problems, Please Call the [address] [place] [address] Avenue at [phone].         * Shortness of Breath    * Fever of 101 or higher    * Nausea / Vomiting    * Severe Headache    * Weakness or numbness in arms or legs that is not      resolving    * Prolonged increase in pain greater than 4 days      DISCHARGE SUMMARY from Nurse      PATIENT INSTRUCTIONS:    After oral sedation, for 24 hours or while taking prescription Narcotics:  · Limit your activities  · Do not drive and operate hazardous machinery  · Do not make important personal or business decisions  · Do  not drink alcoholic beverages  · If you have not urinated within 8 hours after discharge, please contact your surgeon on call. Report the following to your surgeon:  · Excessive pain, swelling, redness or odor of or around the surgical area  · Temperature over 101  · Nausea and vomiting lasting longer than 4 hours or if unable to take medications  · Any signs of decreased circulation or nerve impairment to extremity: change in color, persistent  numbness, tingling, coldness or increase pain  · Any questions            What to do at Home:  Recommended activity: Activity as tolerated, NO DRIVING FOR 12 Hours post injection          *  Please give a list of your current medications to your Primary Care Provider. *  Please update this list whenever your medications are discontinued, doses are      changed, or new medications (including over-the-counter products) are added. *  Please carry medication information at all times in case of emergency situations. These are general instructions for a healthy lifestyle:    No smoking/ No tobacco products/ Avoid exposure to second hand smoke    Surgeon General's Warning:  Quitting smoking now greatly reduces serious risk to your health. Obesity, smoking, and sedentary lifestyle greatly increases your risk for illness    A healthy diet, regular physical exercise & weight monitoring are important for maintaining a healthy lifestyle    You may be retaining fluid if you have a history of heart failure or if you experience any of the following symptoms:  Weight gain of 3 pounds or more overnight or 5 pounds in a week, increased swelling in our hands or feet or shortness of breath while lying flat in bed.   Please call your doctor as soon as you notice any of these symptoms; do not wait until your next office visit. Recognize signs and symptoms of STROKE:    F-face looks uneven    A-arms unable to move or move unevenly    S-speech slurred or non-existent    T-time-call 911 as soon as signs and symptoms begin-DO NOT go       Back to bed or wait to see if you get better-TIME IS BRAIN.

## 2019-09-20 ENCOUNTER — TELEPHONE (OUTPATIENT)
Dept: ORTHOPEDIC SURGERY | Age: 34
End: 2019-09-20

## 2019-09-20 DIAGNOSIS — M54.16 LUMBAR RADICULOPATHY: Primary | ICD-10-CM

## 2019-09-20 RX ORDER — DULOXETIN HYDROCHLORIDE 30 MG/1
30 CAPSULE, DELAYED RELEASE ORAL DAILY
Qty: 30 CAP | Refills: 0 | Status: SHIPPED | OUTPATIENT
Start: 2019-09-20 | End: 2020-03-23

## 2019-09-20 NOTE — TELEPHONE ENCOUNTER
She can try ice heat massage  If she's not on an anti-depressant you can send a Rx for Cymbalta 30mg daily disp #30 no refill to pharmacy

## 2019-09-20 NOTE — TELEPHONE ENCOUNTER
Patient called stating that she felt some relieve from her spinal block last week but is now back at square one if not worse. She said the Gabapentin is not working. I did schedule her for 9/25/19 with José Manuel Ratliff but she would like to discuss to she can do in the meantime for the pain.  Please advise patient at [phone]

## 2019-10-04 ENCOUNTER — OFFICE VISIT (OUTPATIENT)
Dept: ORTHOPEDIC SURGERY | Age: 34
End: 2019-10-04

## 2019-10-04 VITALS
SYSTOLIC BLOOD PRESSURE: 115 MMHG | OXYGEN SATURATION: 98 % | HEART RATE: 67 BPM | BODY MASS INDEX: 27.99 KG/M2 | TEMPERATURE: 98.2 F | HEIGHT: 63 IN | DIASTOLIC BLOOD PRESSURE: 78 MMHG

## 2019-10-04 DIAGNOSIS — M43.16 SPONDYLOLISTHESIS OF LUMBAR REGION: Primary | ICD-10-CM

## 2019-10-04 DIAGNOSIS — Z98.890 HISTORY OF SPINAL SURGERY: ICD-10-CM

## 2019-10-04 DIAGNOSIS — Z87.39 HISTORY OF FUSION OF SPINE FOR SCOLIOSIS: ICD-10-CM

## 2019-10-04 DIAGNOSIS — Z98.1 HISTORY OF FUSION OF SPINE FOR SCOLIOSIS: ICD-10-CM

## 2019-10-08 ENCOUNTER — TELEPHONE (OUTPATIENT)
Dept: ORTHOPEDIC SURGERY | Age: 34
End: 2019-10-08

## 2019-10-08 DIAGNOSIS — Z98.890 HISTORY OF SPINAL SURGERY: Primary | ICD-10-CM

## 2019-10-08 NOTE — TELEPHONE ENCOUNTER
Patient came into [facility] and picked up her letter she stated she would like for me to put in a message for her. Patient states if she could have a referral  to pain management she stated that in the past she has gone to [address] and she does not want to return there. Please advise patient at [phone].

## 2019-10-08 NOTE — TELEPHONE ENCOUNTER
Reached unidentified voicemail, left message, identified myself/facility/callback number, requested return call to facility. Called patient to inform her that pain management referral has been placed in [address] [facility] and copy of referral and Pain management locations have been placed at  of [facility] location on [facility] AND VASCULAR [place]. For her pickup.

## 2019-10-09 NOTE — TELEPHONE ENCOUNTER
Patient called, verified name , informed that referral was placed for pain management as requested and a copy of referral and pain management locations is available for  at the [employer] [facility] Office. Reminded patient to bring photo id when picking up referral.    Patient verbalized understanding. No further action required at this time.

## 2019-10-11 RX ORDER — PANTOPRAZOLE SODIUM 40 MG/1
TABLET, DELAYED RELEASE ORAL
Qty: 60 TAB | Refills: 2 | Status: SHIPPED | OUTPATIENT
Start: 2019-10-11 | End: 2020-01-31

## 2019-10-23 ENCOUNTER — TELEPHONE (OUTPATIENT)
Dept: SURGERY | Age: 34
End: 2019-10-23

## 2019-10-23 NOTE — TELEPHONE ENCOUNTER
Spoke with Evelyn Velasco with pt ins [employer] [phone] to get prior approval for Pantoprazole 40mg. She gave approval # [id number]. I called pt and she is aware.

## 2019-12-04 ENCOUNTER — HOSPITAL ENCOUNTER (OUTPATIENT)
Dept: CT IMAGING | Age: 34
Discharge: HOME | End: 2019-12-04
Attending: ORTHOPAEDIC SURGERY
Payer: MEDICAID

## 2019-12-04 ENCOUNTER — HOSPITAL ENCOUNTER (OUTPATIENT)
Dept: GENERAL RADIOLOGY | Age: 34
End: 2019-12-04
Attending: ORTHOPAEDIC SURGERY
Payer: MEDICAID

## 2019-12-04 DIAGNOSIS — M43.16 SPONDYLOLISTHESIS OF LUMBAR REGION: ICD-10-CM

## 2019-12-04 DIAGNOSIS — Z98.1 HISTORY OF FUSION OF SPINE FOR SCOLIOSIS: ICD-10-CM

## 2019-12-04 DIAGNOSIS — Z87.39 HISTORY OF FUSION OF SPINE FOR SCOLIOSIS: ICD-10-CM

## 2019-12-04 DIAGNOSIS — Z98.890 HISTORY OF SPINAL SURGERY: ICD-10-CM

## 2019-12-04 PROCEDURE — 72131 CT LUMBAR SPINE W/O DYE: CPT

## 2019-12-06 ENCOUNTER — TELEPHONE (OUTPATIENT)
Dept: ORTHOPEDIC SURGERY | Age: 34
End: 2019-12-06

## 2019-12-06 NOTE — TELEPHONE ENCOUNTER
Patient has found a pain mgmt physician and needs to have order and notes faxed to  [facility], Dr Marli Lion fax [phone]. Please fax asap so they can call patient to set up appt.  Patient can be reached for any questions at [phone]

## 2019-12-10 ENCOUNTER — HOSPITAL ENCOUNTER (OUTPATIENT)
Dept: GENERAL RADIOLOGY | Age: 34
Discharge: HOME | End: 2019-12-10
Payer: MEDICAID

## 2019-12-10 DIAGNOSIS — Z98.890 HISTORY OF SPINAL SURGERY: ICD-10-CM

## 2019-12-10 DIAGNOSIS — Z98.1 HISTORY OF FUSION OF SPINE FOR SCOLIOSIS: ICD-10-CM

## 2019-12-10 DIAGNOSIS — M43.16 SPONDYLOLISTHESIS OF LUMBAR REGION: ICD-10-CM

## 2019-12-10 DIAGNOSIS — Z87.39 HISTORY OF FUSION OF SPINE FOR SCOLIOSIS: ICD-10-CM

## 2019-12-10 PROCEDURE — 72082 X-RAY EXAM ENTIRE SPI 2/3 VW: CPT

## 2019-12-13 ENCOUNTER — OFFICE VISIT (OUTPATIENT)
Dept: ORTHOPEDIC SURGERY | Age: 34
End: 2019-12-13

## 2019-12-13 VITALS
TEMPERATURE: 98.2 F | HEART RATE: 92 BPM | OXYGEN SATURATION: 99 % | BODY MASS INDEX: 27.99 KG/M2 | HEIGHT: 63 IN | DIASTOLIC BLOOD PRESSURE: 93 MMHG | SYSTOLIC BLOOD PRESSURE: 144 MMHG | RESPIRATION RATE: 16 BRPM

## 2019-12-13 DIAGNOSIS — M43.16 SPONDYLOLISTHESIS OF LUMBAR REGION: Primary | ICD-10-CM

## 2019-12-13 DIAGNOSIS — Z87.39 HISTORY OF FUSION OF SPINE FOR SCOLIOSIS: ICD-10-CM

## 2019-12-13 DIAGNOSIS — Z98.1 HISTORY OF FUSION OF SPINE FOR SCOLIOSIS: ICD-10-CM

## 2019-12-13 NOTE — PROGRESS NOTES
[facility]  [address]  [place] [address]  Phone: [phone]  Fax: [phone]  PROGRESS NOTE  Patient: Carter Meeks                MRN: [id number]       SSN: [id number]  YOB: 1985        AGE: 27 y.o. SEX: female  Body mass index is 27.99 kg/m². PCP: Gabriella Patel MD  12/13/19    Chief Complaint   Patient presents with    Back Pain     lower back pain, f/u appt. HISTORY OF PRESENT ILLNESS, RADIOGRAPHS, and PLAN:       HISTORY OF PRESENT ILLNESS:  Ms. Agustina Acosta returns today. She had the studies I directed. She has continued to have severe back and leg pain. RADIOGRAPHS:  Her MRI, CT scan, and standing x-rays demonstrate a previous thoracolumbar fusion for scoliosis utilizing hooks, rods, and wires down to, I believe, L4. There is a possibility that L3-4 is just a very stable pseudarthrosis. It has a full height disc, and I am not certain this is a confluent fusion posteriorly. The patient has this spondylolisthesis, combination degenerative possibly with a pars fracture in addition causing a grade two mobile spondylolisthesis at L4-5. L5-S1 has degenerative changes. ASSESSMENT/PLAN: The recommendation for such pathology, I believe, would require a circumferential approach. I think her initial sagittal balance and coronal balance was good, so I do not believe an osteotomy is necessary, though improving and maximizing segmental lordosis here is probably warranted. I would do an ALIF at L5-S1 to give her a stable base, an ALIF or XLIF at L4-5, and probably an interbody grafting at L3-4 as well just to ensure confluent fusion, since I am in the area. I then would instrument her posteriorly to the sacrum. Whether I could visualize her pedicles from L3 and below and instrument her there to the sacrum may be enough if we have adequate anterior column support.   One could also try to tie into her previously placed rods, which I sadly believe are made of stainless steel, to try to ensure control of that lever arm. Another option might require much of the same but a removal of her previously placed instrumentation with reinstrumenting the entire construct. I have discussed this with the patient. They inquired about experience, and as I have told them, while I have done these surgeries, certainly there are those with greater experience, and my recommendation would be evaluation by Dr. Aleena Plasencia at the [address] [place] [address]  who is quite fluent in these complex spinal reconstructive procedures. In addition, they have technology available at [facility] that is not available locally for intraoperative navigation, CT scan, and other techniques that may make placement of instrumentation within her fusion mass, if that is what they decide to do, easier and safer. They would like to consider getting this opinion and evaluation. In either case, I would be happy to take care of them either for the acute surgery and/or for followup care if she decides to go to a tertiary center for her initial procedure. cc: Aleena Plasencia M.D. Noralyn Litten, M.D. Past Medical History:   Diagnosis Date    Acid reflux     Incisional hernia     Intestinal malabsorption     Morbid obesity with body mass index of 40.0-49.9 ([facility])     Status post bariatric surgery 7/2012    sleeve resection / kirstin borja       History reviewed. No pertinent family history. Current Outpatient Medications   Medication Sig Dispense Refill    pantoprazole (PROTONIX) 40 mg tablet TAKE 1 TABLET BY MOUTH TWO TIMES A DAY FOR 30 DAYS. INDICATIONS: GASTRIC ULCER 60 Tab 2    methocarbamol (ROBAXIN) 500 mg tablet Take 1 Tab by mouth four (4) times daily. 20 Tab 0    gabapentin (NEURONTIN) 600 mg tablet Take 1 Tab by mouth three (3) times daily. Max Daily Amount: 1,800 mg. 90 Tab 2    b complex vitamins tablet Take 1 Tab by mouth daily.       Biotin 2,500 mcg cap Take  by mouth.  cyanocobalamin (VITAMIN B-12) 500 mcg tablet Take 500 mcg by mouth daily.  calcium citrate 200 mg (950 mg) tablet Take  by mouth daily.  multivitamin with iron (FLINTSTONES) chewable tablet Take 1 Tab by mouth two (2) times a day.  DULoxetine (CYMBALTA) 30 mg capsule Take 1 Cap by mouth daily.  30 Cap 0       Allergies   Allergen Reactions    Nsaids (Non-Steroidal Anti-Inflammatory Drug) Other (comments)     GI Ulcer- Hx of Gastric Bypass    Buprenorphine Hcl Hives and Other (comments)       Past Surgical History:   Procedure Laterality Date    HX  SECTION      X 3    HX GI  2012    Sleeve gastrectomy- Dr Radha March HX GI      Conversion to gastric bypass-2017    HX GI      Internal hernia ( dr Macarthur Ganser) -2018    HX GI      Internal hernia - 2019    HX ORTHOPAEDIC      scoliosis correction surgery X 2    HX TONSIL AND ADENOIDECTOMY      HX TUBAL LIGATION      LAP GASTRIC BYPASS/TAMICA-EN-Y      revision from sleeve on 2017       Past Medical History:   Diagnosis Date    Acid reflux     Incisional hernia     Intestinal malabsorption     Morbid obesity with body mass index of 40.0-49.9 [facility]     Status post bariatric surgery 2012    sleeve resection / kirstin borja       Social History     Socioeconomic History    Marital status: SINGLE     Spouse name: Not on file    Number of children: Not on file    Years of education: Not on file    Highest education level: Not on file   Occupational History    Not on file   Social Needs    Financial resource strain: Not on file    Food insecurity:     Worry: Not on file     Inability: Not on file    Transportation needs:     Medical: Not on file     Non-medical: Not on file   Tobacco Use    Smoking status: Never Smoker    Smokeless tobacco: Never Used   Substance and Sexual Activity    Alcohol use: No     Alcohol/week: 0.0 standard drinks    Drug use: No    Sexual activity: Yes Partners: Male   Lifestyle    Physical activity:     Days per week: Not on file     Minutes per session: Not on file    Stress: Not on file   Relationships    Social connections:     Talks on phone: Not on file     Gets together: Not on file     Attends Scientologist service: Not on file     Active member of club or organization: Not on file     Attends meetings of clubs or organizations: Not on file     Relationship status: Not on file    Intimate partner violence:     Fear of current or ex partner: Not on file     Emotionally abused: Not on file     Physically abused: Not on file     Forced sexual activity: Not on file   Other Topics Concern    Not on file   Social History Narrative    Not on file         REVIEW OF SYSTEMS:   CONSTITUTIONAL SYMPTOMS:  Negative. EYES:  Negative. EARS, NOSE, THROAT AND MOUTH:  Negative. CARDIOVASCULAR:  Negative. RESPIRATORY:  Negative. GENITOURINARY: Per HPI. GASTROINTESTINAL:  Per HPI. INTEGUMENTARY (SKIN AND/OR BREAST):  Negative. MUSCULOSKELETAL: Per HPI.   ENDOCRINE/RHEUMATOLOGIC:  Negative. NEUROLOGICAL:  Per HPI. HEMATOLOGIC/LYMPHATIC:  Negative. ALLERGIC/IMMUNOLOGIC:  Negative. PSYCHIATRIC:  Negative. PHYSICAL EXAMINATION:   Visit Vitals  BP (!) 144/93 (BP 1 Location: Left arm, BP Patient Position: Sitting)   Pulse 92   Temp 98.2 °F (36.8 °C) (Oral)   Resp 16   Ht 5' 3\" (1.6 m)   SpO2 99%   BMI 27.99 kg/m²    PAIN SCALE: 8/10    CONSTITUTIONAL: The patient is in no apparent distress and is alert and oriented x 3. HEENT: Normocephalic. Hearing grossly intact. NECK: Supple and symmetric. no tenderness, or masses were felt. RESPIRATORY: No labored breathing. CARDIOVASCULAR: The carotid pulses were normal. Peripheral pulses were 2+. CHEST: Normal AP diameter and normal contour without any kyphoscoliosis. LYMPHATIC: No lymphadenopathy was appreciated in the neck, axillae or groin.   SKIN: Negative for rashes, lesions, or ulcers on the right upper, right lower, left upper, left lower and trunk. Surgical scar. NEUROLOGICAL: Alert and oriented x 3. Ambulation without assistive device. FWB. EXTREMITIES: See musculoskeletal.  MUSCULOSKELETAL:   Head and Neck:  Negative for misalignment, asymmetry, crepitation, defects, tenderness masses or effusions.  Left Upper Extremity: Inspection, percussion and palpation performed. Robbinss sign is negative.  Right Upper Extremity: Inspection, percussion and palpation performed. Robbinss sign is negative.  Spine, Ribs and Pelvis: Back pain radiating into LLE. Inspection, percussion and palpation performed. Negative for misalignment, asymmetry, crepitation, defects, tenderness masses or effusions.  Left Lower Extremity: Pain. Inspection, percussion and palpation performed. Negative straight leg raise.  Right Lower Extremity: Inspection, percussion and palpation performed. Negative straight leg raise. SPINE EXAM:    Lumbar spine: No rash, ecchymosis, or gross obliquity. No focal atrophy is noted. ASSESSMENT    ICD-10-CM ICD-9-CM    1. Spondylolisthesis of lumbar region M43.16 738.4 REFERRAL TO SPINE SURGERY   2. History of fusion of spine for scoliosis Z98.1 V45.4 REFERRAL TO SPINE SURGERY    Z87.39 V13.59        Written by Carlotta Rosenberg, as dictated by Dom Alcazar MD.    I, Dr. Dom Alcazar MD, confirm that all documentation is accurate.

## 2019-12-13 NOTE — LETTER
12/13/2019 2:15 PM 
 
Ms. Lillie Viramontes [address] [facility] [address] To Whom It May Concern: 
 
Lillie Viramontes is currently under the care of [address] [place] [address]. She was evaluated in the office today. She will remain out of work until next office visit. If there are questions or concerns please have the patient contact our office.  
 
 
 
Sincerely, 
 
 
 
Sky Jones MD

## 2020-01-07 ENCOUNTER — DOCUMENTATION ONLY (OUTPATIENT)
Dept: ORTHOPEDIC SURGERY | Age: 35
End: 2020-01-07

## 2020-01-07 NOTE — PROGRESS NOTES
Patient came to [facility] today and dropped off a form from [address] [place] [address] to Participate in Emp and Training Activity. I check with NP GARRETT Mayen and she said we do it and there is no charge. The form is in the NP file to complete. Call patient for pcik up when ready.

## 2020-01-15 ENCOUNTER — DOCUMENTATION ONLY (OUTPATIENT)
Dept: ORTHOPEDIC SURGERY | Age: 35
End: 2020-01-15

## 2020-01-31 RX ORDER — PANTOPRAZOLE SODIUM 40 MG/1
TABLET, DELAYED RELEASE ORAL
Qty: 60 TAB | Refills: 2 | Status: SHIPPED | OUTPATIENT
Start: 2020-01-31 | End: 2020-06-02

## 2020-03-22 ENCOUNTER — HOSPITAL ENCOUNTER (EMERGENCY)
Age: 35
Discharge: STILL ADMITTED OR ADMINISTRATIVE | End: 2020-03-22
Attending: EMERGENCY MEDICINE
Payer: MEDICAID

## 2020-03-22 PROCEDURE — 75810000275 HC EMERGENCY DEPT VISIT NO LEVEL OF CARE

## 2020-03-23 ENCOUNTER — HOSPITAL ENCOUNTER (OUTPATIENT)
Dept: GENERAL RADIOLOGY | Age: 35
Discharge: HOME | End: 2020-03-23
Attending: SPECIALIST
Payer: MEDICAID

## 2020-03-23 ENCOUNTER — OFFICE VISIT (OUTPATIENT)
Dept: SURGERY | Age: 35
End: 2020-03-23

## 2020-03-23 VITALS
OXYGEN SATURATION: 100 % | HEIGHT: 63 IN | TEMPERATURE: 97.6 F | SYSTOLIC BLOOD PRESSURE: 133 MMHG | BODY MASS INDEX: 30.53 KG/M2 | HEART RATE: 72 BPM | WEIGHT: 172.31 LBS | DIASTOLIC BLOOD PRESSURE: 85 MMHG

## 2020-03-23 DIAGNOSIS — Z98.84 STATUS POST BARIATRIC SURGERY: ICD-10-CM

## 2020-03-23 DIAGNOSIS — Z98.84 S/P GASTRIC BYPASS: ICD-10-CM

## 2020-03-23 DIAGNOSIS — R10.12 LEFT UPPER QUADRANT PAIN: Primary | ICD-10-CM

## 2020-03-23 DIAGNOSIS — K90.9 INTESTINAL MALABSORPTION, UNSPECIFIED TYPE: ICD-10-CM

## 2020-03-23 DIAGNOSIS — K46.9 HERNIA OF ABDOMINAL CAVITY: ICD-10-CM

## 2020-03-23 DIAGNOSIS — R10.12 LEFT UPPER QUADRANT PAIN: ICD-10-CM

## 2020-03-23 DIAGNOSIS — K25.0 ACUTE GASTRIC ULCER WITH HEMORRHAGE: ICD-10-CM

## 2020-03-23 DIAGNOSIS — M43.16 SPONDYLOLISTHESIS OF LUMBAR REGION: ICD-10-CM

## 2020-03-23 PROBLEM — K56.609 BOWEL OBSTRUCTION (HCC): Status: RESOLVED | Noted: 2019-02-02 | Resolved: 2020-03-23

## 2020-03-23 PROCEDURE — 74018 RADEX ABDOMEN 1 VIEW: CPT

## 2020-03-23 NOTE — PROGRESS NOTES
Ashu Smart presents today for   Chief Complaint   Patient presents with    Follow-up     Pt is here today because she stated that she is having upper abd pain for about 3 days now       Is someone accompanying this pt? no    Is the patient using any DME equipment during OV? no    Depression Screening:  3 most recent PHQ Screens 3/20/2019   Little interest or pleasure in doing things Not at all   Feeling down, depressed, irritable, or hopeless Not at all   Total Score PHQ 2 0       Learning Assessment:  Learning Assessment 3/20/2019   PRIMARY LEARNER Patient   HIGHEST LEVEL OF EDUCATION - PRIMARY LEARNER  GRADUATED HIGH SCHOOL OR GED   BARRIERS PRIMARY LEARNER NONE   CO-LEARNER CAREGIVER No   BARRIERS CO-LEARNER NONE   PRIMARY LANGUAGE ENGLISH    NEED No   LEARNER PREFERENCE PRIMARY DEMONSTRATION   LEARNING SPECIAL TOPICS no   ANSWERED BY patient   RELATIONSHIP SELF       Abuse Screening:  Abuse Screening Questionnaire 3/20/2019   Do you ever feel afraid of your partner? N   Are you in a relationship with someone who physically or mentally threatens you? N   Is it safe for you to go home? Y       Fall Risk  No flowsheet data found. Coordination of Care:  1. Have you been to the ER, urgent care clinic since your last visit? Hospitalized since your last visit? no    2. Have you seen or consulted any other health care providers outside of the [address] [place] [address] since your last visit? Include any pap smears or colon screening.  no

## 2020-03-23 NOTE — PROGRESS NOTES
Surgery Consultation    History of Present Illness:    Mariah Sales is a prior laparoscopic gastric bypass surgery   patient who underwent their weight loss surgical procedure procedure approximately 2.5 years ago. They now present with a history of abdominal pain. Onset was 3 days ago. The pain is described as burning and is 4/10 in intensity. Starts in LUQ,does not radiate t  o Non-radiating. Symptoms have been the same since onset. The patient also gives a history of nausea. The patient   denies obstipation and constipation. The patient did not go to the emergency room prior to this visit with me for this issue. Consultation was requested for   assistance with evaluation of their symptoms. The patient's history is significant for the fact that she had a conversion from a sleeve gastrectomy to gastric bypass procedure now 2-1/2 years ago. Postoperatively she had an internal hernia that was treated by Dr. Carlos Sousa. About 6 months after that treatment she then underwent a reexploration by myself which required an open operation for a recurrent internal hernia. Last year in March she presented with abdominal pain and underwent a CT scan of the abdomen which was completely normal which showed normal trajectory of her intra-abdominal visceral vasculature. Her above symptoms have been for 3 days. She has had no constipation or obstipation and is passing flatus on a normal basis and actually had a normal bowel movement this morning. She denies any vomiting and has only had some mild nausea.   She did call me through the answering service last night and she presents today for evaluation of her symptoms    Past Medical History:   Diagnosis Date    Acid reflux     Incisional hernia     Intestinal malabsorption     Morbid obesity with body mass index of 40.0-49.9 ([facility])     Status post bariatric surgery 7/2012    sleeve resection / kirstin Harrison     Past Surgical History:   Procedure Laterality Date    HX  SECTION      X 3    HX GI  2012    Sleeve gastrectomy- Dr Santos Shah HX GI      Conversion to gastric bypass-2017    HX GI      Internal hernia ( dr Myrna Gaming) -2018    HX GI      Internal hernia - 2019    HX ORTHOPAEDIC      scoliosis correction surgery X 2    HX TONSIL AND ADENOIDECTOMY      HX TUBAL LIGATION      LAP GASTRIC BYPASS/TAMICA-EN-Y      revision from sleeve on 2017      History reviewed. No pertinent family history. Social History     Socioeconomic History    Marital status: SINGLE     Spouse name: Not on file    Number of children: Not on file    Years of education: Not on file    Highest education level: Not on file   Tobacco Use    Smoking status: Never Smoker    Smokeless tobacco: Never Used   Substance and Sexual Activity    Alcohol use: No     Alcohol/week: 0.0 standard drinks    Drug use: No    Sexual activity: Yes     Partners: Male      Current Outpatient Medications   Medication Sig    pantoprazole (PROTONIX) 40 mg tablet TAKE 1 TABLET BY MOUTH TWO TIMES A DAY FOR 30 DAYS. INDICATIONS: GASTRIC ULCER    gabapentin (NEURONTIN) 600 mg tablet Take 1 Tab by mouth three (3) times daily. Max Daily Amount: 1,800 mg.  b complex vitamins tablet Take 1 Tab by mouth daily.  Biotin 2,500 mcg cap Take  by mouth.  cyanocobalamin (VITAMIN B-12) 500 mcg tablet Take 500 mcg by mouth daily.  calcium citrate 200 mg (950 mg) tablet Take  by mouth daily.  multivitamin with iron (FLINTSTONES) chewable tablet Take 1 Tab by mouth two (2) times a day. No current facility-administered medications for this visit.        Allergies   Allergen Reactions    Nsaids (Non-Steroidal Anti-Inflammatory Drug) Other (comments)     GI Ulcer- Hx of Gastric Bypass    Buprenorphine Hcl Hives and Other (comments)       Review of Symptoms:     General - No history or complaints of unexpected fever, chills, or weight loss  Head/Neck - No history or complaints of headache, diplopia, dysphagia, hearing loss  Cardiac - No history or complaints of chest pain, palpitations, murmur, or shortness of breath  Pulmonary - No history or complaints of shortness of breath, productive cough, hemoptysis  Gastrointestinal - As per the HPI above.   Genitourinary - No history or complaints of hematuria/dysuria, stress urinary incontinence symptoms, or renal lithiasis  Musculoskeletal - No history or complaints of joint pain or muscular weakness  Hematologic - No history or complaints of bleeding disorders, blood transfusions, sickle cell anemia  Neurologic - No history or complaints of  migraine headaches, seizure activity, syncopal episodes, TIA or stroke  Integumentary - No history or complaints of rashes, abnormal nevi, skin cancer  Gynecological - No history of heavy menses/abnormal menses        Physical Exam:     Physical Examination: General appearance - alert, well appearing, and in no distress and oriented to person, place, and time  Mental status - alert, oriented to person, place, and time, normal mood, behavior, speech, dress, motor activity, and thought processes  Eyes - pupils equal and reactive, extraocular eye movements intact, sclera anicteric, left eye normal, right eye normal  Ears - right ear normal, left ear normal  Nose - normal and patent, no erythema, discharge or polyps  Mouth - mucous membranes moist, pharynx normal without lesions  Neck - supple, no significant adenopathy  Lymphatics - no palpable lymphadenopathy, no hepatosplenomegaly  Chest - clear to auscultation, no wheezes, rales or rhonchi, symmetric air entry  Heart - normal rate, regular rhythm, normal S1, S2, no murmurs, rubs, clicks or gallops  Abdomen - soft, nontender, nondistended, no masses or organomegaly  Back exam - full range of motion, no tenderness, palpable spasm or pain on motion  Neurological - alert, oriented, normal speech, no focal findings or movement disorder noted  Musculoskeletal - no joint tenderness, deformity or swelling  Extremities - peripheral pulses normal, no pedal edema, no clubbing or cyanosis  Skin - normal coloration and turgor, no rashes, no suspicious skin lesions noted         Laboratory / X-Rays:      Lab Results   Component Value Date/Time    WBC 5.0 04/02/2019 11:20 AM    HGB 11.9 (L) 04/02/2019 11:20 AM    HCT 37.4 04/02/2019 11:20 AM    PLATELET 693 80/46/2049 11:20 AM    MCV 94.4 04/02/2019 11:20 AM     Lab Results   Component Value Date/Time    Sodium 139 04/02/2019 11:20 AM    Potassium 4.0 04/02/2019 11:20 AM    Chloride 107 04/02/2019 11:20 AM    CO2 27 04/02/2019 11:20 AM    Anion gap 5 04/02/2019 11:20 AM    Glucose 82 04/02/2019 11:20 AM    BUN 12 04/02/2019 11:20 AM    Creatinine 0.62 04/02/2019 11:20 AM    BUN/Creatinine ratio 19 04/02/2019 11:20 AM    GFR est AA >60 04/02/2019 11:20 AM    GFR est non-AA >60 04/02/2019 11:20 AM    Calcium 9.0 04/02/2019 11:20 AM    Bilirubin, total 0.4 04/02/2019 11:20 AM    AST (SGOT) 24 04/02/2019 11:20 AM    Alk. phosphatase 123 (H) 04/02/2019 11:20 AM    Protein, total 7.6 04/02/2019 11:20 AM    Albumin 3.5 04/02/2019 11:20 AM    Globulin 4.1 (H) 04/02/2019 11:20 AM    A-G Ratio 0.9 04/02/2019 11:20 AM    ALT (SGPT) 32 04/02/2019 11:20 AM     Lab Results   Component Value Date/Time    Iron 147 06/29/2016 12:00 AM    Ferritin 18 06/29/2016 12:00 AM     Lab Results   Component Value Date/Time    Folate 15.5 06/29/2016 12:00 AM     Lab Results   Component Value Date/Time    VITAMIN D, 25-HYDROXY 29.5 (L) 06/29/2016 12:00 AM           All labs and x-rays reviewed from their ER visit and are included below:      Assessment:     Mariah Sales is a prior laparoscopic gastric bypass surgery   patient who underwent their weight loss surgical procedure procedure   approximately 2.5 years ago. They now have abdominal pain that is persistent in nature.   At this juncture it would be very unusual for her to have a recurrent internal hernia as she is now had a laparotomy for the second hernia. Her clinical picture and examination do not suggest such either. It is certainly very interesting noting that the patient has scoliosis present and this would be consistent with the finding of a very limited intra-abdominal space to work within at her initial operation and subsequent procedures. This likely has a significant causative factor in her original internal hernia and her subsequent recurrence. Recommendations: The diagnosis was discussed with the patient and evaluation and treatment plans outlined. See orders for lab and imaging studies.        Signed By: Nasreen Alas MD     March 23, 2020         Total time spent with the patient face-to-face, reviewing her significant prior history, and follow-up on her x-rays and lab work was in excess of 60 minutes

## 2020-03-23 NOTE — PATIENT INSTRUCTIONS
Body Mass Index: Care Instructions  Your Care Instructions    Body mass index (BMI) can help you see if your weight is raising your risk for health problems. It uses a formula to compare how much you weigh with how tall you are. · A BMI lower than 18.5 is considered underweight. · A BMI between 18.5 and 24.9 is considered healthy. · A BMI between 25 and 29.9 is considered overweight. A BMI of 30 or higher is considered obese. If your BMI is in the normal range, it means that you have a lower risk for weight-related health problems. If your BMI is in the overweight or obese range, you may be at increased risk for weight-related health problems, such as high blood pressure, heart disease, stroke, arthritis or joint pain, and diabetes. If your BMI is in the underweight range, you may be at increased risk for health problems such as fatigue, lower protection (immunity) against illness, muscle loss, bone loss, hair loss, and hormone problems. BMI is just one measure of your risk for weight-related health problems. You may be at higher risk for health problems if you are not active, you eat an unhealthy diet, or you drink too much alcohol or use tobacco products. Follow-up care is a key part of your treatment and safety. Be sure to make and go to all appointments, and call your doctor if you are having problems. It's also a good idea to know your test results and keep a list of the medicines you take. How can you care for yourself at home? · Practice healthy eating habits. This includes eating plenty of fruits, vegetables, whole grains, lean protein, and low-fat dairy. · If your doctor recommends it, get more exercise. Walking is a good choice. Bit by bit, increase the amount you walk every day. Try for at least 30 minutes on most days of the week. · Do not smoke. Smoking can increase your risk for health problems. If you need help quitting, talk to your doctor about stop-smoking programs and medicines. These can increase your chances of quitting for good. · Limit alcohol to 2 drinks a day for men and 1 drink a day for women. Too much alcohol can cause health problems. If you have a BMI higher than 25  · Your doctor may do other tests to check your risk for weight-related health problems. This may include measuring the distance around your waist. A waist measurement of more than 40 inches in men or 35 inches in women can increase the risk of weight-related health problems. · Talk with your doctor about steps you can take to stay healthy or improve your health. You may need to make lifestyle changes to lose weight and stay healthy, such as changing your diet and getting regular exercise. If you have a BMI lower than 18.5  · Your doctor may do other tests to check your risk for health problems. · Talk with your doctor about steps you can take to stay healthy or improve your health. You may need to make lifestyle changes to gain or maintain weight and stay healthy, such as getting more healthy foods in your diet and doing exercises to build muscle. Where can you learn more? Go to http://wilbert-loraine.info/  Enter S176 in the search box to learn more about \"Body Mass Index: Care Instructions. \"  Current as of: December 10, 2019Content Version: 12.4  © 1813-0108 Healthwise, Incorporated. Care instructions adapted under license by [employer] (which disclaims liability or warranty for this information). If you have questions about a medical condition or this instruction, always ask your healthcare professional. [address] any warranty or liability for your use of this information.

## 2020-05-04 ENCOUNTER — TELEPHONE (OUTPATIENT)
Dept: ORTHOPEDIC SURGERY | Age: 35
End: 2020-05-04

## 2020-05-05 ENCOUNTER — VIRTUAL VISIT (OUTPATIENT)
Dept: ORTHOPEDIC SURGERY | Age: 35
End: 2020-05-05

## 2020-05-05 DIAGNOSIS — M43.16 SPONDYLOLISTHESIS OF LUMBAR REGION: Primary | ICD-10-CM

## 2020-05-05 DIAGNOSIS — M54.16 LUMBAR RADICULOPATHY: ICD-10-CM

## 2020-05-05 NOTE — PATIENT INSTRUCTIONS
Back Pain: Care Instructions Your Care Instructions Back pain has many possible causes. It is often related to problems with muscles and ligaments of the back. It may also be related to problems with the nerves, discs, or bones of the back. Moving, lifting, standing, sitting, or sleeping in an awkward way can strain the back. Sometimes you don't notice the injury until later. Arthritis is another common cause of back pain. Although it may hurt a lot, back pain usually improves on its own within several weeks. Most people recover in 12 weeks or less. Using good home treatment and being careful not to stress your back can help you feel better sooner. Follow-up care is a key part of your treatment and safety. Be sure to make and go to all appointments, and call your doctor if you are having problems. It's also a good idea to know your test results and keep a list of the medicines you take. How can you care for yourself at home? · Sit or lie in positions that are most comfortable and reduce your pain. Try one of these positions when you lie down: ? Lie on your back with your knees bent and supported by large pillows. ? Lie on the floor with your legs on the seat of a sofa or chair. ? Lie on your side with your knees and hips bent and a pillow between your legs. ? Lie on your stomach if it does not make pain worse. · Do not sit up in bed, and avoid soft couches and twisted positions. Bed rest can help relieve pain at first, but it delays healing. Avoid bed rest after the first day of back pain. · Change positions every 30 minutes. If you must sit for long periods of time, take breaks from sitting. Get up and walk around, or lie in a comfortable position. · Try using a heating pad on a low or medium setting for 15 to 20 minutes every 2 or 3 hours. Try a warm shower in place of one session with the heating pad. · You can also try an ice pack for 10 to 15 minutes every 2 to 3 hours. Put a thin cloth between the ice pack and your skin. · Take pain medicines exactly as directed. ? If the doctor gave you a prescription medicine for pain, take it as prescribed. ? If you are not taking a prescription pain medicine, ask your doctor if you can take an over-the-counter medicine. · Take short walks several times a day. You can start with 5 to 10 minutes, 3 or 4 times a day, and work up to longer walks. Walk on level surfaces and avoid hills and stairs until your back is better. · Return to work and other activities as soon as you can. Continued rest without activity is usually not good for your back. · To prevent future back pain, do exercises to stretch and strengthen your back and stomach. Learn how to use good posture, safe lifting techniques, and proper body mechanics. When should you call for help? Call your doctor now or seek immediate medical care if: 
  · You have new or worsening numbness in your legs.  
  · You have new or worsening weakness in your legs. (This could make it hard to stand up.)  
  · You lose control of your bladder or bowels.  
 Watch closely for changes in your health, and be sure to contact your doctor if: 
  · You have a fever, lose weight, or don't feel well.  
  · You do not get better as expected. Where can you learn more? Go to http://wilbert-loraine.info/ Enter T174 in the search box to learn more about \"Back Pain: Care Instructions. \" Current as of: June 26, 2019Content Version: 12.4 © 8857-3751 Healthwise, Incorporated. Care instructions adapted under license by [employer] (which disclaims liability or warranty for this information). If you have questions about a medical condition or this instruction, always ask your healthcare professional. [place] [address] any warranty or liability for your use of this information.

## 2020-05-05 NOTE — PROGRESS NOTES
Marleen Jennings is a 27 y.o. female who was seen by synchronous (real-time) audio-video technology on 5/5/2020. She has a history of severe back and leg pain. She was seen in December with Dr. Eunice Mcnamara. Per this note, \"Her MRI, CT scan, and standing x-rays demonstrate a previous thoracolumbar fusion for scoliosis utilizing hooks, rods, and wires down to, I believe, L4. There is a possibility that L3-4 is just a very stable pseudarthrosis. It has a full height disc, and I am not certain this is a confluent fusion posteriorly. The patient has this spondylolisthesis, combination degenerative possibly with a pars fracture in addition causing a grade two mobile spondylolisthesis at L4-5. L5-S1 has degenerative changes. The recommendation for such pathology, I believe, would require a circumferential approach. \" her also offered her a referral to [facility]. She was to remain OOW until her next office visit. Today, she states she recently went through a break up and needs to work for finical reasons. she continues to have the same back issues. She states going back to work will be less demanding then before. 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 she had an apt with [facility] due to COVID. Denies bladder/bowel dysfunction, saddle paresthesia, weakness, gait disturbance, or other neurological deficit. Pt at this time desires to continue with current care/proceed with medication evaluation. ASSESSMENT  27 y.o. female with back pain. Diagnoses and all orders for this visit:    1. Spondylolisthesis of lumbar region    2. Lumbar radiculopathy           IMPRESSION/PLAN    1) Pt was given information on back pain. 2) updated RTW letter, no restrictions. 3) Ms. Isabelle Koehler has a reminder for a \"due or due soon\" health maintenance. I have asked that she contact her primary care provider, Christina Lindquist MD, for follow-up on this health maintenance.   4) We have informed patient to notify us for immediate appointment if he has any worsening neurogical symptoms or if an emergency situation presents, then call 911  5) Pt will follow-up in PRN. Risks and benefits of ongoing  therapy have been reviewed with the patient.  is appropriate. . No pain behaviors. Denies thoughts of harming self or others. Pt has a good risk to benefit ratio which allows the pt to function in a home environment without side effects. Assessment & Plan:   Diagnoses and all orders for this visit:    1. Spondylolisthesis of lumbar region    2. Lumbar radiculopathy                  Subjective:   Karson Howe was seen for No chief complaint on file. PAST MEDICAL HISTORY  Past Medical History:   Diagnosis Date    Acid reflux     Incisional hernia     Intestinal malabsorption     Morbid obesity with body mass index of 40.0-49.9 (formerly [facility])     Status post bariatric surgery 7/2012    sleeve resection / kirstin borja        MEDICATIONS  Current Outpatient Medications   Medication Sig Dispense Refill    pantoprazole (PROTONIX) 40 mg tablet TAKE 1 TABLET BY MOUTH TWO TIMES A DAY FOR 30 DAYS. INDICATIONS: GASTRIC ULCER 60 Tab 2    gabapentin (NEURONTIN) 600 mg tablet Take 1 Tab by mouth three (3) times daily. Max Daily Amount: 1,800 mg. 90 Tab 2    b complex vitamins tablet Take 1 Tab by mouth daily.  Biotin 2,500 mcg cap Take  by mouth.  cyanocobalamin (VITAMIN B-12) 500 mcg tablet Take 500 mcg by mouth daily.  calcium citrate 200 mg (950 mg) tablet Take  by mouth daily.  multivitamin with iron (FLINTSTONES) chewable tablet Take 1 Tab by mouth two (2) times a day.          ALLERGIES  Allergies   Allergen Reactions    Nsaids (Non-Steroidal Anti-Inflammatory Drug) Other (comments)     GI Ulcer- Hx of Gastric Bypass    Buprenorphine Hcl Hives and Other (comments)       SOCIAL HISTORY    Social History     Socioeconomic History    Marital status: SINGLE     Spouse name: Not on file    Number of children: Not on file    Years of education: Not on file    Highest education level: Not on file   Occupational History    Not on file   Social Needs    Financial resource strain: Not on file    Food insecurity     Worry: Not on file     Inability: Not on file    Transportation needs     Medical: Not on file     Non-medical: Not on file   Tobacco Use    Smoking status: Never Smoker    Smokeless tobacco: Never Used   Substance and Sexual Activity    Alcohol use: No     Alcohol/week: 0.0 standard drinks    Drug use: No    Sexual activity: Yes     Partners: Male   Lifestyle    Physical activity     Days per week: Not on file     Minutes per session: Not on file    Stress: Not on file   Relationships    Social connections     Talks on phone: Not on file     Gets together: Not on file     Attends [employer] service: Not on file     Active member of club or organization: Not on file     Attends meetings of clubs or organizations: Not on file     Relationship status: Not on file    Intimate partner violence     Fear of current or ex partner: Not on file     Emotionally abused: Not on file     Physically abused: Not on file     Forced sexual activity: Not on file   Other Topics Concern    Not on file   Social History Narrative    Not on file       Pain Scale: /10    Pain Assessment  3/23/2020   Location of Pain (No Data)   Pain Location Comment lower abd   Location Modifiers Left   Severity of Pain 7   Quality of Pain Locking;Aching; Sharp   Quality of Pain Comment -   Duration of Pain Persistent   Frequency of Pain Constant   Date Pain First Started 3/20/2020   Aggravating Factors Bending;Stretching   Aggravating Factors Comment -   Limiting Behavior Yes   Relieving Factors Rest   Result of Injury -         ROS      Objective:       General: alert, cooperative, no distress, appears stated age  Constitutional:  Well developed, well nourished, in no acute distress. Psychiatric: Affect and mood are appropriate.    Integumentary: No rashes or abrasions noted on exposed areas. Due to this being a TeleHealth evaluation, many elements of the physical examination are unable to be assessed. Non-antalgicgait. We discussed the expected course, resolution and complications of the diagnosis(es) in detail. Medication risks, benefits, costs, interactions, and alternatives were discussed as indicated. I advised her to contact the office if her condition worsens, changes or fails to improve as anticipated. She expressed understanding with the diagnosis(es) and plan. Pursuant to the emergency declaration under the [facility], [facility] waiver authority and the [employer] Act, this Virtual  Visit was conducted, with patient's consent, to reduce the patient's risk of exposure to COVID-19 and provide continuity of care for an established patient. Services were provided through real time synchronous discussion virtually to substitute for in-person clinic visit. Patient verbally consented to this type of visit and that they might get a bill from the billing of this visit. This service was provided through [facility] ,  the patient was located at [facility] and  the provider was located at home. There was only the patient participating in the service. Start time 1140  End [id number].      Donald Mathews NP

## 2020-05-05 NOTE — LETTER
NOTIFICATION OF RETURN TO WORK / SCHOOL 
 
5/5/2020 11:52 AM 
 
Ms. Kelsy Lam [address] [facility] [address] [place] To Whom It May Concern: 
 
Kelsy Lam was under the care of [address]. She will be able to return to work immediatly with no restrictions. If there are questions or concerns please have the patient contact our office. Sincerely, Melody Garcia NP

## 2020-05-22 DIAGNOSIS — K90.9 INTESTINAL MALABSORPTION, UNSPECIFIED TYPE: ICD-10-CM

## 2020-06-02 RX ORDER — PANTOPRAZOLE SODIUM 40 MG/1
TABLET, DELAYED RELEASE ORAL
Qty: 60 TAB | Refills: 2 | Status: SHIPPED | OUTPATIENT
Start: 2020-06-02 | End: 2021-05-23

## 2020-06-23 DIAGNOSIS — K90.9 INTESTINAL MALABSORPTION, UNSPECIFIED TYPE: ICD-10-CM

## 2020-08-06 ENCOUNTER — HOSPITAL ENCOUNTER (EMERGENCY)
Age: 35
Discharge: HOME | End: 2020-08-06
Attending: EMERGENCY MEDICINE
Payer: MEDICAID

## 2020-08-06 ENCOUNTER — APPOINTMENT (OUTPATIENT)
Dept: CT IMAGING | Age: 35
End: 2020-08-06
Attending: EMERGENCY MEDICINE
Payer: MEDICAID

## 2020-08-06 VITALS
OXYGEN SATURATION: 100 % | TEMPERATURE: 98.7 F | WEIGHT: 175 LBS | SYSTOLIC BLOOD PRESSURE: 131 MMHG | HEART RATE: 78 BPM | DIASTOLIC BLOOD PRESSURE: 89 MMHG | BODY MASS INDEX: 31.01 KG/M2 | HEIGHT: 63 IN | RESPIRATION RATE: 16 BRPM

## 2020-08-06 DIAGNOSIS — K59.00 CONSTIPATION, UNSPECIFIED CONSTIPATION TYPE: Primary | ICD-10-CM

## 2020-08-06 DIAGNOSIS — R10.84 ABDOMINAL PAIN, GENERALIZED: ICD-10-CM

## 2020-08-06 LAB
ALBUMIN SERPL-MCNC: 3.4 G/DL (ref 3.4–5)
ALBUMIN/GLOB SERPL: 0.9 {RATIO} (ref 0.8–1.7)
ALP SERPL-CCNC: 106 U/L (ref 45–117)
ALT SERPL-CCNC: 28 U/L (ref 13–56)
ANION GAP SERPL CALC-SCNC: 2 MMOL/L (ref 3–18)
APPEARANCE UR: CLEAR
AST SERPL-CCNC: 20 U/L (ref 10–38)
BASOPHILS # BLD: 0 K/UL (ref 0–0.1)
BASOPHILS NFR BLD: 0 % (ref 0–2)
BILIRUB SERPL-MCNC: 0.2 MG/DL (ref 0.2–1)
BILIRUB UR QL: NEGATIVE
BUN SERPL-MCNC: 13 MG/DL (ref 7–18)
BUN/CREAT SERPL: 24 (ref 12–20)
CALCIUM SERPL-MCNC: 8.6 MG/DL (ref 8.5–10.1)
CHLORIDE SERPL-SCNC: 109 MMOL/L (ref 100–111)
CO2 SERPL-SCNC: 30 MMOL/L (ref 21–32)
COLOR UR: YELLOW
CREAT SERPL-MCNC: 0.55 MG/DL (ref 0.6–1.3)
DIFFERENTIAL METHOD BLD: ABNORMAL
EOSINOPHIL # BLD: 0.1 K/UL (ref 0–0.4)
EOSINOPHIL NFR BLD: 2 % (ref 0–5)
ERYTHROCYTE [DISTWIDTH] IN BLOOD BY AUTOMATED COUNT: 13.1 % (ref 11.6–14.5)
GLOBULIN SER CALC-MCNC: 3.9 G/DL (ref 2–4)
GLUCOSE SERPL-MCNC: 81 MG/DL (ref 74–99)
GLUCOSE UR STRIP.AUTO-MCNC: NEGATIVE MG/DL
HCG UR QL: NEGATIVE
HCT VFR BLD AUTO: 38.3 % (ref 35–45)
HGB BLD-MCNC: 12.3 G/DL (ref 12–16)
HGB UR QL STRIP: NEGATIVE
KETONES UR QL STRIP.AUTO: NEGATIVE MG/DL
LEUKOCYTE ESTERASE UR QL STRIP.AUTO: NEGATIVE
LIPASE SERPL-CCNC: 82 U/L (ref 73–393)
LYMPHOCYTES # BLD: 2.2 K/UL (ref 0.9–3.6)
LYMPHOCYTES NFR BLD: 42 % (ref 21–52)
MAGNESIUM SERPL-MCNC: 2.4 MG/DL (ref 1.6–2.6)
MCH RBC QN AUTO: 31.4 PG (ref 24–34)
MCHC RBC AUTO-ENTMCNC: 32.1 G/DL (ref 31–37)
MCV RBC AUTO: 97.7 FL (ref 74–97)
MONOCYTES # BLD: 0.6 K/UL (ref 0.05–1.2)
MONOCYTES NFR BLD: 11 % (ref 3–10)
NEUTS SEG # BLD: 2.3 K/UL (ref 1.8–8)
NEUTS SEG NFR BLD: 45 % (ref 40–73)
NITRITE UR QL STRIP.AUTO: NEGATIVE
PH UR STRIP: 7 [PH] (ref 5–8)
PLATELET # BLD AUTO: 234 K/UL (ref 135–420)
PMV BLD AUTO: 11.4 FL (ref 9.2–11.8)
POTASSIUM SERPL-SCNC: 4.1 MMOL/L (ref 3.5–5.5)
PROT SERPL-MCNC: 7.3 G/DL (ref 6.4–8.2)
PROT UR STRIP-MCNC: NEGATIVE MG/DL
RBC # BLD AUTO: 3.92 M/UL (ref 4.2–5.3)
SODIUM SERPL-SCNC: 141 MMOL/L (ref 136–145)
SP GR UR REFRACTOMETRY: 1.02 (ref 1–1.03)
UROBILINOGEN UR QL STRIP.AUTO: 1 EU/DL (ref 0.2–1)
WBC # BLD AUTO: 5.1 K/UL (ref 4.6–13.2)

## 2020-08-06 PROCEDURE — 74011636320 HC RX REV CODE- 636/320: Performed by: EMERGENCY MEDICINE

## 2020-08-06 PROCEDURE — 74011250636 HC RX REV CODE- 250/636: Performed by: EMERGENCY MEDICINE

## 2020-08-06 PROCEDURE — 85025 COMPLETE CBC W/AUTO DIFF WBC: CPT

## 2020-08-06 PROCEDURE — 99285 EMERGENCY DEPT VISIT HI MDM: CPT

## 2020-08-06 PROCEDURE — 96375 TX/PRO/DX INJ NEW DRUG ADDON: CPT

## 2020-08-06 PROCEDURE — 81003 URINALYSIS AUTO W/O SCOPE: CPT

## 2020-08-06 PROCEDURE — 96374 THER/PROPH/DIAG INJ IV PUSH: CPT

## 2020-08-06 PROCEDURE — 80053 COMPREHEN METABOLIC PANEL: CPT

## 2020-08-06 PROCEDURE — 81025 URINE PREGNANCY TEST: CPT

## 2020-08-06 PROCEDURE — 74177 CT ABD & PELVIS W/CONTRAST: CPT

## 2020-08-06 PROCEDURE — 83735 ASSAY OF MAGNESIUM: CPT

## 2020-08-06 PROCEDURE — 74011000258 HC RX REV CODE- 258: Performed by: EMERGENCY MEDICINE

## 2020-08-06 PROCEDURE — 83690 ASSAY OF LIPASE: CPT

## 2020-08-06 RX ORDER — FAMOTIDINE 10 MG/ML
20 INJECTION INTRAVENOUS
Status: COMPLETED | OUTPATIENT
Start: 2020-08-06 | End: 2020-08-06

## 2020-08-06 RX ORDER — FENTANYL CITRATE 50 UG/ML
50 INJECTION, SOLUTION INTRAMUSCULAR; INTRAVENOUS
Status: COMPLETED | OUTPATIENT
Start: 2020-08-06 | End: 2020-08-06

## 2020-08-06 RX ORDER — POLYETHYLENE GLYCOL 3350 17 G/17G
17 POWDER, FOR SOLUTION ORAL DAILY
Qty: 550 G | Refills: 0 | Status: SHIPPED | OUTPATIENT
Start: 2020-08-06 | End: 2021-02-05

## 2020-08-06 RX ORDER — DICYCLOMINE HYDROCHLORIDE 10 MG/1
10 CAPSULE ORAL 4 TIMES DAILY
Qty: 20 CAP | Refills: 0 | Status: SHIPPED | OUTPATIENT
Start: 2020-08-06 | End: 2020-08-11

## 2020-08-06 RX ORDER — SUCRALFATE 1 G/10ML
1 SUSPENSION ORAL 4 TIMES DAILY
Qty: 414 ML | Refills: 0 | Status: SHIPPED | OUTPATIENT
Start: 2020-08-06 | End: 2021-02-05

## 2020-08-06 RX ADMIN — FENTANYL CITRATE 50 MCG: 50 INJECTION INTRAMUSCULAR; INTRAVENOUS at 21:54

## 2020-08-06 RX ADMIN — SODIUM CHLORIDE 1000 ML: 900 INJECTION, SOLUTION INTRAVENOUS at 20:49

## 2020-08-06 RX ADMIN — FAMOTIDINE 20 MG: 10 INJECTION, SOLUTION INTRAVENOUS at 20:49

## 2020-08-06 RX ADMIN — IOPAMIDOL 100 ML: 612 INJECTION, SOLUTION INTRAVENOUS at 21:37

## 2020-08-06 RX ADMIN — IOHEXOL: 240 INJECTION, SOLUTION INTRATHECAL; INTRAVASCULAR; INTRAVENOUS; ORAL at 20:52

## 2020-08-06 RX ADMIN — LIDOCAINE HYDROCHLORIDE 80 MG: 20 INJECTION INTRAVENOUS at 20:52

## 2020-08-07 NOTE — ED PROVIDER NOTES
EMERGENCY DEPARTMENT HISTORY AND PHYSICAL EXAM    Date: 8/6/2020  Patient Name: Martín Washington    History of Presenting Illness     Chief Complaint   Patient presents with    Abdominal Pain         History Provided By: Patient    Additional History (Context):   Martín Washington is a 27 y.o. female with PMHX status post gastric bypass with history of multiple intracranial hernias presents to the emergency department C/O upper quadrant and epigastric abdominal pain x2 days. Patient reports that the pain is sharp and throbbing. Reports it was initially coming and going however is now constant. Does not get better or worse with eating. States that she still has appendix and gallbladder. Denies any alcohol use. Pt denies nausea, vomiting, diarrhea, and any other sxs or complaints. Was called by Dr. Yenny العلي, on-call for Dr. Parvez Michael and informed that patient was on her way in. PCP: Sandy Nicholson MD    Current Outpatient Medications   Medication Sig Dispense Refill    polyethylene glycol (Miralax) 17 gram/dose powder Take 17 g by mouth daily. 1 tablespoon with 8 oz of water daily 550 g 0    dicyclomine (BentyL) 10 mg capsule Take 1 Cap by mouth four (4) times daily for 5 days. 20 Cap 0    sucralfate (Carafate) 100 mg/mL suspension Take 5 mL by mouth four (4) times daily. 414 mL 0    pantoprazole (PROTONIX) 40 mg tablet TAKE 1 TABLET BY MOUTH TWO TIMES A DAY FOR 30 DAYS. INDICATIONS: GASTRIC ULCER 60 Tab 2    gabapentin (NEURONTIN) 600 mg tablet Take 1 Tab by mouth three (3) times daily. Max Daily Amount: 1,800 mg. 90 Tab 2    b complex vitamins tablet Take 1 Tab by mouth daily.  Biotin 2,500 mcg cap Take  by mouth.  cyanocobalamin (VITAMIN B-12) 500 mcg tablet Take 500 mcg by mouth daily.  calcium citrate 200 mg (950 mg) tablet Take  by mouth daily.  multivitamin with iron (FLINTSTONES) chewable tablet Take 1 Tab by mouth two (2) times a day.          Past History     Past Medical History:  Past Medical History:   Diagnosis Date    Acid reflux     Incisional hernia     Intestinal malabsorption     Morbid obesity with body mass index of 40.0-49.9 ([facility])     Status post bariatric surgery 2012    sleeve resection / kirstin Miller       Past Surgical History:  Past Surgical History:   Procedure Laterality Date    HX  SECTION      X 3    HX GI      Sleeve gastrectomy- Dr Ciaran Egan HX GI      Conversion to gastric bypass-2017    HX GI      Internal hernia ( dr Santa Boast) -2018    HX GI      Internal hernia - 2019    HX ORTHOPAEDIC      scoliosis correction surgery X 2    HX TONSIL AND ADENOIDECTOMY      HX TUBAL LIGATION      LAP GASTRIC BYPASS/TAMICA-EN-Y      revision from sleeve on 2017       Family History:  No family history on file. Social History:  Social History     Tobacco Use    Smoking status: Never Smoker    Smokeless tobacco: Never Used   Substance Use Topics    Alcohol use: No     Alcohol/week: 0.0 standard drinks    Drug use: No       Allergies: Allergies   Allergen Reactions    Nsaids (Non-Steroidal Anti-Inflammatory Drug) Other (comments)     GI Ulcer- Hx of Gastric Bypass    Buprenorphine Hcl Hives and Other (comments)         Review of Systems   Review of Systems   Constitutional: Negative for chills and fever. HENT: Negative for congestion, ear pain, sinus pain and sore throat. Eyes: Negative for pain and visual disturbance. Respiratory: Negative for cough and shortness of breath. Cardiovascular: Negative for chest pain and leg swelling. Gastrointestinal: Positive for abdominal pain. Negative for constipation, diarrhea, nausea and vomiting. Genitourinary: Negative for dysuria, hematuria, vaginal bleeding and vaginal discharge. Musculoskeletal: Negative for back pain and neck pain. Skin: Negative for rash and wound. Neurological: Negative for dizziness, tremors, weakness, light-headedness and numbness.    All other systems reviewed and are negative. Physical Exam     Vitals:    08/06/20 2013 08/06/20 2053 08/06/20 2100 08/06/20 2200   BP: (!) 159/105 120/84 126/84 131/89   Pulse: 86 74 81 78   Resp: 18 19 18 16   Temp: 98.7 °F (37.1 °C)      SpO2: 100%      Weight: 79.4 kg (175 lb)      Height: 5' 3\" (1.6 m)        Physical Exam    Nursing note and vitals reviewed    Constitutional: Maria Del Rosario Longoria -American female, no acute distress  Head: Normocephalic, Atraumatic  Eyes: Pupils are equal, round, and reactive to light, EOMI  Neck: Supple, non-tender  Cardiovascular: Regular rate and rhythm, no murmurs, rubs, or gallops, + 2 radial pulses bilaterally  Chest: Normal work of breathing and chest excursion bilaterally  Lungs: Clear to ausculation bilaterally, no wheezes, no rhonchi  Abdomen: Soft, non tender, non distended, normoactive bowel sounds  Back: No evidence of trauma or deformity  Extremities: No evidence of trauma or deformity, no LE edema.  No streaking erythema, vesicular lesions, ulcerations or bulla  Skin: Warm and dry, normal cap refill  Neuro: Alert and appropriate, CN intact, normal speech, moving all 4 extremities freely and symmetrically  Psychiatric: Normal mood and affect       Diagnostic Study Results     Labs -     Recent Results (from the past 12 hour(s))   URINALYSIS W/ RFLX MICROSCOPIC    Collection Time: 08/06/20  8:45 PM   Result Value Ref Range    Color YELLOW      Appearance CLEAR      Specific gravity 1.025 1.005 - 1.030      pH (UA) 7.0 5.0 - 8.0      Protein Negative NEG mg/dL    Glucose Negative NEG mg/dL    Ketone Negative NEG mg/dL    Bilirubin Negative NEG      Blood Negative NEG      Urobilinogen 1.0 0.2 - 1.0 EU/dL    Nitrites Negative NEG      Leukocyte Esterase Negative NEG     CBC WITH AUTOMATED DIFF    Collection Time: 08/06/20  8:48 PM   Result Value Ref Range    WBC 5.1 4.6 - 13.2 K/uL    RBC 3.92 (L) 4.20 - 5.30 M/uL    HGB 12.3 12.0 - 16.0 g/dL    HCT 38.3 35.0 - 45.0 %    MCV 97.7 (H) 74.0 - 97.0 FL    MCH 31.4 24.0 - 34.0 PG    MCHC 32.1 31.0 - 37.0 g/dL    RDW 13.1 11.6 - 14.5 %    PLATELET 959 050 - 650 K/uL    MPV 11.4 9.2 - 11.8 FL    NEUTROPHILS 45 40 - 73 %    LYMPHOCYTES 42 21 - 52 %    MONOCYTES 11 (H) 3 - 10 %    EOSINOPHILS 2 0 - 5 %    BASOPHILS 0 0 - 2 %    ABS. NEUTROPHILS 2.3 1.8 - 8.0 K/UL    ABS. LYMPHOCYTES 2.2 0.9 - 3.6 K/UL    ABS. MONOCYTES 0.6 0.05 - 1.2 K/UL    ABS. EOSINOPHILS 0.1 0.0 - 0.4 K/UL    ABS. BASOPHILS 0.0 0.0 - 0.1 K/UL    DF AUTOMATED     METABOLIC PANEL, COMPREHENSIVE    Collection Time: 08/06/20  8:48 PM   Result Value Ref Range    Sodium 141 136 - 145 mmol/L    Potassium 4.1 3.5 - 5.5 mmol/L    Chloride 109 100 - 111 mmol/L    CO2 30 21 - 32 mmol/L    Anion gap 2 (L) 3.0 - 18 mmol/L    Glucose 81 74 - 99 mg/dL    BUN 13 7.0 - 18 MG/DL    Creatinine 0.55 (L) 0.6 - 1.3 MG/DL    BUN/Creatinine ratio 24 (H) 12 - 20      GFR est AA >60 >60 ml/min/1.73m2    GFR est non-AA >60 >60 ml/min/1.73m2    Calcium 8.6 8.5 - 10.1 MG/DL    Bilirubin, total 0.2 0.2 - 1.0 MG/DL    ALT (SGPT) 28 13 - 56 U/L    AST (SGOT) 20 10 - 38 U/L    Alk. phosphatase 106 45 - 117 U/L    Protein, total 7.3 6.4 - 8.2 g/dL    Albumin 3.4 3.4 - 5.0 g/dL    Globulin 3.9 2.0 - 4.0 g/dL    A-G Ratio 0.9 0.8 - 1.7     LIPASE    Collection Time: 08/06/20  8:48 PM   Result Value Ref Range    Lipase 82 73 - 393 U/L   MAGNESIUM    Collection Time: 08/06/20  8:48 PM   Result Value Ref Range    Magnesium 2.4 1.6 - 2.6 mg/dL   HCG URINE, QL. - POC    Collection Time: 08/06/20  8:59 PM   Result Value Ref Range    Pregnancy test,urine (POC) Negative NEG         Radiologic Studies -   CT ABD PELV W CONT   Final Result   IMPRESSION:      Large amount of stool in the colon. No bowel obstruction. No acute process. Gastric bypass changes present.         CT Results  (Last 48 hours)               08/06/20 2151  CT ABD PELV W CONT Final result    Impression:  IMPRESSION:       Large amount of stool in the colon. No bowel obstruction. No acute process. Gastric bypass changes present. Narrative:  EXAM: CT of the abdomen and pelvis       INDICATION: Abdominal pain        COMPARISON: April 2, 2019       TECHNIQUE: Axial CT imaging of the abdomen and pelvis was performed with   intravenous contrast. Multiplanar reformats were generated. One or more dose   reduction techniques were used on this CT: automated exposure control,   adjustment of the mAs and/or kVp according to patient size, and iterative   reconstruction techniques. The specific techniques used on this CT exam have   been documented in the patient's electronic medical record. Digital Imaging and   Communications in Medicine (DICOM) format image data are available to   nonaffiliated external healthcare facilities or entities on a secure, media   free, reciprocally searchable basis with patient authorization for at least a   12-month period after this study. _______________       FINDINGS:       LOWER CHEST: Unremarkable. LIVER, BILIARY: Liver is normal. No biliary dilation. Gallbladder is   unremarkable. PANCREAS: Normal.       SPLEEN: Normal.       ADRENALS: Normal.       KIDNEYS: Normal.       VASCULATURE: Unremarkable       LYMPH NODES: No enlarged lymph nodes. GASTROINTESTINAL TRACT: No bowel dilation or wall thickening. There is colonic   diverticulosis without diverticulitis. Large amount of stool present ascending   and transverse colon. Appendix is normal. There is no bowel obstruction. Gastric   bypass changes are present. PELVIC ORGANS: Unremarkable. BONES: No acute or aggressive osseous abnormalities identified. There is   osteopenia. There is grade 1 anterolisthesis at L4-L5 with degenerative disc   disease. Spinal rods present for correction of scoliosis.        OTHER: None.       _______________               CXR Results  (Last 48 hours)    None            Medical Decision Making   I am the first provider for this patient. I reviewed the vital signs, available nursing notes, past medical history, past surgical history, family history and social history. Vital Signs-Reviewed the patient's vital signs. Pulse Oximetry Analysis -100 % on room air    Cardiac Monitor:  Rate: 86 bpm  Rhythm: Regular    Records Reviewed: Nursing Notes and Old Medical Records    Provider Notes:   27 y.o. female presenting with upper quadrant and epigastric abdominal pain x2 days. History of gastric bypass and multiple internal hernias. On exam patient does not appear acutely ill or toxic. Her abdomen soft and nondistended. Very minimal tenderness in the upper quadrant and epigastrium. Will obtain lab work and CT imaging with oral contrast to evaluate. Procedures:  Procedures    ED Course:   8:18 PM   Initial assessment performed. The patients presenting problems have been discussed, and they are in agreement with the care plan formulated and outlined with them. I have encouraged them to ask questions as they arise throughout their visit. 10:13 PM  Patient's lab work showing no leukocytosis or bandemia. No metabolic derangements. CT scan showing large amount of stool in the colon. No bowel obstruction. No acute process. Patient will be discharged with Bentyl, MiraLAX and Carafate for symptom control. Diagnosis and Disposition       DISCHARGE NOTE:  10:13 PM    Evelina YUNIOR Hanleyis's  results have been reviewed with her. She has been counseled regarding her diagnosis, treatment, and plan. She verbally conveys understanding and agreement of the signs, symptoms, diagnosis, treatment and prognosis and additionally agrees to follow up as discussed. She also agrees with the care-plan and conveys that all of her questions have been answered.   I have also provided discharge instructions for her that include: educational information regarding their diagnosis and treatment, and list of reasons why they would want to return to the ED prior to their follow-up appointment, should her condition change. She has been provided with education for proper emergency department utilization. CLINICAL IMPRESSION:    1. Constipation, unspecified constipation type    2. Abdominal pain, generalized        PLAN:  1. D/C Home  2. Current Discharge Medication List      START taking these medications    Details   polyethylene glycol (Miralax) 17 gram/dose powder Take 17 g by mouth daily. 1 tablespoon with 8 oz of water daily  Qty: 550 g, Refills: 0      dicyclomine (BentyL) 10 mg capsule Take 1 Cap by mouth four (4) times daily for 5 days. Qty: 20 Cap, Refills: 0      sucralfate (Carafate) 100 mg/mL suspension Take 5 mL by mouth four (4) times daily. Qty: 414 mL, Refills: 0           3. Follow-up Information     Follow up With Specialties Details Why Contact Info    Murali Rawls MD Family Medicine Schedule an appointment as soon as possible for a visit in 2 days  [address] [facility] [address]  [phone]      Ed Delarosa MD Bariatrics, General Surgery Schedule an appointment as soon as possible for a visit in 2 days  [address]  [place] [address] [phone]      [facility] EMERGENCY DEPT Emergency Medicine  As needed if symptoms worsen [address] [place] [address]  [phone]        ____________________________________     Please note that this dictation was completed with lettrs, the computer voice recognition software. Quite often unanticipated grammatical, syntax, homophones, and other interpretive errors are inadvertently transcribed by the computer software. Please disregard these errors. Please excuse any errors that have escaped final proofreading.

## 2020-08-07 NOTE — DISCHARGE INSTRUCTIONS
You were seen and evaluated in the Emergency Department. Please understand that your work up is not all encompassing and you should follow up with your primary care physician for further management and continuity of care. Please return to Emergency Department or seek medical attention immediately if you have acute worsening in your symptoms or develop chest pain, shortness of breath, repeated vomiting, fever, altered level of consciousness, coughing up blood, or start sweating and feel clammy. If you were prescribed any medicine for home, please take as prescribed by your health-care provider. If you were given any follow-up appointments or numbers to call, please do so as instructed. Avoid any tobacco products or excessive alcohol. Patient Education        Constipation: Care Instructions  Your Care Instructions     Constipation means that you have a hard time passing stools (bowel movements). People pass stools from 3 times a day to once every 3 days. What is normal for you may be different. Constipation may occur with pain in the rectum and cramping. The pain may get worse when you try to pass stools. Sometimes there are small amounts of bright red blood on toilet paper or the surface of stools. This is because of enlarged veins near the rectum (hemorrhoids). A few changes in your diet and lifestyle may help you avoid ongoing constipation. Your doctor may also prescribe medicine to help loosen your stool. Some medicines can cause constipation. These include pain medicines and antidepressants. Tell your doctor about all the medicines you take. Your doctor may want to make a medicine change to ease your symptoms. Follow-up care is a key part of your treatment and safety. Be sure to make and go to all appointments, and call your doctor if you are having problems. It's also a good idea to know your test results and keep a list of the medicines you take. How can you care for yourself at home?   · Drink plenty of fluids, enough so that your urine is light yellow or clear like water. If you have kidney, heart, or liver disease and have to limit fluids, talk with your doctor before you increase the amount of fluids you drink. · Include high-fiber foods in your diet each day. These include fruits, vegetables, beans, and whole grains. · Get at least 30 minutes of exercise on most days of the week. Walking is a good choice. You also may want to do other activities, such as running, swimming, cycling, or playing tennis or team sports. · Take a fiber supplement, such as Citrucel or Metamucil, every day. Read and follow all instructions on the label. · Schedule time each day for a bowel movement. A daily routine may help. Take your time having your bowel movement. · Support your feet with a small step stool when you sit on the toilet. This helps flex your hips and places your pelvis in a squatting position. · Your doctor may recommend an over-the-counter laxative to relieve your constipation. Examples are Milk of Magnesia and MiraLax. Read and follow all instructions on the label. Do not use laxatives on a long-term basis. When should you call for help? Call your doctor now or seek immediate medical care if:  · You have new or worse belly pain. · You have new or worse nausea or vomiting. · You have blood in your stools. Watch closely for changes in your health, and be sure to contact your doctor if:  · Your constipation is getting worse. · You do not get better as expected. Where can you learn more? Go to http://wilbert-loraine.info/  Enter P343 in the search box to learn more about \"Constipation: Care Instructions. \"  Current as of: June 26, 2019               Content Version: 12.5  © 4335-1126 [employer]. Care instructions adapted under license by [employer] (which disclaims liability or warranty for this information).  If you have questions about a medical condition or this instruction, always ask your healthcare professional. [address] any warranty or liability for your use of this information. Patient Education        Abdominal Pain: Care Instructions  Your Care Instructions     Abdominal pain has many possible causes. Some aren't serious and get better on their own in a few days. Others need more testing and treatment. If your pain continues or gets worse, you need to be rechecked and may need more tests to find out what is wrong. You may need surgery to correct the problem. Don't ignore new symptoms, such as fever, nausea and vomiting, urination problems, pain that gets worse, and dizziness. These may be signs of a more serious problem. Your doctor may have recommended a follow-up visit in the next 8 to 12 hours. If you are not getting better, you may need more tests or treatment. The doctor has checked you carefully, but problems can develop later. If you notice any problems or new symptoms, get medical treatment right away. Follow-up care is a key part of your treatment and safety. Be sure to make and go to all appointments, and call your doctor if you are having problems. It's also a good idea to know your test results and keep a list of the medicines you take. How can you care for yourself at home? · Rest until you feel better. · To prevent dehydration, drink plenty of fluids, enough so that your urine is light yellow or clear like water. Choose water and other caffeine-free clear liquids until you feel better. If you have kidney, heart, or liver disease and have to limit fluids, talk with your doctor before you increase the amount of fluids you drink. · If your stomach is upset, eat mild foods, such as rice, dry toast or crackers, bananas, and applesauce. Try eating several small meals instead of two or three large ones.   · Wait until 48 hours after all symptoms have gone away before you have spicy foods, alcohol, and drinks that contain caffeine. · Do not eat foods that are high in fat. · Avoid anti-inflammatory medicines such as aspirin, ibuprofen (Advil, Motrin), and naproxen (Aleve). These can cause stomach upset. Talk to your doctor if you take daily aspirin for another health problem. When should you call for help? [id number] anytime you think you may need emergency care. For example, call if:  · You passed out (lost consciousness). · You pass maroon or very bloody stools. · You vomit blood or what looks like coffee grounds. · You have new, severe belly pain. Call your doctor now or seek immediate medical care if:  · Your pain gets worse, especially if it becomes focused in one area of your belly. · You have a new or higher fever. · Your stools are black and look like tar, or they have streaks of blood. · You have unexpected vaginal bleeding. · You have symptoms of a urinary tract infection. These may include:  ? Pain when you urinate. ? Urinating more often than usual.  ? Blood in your urine. · You are dizzy or lightheaded, or you feel like you may faint. Watch closely for changes in your health, and be sure to contact your doctor if:  · You are not getting better after 1 day (24 hours). Where can you learn more? Go to http://wilbert-loraine.info/  Enter S533 in the search box to learn more about \"Abdominal Pain: Care Instructions. \"  Current as of: June 26, 2019               Content Version: 12.5  © 4393-3899 [employer]. Care instructions adapted under license by [employer] (which disclaims liability or warranty for this information). If you have questions about a medical condition or this instruction, always ask your healthcare professional. [address] any warranty or liability for your use of this information.

## 2021-02-05 ENCOUNTER — APPOINTMENT (OUTPATIENT)
Dept: GENERAL RADIOLOGY | Age: 36
End: 2021-02-05
Attending: EMERGENCY MEDICINE
Payer: MEDICAID

## 2021-02-05 ENCOUNTER — HOSPITAL ENCOUNTER (EMERGENCY)
Age: 36
Discharge: HOME | End: 2021-02-05
Attending: EMERGENCY MEDICINE
Payer: MEDICAID

## 2021-02-05 VITALS
OXYGEN SATURATION: 100 % | DIASTOLIC BLOOD PRESSURE: 75 MMHG | HEART RATE: 70 BPM | SYSTOLIC BLOOD PRESSURE: 112 MMHG | HEIGHT: 63 IN | WEIGHT: 154 LBS | RESPIRATION RATE: 19 BRPM | BODY MASS INDEX: 27.29 KG/M2 | TEMPERATURE: 98.5 F

## 2021-02-05 DIAGNOSIS — R07.9 CHEST PAIN, UNSPECIFIED TYPE: Primary | ICD-10-CM

## 2021-02-05 LAB
ALBUMIN SERPL-MCNC: 2.8 G/DL (ref 3.4–5)
ALBUMIN/GLOB SERPL: 0.7 {RATIO} (ref 0.8–1.7)
ALP SERPL-CCNC: 92 U/L (ref 45–117)
ALT SERPL-CCNC: 19 U/L (ref 13–56)
ANION GAP SERPL CALC-SCNC: 4 MMOL/L (ref 3–18)
AST SERPL-CCNC: 16 U/L (ref 10–38)
ATRIAL RATE: 76 BPM
BASOPHILS # BLD: 0 K/UL (ref 0–0.1)
BASOPHILS NFR BLD: 0 % (ref 0–2)
BILIRUB SERPL-MCNC: 0.3 MG/DL (ref 0.2–1)
BNP SERPL-MCNC: 31 PG/ML (ref 0–450)
BUN SERPL-MCNC: 15 MG/DL (ref 7–18)
BUN/CREAT SERPL: 31 (ref 12–20)
CALCIUM SERPL-MCNC: 8.4 MG/DL (ref 8.5–10.1)
CALCULATED P AXIS, ECG09: 47 DEGREES
CALCULATED R AXIS, ECG10: 49 DEGREES
CALCULATED T AXIS, ECG11: 43 DEGREES
CHLORIDE SERPL-SCNC: 107 MMOL/L (ref 100–111)
CK MB CFR SERPL CALC: NORMAL % (ref 0–4)
CK MB CFR SERPL CALC: NORMAL % (ref 0–4)
CK MB SERPL-MCNC: <1 NG/ML (ref 5–25)
CK MB SERPL-MCNC: <1 NG/ML (ref 5–25)
CK SERPL-CCNC: 134 U/L (ref 26–192)
CK SERPL-CCNC: 159 U/L (ref 26–192)
CO2 SERPL-SCNC: 29 MMOL/L (ref 21–32)
CREAT SERPL-MCNC: 0.49 MG/DL (ref 0.6–1.3)
DIAGNOSIS, 93000: NORMAL
DIFFERENTIAL METHOD BLD: ABNORMAL
EOSINOPHIL # BLD: 0.1 K/UL (ref 0–0.4)
EOSINOPHIL NFR BLD: 2 % (ref 0–5)
ERYTHROCYTE [DISTWIDTH] IN BLOOD BY AUTOMATED COUNT: 14.3 % (ref 11.6–14.5)
GLOBULIN SER CALC-MCNC: 3.9 G/DL (ref 2–4)
GLUCOSE SERPL-MCNC: 85 MG/DL (ref 74–99)
HCG SERPL QL: NEGATIVE
HCT VFR BLD AUTO: 40.2 % (ref 35–45)
HGB BLD-MCNC: 12.8 G/DL (ref 12–16)
LYMPHOCYTES # BLD: 1.8 K/UL (ref 0.9–3.6)
LYMPHOCYTES NFR BLD: 41 % (ref 21–52)
MAGNESIUM SERPL-MCNC: 2.1 MG/DL (ref 1.6–2.6)
MCH RBC QN AUTO: 30.3 PG (ref 24–34)
MCHC RBC AUTO-ENTMCNC: 31.8 G/DL (ref 31–37)
MCV RBC AUTO: 95.3 FL (ref 74–97)
MONOCYTES # BLD: 0.5 K/UL (ref 0.05–1.2)
MONOCYTES NFR BLD: 12 % (ref 3–10)
NEUTS SEG # BLD: 2 K/UL (ref 1.8–8)
NEUTS SEG NFR BLD: 45 % (ref 40–73)
P-R INTERVAL, ECG05: 114 MS
PLATELET # BLD AUTO: 191 K/UL (ref 135–420)
PMV BLD AUTO: 11.4 FL (ref 9.2–11.8)
POTASSIUM SERPL-SCNC: 4.2 MMOL/L (ref 3.5–5.5)
PROT SERPL-MCNC: 6.7 G/DL (ref 6.4–8.2)
Q-T INTERVAL, ECG07: 360 MS
QRS DURATION, ECG06: 84 MS
QTC CALCULATION (BEZET), ECG08: 405 MS
RBC # BLD AUTO: 4.22 M/UL (ref 4.2–5.3)
SODIUM SERPL-SCNC: 140 MMOL/L (ref 136–145)
TROPONIN I SERPL-MCNC: <0.02 NG/ML (ref 0–0.04)
TROPONIN I SERPL-MCNC: <0.02 NG/ML (ref 0–0.04)
TSH SERPL DL<=0.05 MIU/L-ACNC: 0.86 UIU/ML (ref 0.36–3.74)
VENTRICULAR RATE, ECG03: 76 BPM
WBC # BLD AUTO: 4.3 K/UL (ref 4.6–13.2)

## 2021-02-05 PROCEDURE — 80053 COMPREHEN METABOLIC PANEL: CPT

## 2021-02-05 PROCEDURE — 82553 CREATINE MB FRACTION: CPT

## 2021-02-05 PROCEDURE — 83735 ASSAY OF MAGNESIUM: CPT

## 2021-02-05 PROCEDURE — 84703 CHORIONIC GONADOTROPIN ASSAY: CPT

## 2021-02-05 PROCEDURE — 99285 EMERGENCY DEPT VISIT HI MDM: CPT

## 2021-02-05 PROCEDURE — 84443 ASSAY THYROID STIM HORMONE: CPT

## 2021-02-05 PROCEDURE — 85025 COMPLETE CBC W/AUTO DIFF WBC: CPT

## 2021-02-05 PROCEDURE — 83880 ASSAY OF NATRIURETIC PEPTIDE: CPT

## 2021-02-05 PROCEDURE — 93005 ELECTROCARDIOGRAM TRACING: CPT

## 2021-02-05 PROCEDURE — 71045 X-RAY EXAM CHEST 1 VIEW: CPT

## 2021-02-05 RX ORDER — BUPROPION HYDROCHLORIDE 150 MG/1
TABLET, EXTENDED RELEASE ORAL 2 TIMES DAILY
COMMUNITY
End: 2021-05-25

## 2021-02-05 NOTE — Clinical Note
[address] [place] [address] Dr [facility] EMERGENCY DEPT 
[address] [facility] [address] [phone] Work/School Note Date: 2/5/2021 To Whom It May concern: 
 
 
Anne Barahona was seen and treated today in the emergency room by the following provider(s): 
Attending Provider: Camilla Lopez MD. Anne Barahona is excused from work/school on 02/05/21. She is clear to return to work/school on 02/06/21. Sincerely, Polly Burk MD

## 2021-02-05 NOTE — ED TRIAGE NOTES
Patient states she woke up this morning with chest \"tightness\" and feeling like her heartbeat was irregular.

## 2021-02-05 NOTE — ED PROVIDER NOTES
EMERGENCY DEPARTMENT HISTORY AND PHYSICAL EXAM    8:59 AM  Date: 2021  Patient Name: Boni Garcia    History of Presenting Illness     Chief Complaint   Patient presents with    Chest Pain       History Provided By: patient     HPI: Boni Garcia is a 38 y.o. female with medical history of gastric bypass, gastric ulcers, anxiety presents with sensation of her heart missing beats. Patient states that date 9 years ago had history for palpitation and had a Holter monitor that was unremarkable. Patient states that while she was having skipped beats she felt some chest discomfort that has now resolved.    :     PCP: Elaine Rivera MD    Past History     Past Medical History:  Past Medical History:   Diagnosis Date    Acid reflux     Incisional hernia     Intestinal malabsorption     Morbid obesity with body mass index of 40.0-49.9 ([address]     Status post bariatric surgery 2012    sleeve resection / a. Corlis Hatchet       Past Surgical History:  Past Surgical History:   Procedure Laterality Date    HX  SECTION      X 3    HX GI      Sleeve gastrectomy- Dr Cyndi Covington HX GI      Conversion to gastric bypass-2017    HX GI      Internal hernia ( dr Nikki Ceron) -2018    HX GI      Internal hernia - 2019    HX ORTHOPAEDIC      scoliosis correction surgery X 2    HX TONSIL AND ADENOIDECTOMY      HX TUBAL LIGATION      [place] LAP GASTRIC BYPASS/TAMICA-EN-Y      revision from sleeve on 2017       Family History:  History reviewed. No pertinent family history. Social History:  Social History     Tobacco Use    Smoking status: Never Smoker    Smokeless tobacco: Never Used   Substance Use Topics    Alcohol use: No     Alcohol/week: 0.0 standard drinks    Drug use: No       Allergies:   Allergies   Allergen Reactions    Nsaids (Non-Steroidal Anti-Inflammatory Drug) Other (comments)     GI Ulcer- Hx of Gastric Bypass    Buprenorphine Hcl Hives and Other (comments)       Review of Systems   Review of Systems   Constitutional: Negative for activity change, appetite change and chills. HENT: Negative for congestion, ear discharge, ear pain and sore throat. Eyes: Negative for photophobia and pain. Respiratory: Negative for cough and choking. Cardiovascular: Negative for palpitations and leg swelling. Gastrointestinal: Negative for anal bleeding and rectal pain. Endocrine: Negative for polydipsia and polyuria. Genitourinary: Negative for genital sores and urgency. Musculoskeletal: Negative for arthralgias and myalgias. Neurological: Negative for dizziness, seizures and speech difficulty. Psychiatric/Behavioral: Negative for hallucinations, self-injury and suicidal ideas. Physical Exam     Patient Vitals for the past 12 hrs:   Temp Pulse Resp BP SpO2   02/05/21 0853 98.5 °F (36.9 °C) 78 16 134/83 100 %       Physical Exam  Vitals signs and nursing note reviewed. Constitutional:       Appearance: She is well-developed. HENT:      Head: Normocephalic and atraumatic. Eyes:      General:         Right eye: No discharge. Left eye: No discharge. Neck:      Musculoskeletal: Normal range of motion and neck supple. Cardiovascular:      Rate and Rhythm: Normal rate and regular rhythm. Heart sounds: Normal heart sounds. No murmur. Pulmonary:      Effort: Pulmonary effort is normal. No respiratory distress. Breath sounds: Normal breath sounds. No stridor. No wheezing or rales. Chest:      Chest wall: No tenderness. Abdominal:      General: Bowel sounds are normal. There is no distension. Palpations: Abdomen is soft. Tenderness: There is no abdominal tenderness. There is no guarding or rebound. Musculoskeletal: Normal range of motion. Comments: Back midline scoliosis scar   Skin:     General: Skin is warm and dry. Neurological:      Mental Status: She is alert and oriented to person, place, and time.          Diagnostic Study Results     Labs -  Recent Results (from the past 12 hour(s))   EKG, 12 LEAD, INITIAL    Collection Time: 02/05/21  8:53 AM   Result Value Ref Range    Ventricular Rate 76 BPM    Atrial Rate 76 BPM    P-R Interval 114 ms    QRS Duration 84 ms    Q-T Interval 360 ms    QTC Calculation (Bezet) 405 ms    Calculated P Axis 47 degrees    Calculated R Axis 49 degrees    Calculated T Axis 43 degrees    Diagnosis       Normal sinus rhythm  Normal ECG  When compared with ECG of 02-APR-2019 11:33,  No significant change was found         Radiologic Studies -   No results found. Medical Decision Making     ED Course: Progress Notes, Reevaluation, and Consults:    8:59 AM Initial assessment performed. The patients presenting problems have been discussed, and they/their family are in agreement with the care plan formulated and outlined with them. I have encouraged them to ask questions as they arise throughout their visit. Provider Notes (Medical Decision Making):   Patient presents with sensation of heart skipping a beat and some chest discomfort  No ischemic changes on EKG  Labs as interpreted by me unremarkable  2 sets of troponin negative  X-ray chest interpreted by me no signs of pneumonia  Be discharged with PMD and cardiology follow-up    Vital Signs-Reviewed the patient's vital signs. Reviewed pt's pulse ox reading. EKG: Interpreted by me   Time Interpreted: 8:59   Rate: 76   Rhythm: NSR   Interpretation:no ST changes   Normal intervals    Records Reviewed: old medical records (Time of Review: 8:59 AM)  -I am the first provider for this patient.  -I reviewed the vital signs, available nursing notes, past medical history, past surgical history, family history and social history. Current Outpatient Medications   Medication Sig Dispense Refill    buPROPion SR (Wellbutrin SR) 150 mg SR tablet Take  by mouth two (2) times a day.       pantoprazole (PROTONIX) 40 mg tablet TAKE 1 TABLET BY MOUTH TWO TIMES A DAY FOR 30 DAYS. INDICATIONS: GASTRIC ULCER 60 Tab 2    gabapentin (NEURONTIN) 600 mg tablet Take 1 Tab by mouth three (3) times daily. Max Daily Amount: 1,800 mg. 90 Tab 2    b complex vitamins tablet Take 1 Tab by mouth daily.  Biotin 2,500 mcg cap Take  by mouth.  cyanocobalamin (VITAMIN B-12) 500 mcg tablet Take 500 mcg by mouth daily.  calcium citrate 200 mg (950 mg) tablet Take  by mouth daily.  multivitamin with iron (FLINTSTONES) chewable tablet Take 1 Tab by mouth two (2) times a day. Clinical Impression     Clinical Impression: No diagnosis found. Disposition: discharge      DISCHARGE NOTE:   Pt has been reexamined. Patient has no new complaints, changes, or physical findings. Care plan outlined and precautions discussed. Results were reviewed with the patient. All medications were reviewed with the patient; will d/c home with PMD f/u. All of pt's questions and concerns were addressed. Patient was instructed and agrees to follow up with PMD, as well as to return to the ED upon further deterioration. Patient is ready to go home. This note was dictated utilizing voice recognition software which may lead to typographical errors. I apologize in advance if the situation occurs. If questions arise please do not hesitate to contact me or call our department. This note was dictated utilizing voice recognition software which may lead to typographical errors. I apologize in advance if the situation occurs. If questions arise please do not hesitate to contact me or call our department.     Romina Sellers MD  8:59 AM

## 2021-04-04 NOTE — OP NOTES
[address] REPORT    Boaz Carmelina  MR#: [id number]  : 1985  ACCOUNT #: [de-identified]   DATE OF SERVICE: 2019    PREOPERATIVE DIAGNOSES:  Small-bowel obstruction, likely from recurrent internal hernia. POSTOPERATIVE DIAGNOSES:  Small-bowel obstruction, likely from recurrent internal hernia, with the entire small bowel contained within the hernia that initially was very dusky. PROCEDURES PERFORMED:  1. Exploratory laparoscopy. 2.  Exploratory laparotomy with open reduction and repair of internal hernia. SURGEON:  Jennifer Andre MD    ASSISTANT:  SHAHID Multani. SHAHID Lua assisted with initial laparoscopy, subsequent open operation, significant reduction of internal hernia and difficult repair of internal hernia and fascial closures. ANESTHESIA:  General endotracheal.    COMPLICATIONS:  None. ESTIMATED BLOOD LOSS:  None. SPECIMENS REMOVED:  None. IMPLANTS:  None. FINDINGS:  1. The patient noted to have the entire small bowel contained within this internal hernia with about 90% to 95% of the small bowel very dusky, but not necrotic. This did return to normal after reduction of the hernia. 2.  The patient was noted to have a ventral hernia at the transverse mesocolon, as the Kori limb passed over the transverse mesocolon. STATEMENT OF MEDICAL NECESSITY:  The patient is a patient of mine. She is 18 months out from gastric bypass procedure  , which she has done excellent with the weight loss, having really no issues until this past summer, when while out of town she developed an internal hernia and it was taken care of by the covering surgeon for me. The patient states she was essentially fairly normal thereafter, having really no symptoms until early this morning, at 3 a.m., where she developed crampy abdominal pain and it was exactly the same as the prior time she had her surgery for her internal hernia.   She finally presented to the emergency room today. I was called by the radiologist, who instructed me that she likely had a recurrent ventral hernia and I immediately came to the hospital and posted the case. She is now in the operating room, about an  hour after I was notified of her situation, in preparation for exploration. The patient did understand that she might require an open operation, given the severe edema, which is present on CT scan of the abdomen. DESCRIPTION OF PROCEDURE:  The patient was brought to the operating room and placed on the table in the supine position. General anesthesia was administered,   her abdomen prepped and draped in usual sterile fashion. Using a 15 blade, a 1 inch incision made just below the umbilicus. Veress needle approach was used to gain access to the peritoneal cavity, which was then insufflated. Visiport was then placed at that site. Then, the 45-degree scope was introduced abdominally. Upon entering the abdomen, the patient was noted to have a very low riding liver. With the scope at her umbilicus, I was essentially hovering directly over the liver, at its free margin. I immediately noted with the patient in head down position, the patient had exceptionally dusky small bowel and at that moment, I could not really even discern any normal small bowel. Mesentery was quite edematous. The bowel was quite edematous, and I knew I would not be able to reduce this laparoscopically. The scope was then removed and converted to a lower midline incision,   just around the umbilicus. I then divided the subcutaneous tissue using Bovie cautery and then subsequently entered the fascia in the midline. Immediately on entering the abdomen, the description I presented earlier was correct, in that the entire small bowel was dusky with the exception of about 5% distally.   The mesentery of the small bowel was so edematous that it was almost white in appearance and began to reduce the ileum out of this defect, and it was exceptionally arduous due to erythema and very brawny nature of the mesentery of the small bowel. As I very slowly marched distally to proximally, I moving in about an inch per 5-10 seconds, and to reduce the entire small bowel out of this defect took in excess of about 20 minutes to do. In doing so, now the bowel was completely patent and completely viable. I then examined her anatomy. The Kori limb passed over the transverse colon. The afferent limb was in a normal position. Efferent limb was now in normal position. At the base of the Kori limb, against the transverse mesocolon, I could see that there was a defect there. It was about 2 fingerbreadths in size. I did not visualize any of the suture from the prior repair, although it might have been an undyed suture. I used 2-0 silk suture, giving 2 separate closures, one from one side and one from the other, running it against the transverse mesocolon, all the way to the base of this defect. I then at this juncture could still fit a tiny tip of my finger right against the transverse colon. Therefore, when I did, this structure was adhesed to omentum on either side with tacky omentum on either of the Kori limb mesentery to generate any potential space that was present. This left the anatomy not totally normal and once again, all areas were checked. Small bowel was run again for any type of serosal tears or enterotomies, which there was not. The bowel was quite distended to being chronically obstructed and the bowel was returned to normal position. A midline incision was then closed using running #1 Looped PDS, using 2 separate sutures and tying it in the center. The skin was then closed with skin staples and sterile dressings were applied. The patient tolerated the procedure well. She was extubated and brought to the recovery room in good condition.         Chau SUSNHINE / TN  D: 02/02/2019 16:26     T: 02/02/2019 19:24  JOB #: [id number] Statement Selected

## 2021-05-05 ENCOUNTER — HOSPITAL ENCOUNTER (EMERGENCY)
Age: 36
Discharge: HOME | End: 2021-05-05
Attending: EMERGENCY MEDICINE
Payer: MEDICAID

## 2021-05-05 ENCOUNTER — APPOINTMENT (OUTPATIENT)
Dept: GENERAL RADIOLOGY | Age: 36
End: 2021-05-05
Attending: EMERGENCY MEDICINE
Payer: MEDICAID

## 2021-05-05 VITALS
WEIGHT: 165 LBS | HEIGHT: 63 IN | OXYGEN SATURATION: 96 % | RESPIRATION RATE: 16 BRPM | TEMPERATURE: 98.3 F | DIASTOLIC BLOOD PRESSURE: 97 MMHG | BODY MASS INDEX: 29.23 KG/M2 | HEART RATE: 74 BPM | SYSTOLIC BLOOD PRESSURE: 133 MMHG

## 2021-05-05 DIAGNOSIS — S00.33XA CONTUSION OF NOSE, INITIAL ENCOUNTER: Primary | ICD-10-CM

## 2021-05-05 PROCEDURE — 70160 X-RAY EXAM OF NASAL BONES: CPT

## 2021-05-05 PROCEDURE — 99283 EMERGENCY DEPT VISIT LOW MDM: CPT

## 2021-05-05 PROCEDURE — 74011250637 HC RX REV CODE- 250/637: Performed by: EMERGENCY MEDICINE

## 2021-05-05 RX ORDER — FLUTICASONE PROPIONATE 50 MCG
2 SPRAY, SUSPENSION (ML) NASAL DAILY
Qty: 1 BOTTLE | Refills: 0 | Status: SHIPPED | OUTPATIENT
Start: 2021-05-05 | End: 2022-10-02

## 2021-05-05 RX ORDER — ACETAMINOPHEN 500 MG
1000 TABLET ORAL
Status: COMPLETED | OUTPATIENT
Start: 2021-05-05 | End: 2021-05-05

## 2021-05-05 RX ADMIN — ACETAMINOPHEN 1000 MG: 500 TABLET, FILM COATED ORAL at 10:35

## 2021-05-05 NOTE — ED NOTES
Current Discharge Medication List      START taking these medications    Details   fluticasone propionate (FLONASE) 50 mcg/actuation nasal spray 2 Sprays by Both Nostrils route daily. Qty: 1 Bottle, Refills: 0           I have reviewed discharge instructions with the patient. The patient verbalized understanding.  Patient armband removed and shredded

## 2021-05-05 NOTE — ED PROVIDER NOTES
HPI   69-year-old -American female presents with a chief complaint of nasal bone pain. Patient states that she fell and hit her face on the floor 2 days ago. She states that she has been taking Tylenol for pain with minor relief. She complains of pain and nasal congestion. Patient also reports mild epistaxis when she blows her nose. She denies any dizziness, loss of consciousness, or other injuries on arrival to the emergency room. Past Medical History:   Diagnosis Date    Acid reflux     Incisional hernia     Intestinal malabsorption     Morbid obesity with body mass index of 40.0-49.9 ([facility])     Status post bariatric surgery 2012    sleeve resection / kirstin Solis       Past Surgical History:   Procedure Laterality Date    HX  SECTION      X 3    HX GI      Sleeve gastrectomy- Dr Lindsay Wynne HX GI      Conversion to gastric bypass-2017    HX GI      Internal hernia ( dr Darren Plunkett) -2018    HX GI      Internal hernia - 2019    HX ORTHOPAEDIC      scoliosis correction surgery X 2    HX TONSIL AND ADENOIDECTOMY      HX TUBAL LIGATION      [place] LAP GASTRIC BYPASS/TAMICA-EN-Y      revision from sleeve on 2017         History reviewed. No pertinent family history.     Social History     Socioeconomic History    Marital status: SINGLE     Spouse name: Not on file    Number of children: Not on file    Years of education: Not on file    Highest education level: Not on file   Occupational History    Not on file   Social Needs    Financial resource strain: Not on file    Food insecurity     Worry: Not on file     Inability: Not on file    Transportation needs     Medical: Not on file     Non-medical: Not on file   Tobacco Use    Smoking status: Never Smoker    Smokeless tobacco: Never Used   Substance and Sexual Activity    Alcohol use: No     Alcohol/week: 0.0 standard drinks    Drug use: No    Sexual activity: Yes     Partners: Male   Lifestyle    Physical activity     Days per week: Not on file     Minutes per session: Not on file    Stress: Not on file   Relationships    Social connections     Talks on phone: Not on file     Gets together: Not on file     Attends [employer] service: Not on file     Active member of club or organization: Not on file     Attends meetings of clubs or organizations: Not on file     Relationship status: Not on file    Intimate partner violence     Fear of current or ex partner: Not on file     Emotionally abused: Not on file     Physically abused: Not on file     Forced sexual activity: Not on file   Other Topics Concern    Not on file   Social History Narrative    Not on file         ALLERGIES: Nsaids (non-steroidal anti-inflammatory drug) and Buprenorphine hcl    Review of Systems   Constitutional: Negative for fatigue and fever. HENT: Positive for congestion, nosebleeds, rhinorrhea and sinus pain. Negative for postnasal drip, sore throat and trouble swallowing. Eyes: Negative for pain, discharge and redness. Respiratory: Negative for cough and shortness of breath. Cardiovascular: Negative for chest pain and palpitations. Gastrointestinal: Negative for abdominal pain, constipation, diarrhea and vomiting. Endocrine: Negative. Genitourinary: Negative. Negative for dysuria and flank pain. Musculoskeletal: Negative for back pain, myalgias, neck pain and neck stiffness. Skin: Negative for pallor, rash and wound. Allergic/Immunologic: Negative. Neurological: Negative. Hematological: Negative. Negative for adenopathy. Does not bruise/bleed easily. Psychiatric/Behavioral: Negative. Vitals:    05/05/21 0949   BP: (!) 133/97   Pulse: 74   Resp: 16   Temp: 98.3 °F (36.8 °C)   SpO2: 96%   Weight: 74.8 kg (165 lb)   Height: 5' 3\" (1.6 m)            Physical Exam  Vitals signs and nursing note reviewed. Constitutional:       General: She is not in acute distress. Appearance: She is well-developed. She is not diaphoretic. HENT:      Head: Normocephalic and atraumatic. Right Ear: Tympanic membrane, ear canal and external ear normal.      Left Ear: Tympanic membrane, ear canal and external ear normal.      Nose: Congestion and rhinorrhea present. Comments: Mild swelling of the nasal bridge. Mouth/Throat:      Mouth: Mucous membranes are moist.      Pharynx: Oropharynx is clear. No oropharyngeal exudate. Eyes:      General: No scleral icterus. Right eye: No discharge. Left eye: No discharge. Conjunctiva/sclera: Conjunctivae normal.      Pupils: Pupils are equal, round, and reactive to light. Neck:      Musculoskeletal: Normal range of motion and neck supple. Thyroid: No thyromegaly. Vascular: No JVD. Trachea: No tracheal deviation. Cardiovascular:      Rate and Rhythm: Normal rate and regular rhythm. Heart sounds: Normal heart sounds. No murmur. No friction rub. No gallop. Pulmonary:      Effort: Pulmonary effort is normal. No respiratory distress. Breath sounds: Normal breath sounds. No stridor. No wheezing or rales. Chest:      Chest wall: No tenderness. Abdominal:      General: Bowel sounds are normal. There is no distension. Palpations: Abdomen is soft. There is no mass. Tenderness: There is no abdominal tenderness. There is no right CVA tenderness, left CVA tenderness, guarding or rebound. Genitourinary:     Vagina: Normal.   Musculoskeletal: Normal range of motion. General: No tenderness. Lymphadenopathy:      Cervical: No cervical adenopathy. Skin:     General: Skin is warm and dry. Coloration: Skin is not pale. Findings: No erythema or rash. Neurological:      Mental Status: She is alert and oriented to person, place, and time. Cranial Nerves: No cranial nerve deficit. Deep Tendon Reflexes: Reflexes are normal and symmetric.    Psychiatric:         Behavior: Behavior normal. Judgment: Judgment normal.          MDM  Number of Diagnoses or Management Options  Diagnosis management comments: Differential diagnosis includes: Contusion, fracture       Amount and/or Complexity of Data Reviewed  Tests in the radiology section of CPT®: ordered and reviewed    Risk of Complications, Morbidity, and/or Mortality  Presenting problems: low  Management options: low           Procedures    Orders Placed This Encounter    XR NASAL BONES MIN 3 V     Standing Status:   Standing     Number of Occurrences:   1     Order Specific Question:   Transport     Answer:   Wheelchair [7]     Order Specific Question:   Reason for Exam     Answer:   pain, deformity     Order Specific Question:   Is Patient Pregnant? Answer:   No    acetaminophen (TYLENOL) tablet 1,000 mg    fluticasone propionate (FLONASE) 50 mcg/actuation nasal spray     Si Sprays by Both Nostrils route daily. Dispense:  1 Bottle     Refill:  0     XR NASAL BONES MIN 3 V   Final Result      No evidence of nasal bone fracture. Preliminary interpretation by Dr. Lorenzo Dolan -no acute process. 10:35 AM Upon re-evaluation the patient's symptoms have improved. Pt has non-toxic appearance and condition is stable for discharge. She was informed of her results, instructed to f/u with her PCP and return to the ED upon worsening of symptoms. All questions and concerns were addressed. Diagnosis:   1.  Contusion of nose, initial encounter          Disposition: Discharge    Follow-up Information     Follow up With Specialties Details Why Contact Info    Kathryn Rawls MD Family Medicine Schedule an appointment as soon as possible for a visit in 1 day  [address]  EMERGENCY DEPT Emergency Medicine  As needed, If symptoms worsen [facility]  [phone]          Discharge Medication List as of 2021 10:33 AM      START taking these medications    Details   fluticasone propionate (FLONASE) 50 mcg/actuation nasal spray 2 Sprays by Both Nostrils route daily. , Normal, Disp-1 Bottle, R-0         CONTINUE these medications which have NOT CHANGED    Details   buPROPion SR (Wellbutrin SR) 150 mg SR tablet Take  by mouth two (2) times a day., Historical Med      pantoprazole (PROTONIX) 40 mg tablet TAKE 1 TABLET BY MOUTH TWO TIMES A DAY FOR 30 DAYS. INDICATIONS: GASTRIC ULCER, Normal, Disp-60 Tab,R-2      gabapentin (NEURONTIN) 600 mg tablet Take 1 Tab by mouth three (3) times daily. Max Daily Amount: 1,800 mg., Print, Disp-90 Tab, R-2      b complex vitamins tablet Take 1 Tab by mouth daily. , Historical Med      Biotin 2,500 mcg cap Take  by mouth., Historical Med      cyanocobalamin (VITAMIN B-12) 500 mcg tablet Take 500 mcg by mouth daily. , Historical Med      calcium citrate 200 mg (950 mg) tablet Take  by mouth daily. , Historical Med      multivitamin with iron (FLINTSTONES) chewable tablet Take 1 Tab by mouth two (2) times a day., Historical Med

## 2021-05-05 NOTE — ED TRIAGE NOTES
Patient c/o experiencing a fall on Monday. She states that she does not remember what caused her to fall. C/o injury to nose. C/o pain to nose that keeps her from sleeping. Nasal swelling noted. She does not recall LOC.

## 2021-05-23 RX ORDER — PANTOPRAZOLE SODIUM 40 MG/1
TABLET, DELAYED RELEASE ORAL
Qty: 60 TABLET | Refills: 2 | Status: SHIPPED | OUTPATIENT
Start: 2021-05-23 | End: 2022-05-02

## 2021-05-25 ENCOUNTER — HOSPITAL ENCOUNTER (EMERGENCY)
Age: 36
Discharge: HOME | End: 2021-05-25
Attending: EMERGENCY MEDICINE
Payer: MEDICAID

## 2021-05-25 ENCOUNTER — APPOINTMENT (OUTPATIENT)
Dept: CT IMAGING | Age: 36
End: 2021-05-25
Attending: EMERGENCY MEDICINE
Payer: MEDICAID

## 2021-05-25 VITALS
HEART RATE: 67 BPM | TEMPERATURE: 98.1 F | SYSTOLIC BLOOD PRESSURE: 127 MMHG | WEIGHT: 165 LBS | HEIGHT: 63 IN | BODY MASS INDEX: 29.23 KG/M2 | RESPIRATION RATE: 19 BRPM | DIASTOLIC BLOOD PRESSURE: 88 MMHG | OXYGEN SATURATION: 100 %

## 2021-05-25 DIAGNOSIS — Z87.42 HISTORY OF OVARIAN CYST: ICD-10-CM

## 2021-05-25 DIAGNOSIS — Z98.84 S/P BARIATRIC SURGERY: ICD-10-CM

## 2021-05-25 DIAGNOSIS — R10.13 ABDOMINAL PAIN, EPIGASTRIC: Primary | ICD-10-CM

## 2021-05-25 DIAGNOSIS — N70.11 HYDROSALPINX: ICD-10-CM

## 2021-05-25 LAB
ALBUMIN SERPL-MCNC: 3.1 G/DL (ref 3.4–5)
ALBUMIN/GLOB SERPL: 0.7 {RATIO} (ref 0.8–1.7)
ALP SERPL-CCNC: 111 U/L (ref 45–117)
ALT SERPL-CCNC: 20 U/L (ref 13–56)
ANION GAP SERPL CALC-SCNC: 6 MMOL/L (ref 3–18)
APPEARANCE UR: CLEAR
AST SERPL-CCNC: 19 U/L (ref 10–38)
BASOPHILS # BLD: 0 K/UL (ref 0–0.1)
BASOPHILS NFR BLD: 0 % (ref 0–2)
BILIRUB SERPL-MCNC: 0.3 MG/DL (ref 0.2–1)
BILIRUB UR QL: NEGATIVE
BUN SERPL-MCNC: 13 MG/DL (ref 7–18)
BUN/CREAT SERPL: 28 (ref 12–20)
CALCIUM SERPL-MCNC: 8.7 MG/DL (ref 8.5–10.1)
CHLORIDE SERPL-SCNC: 105 MMOL/L (ref 100–111)
CO2 SERPL-SCNC: 27 MMOL/L (ref 21–32)
COLOR UR: YELLOW
CREAT SERPL-MCNC: 0.47 MG/DL (ref 0.6–1.3)
DIFFERENTIAL METHOD BLD: ABNORMAL
EOSINOPHIL # BLD: 0.1 K/UL (ref 0–0.4)
EOSINOPHIL NFR BLD: 1 % (ref 0–5)
ERYTHROCYTE [DISTWIDTH] IN BLOOD BY AUTOMATED COUNT: 12.9 % (ref 11.6–14.5)
GLOBULIN SER CALC-MCNC: 4.3 G/DL (ref 2–4)
GLUCOSE SERPL-MCNC: 82 MG/DL (ref 74–99)
GLUCOSE UR STRIP.AUTO-MCNC: NEGATIVE MG/DL
HCG UR QL: NEGATIVE
HCT VFR BLD AUTO: 35.9 % (ref 35–45)
HGB BLD-MCNC: 11.6 G/DL (ref 12–16)
HGB UR QL STRIP: NEGATIVE
KETONES UR QL STRIP.AUTO: ABNORMAL MG/DL
LEUKOCYTE ESTERASE UR QL STRIP.AUTO: NEGATIVE
LIPASE SERPL-CCNC: 67 U/L (ref 73–393)
LYMPHOCYTES # BLD: 2.1 K/UL (ref 0.9–3.6)
LYMPHOCYTES NFR BLD: 39 % (ref 21–52)
MCH RBC QN AUTO: 30.4 PG (ref 24–34)
MCHC RBC AUTO-ENTMCNC: 32.3 G/DL (ref 31–37)
MCV RBC AUTO: 94 FL (ref 74–97)
MONOCYTES # BLD: 0.6 K/UL (ref 0.05–1.2)
MONOCYTES NFR BLD: 10 % (ref 3–10)
NEUTS SEG # BLD: 2.7 K/UL (ref 1.8–8)
NEUTS SEG NFR BLD: 49 % (ref 40–73)
NITRITE UR QL STRIP.AUTO: NEGATIVE
PH UR STRIP: 7.5 [PH] (ref 5–8)
PLATELET # BLD AUTO: 196 K/UL (ref 135–420)
PMV BLD AUTO: 11.5 FL (ref 9.2–11.8)
POTASSIUM SERPL-SCNC: 3.9 MMOL/L (ref 3.5–5.5)
PROT SERPL-MCNC: 7.4 G/DL (ref 6.4–8.2)
PROT UR STRIP-MCNC: NEGATIVE MG/DL
RBC # BLD AUTO: 3.82 M/UL (ref 4.2–5.3)
SODIUM SERPL-SCNC: 138 MMOL/L (ref 136–145)
SP GR UR REFRACTOMETRY: 1.01 (ref 1–1.03)
UROBILINOGEN UR QL STRIP.AUTO: 0.2 EU/DL (ref 0.2–1)
WBC # BLD AUTO: 5.5 K/UL (ref 4.6–13.2)

## 2021-05-25 PROCEDURE — 74011250636 HC RX REV CODE- 250/636: Performed by: EMERGENCY MEDICINE

## 2021-05-25 PROCEDURE — 74011000636 HC RX REV CODE- 636: Performed by: EMERGENCY MEDICINE

## 2021-05-25 PROCEDURE — 80053 COMPREHEN METABOLIC PANEL: CPT

## 2021-05-25 PROCEDURE — 85025 COMPLETE CBC W/AUTO DIFF WBC: CPT

## 2021-05-25 PROCEDURE — 96375 TX/PRO/DX INJ NEW DRUG ADDON: CPT

## 2021-05-25 PROCEDURE — 99281 EMR DPT VST MAYX REQ PHY/QHP: CPT

## 2021-05-25 PROCEDURE — 74177 CT ABD & PELVIS W/CONTRAST: CPT

## 2021-05-25 PROCEDURE — 74011250637 HC RX REV CODE- 250/637: Performed by: EMERGENCY MEDICINE

## 2021-05-25 PROCEDURE — 96374 THER/PROPH/DIAG INJ IV PUSH: CPT

## 2021-05-25 PROCEDURE — 81025 URINE PREGNANCY TEST: CPT

## 2021-05-25 PROCEDURE — 99284 EMERGENCY DEPT VISIT MOD MDM: CPT

## 2021-05-25 PROCEDURE — 83690 ASSAY OF LIPASE: CPT

## 2021-05-25 PROCEDURE — 81003 URINALYSIS AUTO W/O SCOPE: CPT

## 2021-05-25 RX ORDER — METOCLOPRAMIDE 10 MG/1
10 TABLET ORAL
Qty: 12 TABLET | Refills: 0 | Status: SHIPPED | OUTPATIENT
Start: 2021-05-25 | End: 2021-06-04

## 2021-05-25 RX ORDER — DICYCLOMINE HYDROCHLORIDE 10 MG/1
20 CAPSULE ORAL
Status: COMPLETED | OUTPATIENT
Start: 2021-05-25 | End: 2021-05-25

## 2021-05-25 RX ORDER — ONDANSETRON 2 MG/ML
4 INJECTION INTRAMUSCULAR; INTRAVENOUS
Status: COMPLETED | OUTPATIENT
Start: 2021-05-25 | End: 2021-05-25

## 2021-05-25 RX ORDER — FAMOTIDINE 10 MG/ML
20 INJECTION INTRAVENOUS
Status: COMPLETED | OUTPATIENT
Start: 2021-05-25 | End: 2021-05-25

## 2021-05-25 RX ORDER — DICYCLOMINE HYDROCHLORIDE 10 MG/1
20 CAPSULE ORAL
Qty: 30 CAPSULE | Refills: 0 | Status: SHIPPED | OUTPATIENT
Start: 2021-05-25 | End: 2022-10-02

## 2021-05-25 RX ORDER — CHOLECALCIFEROL (VITAMIN D3) 125 MCG
1 CAPSULE ORAL DAILY
COMMUNITY

## 2021-05-25 RX ORDER — MORPHINE SULFATE 4 MG/ML
4 INJECTION INTRAVENOUS ONCE
Status: COMPLETED | OUTPATIENT
Start: 2021-05-25 | End: 2021-05-25

## 2021-05-25 RX ADMIN — ONDANSETRON 4 MG: 2 INJECTION INTRAMUSCULAR; INTRAVENOUS at 05:14

## 2021-05-25 RX ADMIN — DICYCLOMINE HYDROCHLORIDE 20 MG: 10 CAPSULE ORAL at 05:13

## 2021-05-25 RX ADMIN — SODIUM CHLORIDE 1000 ML: 900 INJECTION, SOLUTION INTRAVENOUS at 05:14

## 2021-05-25 RX ADMIN — IOPAMIDOL 100 ML: 612 INJECTION, SOLUTION INTRAVENOUS at 06:24

## 2021-05-25 RX ADMIN — MORPHINE SULFATE 4 MG: 4 INJECTION INTRAVENOUS at 05:09

## 2021-05-25 RX ADMIN — IOHEXOL: 240 INJECTION, SOLUTION INTRATHECAL; INTRAVASCULAR; INTRAVENOUS; ORAL at 05:13

## 2021-05-25 RX ADMIN — FAMOTIDINE 20 MG: 10 INJECTION, SOLUTION INTRAVENOUS at 05:13

## 2021-05-25 NOTE — ED PROVIDER NOTES
EMERGENCY DEPARTMENT HISTORY AND PHYSICAL EXAM    Date: 5/25/2021  Patient Name: Jono Rojo    History of Presenting Illness     Chief Complaint   Patient presents with    Abdominal Pain         History Provided By: Patient    Additional History (Context):   5:02 AM  Jono Rojo is a 38 y.o. female with PMHX of morbid obesity status post bariatric surgery with revision from gastric sleeve to Kori-en-Y according to records with intestinal malabsorption, incisional hernia, internal intestinal hernia, reflux, who presents to the emergency department C/O abdominal pain. She elaborates on a migratory pain involving over 24 hours. Symptoms removed from right upper quadrant to left upper quadrant and now left-sided and left lower quadrant she states the symptoms feel very similar but not exactly like her previous hernias. She has had both incisional warmth ventral wall lesions as well as internal hernia. She denies nausea vomiting or diarrhea as well as chest pain shortness of breath. She has no fevers or chills. She denies any vaginal discharge or pelvic pain and states her last menstrual cycle was basically normal 2 weeks ago, lasting over 6 days. Social History  Denies smoking drinking or drugs    Family History  Unremarkable      PCP: Juan José Chiang MD    Current Outpatient Medications   Medication Sig Dispense Refill    cholecalciferol, vitamin D3, (Vitamin D3) 50 mcg (2,000 unit) tab Take 1 Tablet by mouth daily.  dicyclomine (BENTYL) 10 mg capsule Take 2 Capsules by mouth four (4) times daily as needed for Abdominal Cramps. 30 Capsule 0    metoclopramide HCl (Reglan) 10 mg tablet Take 1 Tablet by mouth every six (6) hours as needed for Nausea for up to 10 days. 12 Tablet 0    pantoprazole (PROTONIX) 40 mg tablet TAKE 1 TABLET BY MOUTH TWO TIMES A DAY FOR 30 DAYS.  INDICATIONS: GASTRIC ULCER 60 Tablet 2    fluticasone propionate (FLONASE) 50 mcg/actuation nasal spray 2 Sprays by Both Nostrils route daily. 1 Bottle 0    b complex vitamins tablet Take 1 Tab by mouth daily.  Biotin 2,500 mcg cap Take  by mouth.  cyanocobalamin (VITAMIN B-12) 500 mcg tablet Take 500 mcg by mouth daily.  calcium citrate 200 mg (950 mg) tablet Take  by mouth daily.  multivitamin with iron (FLINTSTONES) chewable tablet Take 1 Tab by mouth two (2) times a day.  gabapentin (NEURONTIN) 600 mg tablet Take 1 Tab by mouth three (3) times daily. Max Daily Amount: 1,800 mg. (Patient not taking: Reported on 2021) 90 Tab 2       Past History     Past Medical History:  Past Medical History:   Diagnosis Date    Acid reflux     Incisional hernia     Intestinal malabsorption     Morbid obesity with body mass index of 40.0-49.9 ([facility])     Status post bariatric surgery 2012    sleeve resection / kirstin Lazar       Past Surgical History:  Past Surgical History:   Procedure Laterality Date    HX  SECTION      X 3    HX GI      Sleeve gastrectomy- Dr Yazmin Alas HX GI      Conversion to gastric bypass-2017    HX GI      Internal hernia ( dr Esperanza Finn) -2018    HX GI      Internal hernia - 2019    HX ORTHOPAEDIC      scoliosis correction surgery X 2    HX TONSIL AND ADENOIDECTOMY      HX TUBAL LIGATION      NM LAP GASTRIC BYPASS/TAMICA-EN-Y      revision from sleeve on 2017       Family History:  History reviewed. No pertinent family history. Social History:  Social History     Tobacco Use    Smoking status: Never Smoker    Smokeless tobacco: Never Used   Substance Use Topics    Alcohol use: No     Alcohol/week: 0.0 standard drinks    Drug use: No       Allergies: Allergies   Allergen Reactions    Nsaids (Non-Steroidal Anti-Inflammatory Drug) Other (comments)     GI Ulcer- Hx of Gastric Bypass    Buprenorphine Hcl Hives and Other (comments)         Review of Systems   Review of Systems   Constitutional: Negative. HENT: Negative. Eyes: Negative. Respiratory: Negative. Cardiovascular: Negative. Gastrointestinal: Positive for abdominal pain and nausea. Endocrine: Negative. Genitourinary: Positive for flank pain. Negative for menstrual problem, vaginal bleeding and vaginal pain. Left-sided flank pain   Musculoskeletal: Positive for back pain. Skin: Negative. Allergic/Immunologic: Negative. Neurological: Negative. Hematological: Does not bruise/bleed easily. Psychiatric/Behavioral: Positive for dysphoric mood. Negative for decreased concentration, self-injury and suicidal ideas. Long-term depression without worsening of symptoms. Symptoms are basically controlled   All other systems reviewed and are negative. Physical Exam     Vitals:    05/25/21 0405 05/25/21 0643 05/25/21 0701   BP: (!) 133/100 112/73 127/88   Pulse: 81 67 67   Resp: 18 14 19   Temp: 98.1 °F (36.7 °C)     SpO2: 100% 100% 100%   Weight: 74.8 kg (165 lb)     Height: 5' 3\" (1.6 m)       Physical Exam  Vitals and nursing note reviewed. Constitutional:       General: She is not in acute distress. Appearance: She is well-developed. She is not diaphoretic. HENT:      Head: Normocephalic and atraumatic. Eyes:      General: No scleral icterus. Extraocular Movements:      Right eye: Normal extraocular motion. Left eye: Normal extraocular motion. Conjunctiva/sclera: Conjunctivae normal.      Pupils: Pupils are equal, round, and reactive to light. Neck:      Trachea: No tracheal deviation. Cardiovascular:      Rate and Rhythm: Normal rate and regular rhythm. Heart sounds: Normal heart sounds. Pulmonary:      Effort: Pulmonary effort is normal. No respiratory distress. Breath sounds: Normal breath sounds. No stridor. Abdominal:      General: Bowel sounds are normal. There is no distension. Palpations: Abdomen is soft. Tenderness:  There is abdominal tenderness in the epigastric area, periumbilical area, left upper quadrant and left lower quadrant. There is left CVA tenderness. There is no guarding or rebound. Hernia: No hernia is present. Comments: Prominent loose skin to abdomen. No rebound or guarding. Musculoskeletal:         General: No tenderness. Normal range of motion. Cervical back: Normal range of motion and neck supple. Comments: Grossly unremarkable without abnormalities   Skin:     General: Skin is warm and dry. Capillary Refill: Capillary refill takes less than 2 seconds. Findings: No erythema or rash. Neurological:      Mental Status: She is alert and oriented to person, place, and time. GCS: GCS eye subscore is 4. GCS verbal subscore is 5. GCS motor subscore is 6. Cranial Nerves: No cranial nerve deficit. Motor: Motor function is intact. No weakness. Coordination: Coordination is intact. Psychiatric:         Mood and Affect: Mood normal.         Behavior: Behavior normal.         Thought Content: Thought content normal.         Judgment: Judgment normal.       Diagnostic Study Results     Labs -  Recent Results (from the past 24 hour(s))   CBC WITH AUTOMATED DIFF    Collection Time: 05/25/21  4:10 AM   Result Value Ref Range    WBC 5.5 4.6 - 13.2 K/uL    RBC 3.82 (L) 4.20 - 5.30 M/uL    HGB 11.6 (L) 12.0 - 16.0 g/dL    HCT 35.9 35.0 - 45.0 %    MCV 94.0 74.0 - 97.0 FL    MCH 30.4 24.0 - 34.0 PG    MCHC 32.3 31.0 - 37.0 g/dL    RDW 12.9 11.6 - 14.5 %    PLATELET 009 491 - 598 K/uL    MPV 11.5 9.2 - 11.8 FL    NEUTROPHILS 49 40 - 73 %    LYMPHOCYTES 39 21 - 52 %    MONOCYTES 10 3 - 10 %    EOSINOPHILS 1 0 - 5 %    BASOPHILS 0 0 - 2 %    ABS. NEUTROPHILS 2.7 1.8 - 8.0 K/UL    ABS. LYMPHOCYTES 2.1 0.9 - 3.6 K/UL    ABS. MONOCYTES 0.6 0.05 - 1.2 K/UL    ABS. EOSINOPHILS 0.1 0.0 - 0.4 K/UL    ABS.  BASOPHILS 0.0 0.0 - 0.1 K/UL    DF AUTOMATED     METABOLIC PANEL, COMPREHENSIVE    Collection Time: 05/25/21  4:10 AM   Result Value Ref Range    Sodium 138 136 - 145 mmol/L    Potassium 3.9 3.5 - 5.5 mmol/L    Chloride 105 100 - 111 mmol/L    CO2 27 21 - 32 mmol/L    Anion gap 6 3.0 - 18 mmol/L    Glucose 82 74 - 99 mg/dL    BUN 13 7.0 - 18 MG/DL    Creatinine 0.47 (L) 0.6 - 1.3 MG/DL    BUN/Creatinine ratio 28 (H) 12 - 20      GFR est AA >60 >60 ml/min/1.73m2    GFR est non-AA >60 >60 ml/min/1.73m2    Calcium 8.7 8.5 - 10.1 MG/DL    Bilirubin, total 0.3 0.2 - 1.0 MG/DL    ALT (SGPT) 20 13 - 56 U/L    AST (SGOT) 19 10 - 38 U/L    Alk. phosphatase 111 45 - 117 U/L    Protein, total 7.4 6.4 - 8.2 g/dL    Albumin 3.1 (L) 3.4 - 5.0 g/dL    Globulin 4.3 (H) 2.0 - 4.0 g/dL    A-G Ratio 0.7 (L) 0.8 - 1.7     LIPASE    Collection Time: 05/25/21  4:10 AM   Result Value Ref Range    Lipase 67 (L) 73 - 393 U/L   URINALYSIS W/ RFLX MICROSCOPIC    Collection Time: 05/25/21  5:20 AM   Result Value Ref Range    Color YELLOW      Appearance CLEAR      Specific gravity 1.015 1.005 - 1.030      pH (UA) 7.5 5.0 - 8.0      Protein Negative NEG mg/dL    Glucose Negative NEG mg/dL    Ketone TRACE (A) NEG mg/dL    Bilirubin Negative NEG      Blood Negative NEG      Urobilinogen 0.2 0.2 - 1.0 EU/dL    Nitrites Negative NEG      Leukocyte Esterase Negative NEG     HCG URINE, QL    Collection Time: 05/25/21  5:20 AM   Result Value Ref Range    HCG urine, QL Negative NEG          Radiologic Studies -   CT ABD PELV W CONT   Final Result      1. Status post gastric bypass without evidence of obstruction or other   complication. No clear explanation for pain symptoms. 2. Suspected hydrosalpinx on the left. Benign-appearing left ovarian cyst   measuring 3.3 cm. US PELV NON OBS  LTD    (Results Pending)     CT Results  (Last 48 hours)               05/25/21 0616  CT ABD PELV W CONT Final result    Impression:      1. Status post gastric bypass without evidence of obstruction or other   complication. No clear explanation for pain symptoms. 2. Suspected hydrosalpinx on the left. Benign-appearing left ovarian cyst   measuring 3.3 cm. Narrative:  EXAM: CT ABDOMEN AND PELVIS        CLINICAL INDICATION/HISTORY:  Epigastric pain. Prior gastric bypass. COMPARISON: 8/6/2020       TECHNIQUE: Axial CT imaging of the abdomen and pelvis was performed with   intravenous contrast. Multiplanar reformats were generated. One or more dose   reduction techniques were used on this CT: automated exposure control,   adjustment of the mAs and/or kVp according to patient size, and iterative   reconstruction techniques. The specific techniques used on this CT exam have   been documented in the patient's electronic medical record. Digital Imaging and   Communications in Medicine (DICOM) format image data are available to   nonaffiliated external healthcare facilities or entities on a secure, media   free, reciprocally searchable basis with patient authorization for at least a   12-month period after this study. _______________       FINDINGS:       LOWER CHEST: Unremarkable. LIVER, BILIARY: Liver is normal. No biliary dilation. Gallbladder is   unremarkable. SPLEEN: Normal.       PANCREAS: Normal.       ADRENALS: Normal.       KIDNEYS/URETERS/BLADDER: Normal.       LYMPH NODES: No enlarged lymph nodes. GASTROINTESTINAL TRACT: Status post gastric bypass. No obstruction. No   convincing acute inflammatory findings of the GI tract. VASCULATURE: Unremarkable. PELVIC ORGANS: Presumably physiologic left ovarian cyst measuring 3.3 cm (image   79). Suspected left-sided hydrosalpinx (image 87)       BONES: No acute or aggressive osseous abnormalities identified. Thoracolumbar   scoliosis hardware.        OTHER: None.       _______________               CXR Results  (Last 48 hours)    None          Medications given in the ED-  Medications   iohexol (OMNIPAQUE) ORAL mixture ( Oral Given 5/25/21 0513)   dicyclomine (BENTYL) capsule 20 mg (20 mg Oral Given 5/25/21 0513) morphine injection 4 mg (4 mg IntraVENous Given 5/25/21 3385)   ondansetron (ZOFRAN) injection 4 mg (4 mg IntraVENous Given 5/25/21 6514)   sodium chloride 0.9 % bolus infusion 1,000 mL (0 mL IntraVENous IV Completed 5/25/21 0614)   famotidine (PF) (PEPCID) injection 20 mg (20 mg IntraVENous Given 5/25/21 0513)   iopamidoL (ISOVUE 300) 61 % contrast injection  mL (100 mL IntraVENous Given 5/25/21 1342)         Medical Decision Making   I am the first provider for this patient. I reviewed the vital signs, available nursing notes, past medical history, past surgical history, family history and social history. Vital Signs-Reviewed the patient's vital signs. Pulse Oximetry Analysis - 100% on room air    Cardiac Monitor:  Rate: 71 bpm  Rhythm: Sinus rhythm      Records Reviewed: NURSING NOTES AND PREVIOUS MEDICAL RECORDS    Provider Notes (Medical Decision Making): Symptoms of migratory abdominal pain suggest gastrointestinal aching cramping possible bowel blockage or obstruction or complication from previous gastric bypass surgery. Ovarian pathology also possible pregnancy test was negative. She has also had chronic pain issues with lower back or the symptoms seem somewhat different. CAT scan did demonstrate a suspicious fluid collection possibly within the fallopian tubes and associated with ovarian cyst although she is adamant there are no issues with sexual partners or contacts. After some discussion we agreed antibiotics were not indicated at this time however urgent ultrasound could be directed which she wished to complete as an outpatient as opposed to staying in the emergency room today. I order this for her and she can follow-up results with her primary care doctor and gastrointestinal specialist and she was encouraged to return for worsening of her symptoms. Procedures:  Procedures    ED Course:   5:02 AM: Initial assessment performed.  The patients presenting problems have been discussed, and they are in agreement with the care plan formulated and outlined with them. I have encouraged them to ask questions as they arise throughout their visit. Diagnosis and Disposition       DISCHARGE NOTE:  7:20 AM  Evelina Timmons's  results have been reviewed with her. She has been counseled regarding her diagnosis, treatment, and plan. She verbally conveys understanding and agreement of the signs, symptoms, diagnosis, treatment and prognosis and additionally agrees to follow up as discussed. She also agrees with the care-plan and conveys that all of her questions have been answered. I have also provided discharge instructions for her that include: educational information regarding their diagnosis and treatment, and list of reasons why they would want to return to the ED prior to their follow-up appointment, should her condition change. She has been provided with education for proper emergency department utilization. CLINICAL IMPRESSION:    1. Abdominal pain, epigastric    2. S/P bariatric surgery    3. Hydrosalpinx    4. History of ovarian cyst        PLAN:  1. D/C Home  2. Discharge Medication List as of 5/25/2021  7:34 AM        3. Follow-up Information     Follow up With Specialties Details Why Contact Info    Jonh Machado MD Family Medicine In 1 week  [address], [place] [address] [place] [address]      Romie Meckel, MD Bariatrics, General Surgery In 1 week  [address] [facility]  Anselmo Martínez MD Obstetrics & Gynecology, Gynecology, Obstetrics In 1 week  [address]  [address] [place] [address]  [phone]          _______________________________    This note was partially transcribed via voice recognition software. Although efforts have been made to catch any discrepancies, it may contain sound alike words, grammatical errors, or nonsensical words.

## 2021-05-25 NOTE — ED TRIAGE NOTES
Pt co RUQ, LUQ pain since yesterday morning, worsened today. Pt describes pain as constant sharp pain, rates 9/10, radiates to lower back. Pt states \"I've had two hernias before, and it feels just like it did then\". Pt denies any bulging area on her bad, CP, SOB, fever, cough, N/V/D, urinary symptoms. LBM yesterday, formed. LMP 2 wks, lasted 6 days. Pt has hx of gastric sleeve, gastric bypass, hernia repair x 2, back surgeries.

## 2021-05-27 ENCOUNTER — HOSPITAL ENCOUNTER (OUTPATIENT)
Dept: ULTRASOUND IMAGING | Age: 36
Discharge: HOME | End: 2021-05-27
Attending: EMERGENCY MEDICINE
Payer: MEDICAID

## 2021-05-27 DIAGNOSIS — N70.11 HYDROSALPINX: ICD-10-CM

## 2021-05-27 DIAGNOSIS — Z87.42 HISTORY OF OVARIAN CYST: ICD-10-CM

## 2021-05-27 PROCEDURE — 93975 VASCULAR STUDY: CPT

## 2021-07-23 ENCOUNTER — HOSPITAL ENCOUNTER (EMERGENCY)
Age: 36
Discharge: LEFT BEFORE DISCHARGE | End: 2021-07-23
Attending: EMERGENCY MEDICINE
Payer: MEDICAID

## 2021-07-23 PROCEDURE — 75810000275 HC EMERGENCY DEPT VISIT NO LEVEL OF CARE

## 2021-09-14 ENCOUNTER — APPOINTMENT (OUTPATIENT)
Dept: ULTRASOUND IMAGING | Age: 36
End: 2021-09-14
Attending: EMERGENCY MEDICINE
Payer: MEDICAID

## 2021-09-14 ENCOUNTER — HOSPITAL ENCOUNTER (EMERGENCY)
Age: 36
Discharge: HOME | End: 2021-09-14
Attending: EMERGENCY MEDICINE
Payer: MEDICAID

## 2021-09-14 VITALS
HEART RATE: 72 BPM | SYSTOLIC BLOOD PRESSURE: 133 MMHG | DIASTOLIC BLOOD PRESSURE: 86 MMHG | BODY MASS INDEX: 34.2 KG/M2 | TEMPERATURE: 98 F | OXYGEN SATURATION: 97 % | HEIGHT: 63 IN | WEIGHT: 193 LBS | RESPIRATION RATE: 16 BRPM

## 2021-09-14 DIAGNOSIS — R10.32 ABDOMINAL PAIN, LLQ (LEFT LOWER QUADRANT): Primary | ICD-10-CM

## 2021-09-14 LAB
ALBUMIN SERPL-MCNC: 2.9 G/DL (ref 3.4–5)
ALBUMIN/GLOB SERPL: 0.7 {RATIO} (ref 0.8–1.7)
ALP SERPL-CCNC: 103 U/L (ref 45–117)
ALT SERPL-CCNC: 22 U/L (ref 13–56)
ANION GAP SERPL CALC-SCNC: 7 MMOL/L (ref 3–18)
APPEARANCE UR: CLEAR
AST SERPL-CCNC: 17 U/L (ref 10–38)
BASOPHILS # BLD: 0 K/UL (ref 0–0.1)
BASOPHILS NFR BLD: 0 % (ref 0–2)
BILIRUB SERPL-MCNC: 0.2 MG/DL (ref 0.2–1)
BILIRUB UR QL: NEGATIVE
BUN SERPL-MCNC: 16 MG/DL (ref 7–18)
BUN/CREAT SERPL: 33 (ref 12–20)
CALCIUM SERPL-MCNC: 8.7 MG/DL (ref 8.5–10.1)
CHLORIDE SERPL-SCNC: 108 MMOL/L (ref 100–111)
CO2 SERPL-SCNC: 25 MMOL/L (ref 21–32)
COLOR UR: YELLOW
CREAT SERPL-MCNC: 0.48 MG/DL (ref 0.6–1.3)
DIFFERENTIAL METHOD BLD: ABNORMAL
EOSINOPHIL # BLD: 0.2 K/UL (ref 0–0.4)
EOSINOPHIL NFR BLD: 3 % (ref 0–5)
ERYTHROCYTE [DISTWIDTH] IN BLOOD BY AUTOMATED COUNT: 13.9 % (ref 11.6–14.5)
GLOBULIN SER CALC-MCNC: 4.2 G/DL (ref 2–4)
GLUCOSE SERPL-MCNC: 90 MG/DL (ref 74–99)
GLUCOSE UR STRIP.AUTO-MCNC: NEGATIVE MG/DL
HCG SERPL QL: NEGATIVE
HCT VFR BLD AUTO: 34 % (ref 35–45)
HGB BLD-MCNC: 11.1 G/DL (ref 12–16)
HGB UR QL STRIP: NEGATIVE
KETONES UR QL STRIP.AUTO: NEGATIVE MG/DL
LEUKOCYTE ESTERASE UR QL STRIP.AUTO: NEGATIVE
LYMPHOCYTES # BLD: 2.6 K/UL (ref 0.9–3.6)
LYMPHOCYTES NFR BLD: 43 % (ref 21–52)
MCH RBC QN AUTO: 30.2 PG (ref 24–34)
MCHC RBC AUTO-ENTMCNC: 32.6 G/DL (ref 31–37)
MCV RBC AUTO: 92.4 FL (ref 78–100)
MONOCYTES # BLD: 0.4 K/UL (ref 0.05–1.2)
MONOCYTES NFR BLD: 6 % (ref 3–10)
NEUTS SEG # BLD: 2.9 K/UL (ref 1.8–8)
NEUTS SEG NFR BLD: 48 % (ref 40–73)
NITRITE UR QL STRIP.AUTO: NEGATIVE
PH UR STRIP: 6.5 [PH] (ref 5–8)
PLATELET # BLD AUTO: 289 K/UL (ref 135–420)
PLATELET COMMENTS,PCOM: ABNORMAL
PMV BLD AUTO: 11.5 FL (ref 9.2–11.8)
POTASSIUM SERPL-SCNC: 4.3 MMOL/L (ref 3.5–5.5)
PROT SERPL-MCNC: 7.1 G/DL (ref 6.4–8.2)
PROT UR STRIP-MCNC: NEGATIVE MG/DL
RBC # BLD AUTO: 3.68 M/UL (ref 4.2–5.3)
RBC MORPH BLD: ABNORMAL
SERVICE CMNT-IMP: NORMAL
SODIUM SERPL-SCNC: 140 MMOL/L (ref 136–145)
SP GR UR REFRACTOMETRY: 1.02 (ref 1–1.03)
UROBILINOGEN UR QL STRIP.AUTO: 1 EU/DL (ref 0.2–1)
WBC # BLD AUTO: 6.1 K/UL (ref 4.6–13.2)
WBC MORPH BLD: ABNORMAL
WET PREP GENITAL: NORMAL

## 2021-09-14 PROCEDURE — 96374 THER/PROPH/DIAG INJ IV PUSH: CPT

## 2021-09-14 PROCEDURE — 85025 COMPLETE CBC W/AUTO DIFF WBC: CPT

## 2021-09-14 PROCEDURE — 74011250636 HC RX REV CODE- 250/636: Performed by: EMERGENCY MEDICINE

## 2021-09-14 PROCEDURE — 87210 SMEAR WET MOUNT SALINE/INK: CPT

## 2021-09-14 PROCEDURE — 80053 COMPREHEN METABOLIC PANEL: CPT

## 2021-09-14 PROCEDURE — 76830 TRANSVAGINAL US NON-OB: CPT

## 2021-09-14 PROCEDURE — 87491 CHLMYD TRACH DNA AMP PROBE: CPT

## 2021-09-14 PROCEDURE — 84703 CHORIONIC GONADOTROPIN ASSAY: CPT

## 2021-09-14 PROCEDURE — 99284 EMERGENCY DEPT VISIT MOD MDM: CPT

## 2021-09-14 PROCEDURE — 81003 URINALYSIS AUTO W/O SCOPE: CPT

## 2021-09-14 RX ORDER — DIPHENHYDRAMINE HYDROCHLORIDE 50 MG/ML
50 INJECTION, SOLUTION INTRAMUSCULAR; INTRAVENOUS ONCE
Status: DISCONTINUED | OUTPATIENT
Start: 2021-09-14 | End: 2021-09-15 | Stop reason: HOSPADM

## 2021-09-14 RX ORDER — MORPHINE SULFATE 10 MG/ML
6 INJECTION, SOLUTION INTRAMUSCULAR; INTRAVENOUS
Status: COMPLETED | OUTPATIENT
Start: 2021-09-14 | End: 2021-09-14

## 2021-09-14 RX ADMIN — MORPHINE SULFATE 6 MG: 10 INJECTION INTRAVENOUS at 20:27

## 2021-09-14 NOTE — ED PROVIDER NOTES
EMERGENCY DEPARTMENT HISTORY AND PHYSICAL EXAM    Date: 9/14/2021  Patient Name: Susan Macedo    History of Presenting Illness     Chief Complaint   Patient presents with    Abdominal Pain         History Provided By: Patient    7:09 PM  Susan Macedo is a 38 y.o. female with PMHX of gastric bypass surgery who presents to the emergency department C/O abdominal pain. Patient reports for the past 2 months she has had intermittent pain in the left lower part of her abdomen but today it became more intense and more frequent. She describes it as a sharp 8 out of 10 pain that comes and goes without clear relieving or exacerbating factors. She denies any associated fever, nausea, vomiting, bowel or urinary complaints, vaginal bleeding, vaginal discharge. PCP: Allan Choi MD    Current Facility-Administered Medications   Medication Dose Route Frequency Provider Last Rate Last Admin    diphenhydrAMINE (BENADRYL) injection 50 mg  50 mg IntraVENous ONCE Anest, Gerard Blackman MD         Current Outpatient Medications   Medication Sig Dispense Refill    cholecalciferol, vitamin D3, (Vitamin D3) 50 mcg (2,000 unit) tab Take 1 Tablet by mouth daily.  dicyclomine (BENTYL) 10 mg capsule Take 2 Capsules by mouth four (4) times daily as needed for Abdominal Cramps. 30 Capsule 0    pantoprazole (PROTONIX) 40 mg tablet TAKE 1 TABLET BY MOUTH TWO TIMES A DAY FOR 30 DAYS. INDICATIONS: GASTRIC ULCER 60 Tablet 2    fluticasone propionate (FLONASE) 50 mcg/actuation nasal spray 2 Sprays by Both Nostrils route daily. 1 Bottle 0    gabapentin (NEURONTIN) 600 mg tablet Take 1 Tab by mouth three (3) times daily. Max Daily Amount: 1,800 mg. (Patient not taking: Reported on 5/25/2021) 90 Tab 2    b complex vitamins tablet Take 1 Tab by mouth daily.  Biotin 2,500 mcg cap Take  by mouth.  cyanocobalamin (VITAMIN B-12) 500 mcg tablet Take 500 mcg by mouth daily.       calcium citrate 200 mg (950 mg) tablet Take  by mouth daily.  multivitamin with iron (FLINTSTONES) chewable tablet Take 1 Tab by mouth two (2) times a day. Past History     Past Medical History:  Past Medical History:   Diagnosis Date    Acid reflux     Incisional hernia     Intestinal malabsorption     Morbid obesity with body mass index of 40.0-49.9 ([facility])     Status post bariatric surgery 2012    sleeve resection / kirstin Higgins       Past Surgical History:  Past Surgical History:   Procedure Laterality Date    HX  SECTION      X 3    HX GI      Sleeve gastrectomy- Dr Latrice Turner HX GI      Conversion to gastric bypass-2017    HX GI      Internal hernia ( dr Himanshu Cavanaugh) -2018    HX GI      Internal hernia - 2019    HX ORTHOPAEDIC      scoliosis correction surgery X 2    HX TONSIL AND ADENOIDECTOMY      HX TUBAL LIGATION      SC LAP GASTRIC BYPASS/TAMICA-EN-Y      revision from sleeve on 2017       Family History:  History reviewed. No pertinent family history. Social History:  Social History     Tobacco Use    Smoking status: Never Smoker    Smokeless tobacco: Never Used   Substance Use Topics    Alcohol use: No     Alcohol/week: 0.0 standard drinks    Drug use: No       Allergies: Allergies   Allergen Reactions    Nsaids (Non-Steroidal Anti-Inflammatory Drug) Other (comments)     GI Ulcer- Hx of Gastric Bypass    Buprenorphine Hcl Hives and Other (comments)         Review of Systems   Review of Systems   Constitutional: Negative for fever. Respiratory: Negative for shortness of breath. Cardiovascular: Negative for chest pain. Gastrointestinal: Positive for abdominal pain. All other systems reviewed and are negative.         Physical Exam     Vitals:    21 1852 21 2121 21 2145   BP: (!) 158/90 (!) 135/90 (!) 140/83   Pulse: 80     Resp: 16     Temp: 98 °F (36.7 °C)     SpO2: 100%  100%   Weight: 87.5 kg (193 lb)     Height: 5' 3\" (1.6 m)       Physical Exam    Nursing notes and vital signs reviewed    Constitutional: Non toxic appearing, moderate distress  Head: Normocephalic, Atraumatic  Eyes: EOMI  Neck: Supple  Cardiovascular: Regular rate and rhythm, no murmurs, rubs, or gallops  Chest: Normal work of breathing and chest excursion bilaterally  Lungs: Clear to ausculation bilaterally  Abdomen: Soft, left lower quadrant tenderness without rebound or guarding, otherwise nontender, non distended  Pelvic: See below  Back: No evidence of trauma or deformity  Extremities: No evidence of trauma or deformity, no LE edema  Skin: Warm and dry, normal cap refill  Neuro: Alert and appropriate  Psychiatric: Normal mood and affect      Diagnostic Study Results     Labs -     Recent Results (from the past 12 hour(s))   URINALYSIS W/ RFLX MICROSCOPIC    Collection Time: 09/14/21  7:06 PM   Result Value Ref Range    Color YELLOW      Appearance CLEAR      Specific gravity 1.025 1.005 - 1.030      pH (UA) 6.5 5.0 - 8.0      Protein Negative NEG mg/dL    Glucose Negative NEG mg/dL    Ketone Negative NEG mg/dL    Bilirubin Negative NEG      Blood Negative NEG      Urobilinogen 1.0 0.2 - 1.0 EU/dL    Nitrites Negative NEG      Leukocyte Esterase Negative NEG     CBC WITH AUTOMATED DIFF    Collection Time: 09/14/21  7:26 PM   Result Value Ref Range    WBC 6.1 4.6 - 13.2 K/uL    RBC 3.68 (L) 4.20 - 5.30 M/uL    HGB 11.1 (L) 12.0 - 16.0 g/dL    HCT 34.0 (L) 35.0 - 45.0 %    MCV 92.4 78.0 - 100.0 FL    MCH 30.2 24.0 - 34.0 PG    MCHC 32.6 31.0 - 37.0 g/dL    RDW 13.9 11.6 - 14.5 %    PLATELET 190 326 - 037 K/uL    MPV 11.5 9.2 - 11.8 FL    NEUTROPHILS 48 40 - 73 %    LYMPHOCYTES 43 21 - 52 %    MONOCYTES 6 3 - 10 %    EOSINOPHILS 3 0 - 5 %    BASOPHILS 0 0 - 2 %    ABS. NEUTROPHILS 2.9 1.8 - 8.0 K/UL    ABS. LYMPHOCYTES 2.6 0.9 - 3.6 K/UL    ABS. MONOCYTES 0.4 0.05 - 1.2 K/UL    ABS. EOSINOPHILS 0.2 0.0 - 0.4 K/UL    ABS.  BASOPHILS 0.0 0.0 - 0.1 K/UL    DF MANUAL      PLATELET COMMENTS ADEQUATE PLATELETS RBC COMMENTS NORMOCYTIC, NORMOCHROMIC      WBC COMMENTS REACTIVE LYMPHS     METABOLIC PANEL, COMPREHENSIVE    Collection Time: 09/14/21  7:26 PM   Result Value Ref Range    Sodium 140 136 - 145 mmol/L    Potassium 4.3 3.5 - 5.5 mmol/L    Chloride 108 100 - 111 mmol/L    CO2 25 21 - 32 mmol/L    Anion gap 7 3.0 - 18 mmol/L    Glucose 90 74 - 99 mg/dL    BUN 16 7.0 - 18 MG/DL    Creatinine 0.48 (L) 0.6 - 1.3 MG/DL    BUN/Creatinine ratio 33 (H) 12 - 20      GFR est AA >60 >60 ml/min/1.73m2    GFR est non-AA >60 >60 ml/min/1.73m2    Calcium 8.7 8.5 - 10.1 MG/DL    Bilirubin, total 0.2 0.2 - 1.0 MG/DL    ALT (SGPT) 22 13 - 56 U/L    AST (SGOT) 17 10 - 38 U/L    Alk. phosphatase 103 45 - 117 U/L    Protein, total 7.1 6.4 - 8.2 g/dL    Albumin 2.9 (L) 3.4 - 5.0 g/dL    Globulin 4.2 (H) 2.0 - 4.0 g/dL    A-G Ratio 0.7 (L) 0.8 - 1.7     HCG QL SERUM    Collection Time: 09/14/21  7:26 PM   Result Value Ref Range    HCG, Ql. Negative NEG     WET PREP    Collection Time: 09/14/21  8:22 PM    Specimen: Vagina   Result Value Ref Range    Special Requests: NO SPECIAL REQUESTS      Wet prep NO YEAST,TRICHOMONAS OR CLUE CELLS NOTED         Radiologic Studies -   US TRANSVAGINAL W DOPPLER   Final Result      No evidence of ovarian torsion or acute findings of concern. Possible 2 x 3 cm   myometrial fibroid versus artifact.      ______________        CT Results  (Last 48 hours)    None        CXR Results  (Last 48 hours)    None          Medications given in the ED-  Medications   diphenhydrAMINE (BENADRYL) injection 50 mg (50 mg IntraVENous Incomplete 9/14/21 2148)   morphine 10 mg/mL injection 6 mg (6 mg IntraVENous Given 9/14/21 2027)         Medical Decision Making   I am the first provider for this patient. I reviewed the vital signs, available nursing notes, past medical history, past surgical history, family history and social history. Vital Signs-Reviewed the patient's vital signs.     Pulse Oximetry Analysis - 100% on room air, not hypoxic     Records Reviewed: Nursing Notes    Provider Notes (Medical Decision Making): Jarod Denis is a 38 y.o. female presenting for left lower quadrant pain. No acute process identified on labs, ultrasound other than small uterine fibroid. Discussed this finding with patient. She feels much better here after symptomatic management. Plan for discharge with primary care and OB/GYN follow-up with return precautions. Patient understands and agrees with this plan. Procedures:  Procedures    ED Course:   8:22 PM  Exam was chaperoned by Jeffrey Boxer RN  Normal external genitalia. Physiologic discharge in the vault. Normal cervix appearance. On bimanual exam, no cervical, uterine, or right adnexal tenderness. Positive left adnexal tenderness. 10:31 PM  Updated patient on all results and plan. All questions answered. Patient reports her symptoms are much improved. Diagnosis and Disposition     Critical Care: None    DISCHARGE NOTE:    Janan Meckel Artis's  results have been reviewed with her. She has been counseled regarding her diagnosis, treatment, and plan. She verbally conveys understanding and agreement of the signs, symptoms, diagnosis, treatment and prognosis and additionally agrees to follow up as discussed. She also agrees with the care-plan and conveys that all of her questions have been answered. I have also provided discharge instructions for her that include: educational information regarding their diagnosis and treatment, and list of reasons why they would want to return to the ED prior to their follow-up appointment, should her condition change. She has been provided with education for proper emergency department utilization. CLINICAL IMPRESSION:    1. Abdominal pain, LLQ (left lower quadrant)        PLAN:  1. D/C Home  2. Current Discharge Medication List        3.    Follow-up Information     Follow up With Specialties Details Why Contact Heather Torres MD Family Medicine Schedule an appointment as soon as possible for a visit   [address] [facility] [place] [address]      Ron Martins MD Obstetrics & Gynecology, Gynecology, Obstetrics Schedule an appointment as soon as possible for a visit  [facility] [address] [place], [place] [address] [address] [phone]      [facility] EMERGENCY DEPT Emergency Medicine  If symptoms worsen [address] [place] [address]  [phone]        _______________________________      Please note that this dictation was completed with [employer], the computer voice recognition software. Quite often unanticipated grammatical, syntax, homophones, and other interpretive errors are inadvertently transcribed by the computer software. Please disregard these errors. Please excuse any errors that have escaped final proofreading.

## 2021-09-16 LAB
C TRACH RRNA SPEC QL NAA+PROBE: NEGATIVE
N GONORRHOEA RRNA SPEC QL NAA+PROBE: NEGATIVE
SPECIMEN SOURCE: NORMAL

## 2021-12-30 NOTE — PROGRESS NOTES
Problem: Falls - Risk of 
Goal: *Absence of Falls Document Bowen Dust Fall Risk and appropriate interventions in the flowsheet. Outcome: Progressing Towards Goal 
Fall Risk Interventions: 
  
 
  
 
Medication Interventions: Assess postural VS orthostatic hypotension, Evaluate medications/consider consulting pharmacy Posterior Auricular Interpolation Flap Text: A decision was made to reconstruct the defect utilizing an interpolation axial flap and a staged reconstruction.  A telfa template was made of the defect.  This telfa template was then used to outline the posterior auricular interpolation flap.  The donor area for the pedicle flap was then injected with anesthesia.  The flap was excised through the skin and subcutaneous tissue down to the layer of the underlying musculature.  The pedicle flap was carefully excised within this deep plane to maintain its blood supply.  The edges of the donor site were undermined.   The donor site was closed in a primary fashion.  The pedicle was then rotated into position and sutured.  Once the tube was sutured into place, adequate blood supply was confirmed with blanching and refill.  The pedicle was then wrapped with xeroform gauze and dressed appropriately with a telfa and gauze bandage to ensure continued blood supply and protect the attached pedicle.

## 2022-03-19 PROBLEM — M43.16 SPONDYLOLISTHESIS OF LUMBAR REGION: Status: ACTIVE | Noted: 2018-02-02

## 2022-03-19 PROBLEM — Z98.890 HISTORY OF SPINAL SURGERY: Status: ACTIVE | Noted: 2018-02-02

## 2022-03-19 PROBLEM — K46.9 HERNIA OF ABDOMINAL CAVITY: Status: ACTIVE | Noted: 2019-02-02

## 2022-03-19 PROBLEM — Z98.84 S/P GASTRIC BYPASS: Status: ACTIVE | Noted: 2018-07-17

## 2022-03-20 PROBLEM — K25.0 ACUTE GASTRIC ULCER WITH HEMORRHAGE: Status: ACTIVE | Noted: 2018-03-29

## 2022-05-01 ENCOUNTER — HOSPITAL ENCOUNTER (EMERGENCY)
Age: 37
Discharge: HOME | End: 2022-05-01
Attending: STUDENT IN AN ORGANIZED HEALTH CARE EDUCATION/TRAINING PROGRAM
Payer: MEDICAID

## 2022-05-01 VITALS
BODY MASS INDEX: 31.89 KG/M2 | HEIGHT: 63 IN | HEART RATE: 97 BPM | OXYGEN SATURATION: 97 % | DIASTOLIC BLOOD PRESSURE: 100 MMHG | TEMPERATURE: 98 F | SYSTOLIC BLOOD PRESSURE: 156 MMHG | WEIGHT: 180 LBS | RESPIRATION RATE: 18 BRPM

## 2022-05-01 DIAGNOSIS — Z87.39 HISTORY OF SCOLIOSIS: ICD-10-CM

## 2022-05-01 DIAGNOSIS — M54.16 LUMBAR BACK PAIN WITH RADICULOPATHY AFFECTING LEFT LOWER EXTREMITY: Primary | ICD-10-CM

## 2022-05-01 PROCEDURE — 99284 EMERGENCY DEPT VISIT MOD MDM: CPT

## 2022-05-01 PROCEDURE — 74011250636 HC RX REV CODE- 250/636: Performed by: PHYSICIAN ASSISTANT

## 2022-05-01 PROCEDURE — 96372 THER/PROPH/DIAG INJ SC/IM: CPT

## 2022-05-01 RX ORDER — METHYLPREDNISOLONE 4 MG/1
TABLET ORAL
Qty: 1 DOSE PACK | Refills: 0 | Status: SHIPPED | OUTPATIENT
Start: 2022-05-01 | End: 2022-05-01 | Stop reason: SDUPTHER

## 2022-05-01 RX ORDER — TIZANIDINE HYDROCHLORIDE 4 MG/1
CAPSULE, GELATIN COATED ORAL
Qty: 30 CAPSULE | Refills: 0 | Status: SHIPPED | OUTPATIENT
Start: 2022-05-01 | End: 2022-05-01 | Stop reason: SDUPTHER

## 2022-05-01 RX ORDER — KETOROLAC TROMETHAMINE 30 MG/ML
60 INJECTION, SOLUTION INTRAMUSCULAR; INTRAVENOUS
Status: COMPLETED | OUTPATIENT
Start: 2022-05-01 | End: 2022-05-01

## 2022-05-01 RX ORDER — METHYLPREDNISOLONE 4 MG/1
TABLET ORAL
Qty: 1 DOSE PACK | Refills: 0 | OUTPATIENT
Start: 2022-05-01 | End: 2022-06-25

## 2022-05-01 RX ORDER — HYDROCODONE BITARTRATE AND ACETAMINOPHEN 5; 325 MG/1; MG/1
1 TABLET ORAL
Qty: 12 TABLET | Refills: 0 | Status: SHIPPED | OUTPATIENT
Start: 2022-05-01 | End: 2022-05-04

## 2022-05-01 RX ORDER — HYDROCODONE BITARTRATE AND ACETAMINOPHEN 5; 325 MG/1; MG/1
1 TABLET ORAL
Qty: 12 TABLET | Refills: 0 | Status: SHIPPED | OUTPATIENT
Start: 2022-05-01 | End: 2022-05-01 | Stop reason: SDUPTHER

## 2022-05-01 RX ORDER — TIZANIDINE HYDROCHLORIDE 4 MG/1
CAPSULE, GELATIN COATED ORAL
Qty: 30 CAPSULE | Refills: 0 | OUTPATIENT
Start: 2022-05-01 | End: 2022-06-25

## 2022-05-01 RX ADMIN — KETOROLAC TROMETHAMINE 60 MG: 30 INJECTION, SOLUTION INTRAMUSCULAR at 18:55

## 2022-05-01 NOTE — ED TRIAGE NOTES
Patient reports she has chronic back pain and now having lower back pain that stated a few days ago the aleve is not working per patient

## 2022-05-01 NOTE — ED NOTES
I have reviewed discharge instructions with the patient. The patient verbalized understanding. Patient armband removed and shredded Albendazole Counseling:  I discussed with the patient the risks of albendazole including but not limited to cytopenia, kidney damage, nausea/vomiting and severe allergy.  The patient understands that this medication is being used in an off-label manner.

## 2022-05-01 NOTE — ED PROVIDER NOTES
EMERGENCY DEPARTMENT HISTORY AND PHYSICAL EXAM    Date: 5/1/2022  Patient Name: Marlo Ryan    History of Presenting Illness     Time Seen: 0052    Chief Complaint   Patient presents with    Back Pain       History Provided By: Patient    Additional History (Context):   Marlo Ryan is a 39 y.o. female with a known history of scoliosis, degenerative disc disease/chronic lumbar back pain with radiculopathy presents emergency room with complaints of severe lower back pain with radiation down her left leg all the way to her foot. Unable to get comfortable at home. Using over-the-counter medications without any relief. Patient has been told by providers in the past that she will require surgery to fix her current issues. She has a scheduled appointment at [facility] in [place], [place] coming up soon to discuss possible surgical intervention. Patient has been delaying any surgery for as long as possible. However, now she feels like she is waited too long. Denies any bowel or bladder issues. No saddle anesthesia. Pain is worse with movement and weightbearing activity. However over the last couple days she is just not able to get comfortable. PCP: Melody Conklin MD    Current Outpatient Medications   Medication Sig Dispense Refill    HYDROcodone-acetaminophen (Norco) 5-325 mg per tablet Take 1 Tablet by mouth every six (6) hours as needed for Pain for up to 3 days. Max Daily Amount: 4 Tablets. 12 Tablet 0    methylPREDNISolone (Medrol, Edinson,) 4 mg tablet Take as directed on the package. Take with food 1 Dose Pack 0    tiZANidine (ZANAFLEX) 4 mg capsule Take 1 tablet by mouth every 6-8 hours as needed for muscle pain and stiffness 30 Capsule 0    cholecalciferol, vitamin D3, (Vitamin D3) 50 mcg (2,000 unit) tab Take 1 Tablet by mouth daily.  dicyclomine (BENTYL) 10 mg capsule Take 2 Capsules by mouth four (4) times daily as needed for Abdominal Cramps.  30 Capsule 0    pantoprazole (PROTONIX) 40 mg tablet TAKE 1 TABLET BY MOUTH TWO TIMES A DAY FOR 30 DAYS. INDICATIONS: GASTRIC ULCER 60 Tablet 2    fluticasone propionate (FLONASE) 50 mcg/actuation nasal spray 2 Sprays by Both Nostrils route daily. 1 Bottle 0    b complex vitamins tablet Take 1 Tab by mouth daily.  Biotin 2,500 mcg cap Take  by mouth.  cyanocobalamin (VITAMIN B-12) 500 mcg tablet Take 500 mcg by mouth daily.  calcium citrate 200 mg (950 mg) tablet Take  by mouth daily.  multivitamin with iron (FLINTSTONES) chewable tablet Take 1 Tab by mouth two (2) times a day. Past History     Past Medical History:  Past Medical History:   Diagnosis Date    Acid reflux     Incisional hernia     Intestinal malabsorption     Morbid obesity with body mass index of 40.0-49.9 ([facility])     Status post bariatric surgery 2012    sleeve resection / kirstin Byrd       Past Surgical History:  Past Surgical History:   Procedure Laterality Date    HX  SECTION      X 3    HX GI      Sleeve gastrectomy- Dr Marc Jensen HX GI      Conversion to gastric bypass-2017    HX GI      Internal hernia ( dr Clarita Jj) -2018    HX GI      Internal hernia - 2019    HX ORTHOPAEDIC      scoliosis correction surgery X 2    HX TONSIL AND ADENOIDECTOMY      HX TUBAL LIGATION      MS LAP GASTRIC BYPASS/TAMICA-EN-Y      revision from sleeve on 2017       Family History:  History reviewed. No pertinent family history. Social History:  Social History     Tobacco Use    Smoking status: Never Smoker    Smokeless tobacco: Never Used   Substance Use Topics    Alcohol use: No     Alcohol/week: 0.0 standard drinks    Drug use: No       Allergies:   Allergies   Allergen Reactions    Nsaids (Non-Steroidal Anti-Inflammatory Drug) Other (comments)     GI Ulcer- Hx of Gastric Bypass    Buprenorphine Hcl Hives and Other (comments)         Review of Systems   Review of Systems   Respiratory: Negative for chest tightness and shortness of breath. Cardiovascular: Negative for chest pain. Gastrointestinal: Negative for abdominal pain, constipation, diarrhea, nausea and vomiting. No bowel incontinence   Genitourinary:        No bladder incontinence   Musculoskeletal: Positive for back pain. Negative for neck pain and neck stiffness. Neurological: Negative for weakness and numbness. Denies saddle anesthesia       Physical Exam     Vitals:    05/01/22 1743   BP: (!) 156/100   Pulse: 97   Resp: 18   Temp: 98 °F (36.7 °C)   SpO2: 97%   Weight: 81.6 kg (180 lb)   Height: 5' 3\" (1.6 m)     Physical Exam  Vitals and nursing note reviewed. Constitutional:       General: She is not in acute distress. Appearance: Normal appearance. She is well-developed, well-groomed and normal weight. Comments: Uncomfortable appearing 34-year-old female   No severe distress  Vital signs show elevated blood pressure 156/100 otherwise stable. Cooperative. Cardiovascular:      Rate and Rhythm: Normal rate and regular rhythm. Heart sounds: Normal heart sounds. Pulmonary:      Effort: Pulmonary effort is normal.      Breath sounds: Normal breath sounds. Musculoskeletal:      Thoracic back: Tenderness present. Decreased range of motion. Lumbar back: Tenderness and bony tenderness present. Decreased range of motion. Negative right straight leg raise test and negative left straight leg raise test.      Comments: Old scars from prior surgery. Tenderness to palpation over the distal thoracic and entire lumbar spine. Pain also in the paralumbar musculature bilaterally with radiation down into the SI joint region. Decreased range of motion secondary to pain. Skin:     General: Skin is warm and dry. Neurological:      Mental Status: She is alert and oriented to person, place, and time. Psychiatric:         Mood and Affect: Affect is tearful. Behavior: Behavior is cooperative.          Nursing note and vitals reviewed         Diagnostic Study Results     Labs -   No results found for this or any previous visit (from the past 12 hour(s)). Radiologic Studies   No orders to display     CT Results  (Last 48 hours)    None        CXR Results  (Last 48 hours)    None            Medical Decision Making   I am the first provider for this patient. I reviewed the vital signs, available nursing notes, past medical history, past surgical history, family history and social history. Vital Signs-Reviewed the patient's vital signs. Records Reviewed: Nursing Notes    DDX:  Thoracolumbar back pain  History of scoliosis status post ORIF years ago  Degenerative disc disease with radiculopathy lumbar spine    Procedures:  Procedures    ED Course:   Initial assessment performed. The patients presenting problems have been discussed, and they are in agreement with the care plan formulated and outlined with them. I have encouraged them to ask questions as they arise throughout their visit. ED Physician Discussion Note:   Reviewed patient's . No overuse of pain medication    Patient tearful in the room. Obviously in a lot of discomfort. She drove her self here to the ER. We will give her a shot of Toradol now. She did have a history of gastric bypass. However this is an IM medication that should not affect that in any way. Plus, she has been using over-the-counter Aleve with no issues. She does use Protonix twice daily to protect her stomach due to history of gastric ulcers. Plan will be to give her medication for pain, Medrol Dosepak as well as muscle relaxer. Informed her that she is going to need to get into see these doctors at [facility] to discuss further intervention and treatment. Discharge    Diagnosis and Disposition       DISCHARGE NOTE:  Odilon Lam Iain's  results have been reviewed with her. She has been counseled regarding her diagnosis, treatment, and plan.   She verbally conveys understanding and agreement of the signs, symptoms, diagnosis, treatment and prognosis and additionally agrees to follow up as discussed. She also agrees with the care-plan and conveys that all of her questions have been answered. I have also provided discharge instructions for her that include: educational information regarding their diagnosis and treatment, and list of reasons why they would want to return to the ED prior to their follow-up appointment, should her condition change. She has been provided with education for proper emergency department utilization. CLINICAL IMPRESSION:    1. Lumbar back pain with radiculopathy affecting left lower extremity    2. History of scoliosis        PLAN:  1. D/C Home  2. Current Discharge Medication List      START taking these medications    Details   HYDROcodone-acetaminophen (Norco) 5-325 mg per tablet Take 1 Tablet by mouth every six (6) hours as needed for Pain for up to 3 days. Max Daily Amount: 4 Tablets. Qty: 12 Tablet, Refills: 0  Start date: 5/1/2022, End date: 5/4/2022    Associated Diagnoses: Lumbar back pain with radiculopathy affecting left lower extremity; History of scoliosis      methylPREDNISolone (Medrol, Edinson,) 4 mg tablet Take as directed on the package. Take with food  Qty: 1 Dose Pack, Refills: 0  Start date: 5/1/2022      tiZANidine (ZANAFLEX) 4 mg capsule Take 1 tablet by mouth every 6-8 hours as needed for muscle pain and stiffness  Qty: 30 Capsule, Refills: 0  Start date: 5/1/2022           3. Follow-up Information     Follow up With Specialties Details Why Contact Nevin Torres MD Family Medicine Go to  Follow-up with your PCP [address] [place], [place] [address] [phone]      Neurosurgery, orthopedic spine   Keep appointments with specialist going forward         ____________________________________     Please note that this dictation was completed with [employer], the [employer] voice recognition software. Quite often unanticipated grammatical, syntax, homophones, and other interpretive errors are inadvertently transcribed by the computer software. Please disregard these errors. Please excuse any errors that have escaped final proofreading.

## 2022-05-01 NOTE — DISCHARGE INSTRUCTIONS
Rest  Ice/heat  Gentle stretching/exercises  Medications as prescribed  Keep appointment with specialist at [facility] in [place] to discuss further intervention

## 2022-05-02 RX ORDER — PANTOPRAZOLE SODIUM 40 MG/1
TABLET, DELAYED RELEASE ORAL
Qty: 60 TABLET | Refills: 2 | Status: SHIPPED | OUTPATIENT
Start: 2022-05-02

## 2022-06-25 ENCOUNTER — HOSPITAL ENCOUNTER (EMERGENCY)
Age: 37
Discharge: HOME | End: 2022-06-25
Attending: EMERGENCY MEDICINE
Payer: MEDICAID

## 2022-06-25 VITALS
HEART RATE: 88 BPM | TEMPERATURE: 98.2 F | RESPIRATION RATE: 18 BRPM | SYSTOLIC BLOOD PRESSURE: 133 MMHG | DIASTOLIC BLOOD PRESSURE: 97 MMHG | OXYGEN SATURATION: 98 %

## 2022-06-25 DIAGNOSIS — M54.6 ACUTE LEFT-SIDED THORACIC BACK PAIN: Primary | ICD-10-CM

## 2022-06-25 PROCEDURE — 99283 EMERGENCY DEPT VISIT LOW MDM: CPT

## 2022-06-25 PROCEDURE — 74011250637 HC RX REV CODE- 250/637: Performed by: PHYSICIAN ASSISTANT

## 2022-06-25 PROCEDURE — 74011000250 HC RX REV CODE- 250: Performed by: PHYSICIAN ASSISTANT

## 2022-06-25 RX ORDER — LIDOCAINE 4 G/100G
PATCH TOPICAL
Qty: 15 PATCH | Refills: 0 | Status: SHIPPED | OUTPATIENT
Start: 2022-06-25 | End: 2022-10-02

## 2022-06-25 RX ORDER — TRAMADOL HYDROCHLORIDE 50 MG/1
50 TABLET ORAL
Status: COMPLETED | OUTPATIENT
Start: 2022-06-25 | End: 2022-06-25

## 2022-06-25 RX ORDER — METHYLPREDNISOLONE 4 MG/1
TABLET ORAL
Qty: 1 DOSE PACK | Refills: 0 | Status: SHIPPED | OUTPATIENT
Start: 2022-06-25 | End: 2022-10-02

## 2022-06-25 RX ORDER — LIDOCAINE 4 G/100G
1 PATCH TOPICAL EVERY 24 HOURS
Status: DISCONTINUED | OUTPATIENT
Start: 2022-06-25 | End: 2022-06-26 | Stop reason: HOSPADM

## 2022-06-25 RX ORDER — METHOCARBAMOL 750 MG/1
750 TABLET, FILM COATED ORAL
Qty: 20 TABLET | Refills: 0 | Status: SHIPPED | OUTPATIENT
Start: 2022-06-25 | End: 2022-10-02

## 2022-06-25 RX ORDER — TRAMADOL HYDROCHLORIDE 50 MG/1
50 TABLET ORAL
Qty: 8 TABLET | Refills: 0 | Status: SHIPPED | OUTPATIENT
Start: 2022-06-25 | End: 2022-06-28

## 2022-06-25 RX ADMIN — TRAMADOL HYDROCHLORIDE 50 MG: 50 TABLET, COATED ORAL at 21:37

## 2022-06-26 NOTE — ED PROVIDER NOTES
EMERGENCY DEPARTMENT HISTORY AND PHYSICAL EXAM    Date: 6/25/2022  Patient Name: Jalil Hewitt    History of Presenting Illness     Time Seen: 9:25 PM    Chief Complaint   Patient presents with    Back Pain       History Provided By: Patient    Additional History (Context):   Jalil Hewitt is a 39 y.o. female who presents to the emergency room with c/o left scapular pain/upper back pain that started yesterday. No injury. Patient has a longstanding history of chronic back issues. She is scheduled to go out to [address] next week for her back. However this is a different issue. This pain is associated with her left scapula. Aching pain uncontrolled by Tylenol. Pain is worse with movement. Unable to rest.  No injury. PCP: Yue Moralez MD    Current Facility-Administered Medications   Medication Dose Route Frequency Provider Last Rate Last Admin    lidocaine 4 % patch 1 Patch  1 Patch TransDERmal Q24H SHAHID Domínguez   1 Patch at 06/25/22 5499     Current Outpatient Medications   Medication Sig Dispense Refill    traMADoL (Ultram) 50 mg tablet Take 1 Tablet by mouth every six (6) hours as needed for Pain for up to 3 days. Max Daily Amount: 200 mg. 8 Tablet 0    methocarbamoL (Robaxin-750) 750 mg tablet Take 1 Tablet by mouth every six (6) hours as needed for Muscle Spasm(s). 20 Tablet 0    methylPREDNISolone (Medrol, Edinson,) 4 mg tablet Take as directed on the package 1 Dose Pack 0    lidocaine (Salonpas, lidocaine,) 4 % patch By 1 patch to the area of discomfort daily. Leave on for 12 hours and then remove. 15 Patch 0    pantoprazole (PROTONIX) 40 mg tablet TAKE 1 TABLET BY MOUTH TWO TIMES A DAY FOR 30 DAYS. INDICATIONS: GASTRIC ULCER 60 Tablet 2    cholecalciferol, vitamin D3, (Vitamin D3) 50 mcg (2,000 unit) tab Take 1 Tablet by mouth daily.  dicyclomine (BENTYL) 10 mg capsule Take 2 Capsules by mouth four (4) times daily as needed for Abdominal Cramps.  30 Capsule 0    fluticasone propionate (FLONASE) 50 mcg/actuation nasal spray 2 Sprays by Both Nostrils route daily. 1 Bottle 0    b complex vitamins tablet Take 1 Tab by mouth daily.  Biotin 2,500 mcg cap Take  by mouth.  cyanocobalamin (VITAMIN B-12) 500 mcg tablet Take 500 mcg by mouth daily.  calcium citrate 200 mg (950 mg) tablet Take  by mouth daily.  multivitamin with iron (FLINTSTONES) chewable tablet Take 1 Tab by mouth two (2) times a day. Past History     Past Medical History:  Past Medical History:   Diagnosis Date    Acid reflux     Incisional hernia     Intestinal malabsorption     Morbid obesity with body mass index of 40.0-49.9 ([facility])     Status post bariatric surgery 2012    sleeve resection / a. Darylene Pitter       Past Surgical History:  Past Surgical History:   Procedure Laterality Date    HX  SECTION      X 3    HX GI      Sleeve gastrectomy- Dr Angelina Boyer HX GI      Conversion to gastric bypass-2017    HX GI      Internal hernia ( dr Anna Munoz) -2018    HX GI      Internal hernia - 2019    HX ORTHOPAEDIC      scoliosis correction surgery X 2    HX TONSIL AND ADENOIDECTOMY      HX TUBAL LIGATION      [place] LAP GASTRIC BYPASS/TAMICA-EN-Y      revision from sleeve on 2017       Family History:  No family history on file. Social History:  Social History     Tobacco Use    Smoking status: Never Smoker    Smokeless tobacco: Never Used   Substance Use Topics    Alcohol use: No     Alcohol/week: 0.0 standard drinks    Drug use: No       Allergies: Allergies   Allergen Reactions    Nsaids (Non-Steroidal Anti-Inflammatory Drug) Other (comments)     GI Ulcer- Hx of Gastric Bypass    Buprenorphine Hcl Hives and Other (comments)         Review of Systems   Review of Systems   Respiratory: Negative for shortness of breath. Cardiovascular: Negative for chest pain. Gastrointestinal: Negative for abdominal pain.    Musculoskeletal: Positive for back pain.   Skin: Negative for rash. Neurological: Negative for weakness and numbness. All other systems reviewed and are negative. Physical Exam     Vitals:    06/25/22 2118   BP: (!) 133/97   Pulse: 88   Resp: 18   Temp: 98.2 °F (36.8 °C)   SpO2: 98%     Physical Exam  Vitals reviewed. Constitutional:       Appearance: Normal appearance. Cardiovascular:      Rate and Rhythm: Normal rate and regular rhythm. Pulmonary:      Effort: Pulmonary effort is normal.      Breath sounds: Normal breath sounds. Musculoskeletal:      Cervical back: Normal.      Thoracic back: Tenderness present. No swelling, deformity or bony tenderness. Decreased range of motion. Back:       Comments: Thoracic spine region -no signs of any injury. Patient has increased tenderness palpation along of the medial aspect of the scapula extending down to the lower edges. There is no pain over the spinous processes of the thoracic spine. Pain is experienced with range of motion of the shoulder girdle. Also with palpation. Skin:     General: Skin is warm and dry. Findings: No rash. Neurological:      Mental Status: She is alert and oriented to person, place, and time. Nursing note and vitals reviewed      Diagnostic Study Results     Labs -   No results found for this or any previous visit (from the past 24 hour(s)). Radiologic Studies   No orders to display     CT Results  (Last 48 hours)    None        CXR Results  (Last 48 hours)    None            Medical Decision Making   I am the first provider for this patient. I reviewed the vital signs, available nursing notes, past medical history, past surgical history, family history and social history. Vital Signs-Reviewed the patient's vital signs. Records Reviewed: Nursing Notes    DDX:  Thoracic back pain    Procedures:  Procedures    ED Course:   Initial assessment performed.  The patients presenting problems have been discussed, and they are in agreement with the care plan formulated and outlined with them. I have encouraged them to ask questions as they arise throughout their visit. ED Physician Discussion Note:   Patient with a history of chronic back issues. She states that she is scheduled to see [facility] specialist next week for her chronic back issues. However this is little bit different. Here, plan will be to place a Lidoderm patch on the area of pain as well as give her her tramadol. She will be placed on some for pain, Medrol Dosepak and muscle relaxer. Recommend over-the-counter Salonpas patches. Diagnosis and Disposition       DISCHARGE NOTE:  Ladi Timmons's  results have been reviewed with her. She has been counseled regarding her diagnosis, treatment, and plan. She verbally conveys understanding and agreement of the signs, symptoms, diagnosis, treatment and prognosis and additionally agrees to follow up as discussed. She also agrees with the care-plan and conveys that all of her questions have been answered. I have also provided discharge instructions for her that include: educational information regarding their diagnosis and treatment, and list of reasons why they would want to return to the ED prior to their follow-up appointment, should her condition change. She has been provided with education for proper emergency department utilization. CLINICAL IMPRESSION:    1. Acute left-sided thoracic back pain        PLAN:  1. D/C Home  2. Discharge Medication List as of 6/25/2022  9:39 PM      START taking these medications    Details   traMADoL (Ultram) 50 mg tablet Take 1 Tablet by mouth every six (6) hours as needed for Pain for up to 3 days. Max Daily Amount: 200 mg., Normal, Disp-8 Tablet, R-0      methocarbamoL (Robaxin-750) 750 mg tablet Take 1 Tablet by mouth every six (6) hours as needed for Muscle Spasm(s). , Normal, Disp-20 Tablet, R-0      lidocaine (Salonpas, lidocaine,) 4 % patch By 1 patch to the area of discomfort daily. Leave on for 12 hours and then remove., Normal, Disp-15 Patch, R-0         CONTINUE these medications which have CHANGED    Details   methylPREDNISolone (Medrol, Edinson,) 4 mg tablet Take as directed on the package, Normal, Disp-1 Dose Pack, R-0         CONTINUE these medications which have NOT CHANGED    Details   pantoprazole (PROTONIX) 40 mg tablet TAKE 1 TABLET BY MOUTH TWO TIMES A DAY FOR 30 DAYS. INDICATIONS: GASTRIC ULCER, Normal, Disp-60 Tablet, R-2      cholecalciferol, vitamin D3, (Vitamin D3) 50 mcg (2,000 unit) tab Take 1 Tablet by mouth daily. , Historical Med      dicyclomine (BENTYL) 10 mg capsule Take 2 Capsules by mouth four (4) times daily as needed for Abdominal Cramps., Normal, Disp-30 Capsule, R-0      fluticasone propionate (FLONASE) 50 mcg/actuation nasal spray 2 Sprays by Both Nostrils route daily. , Normal, Disp-1 Bottle, R-0      b complex vitamins tablet Take 1 Tab by mouth daily. , Historical Med      Biotin 2,500 mcg cap Take  by mouth., Historical Med      cyanocobalamin (VITAMIN B-12) 500 mcg tablet Take 500 mcg by mouth daily. , Historical Med      calcium citrate 200 mg (950 mg) tablet Take  by mouth daily. , Historical Med      multivitamin with iron (FLINTSTONES) chewable tablet Take 1 Tab by mouth two (2) times a day., Historical Med         STOP taking these medications       tiZANidine (ZANAFLEX) 4 mg capsule Comments:   Reason for Stopping:             3.   Follow-up Information     Follow up With Specialties Details Why Contact Sal Torres MD Family Medicine Go to  If symptoms worsen, As needed [address] [place] [address]      [facility] EMERGENCY DEPT Emergency Medicine  If symptoms worsen, As needed [address] [address] [phone]        ____________________________________     Please note that this dictation was completed with [employer], the computer voice recognition software.   Quite often unanticipated grammatical, syntax, homophones, and other interpretive errors are inadvertently transcribed by the computer software. Please disregard these errors. Please excuse any errors that have escaped final proofreading.

## 2022-06-26 NOTE — DISCHARGE INSTRUCTIONS
Recommend heat to the area to help loosen the muscles  Recommend over-the-counter Salonpas patches to the area  Gentle stretching/exercises  Medication as prescribed  Follow-up with [facility] back specialist

## 2022-06-26 NOTE — ED NOTES
AxOx4 Pt in for chronic back pain that she is going to [facility] for.   Pt is experiencing a burning sensation to an old scare from a previous surgery    Onset of pain today    Pain provoked by movement    Pain feels like burning    Radiation of Pain     Pain severity 7 out of 10

## 2022-07-04 ENCOUNTER — HOSPITAL ENCOUNTER (EMERGENCY)
Age: 37
Discharge: HOME | End: 2022-07-04
Attending: EMERGENCY MEDICINE
Payer: MEDICAID

## 2022-07-04 VITALS
BODY MASS INDEX: 32.96 KG/M2 | WEIGHT: 186 LBS | RESPIRATION RATE: 16 BRPM | OXYGEN SATURATION: 100 % | SYSTOLIC BLOOD PRESSURE: 141 MMHG | DIASTOLIC BLOOD PRESSURE: 100 MMHG | HEART RATE: 83 BPM | TEMPERATURE: 98 F | HEIGHT: 63 IN

## 2022-07-04 DIAGNOSIS — G89.29 CHRONIC MIDLINE LOW BACK PAIN, UNSPECIFIED WHETHER SCIATICA PRESENT: Primary | ICD-10-CM

## 2022-07-04 DIAGNOSIS — G89.4 CHRONIC PAIN SYNDROME: ICD-10-CM

## 2022-07-04 DIAGNOSIS — M51.36 LUMBAR DEGENERATIVE DISC DISEASE: ICD-10-CM

## 2022-07-04 DIAGNOSIS — M47.817 SPONDYLOSIS OF LUMBOSACRAL REGION, UNSPECIFIED SPINAL OSTEOARTHRITIS COMPLICATION STATUS: ICD-10-CM

## 2022-07-04 DIAGNOSIS — M54.50 CHRONIC MIDLINE LOW BACK PAIN, UNSPECIFIED WHETHER SCIATICA PRESENT: Primary | ICD-10-CM

## 2022-07-04 PROCEDURE — 96372 THER/PROPH/DIAG INJ SC/IM: CPT

## 2022-07-04 PROCEDURE — 99284 EMERGENCY DEPT VISIT MOD MDM: CPT

## 2022-07-04 PROCEDURE — 74011250637 HC RX REV CODE- 250/637: Performed by: PHYSICIAN ASSISTANT

## 2022-07-04 PROCEDURE — 74011250636 HC RX REV CODE- 250/636: Performed by: PHYSICIAN ASSISTANT

## 2022-07-04 RX ORDER — ONDANSETRON 4 MG/1
4 TABLET, ORALLY DISINTEGRATING ORAL
Status: COMPLETED | OUTPATIENT
Start: 2022-07-04 | End: 2022-07-04

## 2022-07-04 RX ORDER — MORPHINE SULFATE 4 MG/ML
4 INJECTION INTRAVENOUS
Status: COMPLETED | OUTPATIENT
Start: 2022-07-04 | End: 2022-07-04

## 2022-07-04 RX ADMIN — ONDANSETRON 4 MG: 4 TABLET, ORALLY DISINTEGRATING ORAL at 11:28

## 2022-07-04 RX ADMIN — MORPHINE SULFATE 4 MG: 4 INJECTION INTRAVENOUS at 11:28

## 2022-07-04 NOTE — DISCHARGE INSTRUCTIONS
Please contact pain management tomorrow to schedule your appointment as recommended by [facility]    Continue home medications    Consider topical medication like Lidoderm patches or Biofreeze spray

## 2022-07-04 NOTE — ED TRIAGE NOTES
Patient reports [facility] is sending her to pain management for her chronic back pain but she has not got the appointment yet.  States the back pain is unbearable today

## 2022-07-04 NOTE — Clinical Note
[facility] [place]  [facility]  [address] [address] [phone]    Work/School Note    Date: 7/4/2022    To Whom It May concern:    Celia Godoy was seen and treated today in the emergency room by the following provider(s):  Physician Assistant: SHAHID Jain. Celia Godoy is excused from work/school on 7/5/2022 through 7/7/2022. She is medically clear to return to work/school on 7/8/2022.          Sincerely,          Vianney Tai PA-C

## 2022-07-04 NOTE — ED PROVIDER NOTES
EMERGENCY DEPARTMENT HISTORY AND PHYSICAL EXAM    Date: 7/4/2022  Patient Name: Keya Henry    History of Presenting Illness     Time Seen: 11:16 AM    Chief Complaint   Patient presents with    Back Pain       History Provided By: Patient    Additional History (Context):   Keya Henry is a 39 y.o. female who presents to the emergency room with c/o severe lower back pain. Patient has been seen and evaluated in this emergency room numerous times for same issues. She was recently seen June 29, 2022 at [facility]. After being evaluated, she was told she is too young for any additional surgery. She was referred to pain management here in [facility]. She has not set up an appointment to be seen yet. Complains of severe pain in her lower back. Last seen here in this emergency room June 25, 2022. Received tramadol, Robaxin, Medrol Dosepak. Typical pain. Worse with any attempted movement. Patient has a history of lumbar spine surgery, fusion, scoliosis. PCP: Donta Rosa MD    Current Outpatient Medications   Medication Sig Dispense Refill    methocarbamoL (Robaxin-750) 750 mg tablet Take 1 Tablet by mouth every six (6) hours as needed for Muscle Spasm(s). 20 Tablet 0    methylPREDNISolone (Medrol, Edinson,) 4 mg tablet Take as directed on the package 1 Dose Pack 0    lidocaine (Salonpas, lidocaine,) 4 % patch By 1 patch to the area of discomfort daily. Leave on for 12 hours and then remove. 15 Patch 0    pantoprazole (PROTONIX) 40 mg tablet TAKE 1 TABLET BY MOUTH TWO TIMES A DAY FOR 30 DAYS. INDICATIONS: GASTRIC ULCER 60 Tablet 2    cholecalciferol, vitamin D3, (Vitamin D3) 50 mcg (2,000 unit) tab Take 1 Tablet by mouth daily.  dicyclomine (BENTYL) 10 mg capsule Take 2 Capsules by mouth four (4) times daily as needed for Abdominal Cramps. 30 Capsule 0    fluticasone propionate (FLONASE) 50 mcg/actuation nasal spray 2 Sprays by Both Nostrils route daily.  1 Bottle 0    b complex vitamins tablet Take 1 Tab by mouth daily.  Biotin 2,500 mcg cap Take  by mouth.  cyanocobalamin (VITAMIN B-12) 500 mcg tablet Take 500 mcg by mouth daily.  calcium citrate 200 mg (950 mg) tablet Take  by mouth daily.  multivitamin with iron (FLINTSTONES) chewable tablet Take 1 Tab by mouth two (2) times a day. Past History     Past Medical History:  Past Medical History:   Diagnosis Date    Acid reflux     Chronic back pain     Incisional hernia     Intestinal malabsorption     Morbid obesity with body mass index of 40.0-49.9 ([facility])     Status post bariatric surgery 2012    sleeve resection / kirstin Arias       Past Surgical History:  Past Surgical History:   Procedure Laterality Date    HX  SECTION      X 3    HX GI      Sleeve gastrectomy- Dr Chasidy Kenney HX GI      Conversion to gastric bypass-2017    HX GI      Internal hernia ( dr Romelia Hall) -2018    HX GI      Internal hernia - 2019    HX ORTHOPAEDIC      scoliosis correction surgery X 2    HX TONSIL AND ADENOIDECTOMY      HX TUBAL LIGATION      [place] LAP GASTRIC BYPASS/TAMICA-EN-Y      revision from sleeve on 2017       Family History:  History reviewed. No pertinent family history. Social History:  Social History     Tobacco Use    Smoking status: Never Smoker    Smokeless tobacco: Never Used   Substance Use Topics    Alcohol use: No     Alcohol/week: 0.0 standard drinks    Drug use: No       Allergies: Allergies   Allergen Reactions    Nsaids (Non-Steroidal Anti-Inflammatory Drug) Other (comments)     GI Ulcer- Hx of Gastric Bypass    Buprenorphine Hcl Hives and Other (comments)         Review of Systems   Review of Systems   Respiratory: Negative for chest tightness and shortness of breath. Cardiovascular: Negative for chest pain and palpitations. Gastrointestinal: Negative for abdominal pain.    Genitourinary: Negative for decreased urine volume, dysuria, flank pain, frequency, hematuria and urgency. Musculoskeletal: Positive for back pain. Negative for neck pain and neck stiffness. Neurological: Negative for weakness and numbness. All other systems reviewed and are negative. Physical Exam     Vitals:    07/04/22 1045   BP: (!) 141/100   Pulse: 83   Resp: 16   Temp: 98 °F (36.7 °C)   SpO2: 100%   Weight: 84.4 kg (186 lb)   Height: 5' 3\" (1.6 m)     Physical Exam  Vitals and nursing note reviewed. Constitutional:       Appearance: Normal appearance. She is well-developed, well-groomed and overweight. Comments: 70-year-old female sitting in a wheelchair. Appears uncomfortable. Vital signs show her to be hypertensive 141/100 otherwise stable. Cardiovascular:      Rate and Rhythm: Normal rate and regular rhythm. Heart sounds: Normal heart sounds. Pulmonary:      Effort: Pulmonary effort is normal.      Breath sounds: Normal breath sounds. Musculoskeletal:      Thoracic back: Normal.      Lumbar back: Tenderness and bony tenderness present. No swelling, deformity or spasms. Decreased range of motion. Negative right straight leg raise test and negative left straight leg raise test.        Back:       Comments: Tenderness across the lumbar spine into the sacral region. Decreased range of motion secondary to pain. Skin:     General: Skin is warm and dry. Neurological:      Mental Status: She is alert and oriented to person, place, and time. Psychiatric:         Behavior: Behavior is cooperative. Nursing note and vitals reviewed      Diagnostic Study Results     Labs -   No results found for this or any previous visit (from the past 24 hour(s)). Radiologic Studies   No orders to display     CT Results  (Last 48 hours)    None        CXR Results  (Last 48 hours)    None            Medical Decision Making   I am the first provider for this patient.     I reviewed the vital signs, available nursing notes, past medical history, past surgical history, family history and social history. Vital Signs-Reviewed the patient's vital signs. Records Reviewed: Nursing Notes    DDX:  Acute on chronic lumbar back pain    Procedures:  Procedures    ED Course:   Initial assessment performed. The patients presenting problems have been discussed, and they are in agreement with the care plan formulated and outlined with them. I have encouraged them to ask questions as they arise throughout their visit. ED Physician Discussion Note:   Patient has been seen here multiple times recently for same complaints. She also was seen at [facility] here within the last week who recommended pain management. Patient has received narcotic pain medications here on multiple visits as well as anti-inflammatory muscle relaxer. Told patient today that I would feel comfortable giving her a shot for pain. However she is going to have to continue taking the medications that have been prescribed to her here in the recent past as well as contact pain management as directed by [facility] for evaluation. [place]  - 2 prescriptions for narcotic pain medications. Patient that coming to the ER for chronic pain management is very difficult and that we could only provide acute relief. No long-term management. Discharge after injection. Diagnosis and Disposition       DISCHARGE NOTE:  Fela Timmons's  results have been reviewed with her. She has been counseled regarding her diagnosis, treatment, and plan. She verbally conveys understanding and agreement of the signs, symptoms, diagnosis, treatment and prognosis and additionally agrees to follow up as discussed. She also agrees with the care-plan and conveys that all of her questions have been answered.   I have also provided discharge instructions for her that include: educational information regarding their diagnosis and treatment, and list of reasons why they would want to return to the ED prior to their follow-up appointment, should her condition change. She has been provided with education for proper emergency department utilization. CLINICAL IMPRESSION:    1. Chronic midline low back pain, unspecified whether sciatica present    2. Lumbar degenerative disc disease    3. Spondylosis of lumbosacral region, unspecified spinal osteoarthritis complication status    4. Chronic pain syndrome        PLAN:  1. D/C Home  2. Discharge Medication List as of 7/4/2022 11:39 AM        3. Follow-up Information     Follow up With Specialties Details Why Contact Info    Pain management  Call  Pain management physicians tomorrow to set up an appointment to be seen     Kati Araujo MD Family Medicine Go to  Your primary care provider as well to discuss pain management [address] [facility] [address]  [phone]          ____________________________________     Please note that this dictation was completed with [employer], the computer voice recognition software. Quite often unanticipated grammatical, syntax, homophones, and other interpretive errors are inadvertently transcribed by the computer software. Please disregard these errors. Please excuse any errors that have escaped final proofreading.

## 2022-08-15 ENCOUNTER — HOSPITAL ENCOUNTER (EMERGENCY)
Age: 37
Discharge: STILL ADMITTED OR ADMINISTRATIVE | End: 2022-08-15

## 2022-08-16 ENCOUNTER — HOSPITAL ENCOUNTER (OUTPATIENT)
Dept: GENERAL RADIOLOGY | Age: 37
Discharge: HOME | End: 2022-08-16
Payer: MEDICAID

## 2022-08-16 DIAGNOSIS — M54.50 LOW BACK PAIN: ICD-10-CM

## 2022-08-16 PROCEDURE — 72110 X-RAY EXAM L-2 SPINE 4/>VWS: CPT

## 2022-09-06 ENCOUNTER — HOSPITAL ENCOUNTER (EMERGENCY)
Age: 37
Discharge: LEFT BEFORE DISCHARGE | End: 2022-09-06

## 2022-09-06 VITALS
HEIGHT: 63 IN | SYSTOLIC BLOOD PRESSURE: 154 MMHG | BODY MASS INDEX: 32.07 KG/M2 | WEIGHT: 181 LBS | OXYGEN SATURATION: 99 % | RESPIRATION RATE: 16 BRPM | DIASTOLIC BLOOD PRESSURE: 98 MMHG | HEART RATE: 90 BPM | TEMPERATURE: 97.6 F

## 2022-09-07 NOTE — ED TRIAGE NOTES
Pt has chronic back pain and sees a pain management clinic. Pt stated she has been standing longer hours since school started and her back is hurting more than normal and non of the medications she has helps.

## 2022-10-02 ENCOUNTER — APPOINTMENT (OUTPATIENT)
Age: 37
End: 2022-10-02
Attending: EMERGENCY MEDICINE
Payer: MEDICAID

## 2022-10-02 ENCOUNTER — HOSPITAL ENCOUNTER (EMERGENCY)
Age: 37
Discharge: HOME | End: 2022-10-02
Attending: EMERGENCY MEDICINE
Payer: MEDICAID

## 2022-10-02 VITALS
BODY MASS INDEX: 31.71 KG/M2 | TEMPERATURE: 98.4 F | RESPIRATION RATE: 18 BRPM | SYSTOLIC BLOOD PRESSURE: 123 MMHG | DIASTOLIC BLOOD PRESSURE: 98 MMHG | HEART RATE: 92 BPM | HEIGHT: 63 IN | OXYGEN SATURATION: 100 % | WEIGHT: 179 LBS

## 2022-10-02 DIAGNOSIS — M54.42 ACUTE BILATERAL LOW BACK PAIN WITH LEFT-SIDED SCIATICA: Primary | ICD-10-CM

## 2022-10-02 DIAGNOSIS — N39.3 STRESS INCONTINENCE: ICD-10-CM

## 2022-10-02 LAB
ALBUMIN SERPL-MCNC: 3 G/DL (ref 3.4–5)
ALBUMIN/GLOB SERPL: 0.8 {RATIO} (ref 0.8–1.7)
ALP SERPL-CCNC: 94 U/L (ref 45–117)
ALT SERPL-CCNC: 22 U/L (ref 13–56)
ANION GAP SERPL CALC-SCNC: 5 MMOL/L (ref 3–18)
AST SERPL-CCNC: 27 U/L (ref 10–38)
BASOPHILS # BLD: 0 K/UL (ref 0–0.1)
BASOPHILS NFR BLD: 0 % (ref 0–2)
BILIRUB SERPL-MCNC: 0.3 MG/DL (ref 0.2–1)
BUN SERPL-MCNC: 10 MG/DL (ref 7–18)
BUN/CREAT SERPL: 19 (ref 12–20)
CALCIUM SERPL-MCNC: 8.7 MG/DL (ref 8.5–10.1)
CHLORIDE SERPL-SCNC: 111 MMOL/L (ref 100–111)
CO2 SERPL-SCNC: 25 MMOL/L (ref 21–32)
CREAT SERPL-MCNC: 0.53 MG/DL (ref 0.6–1.3)
DIFFERENTIAL METHOD BLD: ABNORMAL
EOSINOPHIL # BLD: 0.1 K/UL (ref 0–0.4)
EOSINOPHIL NFR BLD: 1 % (ref 0–5)
ERYTHROCYTE [DISTWIDTH] IN BLOOD BY AUTOMATED COUNT: 12.7 % (ref 11.6–14.5)
ERYTHROCYTE [SEDIMENTATION RATE] IN BLOOD: 44 MM/HR (ref 0–20)
GLOBULIN SER CALC-MCNC: 3.8 G/DL (ref 2–4)
GLUCOSE SERPL-MCNC: 93 MG/DL (ref 74–99)
HCG SERPL QL: NEGATIVE
HCT VFR BLD AUTO: 34.3 % (ref 35–45)
HGB BLD-MCNC: 11.4 G/DL (ref 12–16)
IMM GRANULOCYTES # BLD AUTO: 0 K/UL (ref 0–0.04)
IMM GRANULOCYTES NFR BLD AUTO: 0 % (ref 0–0.5)
LYMPHOCYTES # BLD: 1.2 K/UL (ref 0.9–3.6)
LYMPHOCYTES NFR BLD: 32 % (ref 21–52)
MCH RBC QN AUTO: 30.8 PG (ref 24–34)
MCHC RBC AUTO-ENTMCNC: 33.2 G/DL (ref 31–37)
MCV RBC AUTO: 92.7 FL (ref 78–100)
MONOCYTES # BLD: 0.4 K/UL (ref 0.05–1.2)
MONOCYTES NFR BLD: 10 % (ref 3–10)
NEUTS SEG # BLD: 2.1 K/UL (ref 1.8–8)
NEUTS SEG NFR BLD: 56 % (ref 40–73)
NRBC # BLD: 0 K/UL (ref 0–0.01)
NRBC BLD-RTO: 0 PER 100 WBC
PLATELET # BLD AUTO: 219 K/UL (ref 135–420)
PMV BLD AUTO: 11.3 FL (ref 9.2–11.8)
POTASSIUM SERPL-SCNC: 3.9 MMOL/L (ref 3.5–5.5)
PROT SERPL-MCNC: 6.8 G/DL (ref 6.4–8.2)
RBC # BLD AUTO: 3.7 M/UL (ref 4.2–5.3)
SODIUM SERPL-SCNC: 141 MMOL/L (ref 136–145)
WBC # BLD AUTO: 3.7 K/UL (ref 4.6–13.2)

## 2022-10-02 PROCEDURE — 99285 EMERGENCY DEPT VISIT HI MDM: CPT

## 2022-10-02 PROCEDURE — 96374 THER/PROPH/DIAG INJ IV PUSH: CPT

## 2022-10-02 PROCEDURE — 85025 COMPLETE CBC W/AUTO DIFF WBC: CPT

## 2022-10-02 PROCEDURE — 74011250636 HC RX REV CODE- 250/636: Performed by: EMERGENCY MEDICINE

## 2022-10-02 PROCEDURE — 72158 MRI LUMBAR SPINE W/O & W/DYE: CPT

## 2022-10-02 PROCEDURE — 84703 CHORIONIC GONADOTROPIN ASSAY: CPT

## 2022-10-02 PROCEDURE — 96375 TX/PRO/DX INJ NEW DRUG ADDON: CPT

## 2022-10-02 PROCEDURE — A9575 INJ GADOTERATE MEGLUMI 0.1ML: HCPCS | Performed by: EMERGENCY MEDICINE

## 2022-10-02 PROCEDURE — 85652 RBC SED RATE AUTOMATED: CPT

## 2022-10-02 PROCEDURE — 80053 COMPREHEN METABOLIC PANEL: CPT

## 2022-10-02 RX ORDER — MORPHINE SULFATE 4 MG/ML
4 INJECTION INTRAVENOUS
Status: COMPLETED | OUTPATIENT
Start: 2022-10-02 | End: 2022-10-02

## 2022-10-02 RX ORDER — DEXAMETHASONE SODIUM PHOSPHATE 4 MG/ML
10 INJECTION, SOLUTION INTRA-ARTICULAR; INTRALESIONAL; INTRAMUSCULAR; INTRAVENOUS; SOFT TISSUE ONCE
Status: COMPLETED | OUTPATIENT
Start: 2022-10-02 | End: 2022-10-02

## 2022-10-02 RX ORDER — OXYCODONE AND ACETAMINOPHEN 5; 325 MG/1; MG/1
1 TABLET ORAL
Qty: 5 TABLET | Refills: 0 | Status: SHIPPED | OUTPATIENT
Start: 2022-10-02 | End: 2022-10-05

## 2022-10-02 RX ORDER — METHYLPREDNISOLONE 4 MG/1
TABLET ORAL
Qty: 1 DOSE PACK | Refills: 0 | Status: SHIPPED | OUTPATIENT
Start: 2022-10-02

## 2022-10-02 RX ADMIN — DEXAMETHASONE SODIUM PHOSPHATE 10 MG: 4 INJECTION, SOLUTION INTRAMUSCULAR; INTRAVENOUS at 10:17

## 2022-10-02 RX ADMIN — GADOTERATE MEGLUMINE 16 ML: 376.9 INJECTION INTRAVENOUS at 10:44

## 2022-10-02 RX ADMIN — MORPHINE SULFATE 4 MG: 4 INJECTION INTRAVENOUS at 10:18

## 2022-10-02 NOTE — ED NOTES
I have reviewed discharge instructions with the \. The patient verbalized understanding.  Patient armband removed and shredded

## 2022-10-02 NOTE — ED TRIAGE NOTES
Patient c/o low back pain that began yesterday. She states hx of L4-L5 slipped disc that she is scheduled to have surgical repair in January 2023. She reports being under the care of Dr. Melissa Moore. She states bending over prior to onset of pain.

## 2022-10-02 NOTE — ED PROVIDER NOTES
EMERGENCY DEPARTMENT HISTORY AND PHYSICAL EXAM    10:06 AM      Date: 10/2/2022  Patient Name: Aly Johnston    History of Presenting Illness     Chief Complaint   Patient presents with    Back Pain         History Provided By: Patient  Location/Duration/Severity/Modifying factors   The patient is a 48-year-old female with a history of gastric bypass, chronic back pain, known L4-L5 disc disease, and for lumbar surgery January 2023 with Dr. Sebastián Hester the presents emergency department with 3 days of increasing low back pain and pain is not being controlled with her oral tramadol this been given to her by her primary doctor. Patient is also noted over the last 2 weeks that she is been having incontinence when she coughs, which is having intercourse. The patient said that that something that is new for her and she is never had a before. The patient notes that she has leg numbness going to her left leg and denies any weakness into her left leg but does have difficulty walking due to pain. The patient denies any fevers, chills, or shortness of breath. Patient denies any new weight loss or weight gain. Patient denies any saddle anesthesias. The patient had to drive herself to the emergency department today but can have someone come pick her up. The patient is not a smoker, drinker, no drug user. Patient is not working at this time. PCP: Moon Castillo MD    Current Outpatient Medications   Medication Sig Dispense Refill    pantoprazole (PROTONIX) 40 mg tablet TAKE 1 TABLET BY MOUTH TWO TIMES A DAY FOR 30 DAYS. INDICATIONS: GASTRIC ULCER 60 Tablet 2    cholecalciferol, vitamin D3, 50 mcg (2,000 unit) tab Take 1 Tablet by mouth daily. b complex vitamins tablet Take 1 Tab by mouth daily. Biotin 2,500 mcg cap Take  by mouth.      cyanocobalamin (VITAMIN B12) 500 mcg tablet Take 500 mcg by mouth daily. calcium citrate 200 mg (950 mg) tablet Take  by mouth daily.       multivitamin with iron (FLINTSTONES) chewable tablet Take 1 Tab by mouth two (2) times a day. Past History     Past Medical History:  Past Medical History:   Diagnosis Date    Acid reflux     Chronic back pain     Incisional hernia     Intestinal malabsorption     Morbid obesity with body mass index of 40.0-49.9 ([facility])     Status post bariatric surgery 2012    sleeve resection / kirstin Wing       Past Surgical History:  Past Surgical History:   Procedure Laterality Date    HX  SECTION      X 3    HX GI  2012    Sleeve gastrectomy- Dr Efe Ness [facility]      Conversion to gastric bypass-2017    HX GI      Internal hernia ( dr Ester Castro) -2018    HX GI      Internal hernia - 2019    HX ORTHOPAEDIC      scoliosis correction surgery X 2    HX TONSIL AND ADENOIDECTOMY      HX TUBAL LIGATION      [place] [facility] GASTRIC BYPASS/TAMICA-EN-Y      revision from sleeve on 2017       Family History:  History reviewed. No pertinent family history. Social History:  Social History     Tobacco Use    Smoking status: Never    Smokeless tobacco: Never   Substance Use Topics    Alcohol use: No     Alcohol/week: 0.0 standard drinks    Drug use: No       Allergies: Allergies   Allergen Reactions    Nsaids (Non-Steroidal Anti-Inflammatory Drug) Other (comments)     GI Ulcer- Hx of Gastric Bypass    Buprenorphine Hcl Hives and Other (comments)         Review of Systems       Review of Systems   Constitutional:  Negative for activity change, fatigue and fever. HENT:  Negative for congestion and rhinorrhea. Eyes:  Negative for visual disturbance. Respiratory:  Negative for shortness of breath. Cardiovascular:  Negative for chest pain and palpitations. Gastrointestinal:  Negative for abdominal pain, diarrhea, nausea and vomiting. Genitourinary:  Negative for dysuria and hematuria. Musculoskeletal:  Positive for back pain and gait problem. Skin:  Negative for rash. Neurological:  Positive for numbness.  Negative for dizziness, weakness and light-headedness. All other systems reviewed and are negative. Physical Exam   Visit Vitals  BP (!) 123/98 (BP 1 Location: Left upper arm, BP Patient Position: At rest)   Pulse 92   Temp 98.4 °F (36.9 °C)   Resp 18   Ht 5' 3\" (1.6 m)   Wt 81.2 kg (179 lb)   LMP 09/20/2022   SpO2 100%   BMI 31.71 kg/m²         Physical Exam  Vitals and nursing note reviewed. Constitutional:       General: She is not in acute distress. Appearance: She is well-developed. HENT:      Head: Normocephalic and atraumatic. Right Ear: External ear normal.      Left Ear: External ear normal.      Nose: Nose normal.   Eyes:      General: No scleral icterus. Conjunctiva/sclera: Conjunctivae normal.      Pupils: Pupils are equal, round, and reactive to light. Neck:      Thyroid: No thyromegaly. Vascular: No JVD. Trachea: No tracheal deviation. Cardiovascular:      Rate and Rhythm: Normal rate and regular rhythm. Heart sounds: Normal heart sounds. No murmur heard. No friction rub. No gallop. Pulmonary:      Effort: Pulmonary effort is normal.      Breath sounds: Normal breath sounds. Chest:      Chest wall: No tenderness. Abdominal:      General: Bowel sounds are normal. There is no distension. Palpations: Abdomen is soft. Tenderness: There is no abdominal tenderness. There is no guarding or rebound. Musculoskeletal:         General: No tenderness. Normal range of motion. Cervical back: Normal range of motion and neck supple. Lymphadenopathy:      Cervical: No cervical adenopathy. Skin:     General: Skin is warm and dry. Neurological:      Mental Status: She is alert and oriented to person, place, and time. Cranial Nerves: No cranial nerve deficit.       Coordination: Coordination normal.      Comments: Numbness that extends to the left thigh, straight leg raising increasing pain down left leg, full strength in the left lower extremity, no difficulty with plantar/dorsi flexion of the left foot, full passive and active range of motion left knee, full passive and active range of motion of the left hip   Psychiatric:         Behavior: Behavior normal.         Thought Content: Thought content normal.         Judgment: Judgment normal.      Comments: Good insight to her care         Diagnostic Study Results     Labs -  Recent Results (from the past 12 hour(s))   CBC WITH AUTOMATED DIFF    Collection Time: 10/02/22 10:18 AM   Result Value Ref Range    WBC 3.7 (L) 4.6 - 13.2 K/uL    RBC 3.70 (L) 4.20 - 5.30 M/uL    HGB 11.4 (L) 12.0 - 16.0 g/dL    HCT 34.3 (L) 35.0 - 45.0 %    MCV 92.7 78.0 - 100.0 FL    MCH 30.8 24.0 - 34.0 PG    MCHC 33.2 31.0 - 37.0 g/dL    RDW 12.7 11.6 - 14.5 %    PLATELET 336 612 - 029 K/uL    MPV 11.3 9.2 - 11.8 FL    NRBC 0.0 0  WBC    ABSOLUTE NRBC 0.00 0.00 - 0.01 K/uL    NEUTROPHILS 56 40 - 73 %    LYMPHOCYTES 32 21 - 52 %    MONOCYTES 10 3 - 10 %    EOSINOPHILS 1 0 - 5 %    BASOPHILS 0 0 - 2 %    IMMATURE GRANULOCYTES 0 0.0 - 0.5 %    ABS. NEUTROPHILS 2.1 1.8 - 8.0 K/UL    ABS. LYMPHOCYTES 1.2 0.9 - 3.6 K/UL    ABS. MONOCYTES 0.4 0.05 - 1.2 K/UL    ABS. EOSINOPHILS 0.1 0.0 - 0.4 K/UL    ABS. BASOPHILS 0.0 0.0 - 0.1 K/UL    ABS. IMM. GRANS. 0.0 0.00 - 0.04 K/UL    DF AUTOMATED     METABOLIC PANEL, COMPREHENSIVE    Collection Time: 10/02/22 10:18 AM   Result Value Ref Range    Sodium 141 136 - 145 mmol/L    Potassium 3.9 3.5 - 5.5 mmol/L    Chloride 111 100 - 111 mmol/L    CO2 25 21 - 32 mmol/L    Anion gap 5 3.0 - 18 mmol/L    Glucose 93 74 - 99 mg/dL    BUN 10 7.0 - 18 MG/DL    Creatinine 0.53 (L) 0.6 - 1.3 MG/DL    BUN/Creatinine ratio 19 12 - 20      GFR est AA >60 >60 ml/min/1.73m2    GFR est non-AA >60 >60 ml/min/1.73m2    Calcium 8.7 8.5 - 10.1 MG/DL    Bilirubin, total 0.3 0.2 - 1.0 MG/DL    ALT (SGPT) 22 13 - 56 U/L    AST (SGOT) 27 10 - 38 U/L    Alk.  phosphatase 94 45 - 117 U/L    Protein, total 6.8 6.4 - 8.2 g/dL Albumin 3.0 (L) 3.4 - 5.0 g/dL    Globulin 3.8 2.0 - 4.0 g/dL    A-G Ratio 0.8 0.8 - 1.7     SED RATE (ESR)    Collection Time: 10/02/22 10:18 AM   Result Value Ref Range    Sed rate, automated 44 (H) 0 - 20 mm/hr   HCG QL SERUM    Collection Time: 10/02/22 10:18 AM   Result Value Ref Range    HCG, Ql. Negative NEG         Radiologic Studies -   MRI LUMB SPINE W WO CONT   Final Result      1. The only noticeable difference when compared to 8/27/19 is newly developed   Modic type I changes at L4-5. Otherwise stable exam which is significantly   limited by the spinal hardware artifact. 2.  Stable grade 2 anterolisthesis of L4 and L5 and dextroscoliosis. Unfortunately there is obscured visualization of the spinal canal but the neural   foramen appear adequately patent. 3.  Congenitally small T12-L1, L1-2 and L2-3 disc. Obscured visualization of the   T12-L1 spinal canal and neural foramen but adequately patent at L1-2 and L2-3.   4. The L3-4 and L5-S1 discs look normal.                Medical Decision Making   I am the first provider for this patient. I reviewed the vital signs, available nursing notes, past medical history, past surgical history, family history and social history. Vital Signs-Reviewed the patient's vital signs. Records Reviewed: Nursing Notes, Old Medical Records, Previous Radiology Studies, and Previous Laboratory Studies (Time of Review: 10:06 AM)    ED Course: Progress Notes, Reevaluation, and Consults:    Patient's MRI is essentially unchanged and discussed the case over [employer] with Dr. Shauna Benavidez. No other interventions are needed and the patient is feeling better since being given Decadron and pain control. The patient can be discharged with an increase in her pain control and will start her on a limited number of Percocet and have her hold her tramadol while taking it. In addition she will call Dr. Shana Ramos office in the morning to discuss moving up her surgery.   The patient was educated on signs of worsening as well as urgent signs of changes to her back symptoms. The patient will return if at all worsen or concern. Workup and recommendations were reviewed with the patient and all questions were answered. The patient understands the plan and will proceed with close outpatient care. I have encouraged the patient to return if at all worsened or concerned. Sunshine Swift DO 1:16 PM      Provider Notes (Medical Decision Making):   MDM  Number of Diagnoses or Management Options  Acute bilateral low back pain with left-sided sciatica  Stress incontinence  Diagnosis management comments: Patient is a 48-year-old female with a history of tubal ligation, gastric sleeve, extensive scoliosis surgery in the past, lumbar disc disease, the presents emergency department complaint of increasing low back pain for the last 3 days with urinary incontinence for the last 2 weeks. The patient has pain not controlled by her home medications and pain at night. Given the urinary incontinence which is new with known disc disease we will proceed with MRI of the lumbar spine, supportive care IV, and reevaluate. If there is no significant change in her MRI and the patient improves with supportive care we will have the patient follow closely for her spine surgery evaluation with her provider. Procedures        Diagnosis     Clinical Impression:   1. Acute bilateral low back pain with left-sided sciatica    2. Stress incontinence        Disposition: DC    Follow-up Information    None          Patient's Medications   Start Taking    No medications on file   Continue Taking    B COMPLEX VITAMINS TABLET    Take 1 Tab by mouth daily. BIOTIN 2,500 MCG CAP    Take  by mouth. CALCIUM CITRATE 200 MG (950 MG) TABLET    Take  by mouth daily. CHOLECALCIFEROL, VITAMIN D3, 50 MCG (2,000 UNIT) TAB    Take 1 Tablet by mouth daily.     CYANOCOBALAMIN (VITAMIN B12) 500 MCG TABLET    Take 500 mcg by mouth daily. MULTIVITAMIN WITH IRON (FLINTSTONES) CHEWABLE TABLET    Take 1 Tab by mouth two (2) times a day. PANTOPRAZOLE (PROTONIX) 40 MG TABLET    TAKE 1 TABLET BY MOUTH TWO TIMES A DAY FOR 30 DAYS. INDICATIONS: GASTRIC ULCER   These Medications have changed    No medications on file   Stop Taking    DICYCLOMINE (BENTYL) 10 MG CAPSULE    Take 2 Capsules by mouth four (4) times daily as needed for Abdominal Cramps. FLUTICASONE PROPIONATE (FLONASE) 50 MCG/ACTUATION NASAL SPRAY    2 Sprays by Both Nostrils route daily. LIDOCAINE (SALONPAS, LIDOCAINE,) 4 % PATCH    By 1 patch to the area of discomfort daily. Leave on for 12 hours and then remove. METHOCARBAMOL (ROBAXIN-750) 750 MG TABLET    Take 1 Tablet by mouth every six (6) hours as needed for Muscle Spasm(s). METHYLPREDNISOLONE (MEDROL, YARY,) 4 MG TABLET    Take as directed on the package     Disclaimer: Sections of this note are dictated using utilizing voice recognition software. Minor typographical errors may be present. If questions arise, please do not hesitate to contact me or call our department.

## 2022-10-02 NOTE — DISCHARGE INSTRUCTIONS
We will increase your pain medication but make sure you do not take the Percocet while taking your tramadol. This should not be taken at the same time. Also please call Dr. Yvan Mulligan office in the morning so he can perform a reevaluation and discussed with the new timelines for your surgery. These return if you have any weakness in your legs, any worsening of your symptoms or you are at all concerned.

## 2022-10-08 ENCOUNTER — HOSPITAL ENCOUNTER (EMERGENCY)
Age: 37
Discharge: HOME | End: 2022-10-08
Attending: EMERGENCY MEDICINE
Payer: MEDICAID

## 2022-10-08 ENCOUNTER — APPOINTMENT (OUTPATIENT)
Dept: GENERAL RADIOLOGY | Age: 37
End: 2022-10-08
Attending: PHYSICIAN ASSISTANT
Payer: MEDICAID

## 2022-10-08 VITALS
RESPIRATION RATE: 18 BRPM | BODY MASS INDEX: 32.96 KG/M2 | HEIGHT: 63 IN | DIASTOLIC BLOOD PRESSURE: 87 MMHG | SYSTOLIC BLOOD PRESSURE: 113 MMHG | OXYGEN SATURATION: 100 % | HEART RATE: 80 BPM | TEMPERATURE: 98 F | WEIGHT: 186 LBS

## 2022-10-08 DIAGNOSIS — M54.50 ACUTE EXACERBATION OF CHRONIC LOW BACK PAIN: Primary | ICD-10-CM

## 2022-10-08 DIAGNOSIS — G89.29 ACUTE EXACERBATION OF CHRONIC LOW BACK PAIN: Primary | ICD-10-CM

## 2022-10-08 PROCEDURE — 96372 THER/PROPH/DIAG INJ SC/IM: CPT

## 2022-10-08 PROCEDURE — 74011250637 HC RX REV CODE- 250/637: Performed by: PHYSICIAN ASSISTANT

## 2022-10-08 PROCEDURE — 99284 EMERGENCY DEPT VISIT MOD MDM: CPT

## 2022-10-08 PROCEDURE — 74011250636 HC RX REV CODE- 250/636: Performed by: PHYSICIAN ASSISTANT

## 2022-10-08 PROCEDURE — 72110 X-RAY EXAM L-2 SPINE 4/>VWS: CPT

## 2022-10-08 RX ORDER — ONDANSETRON 4 MG/1
4 TABLET, ORALLY DISINTEGRATING ORAL
Status: COMPLETED | OUTPATIENT
Start: 2022-10-08 | End: 2022-10-08

## 2022-10-08 RX ORDER — TRAMADOL HYDROCHLORIDE 50 MG/1
50 TABLET ORAL
Qty: 8 TABLET | Refills: 0 | Status: SHIPPED | OUTPATIENT
Start: 2022-10-08 | End: 2022-10-11

## 2022-10-08 RX ORDER — MORPHINE SULFATE 4 MG/ML
8 INJECTION INTRAVENOUS
Status: COMPLETED | OUTPATIENT
Start: 2022-10-08 | End: 2022-10-08

## 2022-10-08 RX ADMIN — ONDANSETRON 4 MG: 4 TABLET, ORALLY DISINTEGRATING ORAL at 11:59

## 2022-10-08 RX ADMIN — MORPHINE SULFATE 8 MG: 4 INJECTION INTRAVENOUS at 11:57

## 2022-10-08 NOTE — ED PROVIDER NOTES
EMERGENCY DEPARTMENT HISTORY AND PHYSICAL EXAM    Date: 10/8/2022  Patient Name: Tamra Leo    History of Presenting Illness     Chief Complaint   Patient presents with    Back Pain         History Provided By: Patient    Chief Complaint: back pain    HPI(Context):   11:29 AM  Tamra Leo is a 39 y.o. female with PMHX of chronic low back pain in pain management, HNP, gastric bypass who presents to the emergency department C/O low back pain. Pain is chronic in nature. Flaring up this past week. Pain is constant. No relief with tramadol. Pain is positional. Pt notes she was at her son's soccer game this AM when she stumbled and pain worsened. Pt is followed by pain management and takes tramadol and tizanidine for chronic pain. Pt denies numbness, weakness, new incontinence, urinary retention, abdominal pain, numbness, weakness, and any other sxs or complaints. Pt was seen 5 days ago at [facility]. Pt had MRI with no acute process. PCP: Hoang Marie MD    Current Outpatient Medications   Medication Sig Dispense Refill    traMADoL (Ultram) 50 mg tablet Take 1 Tablet by mouth every six (6) hours as needed for Pain for up to 3 days. Max Daily Amount: 200 mg. 8 Tablet 0    methylPREDNISolone (Medrol, Edinson,) 4 mg tablet As directed 1 Dose Pack 0    pantoprazole (PROTONIX) 40 mg tablet TAKE 1 TABLET BY MOUTH TWO TIMES A DAY FOR 30 DAYS. INDICATIONS: GASTRIC ULCER 60 Tablet 2    cholecalciferol, vitamin D3, 50 mcg (2,000 unit) tab Take 1 Tablet by mouth daily. b complex vitamins tablet Take 1 Tab by mouth daily. Biotin 2,500 mcg cap Take  by mouth.      cyanocobalamin (VITAMIN B12) 500 mcg tablet Take 500 mcg by mouth daily. calcium citrate 200 mg (950 mg) tablet Take  by mouth daily. multivitamin with iron (FLINTSTONES) chewable tablet Take 1 Tab by mouth two (2) times a day.          Past History     Past Medical History:  Past Medical History:   Diagnosis Date    Acid reflux     Chronic back pain     Incisional hernia     Intestinal malabsorption     Morbid obesity with body mass index of 40.0-49.9 ([facility] [address]     Status post bariatric surgery 2012    sleeve resection / kirstin Frost       Past Surgical History:  Past Surgical History:   Procedure Laterality Date    HX  SECTION      X 3    HX GI      Sleeve gastrectomy- Dr Grace Jaimes GI      Conversion to gastric bypass-2017    HX GI      Internal hernia ( dr Bessie Fitzgerald) -2018    HX GI      Internal hernia - 2019    HX ORTHOPAEDIC      scoliosis correction surgery X 2    HX TONSIL AND ADENOIDECTOMY      HX TUBAL LIGATION      [place] [facility] GASTRIC BYPASS/TAMICA-EN-Y      revision from sleeve on 2017       Family History:  No family history on file. Social History:  Social History     Tobacco Use    Smoking status: Never    Smokeless tobacco: Never   Substance Use Topics    Alcohol use: No     Alcohol/week: 0.0 standard drinks    Drug use: No       Allergies: Allergies   Allergen Reactions    Nsaids (Non-Steroidal Anti-Inflammatory Drug) Other (comments)     GI Ulcer- Hx of Gastric Bypass    Buprenorphine Hcl Hives and Other (comments)         Review of Systems   Review of Systems   Constitutional:  Negative for chills and fever. Gastrointestinal:  Negative for abdominal pain. Genitourinary:  Negative for difficulty urinating and dysuria. Musculoskeletal:  Positive for back pain and myalgias. Neurological:  Negative for weakness and numbness. All other systems reviewed and are negative. Physical Exam     Vitals:    10/08/22 1116   BP: 113/87   Pulse: 80   Resp: 18   Temp: 98 °F (36.7 °C)   SpO2: 100%   Weight: 84.4 kg (186 lb)   Height: 5' 3\" (1.6 m)     Physical Exam  Vitals and nursing note reviewed. Constitutional:       General: She is not in acute distress. Appearance: She is well-developed. She is not diaphoretic. Comments: AA female that appears uncomfortable. Alert. In fast track room.  Ambulatory into room   HENT:      Head: Normocephalic and atraumatic. Right Ear: External ear normal.      Left Ear: External ear normal.      Nose: Nose normal.   Eyes:      General: No scleral icterus. Right eye: No discharge. Left eye: No discharge. Conjunctiva/sclera: Conjunctivae normal.   Cardiovascular:      Rate and Rhythm: Normal rate and regular rhythm. Heart sounds: Normal heart sounds. No murmur heard. No friction rub. No gallop. Pulmonary:      Effort: Pulmonary effort is normal. No tachypnea, accessory muscle usage or respiratory distress. Breath sounds: Normal breath sounds. No decreased breath sounds, wheezing, rhonchi or rales. Musculoskeletal:      Cervical back: Normal range of motion. Thoracic back: Normal.      Lumbar back: Tenderness present. No swelling. Decreased range of motion. Skin:     General: Skin is warm and dry. Neurological:      Mental Status: She is alert and oriented to person, place, and time. GCS: GCS eye subscore is 4. GCS verbal subscore is 5. GCS motor subscore is 6. Motor: Motor function is intact. Coordination: Coordination is intact. Psychiatric:         Judgment: Judgment normal.           Diagnostic Study Results     Labs -   No results found for this or any previous visit (from the past 12 hour(s)). Radiologic Studies   XR SPINE LUMB MIN 4 V   Final Result      1. Dorsal spinous fixation from the visualized distal thoracic spine to L4-5   level. No findings of hardware failure. 2. No acute osseous abnormality. 3. Stable degenerative anterolisthesis at L4-5. MRI lumbar 10/02/2022  IMPRESSION     1. The only noticeable difference when compared to 8/27/19 is newly developed  Modic type I changes at L4-5. Otherwise stable exam which is significantly  limited by the spinal hardware artifact. 2.  Stable grade 2 anterolisthesis of L4 and L5 and dextroscoliosis.   Unfortunately there is obscured visualization of the spinal canal but the neural  foramen appear adequately patent. 3.  Congenitally small T12-L1, L1-2 and L2-3 disc. Obscured visualization of the  T12-L1 spinal canal and neural foramen but adequately patent at L1-2 and L2-3.  4. The L3-4 and L5-S1 discs look normal.  CT Results  (Last 48 hours)      None          CXR Results  (Last 48 hours)      None            Medications given in the ED-  Medications   morphine injection 8 mg (8 mg IntraMUSCular Given 10/8/22 1157)   ondansetron (ZOFRAN ODT) tablet 4 mg (4 mg Oral Given 10/8/22 1159)         Medical Decision Making   I am the first provider for this patient. I reviewed the vital signs, available nursing notes, past medical history, past surgical history, family history and social history. Vital Signs-Reviewed the patient's vital signs. Pulse Oximetry Analysis - 100% on RA. NORMAL     Records Reviewed: Nursing Notes, Old Medical Records, and Previous Radiology Studies    Provider Notes (Medical Decision Making): CC of back pain that is reproducible on exam w/ movement/ROM/palpation. Reports it is consistent with prior episodes of back pain. No abdominal complaints/abdomen non tender on exam. No pulsatile mass appreciated. Normal neurologic exam. Not immunosupressed. Doubt epidural abscess, infection, neoplastic etiology. Not consistent with cauda equina. No trauma or midline tenderness. Doubt fracture. Most c/w musculoskeletal etiology. The patient shows no signs or symptoms of developing complication requiring inpatient treatment or management. Further laboratory or radiological investigation is not indicated. Will treat symptomatically with return immediately for new or worsening symptoms. The importance of close follow-up with PMD emphasized to patient. Procedures:  Procedures    ED Course:   11:29 AM Initial assessment performed.  The patients presenting problems have been discussed, and they are in agreement with the care plan formulated and outlined with them. I have encouraged them to ask questions as they arise throughout their visit. Diagnosis and Disposition       Ambulatory. No FND or low back alarm sxs. Pt had MRI lumbar spine 6 days ago. Will tx pain. Fu with Dr. Moshe Tracy. Reasons to RTED discussed with pt. All questions answered. Pt feels comfortable going home at this time. Pt expressed understanding and she agrees with plan. 1. Acute exacerbation of chronic low back pain        PLAN:  1. D/C Home  2. Discharge Medication List as of 10/8/2022  1:58 PM        START taking these medications    Details   traMADoL (Ultram) 50 mg tablet Take 1 Tablet by mouth every six (6) hours as needed for Pain for up to 3 days. Max Daily Amount: 200 mg., Normal, Disp-8 Tablet, R-0Dr. Isabel Porter is supervising MD           CONTINUE these medications which have NOT CHANGED    Details   methylPREDNISolone (Medrol, Edinson,) 4 mg tablet As directed, Normal, Disp-1 Dose Pack, R-0      pantoprazole (PROTONIX) 40 mg tablet TAKE 1 TABLET BY MOUTH TWO TIMES A DAY FOR 30 DAYS. INDICATIONS: GASTRIC ULCER, Normal, Disp-60 Tablet, R-2      cholecalciferol, vitamin D3, 50 mcg (2,000 unit) tab Take 1 Tablet by mouth daily. , Historical Med      b complex vitamins tablet Take 1 Tab by mouth daily. , Historical Med      Biotin 2,500 mcg cap Take  by mouth., Historical Med      cyanocobalamin (VITAMIN B12) 500 mcg tablet Take 500 mcg by mouth daily. , Historical Med      calcium citrate 200 mg (950 mg) tablet Take  by mouth daily. , Historical Med      multivitamin with iron (FLINTSTONES) chewable tablet Take 1 Tab by mouth two (2) times a day., Historical Med           3.    Follow-up Information       Follow up With Specialties Details Why Contact Info    Azra Zazueta MD Orthopedic Surgery   [address]      [facility] EMERGENCY DEPT Emergency Medicine   [address] [place] [id number]  [phone] _______________________________    Attestations: This note is prepared by Lee iHll PA-C.  ______________________________    Please note that this dictation was completed with [employer], the computer voice recognition software. Quite often unanticipated grammatical, syntax, homophones, and other interpretive errors are inadvertently transcribed by the computer software. Please disregard these errors. Please excuse any errors that have escaped final proofreading.

## 2022-10-08 NOTE — ED TRIAGE NOTES
Pt report chronic back pain X 5 years. Pt report sever back pain this AM . Verbalized falling this AM. Denies LOC or hitting head.

## 2022-10-10 ENCOUNTER — HOSPITAL ENCOUNTER (EMERGENCY)
Age: 37
Discharge: HOME | End: 2022-10-10
Attending: EMERGENCY MEDICINE
Payer: MEDICAID

## 2022-10-10 VITALS
DIASTOLIC BLOOD PRESSURE: 94 MMHG | SYSTOLIC BLOOD PRESSURE: 144 MMHG | OXYGEN SATURATION: 97 % | HEART RATE: 90 BPM | RESPIRATION RATE: 18 BRPM | TEMPERATURE: 97.6 F

## 2022-10-10 DIAGNOSIS — M54.50 CHRONIC LOW BACK PAIN WITHOUT SCIATICA, UNSPECIFIED BACK PAIN LATERALITY: Primary | ICD-10-CM

## 2022-10-10 DIAGNOSIS — G89.29 CHRONIC LOW BACK PAIN WITHOUT SCIATICA, UNSPECIFIED BACK PAIN LATERALITY: Primary | ICD-10-CM

## 2022-10-10 PROCEDURE — 74011250637 HC RX REV CODE- 250/637: Performed by: PHYSICIAN ASSISTANT

## 2022-10-10 PROCEDURE — 99283 EMERGENCY DEPT VISIT LOW MDM: CPT

## 2022-10-10 RX ORDER — TRAMADOL HYDROCHLORIDE 50 MG/1
50 TABLET ORAL
Status: COMPLETED | OUTPATIENT
Start: 2022-10-10 | End: 2022-10-10

## 2022-10-10 RX ORDER — METHOCARBAMOL 750 MG/1
750 TABLET, FILM COATED ORAL 4 TIMES DAILY
Qty: 15 TABLET | Refills: 0 | Status: SHIPPED | OUTPATIENT
Start: 2022-10-10

## 2022-10-10 RX ADMIN — TRAMADOL HYDROCHLORIDE 50 MG: 50 TABLET, COATED ORAL at 15:06

## 2022-10-10 NOTE — ED TRIAGE NOTES
Pt arrives to ed reporting back pain that got worse after doing laundry. Pt states she was here a couple of days ago and could not get her prescription filled due to being in pain management.

## 2022-10-10 NOTE — ED PROVIDER NOTES
EMERGENCY DEPARTMENT HISTORY AND PHYSICAL EXAM    Date: 10/10/2022  Patient Name: Jorgito Bangura    History of Presenting Illness     Chief Complaint   Patient presents with    Back Pain         History Provided By: Patient    Chief Complaint: lower back pain       Additional History (Context):   2:00 PM  Jorgito Bangura is a 39 y.o. female with PMHX chronic back pain acid reflux bariatric surgery presents to the emergency department C/O low back pain has been going on for several years according to the patient. States its been worse recently. She typically gets flareups every now and then. Patient was seen at [facility] ER on 10/2 where she had an MRI that showed no acute process or cord compression. She was prescribed Percocet and Medrol Dosepak and instructed to stop her tramadol while taking this medication. Patient was then seen again at this facility on 10/8 for same complaint. Patient had refill of tramadol written. Patient is followed by pain management and is supposed to be seen this week. States she has follow-up scheduled with Dr. Charlotte Storm on the 19th. No abdominal pain pain radiating into the legs, no tingling or numbness in the legs, no saddle anesthesia, no urinary symptoms, no fevers. Denies any new injuries. States she does have a prescription for tramadol at the pharmacy but is unable to get it filled at this time due to insurance reasons    PCP: Shana Ahuja MD    Current Facility-Administered Medications   Medication Dose Route Frequency Provider Last Rate Last Admin    traMADoL (ULTRAM) tablet 50 mg  50 mg [facility]         Current Outpatient Medications   Medication Sig Dispense Refill    methocarbamoL (Robaxin-750) 750 mg tablet Take 1 Tablet by mouth four (4) times daily. 15 Tablet 0    traMADoL (Ultram) 50 mg tablet Take 1 Tablet by mouth every six (6) hours as needed for Pain for up to 3 days.  Max Daily Amount: 200 mg. 8 Tablet 0    methylPREDNISolone (Medrol, Edinson,) 4 mg tablet As directed 1 Dose Pack 0    pantoprazole (PROTONIX) 40 mg tablet TAKE 1 TABLET BY MOUTH TWO TIMES A DAY FOR 30 DAYS. INDICATIONS: GASTRIC ULCER 60 Tablet 2    cholecalciferol, vitamin D3, 50 mcg (2,000 unit) tab Take 1 Tablet by mouth daily. b complex vitamins tablet Take 1 Tab by mouth daily. Biotin 2,500 mcg cap Take  by mouth.      cyanocobalamin (VITAMIN B12) 500 mcg tablet Take 500 mcg by mouth daily. calcium citrate 200 mg (950 mg) tablet Take  by mouth daily. multivitamin with iron (FLINTSTONES) chewable tablet Take 1 Tab by mouth two (2) times a day. Past History     Past Medical History:  Past Medical History:   Diagnosis Date    Acid reflux     Chronic back pain     Incisional hernia     Intestinal malabsorption     Morbid obesity with body mass index of 40.0-49.9 ([facility])     Status post bariatric surgery 2012    sleeve resection / kirstin Alvarez       Past Surgical History:  Past Surgical History:   Procedure Laterality Date    HX  SECTION      X 3    HX GI      Sleeve gastrectomy- Dr Justine Mel[facility]      Conversion to gastric bypass-2017    HX GI      Internal hernia ( dr Philip Anton) -2018    HX GI      Internal hernia - 2019    HX ORTHOPAEDIC      scoliosis correction surgery X 2    HX TONSIL AND ADENOIDECTOMY      HX TUBAL LIGATION      [place] GASTRIC BYPASS/TAMICA-EN-Y      revision from sleeve on 2017       Family History:  No family history on file. Social History:  Social History     Tobacco Use    Smoking status: Never    Smokeless tobacco: Never   Substance Use Topics    Alcohol use: No     Alcohol/week: 0.0 standard drinks    Drug use: No       Allergies:   Allergies   Allergen Reactions    Nsaids (Non-Steroidal Anti-Inflammatory Drug) Other (comments)     GI Ulcer- Hx of Gastric Bypass    Buprenorphine Hcl Hives and Other (comments)       Review of Systems   Review of Systems   Constitutional:  Negative for chills and fever. Respiratory:  Negative for shortness of breath. Cardiovascular:  Negative for chest pain. Gastrointestinal:  Negative for abdominal pain, diarrhea, nausea and vomiting. Genitourinary:  Negative for decreased urine volume, difficulty urinating, dyspareunia, dysuria, enuresis, flank pain, frequency, hematuria and urgency. Musculoskeletal:  Positive for back pain. Skin:  Negative for rash. Neurological:  Negative for weakness and numbness. All other systems reviewed and are negative. Physical Exam     Vitals:    10/10/22 1243   BP: (!) 144/94   Pulse: 90   Resp: 18   Temp: 97.6 °F (36.4 °C)   SpO2: 97%     Physical Exam  Vitals and nursing note reviewed. Constitutional:       Appearance: She is well-developed. Comments: Appears uncomfortable, alert, oriented x4, no neuro deficits, able to ambulate in ED, non toxic   HENT:      Head: Normocephalic and atraumatic. Cardiovascular:      Rate and Rhythm: Normal rate and regular rhythm. Heart sounds: Normal heart sounds. Pulmonary:      Effort: Pulmonary effort is normal. No respiratory distress. Breath sounds: Normal breath sounds. No wheezing or rales. Abdominal:      General: Bowel sounds are normal. There is no distension. Palpations: Abdomen is soft. Tenderness: There is no abdominal tenderness. There is no right CVA tenderness, left CVA tenderness, guarding or rebound. Musculoskeletal:      Cervical back: Normal range of motion and neck supple. Comments: Unable to reproduce pain with palpation to the lower back, decreased ROM secondary to pain, no crepitus or step off, no bruising swelling or skin changes  BLE: strength 5/5, pulses 2+ for DP and PT, brisk cap refill, sensation intact      Skin:     General: Skin is warm and dry. Findings: No rash. Neurological:      Mental Status: She is alert and oriented to person, place, and time. Sensory: No sensory deficit.        Diagnostic Study Results     Labs:   No results found for this or any previous visit (from the past 12 hour(s)). Radiologic Studies:   No orders to display     CT Results  (Last 48 hours)      None          CXR Results  (Last 48 hours)      None            Medical Decision Making   I am the first provider for this patient. I reviewed the vital signs, available nursing notes, past medical history, past surgical history, family history and social history. Vital Signs: Reviewed the patient's vital signs. Pulse Oximetry Analysis: 97% on RA       Records Reviewed: Nursing Notes and Old Medical Records    Procedures:  Procedures    ED Course:   2:00 PM Initial assessment performed. The patients presenting problems have been discussed, and they are in agreement with the care plan formulated and outlined with them. I have encouraged them to ask questions as they arise throughout their visit. Discussion:  Pt presents with acute exacerbation of lower back pain. Patient has been seen several times in the past week. She had an MRI last week that did not show any new changes. Given pain medication. She was seen again for same complaint at this facility and given prescription for tramadol. Patient states she has follow-up scheduled with her pain management doctor this week. No red flag signs or symptoms. No imaging indicated at this time. Offered muscle relaxer. Strict return precautions given, pt offering no questions or complaints. Diagnosis and Disposition     DISCHARGE NOTE:  Newton Timmons's  results have been reviewed with her. She has been counseled regarding her diagnosis, treatment, and plan. She verbally conveys understanding and agreement of the signs, symptoms, diagnosis, treatment and prognosis and additionally agrees to follow up as discussed. She also agrees with the care-plan and conveys that all of her questions have been answered.   I have also provided discharge instructions for her that include: educational information regarding their diagnosis and treatment, and list of reasons why they would want to return to the ED prior to their follow-up appointment, should her condition change. She has been provided with education for proper emergency department utilization. CLINICAL IMPRESSION:    1. Chronic low back pain without sciatica, unspecified back pain laterality        PLAN:  1. D/C Home  2. Current Discharge Medication List        START taking these medications    Details   methocarbamoL (Robaxin-750) 750 mg tablet Take 1 Tablet by mouth four (4) times daily. Qty: 15 Tablet, Refills: 0  Start date: 10/10/2022           3. Follow-up Information       Follow up With Specialties Details Why Contact Info    Kathe Skinner MD Orthopedic Surgery Schedule an appointment as soon as possible for a visit   [address] [address]  [phone]      [facility] EMERGENCY DEPT Emergency Medicine   [address] [place] [address]  [phone]                   Please note that this dictation was completed with [employer]ia, the computer voice recognition software. Quite often unanticipated grammatical, syntax, homophones, and other interpretive errors are inadvertently transcribed by the computer software. Please disregard these errors. Please excuse any errors that have escaped final proofreading.

## 2022-12-05 ENCOUNTER — HOSPITAL ENCOUNTER (EMERGENCY)
Age: 37
Discharge: LEFT BEFORE DISCHARGE | End: 2022-12-05
Attending: EMERGENCY MEDICINE

## 2022-12-05 VITALS
SYSTOLIC BLOOD PRESSURE: 141 MMHG | HEART RATE: 90 BPM | HEIGHT: 63 IN | BODY MASS INDEX: 30.83 KG/M2 | WEIGHT: 174 LBS | RESPIRATION RATE: 18 BRPM | DIASTOLIC BLOOD PRESSURE: 96 MMHG | TEMPERATURE: 98.4 F | OXYGEN SATURATION: 98 %

## 2022-12-05 DIAGNOSIS — Z53.21 ELOPED FROM EMERGENCY DEPARTMENT: Primary | ICD-10-CM

## 2022-12-05 PROCEDURE — 99281 EMR DPT VST MAYX REQ PHY/QHP: CPT

## 2022-12-05 RX ORDER — TRAMADOL HYDROCHLORIDE 50 MG/1
50 TABLET ORAL
COMMUNITY

## 2022-12-05 RX ORDER — DEXAMETHASONE 4 MG/1
10 TABLET ORAL EVERY 12 HOURS
Status: DISCONTINUED | OUTPATIENT
Start: 2022-12-05 | End: 2022-12-05 | Stop reason: HOSPADM

## 2022-12-05 NOTE — ED TRIAGE NOTES
Pt is ambulatory to triage with complaints of lower back pain that radiates down both legs, more so on L. Pt states she has hx of lower back pain and is being by Dr. Moshe Tracy but he was unable to see her until Friday. States this morning she was walking down the stairs when she fell down 3 stairs and her back pain has been severe since. Denies hitting head or any other injuries from fall.

## 2022-12-05 NOTE — ED PROVIDER NOTES
EMERGENCY DEPARTMENT HISTORY AND PHYSICAL EXAM      Date: 12/5/2022  Patient Name: Clementine Alaniz      History of Presenting Illness     Chief Complaint   Patient presents with    Back Pain       Location/Duration/Severity/Modifying factors   Chief Complaint   Patient presents with    Back Pain       HPI:  Clementine Alaniz is a 39 y.o. female with history as listed presents with a concern of back pain. Patient has been scheduled to see Dr. Edna Loo. Patient says that today she fell down 3 stairs bouncing down 3 stairs on her bottom. Now having worsening low back pain radiating into her bilateral legs worse on the left than on the right although she usually does not have significant right leg pain. Symptoms are rated as severe. Unrelenting. Patient has not had any alleviation of her symptoms with home medications. Symptoms are worse with movement and made better by certain positions. No prior history of IV drug abuse. Patient denies urinary incontinence or numbness of the bilateral legs. There are no other complaints, changes, or physical findings at this time. PCP: Dylon Cruz MD    Current Outpatient Medications   Medication Sig Dispense Refill    traMADoL (ULTRAM) 50 mg tablet Take 50 mg by mouth every six (6) hours as needed for Pain.      pantoprazole (PROTONIX) 40 mg tablet TAKE 1 TABLET BY MOUTH TWO TIMES A DAY FOR 30 DAYS. INDICATIONS: GASTRIC ULCER 60 Tablet 2    cholecalciferol, vitamin D3, 50 mcg (2,000 unit) tab Take 1 Tablet by mouth daily. b complex vitamins tablet Take 1 Tab by mouth daily. Biotin 2,500 mcg cap Take  by mouth.      cyanocobalamin (VITAMIN B12) 500 mcg tablet Take 500 mcg by mouth daily. calcium citrate 200 mg (950 mg) tablet Take  by mouth daily. multivitamin with iron (FLINTSTONES) chewable tablet Take 1 Tab by mouth two (2) times a day.          Past History     Past Medical History:  Past Medical History:   Diagnosis Date    Acid reflux Chronic back pain     Incisional hernia     Intestinal malabsorption     Morbid obesity with body mass index of 40.0-49.9 ([facility])     Status post bariatric surgery 2012    sleeve resection / kirstin Seals Aas       Past Surgical History:  Past Surgical History:   Procedure Laterality Date    HX  SECTION      X 3    HX GI  2012    Sleeve gastrectomy- Dr Stevens Clamp [facility]      Conversion to gastric bypass-2017    HX GI      Internal hernia ( dr Modesta Brooke) -2018    HX GI      Internal hernia - 2019    HX ORTHOPAEDIC      scoliosis correction surgery X 2    HX TONSIL AND ADENOIDECTOMY      HX TUBAL LIGATION      [place] [facility] GASTRIC BYPASS/TAMICA-EN-Y      revision from sleeve on 2017       Family History:  No family history on file. Social History:  Social History     Tobacco Use    Smoking status: Never    Smokeless tobacco: Never   Substance Use Topics    Alcohol use: No     Alcohol/week: 0.0 standard drinks    Drug use: No       Allergies: Allergies   Allergen Reactions    Nsaids (Non-Steroidal Anti-Inflammatory Drug) Other (comments)     GI Ulcer- Hx of Gastric Bypass    Buprenorphine Hcl Hives and Other (comments)         Review of Systems     Review of Systems   Constitutional:  Negative for fatigue and fever. HENT:  Negative for sore throat and trouble swallowing. Eyes:  Negative for photophobia and visual disturbance. Respiratory:  Negative for cough and shortness of breath. Cardiovascular:  Negative for chest pain and palpitations. Gastrointestinal:  Negative for abdominal pain and diarrhea. Genitourinary:  Negative for dysuria and flank pain. Musculoskeletal:  Positive for back pain and gait problem. Negative for neck pain and neck stiffness. Skin:  Negative for pallor and rash. Neurological:  Negative for weakness and numbness. Physical Exam     Physical Exam  Constitutional:       Appearance: Normal appearance. HENT:      Head: Normocephalic and atraumatic.       Right Ear: External ear normal.      Left Ear: External ear normal.      Nose: No congestion or rhinorrhea. Mouth/Throat:      Pharynx: No oropharyngeal exudate or posterior oropharyngeal erythema. Cardiovascular:      Rate and Rhythm: Normal rate and regular rhythm. Pulmonary:      Effort: Pulmonary effort is normal.      Breath sounds: Normal breath sounds. Abdominal:      General: Abdomen is flat. Palpations: Abdomen is soft. Musculoskeletal:         General: No swelling or tenderness. Normal range of motion. Cervical back: Normal range of motion and neck supple. Skin:     Coloration: Skin is not pale. Neurological:      General: No focal deficit present. Mental Status: She is alert and oriented to person, place, and time. Mental status is at baseline. Cranial Nerves: No cranial nerve deficit. Sensory: No sensory deficit. Has 2+ patellar reflexes bilaterally. She has 5 out of 5 strength in the bilateral lower extremities to extension and flexion. She has normal sensation to light touch in the bilateral lower extremities. Lab and Diagnostic Study Results     Labs -  No results found for this or any previous visit (from the past 24 hour(s)). Radiologic Studies -   No orders to display         Procedures and Critical Care       Performed by: King Vasquez MD    Procedures         King Vasquez MD    Medical Decision Making and ED Course   - I am the first and primary provider for this patient AND AM THE PRIMARY PROVIDER OF RECORD. - I reviewed the vital signs, available nursing notes, past medical history, past surgical history, family history and social history. - Initial assessment performed. The patients presenting problems have been discussed, and the staff are in agreement with the care plan formulated and outlined with them. I have encouraged them to ask questions as they arise throughout their visit.     Vital Signs-Reviewed the patient's vital signs. Patient Vitals for the past 12 hrs:   Temp Pulse Resp BP SpO2   12/05/22 1339 -- -- -- -- 98 %   12/05/22 1317 98.4 °F (36.9 °C) 90 18 (!) 141/96 100 %         Provider Notes (Medical Decision Making):     MDM  Number of Diagnoses or Management Options  Diagnosis management comments: Presenting for acute back pain in the setting of a fall. Does have significant hardware in the lower back. In the setting of no neurological deficits I have a very low index of suspicion for spinal cord pathology. Will obtain CT of the lumbar spine to evaluate for potential fracture or hardware displacement. We will treat the patient's pain symptomatically        ED Course:       ED Course as of 12/05/22 1815   Mon Dec 05, 2022   6921 unFortunately patient left after being triaged and was offered a lumbar spine CT however left prior to receiving this. [RS]      ED Course User Index  [RS] Kala Berry MD     ------------------------------------------------------------------------------------------------------------        Consultations:       Consultations:       Disposition         LWBS after Triage      Diagnosis     Clinical Impression:   1.  Eloped from emergency department        Attestations:    Iraj Aldana MD

## 2022-12-06 ENCOUNTER — HOSPITAL ENCOUNTER (EMERGENCY)
Age: 37
Discharge: HOME | End: 2022-12-06
Attending: EMERGENCY MEDICINE
Payer: MEDICAID

## 2022-12-06 ENCOUNTER — APPOINTMENT (OUTPATIENT)
Dept: CT IMAGING | Age: 37
End: 2022-12-06
Attending: PHYSICIAN ASSISTANT
Payer: MEDICAID

## 2022-12-06 VITALS
RESPIRATION RATE: 18 BRPM | TEMPERATURE: 97.8 F | DIASTOLIC BLOOD PRESSURE: 107 MMHG | BODY MASS INDEX: 31.36 KG/M2 | SYSTOLIC BLOOD PRESSURE: 154 MMHG | OXYGEN SATURATION: 100 % | WEIGHT: 177 LBS | HEIGHT: 63 IN | HEART RATE: 66 BPM

## 2022-12-06 DIAGNOSIS — R10.13 ABDOMINAL PAIN, EPIGASTRIC: Primary | ICD-10-CM

## 2022-12-06 DIAGNOSIS — Z98.84 HISTORY OF GASTRIC BYPASS: ICD-10-CM

## 2022-12-06 LAB
ALBUMIN SERPL-MCNC: 3 G/DL (ref 3.4–5)
ALBUMIN/GLOB SERPL: 0.7 {RATIO} (ref 0.8–1.7)
ALP SERPL-CCNC: 110 U/L (ref 45–117)
ALT SERPL-CCNC: 18 U/L (ref 13–56)
ANION GAP SERPL CALC-SCNC: 2 MMOL/L (ref 3–18)
APPEARANCE UR: CLEAR
AST SERPL-CCNC: 19 U/L (ref 10–38)
BASOPHILS # BLD: 0 K/UL (ref 0–0.1)
BASOPHILS NFR BLD: 0 % (ref 0–2)
BILIRUB SERPL-MCNC: 0.2 MG/DL (ref 0.2–1)
BILIRUB UR QL: NEGATIVE
BUN SERPL-MCNC: 9 MG/DL (ref 7–18)
BUN/CREAT SERPL: 15 (ref 12–20)
CALCIUM SERPL-MCNC: 9 MG/DL (ref 8.5–10.1)
CHLORIDE SERPL-SCNC: 107 MMOL/L (ref 100–111)
CO2 SERPL-SCNC: 29 MMOL/L (ref 21–32)
COLOR UR: YELLOW
CREAT SERPL-MCNC: 0.61 MG/DL (ref 0.6–1.3)
DIFFERENTIAL METHOD BLD: ABNORMAL
EOSINOPHIL # BLD: 0.1 K/UL (ref 0–0.4)
EOSINOPHIL NFR BLD: 1 % (ref 0–5)
ERYTHROCYTE [DISTWIDTH] IN BLOOD BY AUTOMATED COUNT: 12.7 % (ref 11.6–14.5)
GLOBULIN SER CALC-MCNC: 4.4 G/DL (ref 2–4)
GLUCOSE SERPL-MCNC: 94 MG/DL (ref 74–99)
GLUCOSE UR STRIP.AUTO-MCNC: NEGATIVE MG/DL
HCG SERPL QL: NEGATIVE
HCT VFR BLD AUTO: 35.7 % (ref 35–45)
HGB BLD-MCNC: 11.6 G/DL (ref 12–16)
HGB UR QL STRIP: NEGATIVE
IMM GRANULOCYTES # BLD AUTO: 0 K/UL (ref 0–0.04)
IMM GRANULOCYTES NFR BLD AUTO: 0 % (ref 0–0.5)
KETONES UR QL STRIP.AUTO: NEGATIVE MG/DL
LEUKOCYTE ESTERASE UR QL STRIP.AUTO: NEGATIVE
LIPASE SERPL-CCNC: 80 U/L (ref 73–393)
LYMPHOCYTES # BLD: 1.4 K/UL (ref 0.9–3.6)
LYMPHOCYTES NFR BLD: 37 % (ref 21–52)
MAGNESIUM SERPL-MCNC: 2.3 MG/DL (ref 1.6–2.6)
MCH RBC QN AUTO: 29.3 PG (ref 24–34)
MCHC RBC AUTO-ENTMCNC: 32.5 G/DL (ref 31–37)
MCV RBC AUTO: 90.2 FL (ref 78–100)
MONOCYTES # BLD: 0.3 K/UL (ref 0.05–1.2)
MONOCYTES NFR BLD: 9 % (ref 3–10)
NEUTS SEG # BLD: 2 K/UL (ref 1.8–8)
NEUTS SEG NFR BLD: 52 % (ref 40–73)
NITRITE UR QL STRIP.AUTO: NEGATIVE
NRBC # BLD: 0 K/UL (ref 0–0.01)
NRBC BLD-RTO: 0 PER 100 WBC
PH UR STRIP: 7.5 [PH] (ref 5–8)
PLATELET # BLD AUTO: 321 K/UL (ref 135–420)
PMV BLD AUTO: 11.4 FL (ref 9.2–11.8)
POTASSIUM SERPL-SCNC: 4.2 MMOL/L (ref 3.5–5.5)
PROT SERPL-MCNC: 7.4 G/DL (ref 6.4–8.2)
PROT UR STRIP-MCNC: NEGATIVE MG/DL
RBC # BLD AUTO: 3.96 M/UL (ref 4.2–5.3)
SODIUM SERPL-SCNC: 138 MMOL/L (ref 136–145)
SP GR UR REFRACTOMETRY: 1.02 (ref 1–1.03)
UROBILINOGEN UR QL STRIP.AUTO: 1 EU/DL (ref 0.2–1)
WBC # BLD AUTO: 3.9 K/UL (ref 4.6–13.2)

## 2022-12-06 PROCEDURE — 84703 CHORIONIC GONADOTROPIN ASSAY: CPT

## 2022-12-06 PROCEDURE — 96375 TX/PRO/DX INJ NEW DRUG ADDON: CPT

## 2022-12-06 PROCEDURE — 85025 COMPLETE CBC W/AUTO DIFF WBC: CPT

## 2022-12-06 PROCEDURE — 96366 THER/PROPH/DIAG IV INF ADDON: CPT

## 2022-12-06 PROCEDURE — 96365 THER/PROPH/DIAG IV INF INIT: CPT

## 2022-12-06 PROCEDURE — 81003 URINALYSIS AUTO W/O SCOPE: CPT

## 2022-12-06 PROCEDURE — 83690 ASSAY OF LIPASE: CPT

## 2022-12-06 PROCEDURE — 93005 ELECTROCARDIOGRAM TRACING: CPT

## 2022-12-06 PROCEDURE — C9113 INJ PANTOPRAZOLE SODIUM, VIA: HCPCS | Performed by: PHYSICIAN ASSISTANT

## 2022-12-06 PROCEDURE — 80053 COMPREHEN METABOLIC PANEL: CPT

## 2022-12-06 PROCEDURE — 74011000250 HC RX REV CODE- 250: Performed by: PHYSICIAN ASSISTANT

## 2022-12-06 PROCEDURE — 74177 CT ABD & PELVIS W/CONTRAST: CPT

## 2022-12-06 PROCEDURE — 74011000636 HC RX REV CODE- 636: Performed by: EMERGENCY MEDICINE

## 2022-12-06 PROCEDURE — 74011250636 HC RX REV CODE- 250/636: Performed by: PHYSICIAN ASSISTANT

## 2022-12-06 PROCEDURE — 99285 EMERGENCY DEPT VISIT HI MDM: CPT

## 2022-12-06 PROCEDURE — 83735 ASSAY OF MAGNESIUM: CPT

## 2022-12-06 RX ORDER — PANTOPRAZOLE SODIUM 40 MG/10ML
40 INJECTION, POWDER, LYOPHILIZED, FOR SOLUTION INTRAVENOUS
Status: DISCONTINUED | OUTPATIENT
Start: 2022-12-06 | End: 2022-12-06

## 2022-12-06 RX ORDER — MORPHINE SULFATE 4 MG/ML
4 INJECTION INTRAVENOUS
Status: COMPLETED | OUTPATIENT
Start: 2022-12-06 | End: 2022-12-06

## 2022-12-06 RX ORDER — DICYCLOMINE HYDROCHLORIDE 20 MG/1
20 TABLET ORAL EVERY 6 HOURS
Qty: 20 TABLET | Refills: 0 | Status: SHIPPED | OUTPATIENT
Start: 2022-12-06 | End: 2022-12-11

## 2022-12-06 RX ORDER — PROMETHAZINE HYDROCHLORIDE 25 MG/1
25 TABLET ORAL
Qty: 12 TABLET | Refills: 0 | Status: SHIPPED | OUTPATIENT
Start: 2022-12-06

## 2022-12-06 RX ORDER — DIPHENHYDRAMINE HYDROCHLORIDE 50 MG/ML
25 INJECTION, SOLUTION INTRAMUSCULAR; INTRAVENOUS
Status: COMPLETED | OUTPATIENT
Start: 2022-12-06 | End: 2022-12-06

## 2022-12-06 RX ORDER — ONDANSETRON 2 MG/ML
4 INJECTION INTRAMUSCULAR; INTRAVENOUS
Status: COMPLETED | OUTPATIENT
Start: 2022-12-06 | End: 2022-12-06

## 2022-12-06 RX ADMIN — IOPAMIDOL 100 ML: 612 INJECTION, SOLUTION INTRAVENOUS at 12:54

## 2022-12-06 RX ADMIN — THIAMINE HYDROCHLORIDE: 100 INJECTION, SOLUTION INTRAMUSCULAR; INTRAVENOUS at 13:47

## 2022-12-06 RX ADMIN — MORPHINE SULFATE 4 MG: 4 INJECTION INTRAVENOUS at 12:37

## 2022-12-06 RX ADMIN — SODIUM CHLORIDE 40 MG: 9 INJECTION, SOLUTION INTRAMUSCULAR; INTRAVENOUS; SUBCUTANEOUS at 12:37

## 2022-12-06 RX ADMIN — ONDANSETRON 4 MG: 2 INJECTION INTRAMUSCULAR; INTRAVENOUS at 12:37

## 2022-12-06 RX ADMIN — SODIUM CHLORIDE, POTASSIUM CHLORIDE, SODIUM LACTATE AND CALCIUM CHLORIDE 1000 ML: 600; 310; 30; 20 INJECTION, SOLUTION INTRAVENOUS at 12:36

## 2022-12-06 RX ADMIN — DIPHENHYDRAMINE HYDROCHLORIDE 25 MG: 50 INJECTION, SOLUTION INTRAMUSCULAR; INTRAVENOUS at 13:47

## 2022-12-06 NOTE — ED TRIAGE NOTES
Pt report abdominal pain. Started at 0400 this AM. Pt is followed by MD Miah Casey. HX of gastric bypass and 2 abdominal hernia repair. Pt verbalized that MD Galan  recommended pt to visit ed.

## 2022-12-06 NOTE — ED PROVIDER NOTES
EMERGENCY DEPARTMENT HISTORY AND PHYSICAL EXAM    Date: 12/6/2022  Patient Name: Lissa Thompson    History of Presenting Illness     Chief Complaint   Patient presents with    Abdominal Pain         History Provided By: Patient    Chief Complaint: abdominal pain    HPI(Context):   11:14 AM  Lissa Thompson is a 39 y.o. female with PMHX of gastric bypass with internal hernia x2 who presents to the emergency department C/O epigastric abdominal pain. Associated sxs include nausea and low appetite. Pain is sharp and started at 0400 this AM. Radiates to back. Worse with meals. Pt notes nausea with no vomiting. Pt had gastric bypass with Wendy Sotelo MD (bariatrics) with hx of internal hernia with repair x2. Pt called office and told to come to ED for evaluation. No NSAIDs or steroid use. Pt is nonsmoker. No ETOH. Pt is on Protonix daoily. Pt denies fever, chills, vomiting, diarrhea, and any other sxs or complaints. PCP: Jamari Castle MD    Current Outpatient Medications   Medication Sig Dispense Refill    promethazine (PHENERGAN) 25 mg tablet Take 1 Tablet by mouth every six (6) hours as needed for Nausea. 12 Tablet 0    dicyclomine (BENTYL) 20 mg tablet Take 1 Tablet by mouth every six (6) hours for 5 days. As needed for abdominal pain 20 Tablet 0    traMADoL (ULTRAM) 50 mg tablet Take 50 mg by mouth every six (6) hours as needed for Pain.      pantoprazole (PROTONIX) 40 mg tablet TAKE 1 TABLET BY MOUTH TWO TIMES A DAY FOR 30 DAYS. INDICATIONS: GASTRIC ULCER 60 Tablet 2    cholecalciferol, vitamin D3, 50 mcg (2,000 unit) tab Take 1 Tablet by mouth daily. b complex vitamins tablet Take 1 Tab by mouth daily. Biotin 2,500 mcg cap Take  by mouth.      cyanocobalamin (VITAMIN B12) 500 mcg tablet Take 500 mcg by mouth daily. calcium citrate 200 mg (950 mg) tablet Take  by mouth daily. multivitamin with iron (FLINTSTONES) chewable tablet Take 1 Tab by mouth two (2) times a day.          Past History     Past Medical History:  Past Medical History:   Diagnosis Date    Acid reflux     Chronic back pain     Incisional hernia     Intestinal malabsorption     Morbid obesity with body mass index of 40.0-49.9 ([facility])     Status post bariatric surgery 2012    sleeve resection / kirstin Lopez       Past Surgical History:  Past Surgical History:   Procedure Laterality Date    HX  SECTION      X 3    HX GI  2012    Sleeve gastrectomy- Dr [facility]      Conversion to gastric bypass-2017    HX GI      Internal hernia ( dr Cate Francisco) -2018    HX GI      Internal hernia - 2019    HX ORTHOPAEDIC      scoliosis correction surgery X 2    HX TONSIL AND ADENOIDECTOMY      HX TUBAL LIGATION      MA LAP GASTRIC BYPASS/TAMICA-EN-Y      revision from sleeve on 2017       Family History:  No family history on file. Social History:  Social History     Tobacco Use    Smoking status: Never    Smokeless tobacco: Never   Substance Use Topics    Alcohol use: No     Alcohol/week: 0.0 standard drinks    Drug use: No       Allergies: Allergies   Allergen Reactions    Nsaids (Non-Steroidal Anti-Inflammatory Drug) Other (comments)     GI Ulcer- Hx of Gastric Bypass    Buprenorphine Hcl Hives and Other (comments)         Review of Systems   Review of Systems   Constitutional:  Positive for appetite change. Negative for chills and fever. Respiratory:  Negative for shortness of breath. Cardiovascular:  Negative for chest pain. Gastrointestinal:  Positive for abdominal pain and nausea. Negative for constipation and vomiting. Genitourinary:  Negative for dysuria. Musculoskeletal:  Positive for back pain. All other systems reviewed and are negative.     Physical Exam     Vitals:    22 1107 22 1242 22 1352   BP: (!) 139/99 (!) 135/105 (!) 154/107   Pulse: 79 86 66   Resp: 18 18 18   Temp: 97.8 °F (36.6 °C)     SpO2: 100% 96% 100%   Weight: 80.3 kg (177 lb)     Height: 5' 3\" (1.6 m) Physical Exam  Vitals and nursing note reviewed. Constitutional:       General: She is not in acute distress. Appearance: She is well-developed. She is not diaphoretic. Comments: AA female in NAD. Alert. Appears uncomfortable. HENT:      Head: Normocephalic and atraumatic. Jaw: No trismus. Right Ear: External ear normal.      Left Ear: External ear normal.      Nose: Nose normal.   Eyes:      General: No scleral icterus. Right eye: No discharge. Left eye: No discharge. Conjunctiva/sclera: Conjunctivae normal.   Cardiovascular:      Rate and Rhythm: Normal rate and regular rhythm. Heart sounds: Normal heart sounds. No murmur heard. No friction rub. No gallop. Pulmonary:      Effort: Pulmonary effort is normal. No tachypnea, accessory muscle usage or respiratory distress. Breath sounds: Normal breath sounds. No decreased breath sounds, wheezing, rhonchi or rales. Abdominal:      Tenderness: There is abdominal tenderness in the epigastric area. There is no right CVA tenderness, left CVA tenderness, guarding or rebound. Negative signs include Rangel's sign and McBurney's sign. Musculoskeletal:         General: Normal range of motion. Cervical back: Normal range of motion. Skin:     General: Skin is warm and dry. Neurological:      Mental Status: She is alert and oriented to person, place, and time. Psychiatric:         Judgment: Judgment normal.           Diagnostic Study Results     Labs -   No results found for this or any previous visit (from the past 12 hour(s)). Radiologic Studies   CT ABD PELV W CONT   Final Result      No acute intra-abdominal abnormality. CT Results  (Last 48 hours)                 12/06/22 1303  CT ABD PELV W CONT Final result    Impression:      No acute intra-abdominal abnormality. Narrative:  EXAM: CT of the abdomen and pelvis       INDICATION: Pain. COMPARISON: 5/25/2021. TECHNIQUE: Axial CT imaging of the abdomen and pelvis was performed with   intravenous contrast. Multiplanar reformats were generated. One or more dose reduction techniques were used on this CT: automated exposure   control, adjustment of the mAs and/or kVp according to patient size, and   iterative reconstruction techniques. The specific techniques used on this CT   exam have been documented in the patient's electronic medical record. Digital   Imaging and Communications in Medicine (DICOM) format image data are available   to nonaffiliated external healthcare facilities or entities on a secure, media   free, reciprocally searchable basis with patient authorization for at least a   12-month period after this study. _______________       FINDINGS:       LOWER CHEST: Unremarkable. LIVER, BILIARY: Liver is normal. No biliary dilation. Gallbladder is   unremarkable. PANCREAS: Normal.       SPLEEN: Normal.       ADRENALS: Normal.       KIDNEYS/URETERS/BLADDER: Normal.       PELVIC ORGANS: Unremarkable. VASCULATURE: Unremarkable       LYMPH NODES: No enlarged lymph nodes. GASTROINTESTINAL TRACT: Prior Kori-en-Y gastric bypass. No bowel dilation or   wall thickening. Normal appendix. BONES: No acute or aggressive osseous abnormalities identified. Thoracolumbar   scoliosis hardware. OTHER: Small fat-containing umbilical hernia.       _______________                 CXR Results  (Last 48 hours)      None            Medications given in the ED-  Medications   lactated ringers bolus infusion 1,000 mL (0 mL IntraVENous IV Completed 12/6/22 1510)   ondansetron (ZOFRAN) injection 4 mg (4 mg IntraVENous Given 12/6/22 1237)   0.9% sodium chloride 1,000 mL with mvi (adult no. 4 with vit K) 10 mL, thiamine 077 mg, folic acid 1 mg infusion ( IntraVENous IV Completed 12/6/22 1608)   pantoprazole (PROTONIX) 40 mg in 0.9% sodium chloride 10 mL injection (40 mg IntraVENous Given 12/6/22 1237)   morphine injection 4 mg (4 mg IntraVENous Given 12/6/22 1237)   iopamidoL (ISOVUE 300) 300 mg iodine /mL (61 %) contrast injection 100 mL (100 mL IntraVENous Given 12/6/22 1254)   diphenhydrAMINE (BENADRYL) injection 25 mg (25 mg IntraVENous Given 12/6/22 1347)         Medical Decision Making   I am the first provider for this patient. I reviewed the vital signs, available nursing notes, past medical history, past surgical history, family history and social history. Vital Signs-Reviewed the patient's vital signs. Pulse Oximetry Analysis - 100% on RA. NORMAL     Records Reviewed: Nursing Notes, Old Medical Records, Previous Radiology Studies, and Previous Laboratory Studies    Provider Notes (Medical Decision Making): hernia, gastritis, PUD, GERD, perf, gastroenteritis, pancreatitis, hepatitis, GB, food borne, appy, colitis, diverticulitis    Procedures:  Procedures    ED Course:   11:14 AM Initial assessment performed. The patients presenting problems have been discussed, and they are in agreement with the care plan formulated and outlined with them. I have encouraged them to ask questions as they arise throughout their visit. Diagnosis and Disposition     Discussed case with April Chairez MD  (bariatrics). He is familiar with pt. As national shortage of oral contrast will scan with IV only. Will check labs, IVF, and tx pain. Sxs improved. Labs reassuring. VSS. CT unremarkable. Suspect gastritis v PUD v food borne. [place] diet. Bariatrics FU. Reasons to RTED discussed with pt. All questions answered. Pt feels comfortable going home at this time. Pt expressed understanding and she agrees with plan. 1. Abdominal pain, epigastric    2. History of gastric bypass        PLAN:  1. D/C Home  2.    Discharge Medication List as of 12/6/2022  3:50 PM        START taking these medications    Details   promethazine (PHENERGAN) 25 mg tablet Take 1 Tablet by mouth every six (6) hours as needed for Nausea., Normal, Disp-12 Tablet, R-0      dicyclomine (BENTYL) 20 mg tablet Take 1 Tablet by mouth every six (6) hours for 5 days. As needed for abdominal pain, Normal, Disp-20 Tablet, R-0           CONTINUE these medications which have NOT CHANGED    Details   traMADoL (ULTRAM) 50 mg tablet Take 50 mg by mouth every six (6) hours as needed for Pain., Historical Med      pantoprazole (PROTONIX) 40 mg tablet TAKE 1 TABLET BY MOUTH TWO TIMES A DAY FOR 30 DAYS. INDICATIONS: GASTRIC ULCER, Normal, Disp-60 Tablet, R-2      cholecalciferol, vitamin D3, 50 mcg (2,000 unit) tab Take 1 Tablet by mouth daily. , Historical Med      b complex vitamins tablet Take 1 Tab by mouth daily. , Historical Med      Biotin 2,500 mcg cap Take  by mouth., Historical Med      cyanocobalamin (VITAMIN B12) 500 mcg tablet Take 500 mcg by mouth daily. , Historical Med      calcium citrate 200 mg (950 mg) tablet Take  by mouth daily. , Historical Med      multivitamin with iron (FLINTSTONES) chewable tablet Take 1 Tab by mouth two (2) times a day., Historical Med           3. Follow-up Information       Follow up With Specialties Details Why Contact Info    Pastor Yoon MD [address] [place] [address], General Surgery   [address]  [address]  [address] [place] [address]  [phone]      [facility] EMERGENCY DEPT Emergency Medicine   [address] [place] [address] [address]pass  [phone]          _______________________________    Attestations: This note is prepared by Didier Dolan PA-C.  _______________________________        Please note that this dictation was completed with [employer], the [employer] voice recognition software. Quite often unanticipated grammatical, syntax, homophones, and other interpretive errors are inadvertently transcribed by the computer software. Please disregard these errors. Please excuse any errors that have escaped final proofreading.

## 2022-12-06 NOTE — Clinical Note
[facility] [place]  [facility] EMERGENCY DEPT  [address]  [facility] NEWS [facility] [id number] [phone]    Work/School Note    Date: 12/6/2022    To Whom It May concern:    Bhavna Carolina was seen and treated today in the emergency room by the following provider(s):  Attending Provider: Cliff Orta MD  Physician Assistant: Zollie Prader, PA-C. Bhavna Carolina is excused from work/school on 12/06/22 and 12/07/22. She is medically clear to return to work/school on 12/8/2022.        Sincerely,          Shmuel Pruitt PA-C Date of Service: 03/24/2021    TELEPHONE VISIT     CHIEF COMPLAINT:  The patient is a very pleasant 75-year-old male who today wanted to do a telephone visit instead of an in-person visit because of the COVID-19 pandemic precautions.  He has chronic allergic rhinitis.  He was last seen in the office on 01/02/2020.      HISTORY OF PRESENT ILLNESS:  The patient has chronic allergic rhinitis.  His allergy testing from 01/02/2020 showed elevated specific IgE for dust mites and cockroach.  See results in Epic from 01/02/2020.  He is doing his best in terms of allergen avoidance and environmental control.  He has had good benefit with a combination of Astelin 2 sprays per nostril 2 times daily and Nasacort AQ 2 sprays per nostril once daily.  He has tried other treatments such as Singulair, which he has not found effective.  He has intermittent difficulties with clear rhinorrhea, postnasal drainage, nasal itching and sneezing.  He has no facial pain, sinus headache, purulent rhinorrhea or fevers or chills.  No urticaria, angioedema, or eczema.  There is no history of anaphylaxis.  There is no food, latex or stinging insect allergy.    DRUG ALLERGIES AND ADVERSE DRUG REACTIONS:  Reviewed, recorded in Epic.    CURRENT MEDICATIONS:  Reviewed, recorded in Epic.    SOCIAL HISTORY:  Former smoker as documented in Epic, but currently does not smoke.  Lives in [place], [place].    REVIEW OF SYSTEMS:  As stated in [facility].    IMPRESSION AND RECOMMENDATIONS:  Overall, the patient has chronic allergic rhinitis with prior allergy testing from 01/02/2020 showing elevated specific IgE for dust mites as well as cockroach.  I emphasized allergen avoidance and environmental control.  I have discussed treatment options.  He will take Nasacort AQ 2 sprays per nostril daily and Astelin 2 sprays per nostril 2 times daily.  Prescription for Astelin refilled.  Also, he can use NasoGel or Ayr Gel for nasal dryness.  I have emphasized side effects  of medications and proper way of using them, emphasized to follow up with PCP, Dr. Nieves, regarding general medical care.  Emphasized to get regular comprehensive eye exams through the eye doctor.  Certainly, any allergy related questions or concerns, he can let me know.  Side effects of medications and proper way of using them emphasized.  Above was discussed in detail and all questions were answered.    Total time for this telephone visit was 12 minutes.      Dictated By: Seb Ochoa MD  Signing Provider: MD KAZ Coleman/cynthia ([id number])  DD: 03/24/2021 12:52:40 TD: 03/25/2021 09:18:57    Copy Sent To:

## 2022-12-06 NOTE — Clinical Note
[facility] [place]  [facility]  [address]  [facility] [id number] [phone]    Work/School Note    Date: 12/6/2022    To Whom It May concern:    Shay Rodriguez was seen and treated today in the emergency room by the following provider(s):  Attending Provider: Madonna Dave MD  Physician Assistant: Odette Persaud PA-C. Shay Rodriguez is excused from work/school on 12/06/22 and 12/07/22. She is medically clear to return to work/school on 12/8/2022.        Sincerely,          Nandini Howard RN

## 2022-12-11 LAB
ATRIAL RATE: 74 BPM
CALCULATED P AXIS, ECG09: 46 DEGREES
CALCULATED R AXIS, ECG10: 45 DEGREES
CALCULATED T AXIS, ECG11: 48 DEGREES
DIAGNOSIS, 93000: NORMAL
P-R INTERVAL, ECG05: 100 MS
Q-T INTERVAL, ECG07: 360 MS
QRS DURATION, ECG06: 70 MS
QTC CALCULATION (BEZET), ECG08: 399 MS
VENTRICULAR RATE, ECG03: 74 BPM

## 2022-12-13 ENCOUNTER — APPOINTMENT (OUTPATIENT)
Dept: PHYSICAL THERAPY | Age: 37
End: 2022-12-13

## 2022-12-19 ENCOUNTER — HOSPITAL ENCOUNTER (OUTPATIENT)
Dept: PHYSICAL THERAPY | Age: 37
Discharge: HOME | End: 2022-12-19
Payer: MEDICAID

## 2022-12-19 PROCEDURE — 97161 PT EVAL LOW COMPLEX 20 MIN: CPT

## 2022-12-19 RX ORDER — PANTOPRAZOLE SODIUM 40 MG/1
TABLET, DELAYED RELEASE ORAL
Qty: 60 TABLET | Refills: 2 | Status: SHIPPED | OUTPATIENT
Start: 2022-12-19
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
## 2022-12-30 ENCOUNTER — TELEPHONE (OUTPATIENT)
Dept: PHYSICAL THERAPY | Age: 37
End: 2022-12-30

## 2022-12-30 ENCOUNTER — APPOINTMENT (OUTPATIENT)
Dept: PHYSICAL THERAPY | Age: 37
End: 2022-12-30
Payer: MEDICAID

## 2023-01-18 ENCOUNTER — HOSPITAL ENCOUNTER (OUTPATIENT)
Dept: PHYSICAL THERAPY | Age: 38
Discharge: HOME | End: 2023-01-18
Payer: MEDICAID

## 2023-01-18 PROCEDURE — 97112 NEUROMUSCULAR REEDUCATION: CPT

## 2023-01-18 PROCEDURE — 97110 THERAPEUTIC EXERCISES: CPT

## 2023-01-18 PROCEDURE — 97530 THERAPEUTIC ACTIVITIES: CPT

## 2023-01-18 PROCEDURE — 97535 SELF CARE MNGMENT TRAINING: CPT

## 2023-01-18 NOTE — PROGRESS NOTES
PT DAILY TREATMENT NOTE     Patient Name: Torito Esquivel  Date:2023  : 1985  [x]  Patient  Verified  Payor: BLUE CROSS MEDICAID / Plan: [facility] [employer]KEEPERS PLUS / Product Type: Managed Care Medicaid /    In time:1131  Out time:1224  Total Treatment Time (min): 53  Visit #: 2 of 10    Medicare/BCBS Only   Total Timed Codes (min):  53 1:1 Treatment Time:  53       Treatment Area:  Other low back pain [M54.59]    SUBJECTIVE  Pain Level (0-10 scale): 4-5/10  Any medication changes, allergies to medications, adverse drug reactions, diagnosis change, or new procedure performed?: [x] No    [] Yes (see summary sheet for update)  Subjective functional status/changes:   [] No changes reported  Reports 4-5/10 pain with medication     OBJECTIVE      23 min Therapeutic Exercise:  [x] See flow sheet :   Rationale: increase ROM and increase strength to improve the patients ability to perform ADLs    12 min Therapeutic Activity:  [x]  See flow sheet :   Rationale: increase ROM, increase strength, and improve coordination  to improve the patients ability to perform ADLs     10 min Neuromuscular Re-education:  [x]  See flow sheet :   Rationale: increase ROM, increase strength, and improve coordination  to improve the patients ability to perform ADLs    8 min Self Care/Home Management: Educated patient on body mechanics with bending over/squatting down to limit discomfort in the low back    Rationale: increase ROM, increase strength, and improve coordination  to improve the patients ability to perform ADLs         With   [x] TE   [] TA   [] neuro   [] other: Patient Education: [x] Review HEP    [] Progressed/Changed HEP based on:   [] positioning   [] body mechanics   [] transfers   [] heat/ice application    [] other:      Other Objective/Functional Measures:   Initiate TE per flow sheet   FOTO = 38   Pain with DBL knee lift, added Sball and patient able to perform with little to no pain     Pain Level (0-10 scale) post treatment: 6/10    ASSESSMENT/Changes in Function: Patient actively participates in therapy and tolerates treatment well with minor fatigue in the glutes. Increased pain with DBL knee lift, added Sball to decrease the strain on the low back. Reports feeling \"wobbly\" after treatment d/t LE fatigue. Patient will continue to benefit from skilled PT services to modify and progress therapeutic interventions, address functional mobility deficits, address ROM deficits, address strength deficits, analyze and address soft tissue restrictions, analyze and cue movement patterns, analyze and modify body mechanics/ergonomics, and assess and modify postural abnormalities to attain remaining goals. []  See Plan of Care  []  See progress note/recertification  []  See Discharge Summary         Progress towards goals / Updated goals:  Short Term Goals: To be accomplished in 5 treatments:  1. Pt will be compliant and independent with HEP in order to facilitate PT sessions and aid with self management             Eval Status:  Initiated             Current Status:  2. Pt to tolerate 30 min or more of TE and/or Interventions w/o increased s/s             Eval Status:  Initiated             Current Status: Patient reports increased discomfort with TE  1/18/23     Long Term Goals: To be accomplished in 10 treatments:  1. Pt will report 50% improvement or better with low back dysfunction to show a significant increase in ability to tolerate ADL             Eval Status:  Initiated             Current Status:  2. Pt will demonstrate upright posturing of the low back to static trunk flx at 10* Fwd Flx Posture or better without added pain              Eval Status:  Static trunk flx at 16*             Current Status:  3.   Pt will reports decreased radicular s/s of the left LE and back pain for carryover to improved household chores and family activity tolerance with pain at 3/10 or better to enjoy more family time with less discomfort               Eval Status:  Radicular s/s into the left LE and pain 6/10             Current Status: Patient reports 4-5/10 today 1/18/23  4. Pt will ambulate 500' (I) with a good pain and little to no discomfort or LOB for progress to community ambulate and increased social time for improved quality of life with her children               Eval Status:  Limited walking due to pian and limited social time with children             Current Status:  5. Pt will improve FOTO score to 50 in 11 visits to show significant improvement in function for progress to performing usual family activities with little difficulty             Eval Status: FOTO = 37             Current Status: FOTO = 38  1/18/23    PLAN  [x]  Upgrade activities as tolerated     [x]  Continue plan of care  []  Update interventions per flow sheet       []  Discharge due to:_  []  Other:_      Taniya Neves, PTA 1/18/2023  11:38 AM    No future appointments.

## 2023-01-20 ENCOUNTER — HOSPITAL ENCOUNTER (OUTPATIENT)
Dept: PHYSICAL THERAPY | Age: 38
Discharge: HOME | End: 2023-01-20
Payer: MEDICAID

## 2023-01-20 PROCEDURE — 97110 THERAPEUTIC EXERCISES: CPT

## 2023-01-20 PROCEDURE — 97112 NEUROMUSCULAR REEDUCATION: CPT

## 2023-01-20 PROCEDURE — 97035 APP MDLTY 1+ULTRASOUND EA 15: CPT

## 2023-01-20 NOTE — PROGRESS NOTES
[facility] at [address] [facility], [facility]  Phone: [phone]      Fax:  [phone]    Progress Note  Patient name: Florinda Ruiz Start of Care: 2022   Referral source: Pasco Lennox, MD : 1985               Medical Diagnosis: Other low back pain [M54.59]  Payor: [employer] MEDICAID / Plan: [id number] [place] [address] / Product Type: Managed Care Medicaid /  Onset Date:Long term pain with recent exacerbations on or about 22               Treatment Diagnosis: Low Back Pain    Prior Hospitalization: see medical history Provider#: [id number]   Medications: Verified on Patient summary List   Comorbidities: Scoliosis  Prior Level of Function: Long term back pain but more pain than normal  Visits from Start of Care: 3    Missed Visits: 1    Established Goals:          Excellent Good         Limited           None  [] Increased ROM   []  []  [x]  []  [] Increased Strength  []  []  [x]  []  [] Increased Mobility  []  []  [x]  []   [] Decreased Pain   []  [x]  [x]  []  [] Decreased Swelling  []  []  []  []    Key Functional Changes: Patient has shown little progress to this point. Patient seen for 3 visits since initial evaluation over a month ago. Pain as of last visit was 5/10. Patient has shown no change in strength and mobility at this point. Patient reports decreased s/s after treatment sessions and will continue to have improved function with consistent treatment sessions. FOTO score is 38. All STG/LTGs achieved as identified below. Short Term Goals: To be accomplished in 5 treatments:  1. Pt will be compliant and independent with HEP in order to facilitate PT sessions and aid with self management             Eval Status:  Initiated             Current Status:  Pt reports non compliance with HEP 23  2.   Pt to tolerate 30 min or more of TE and/or Interventions w/o increased s/s             Eval Status:  Initiated Current Status: Patient reports increased discomfort with TE  1/18/23     Long Term Goals: To be accomplished in 10 treatments:  1. Pt will report 50% improvement or better with low back dysfunction to show a significant increase in ability to tolerate ADL             Eval Status:  Initiated             Current Status:  2. Pt will demonstrate upright posturing of the low back to static trunk flx at 10* Fwd Flx Posture or better without added pain              Eval Status:  Static trunk flx at 16*             Current Status:  3. Pt will reports decreased radicular s/s of the left LE and back pain for carryover to improved household chores and family activity tolerance with pain at 3/10 or better to enjoy more family time with less discomfort               Eval Status:  Radicular s/s into the left LE and pain 6/10             Current Status: Patient reports 4-5/10 today 1/18/23  4. Pt will ambulate 500' (I) with a good pain and little to no discomfort or LOB for progress to community ambulate and increased social time for improved quality of life with her children               Eval Status:  Limited walking due to pian and limited social time with children             Current Status:   5. Pt will improve FOTO score to 50 in 11 visits to show significant improvement in function for progress to performing usual family activities with little difficulty             Eval Status: FOTO = 37             Current Status: FOTO = 38  1/18/23      Updated Goals: to be achieved in 20 treatments:  1.   Pt will be compliant and independent with HEP in order to facilitate PT sessions and aid with self management              Status at last note/certification:  Pt reports non compliance with HEP   Current Status:                  2.  Pt to tolerate 30 min or more of TE and/or Interventions w/o increased s/s              Status at last note/certification: Patient reports increased discomfort with TE               Current Status:  3. Pt will report 50% improvement or better with low back dysfunction to show a significant increase in ability to tolerate ADL              Status at last note/certification:             Current Status:  4. Pt will demonstrate upright posturing of the low back to static trunk flx at 10* Fwd Flx Posture or better without added pain               Status at last note/certification:  Static trunk flx at 16*             Current Status:  5. Pt will reports decreased radicular s/s of the left LE and back pain for carryover to improved household chores and family activity tolerance with pain at 3/10 or better to enjoy more family time with less discomfort                Status at last note/certification: Patient reports 4-5/10 today             Current Status:   6. Pt will ambulate 500' (I) with a good pain and little to no discomfort or LOB for progress to community ambulate and increased social time for improved quality of life with her children                Status at last note/certification: Limited walking due to pian and limited social time with children             Current Status:   7.   Pt will improve FOTO score to 50 in 11 visits to show significant improvement in function for progress to performing usual family activities with little difficulty              Status at last note/certification: FOTO = 38             Current Status:      ASSESSMENT/RECOMMENDATIONS:  [x]Continue therapy per initial plan/protocol at a frequency of  2 x per week for 20 visits  []Continue therapy with the following recommended changes:_____________________      _____________________________________________________________________  []Discontinue therapy progressing towards or have reached established goals  []Discontinue therapy due to lack of appreciable progress towards goals  []Discontinue therapy due to lack of attendance or compliance  []Await Physician's recommendations/decisions regarding therapy  []Other:________________________________________________________________    Thank you for this referral.   Cassandra Hoskins, PT 1/20/2023 12:23 PM  NOTE TO PHYSICIAN:  PLEASE COMPLETE THE ORDERS BELOW AND   FAX TO [facility]: [phone]  If you are unable to process this request in 24 hours please contact our office:   [phone]  []  I have read the above report and request that my patient continue as recommended. []  I have read the above report and request that my patient continue therapy with the following changes/special instructions:________________________________________  []I have read the above report and request that my patient be discharged from therapy.     Physician's Signature:____________Date:_________TIME:________     Vanessa Welsh PA-C  ** Signature, Date and Time must be completed for valid certification **

## 2023-01-20 NOTE — PROGRESS NOTES
PT DAILY TREATMENT NOTE     Patient Name: Hilda Field  Date:2023  : 1985  [x]  Patient  Verified  Payor: BLUE CROSS MEDICAID / Plan: [facility] [employer] PLUS / Product Type: Managed Care Medicaid /    In time:1106  Out time:1205  Total Treatment Time (min): 53  Visit #: 3 of 22    Medicare/BCBS Only   Total Timed Codes (min):  53 1:1 Treatment Time:  53       Treatment Area:  Other low back pain [M54.59]    SUBJECTIVE  Pain Level (0-10 scale): 5  Any medication changes, allergies to medications, adverse drug reactions, diagnosis change, or new procedure performed?: [x] No    [] Yes (see summary sheet for update)  Subjective functional status/changes:   [] No changes reported  Non compliant with HEP    OBJECTIVE    Modality rationale: decrease inflammation, decrease pain, and increase tissue extensibility to improve the patients ability to improve daily activity   Min Type Additional Details    [] Estim:  []Unatt       []IFC  []Premod                        []Other:  []w/ice   []w/heat  Position:  Location:    [] Estim: []Att    []TENS instruct  []NMES                    []Other:  []w/US   []w/ice   []w/heat  Position:  Location:    []  Traction: [] Cervical       []Lumbar                       [] Prone          []Supine                       []Intermittent   []Continuous Lbs:  [] before manual  [] after manual   8 [x]  Ultrasound: [x]Continuous   [] Pulsed                           [x]1MHz   []3MHz W/cm2: 1.7  Location: Low Back    []  Iontophoresis with dexamethasone         Location: [] Take home patch   [] In clinic    []  Ice     []  heat  []  Ice massage  []  Laser   []  Anodyne Position:  Location:    []  Laser with stim  []  Other:  Position:  Location:    []  Vasopneumatic Device    []  Right     []  Left  Pre-treatment girth:  Post-treatment girth:  Measured at (location):  Pressure:       [] lo [] med [] hi   Temperature: [] lo [] med [] hi   [x] Skin assessment post-treatment:  [x]intact []redness- no adverse reaction    []redness - adverse reaction:         26 min Therapeutic Exercise:  [] See flow sheet :   Rationale: increase ROM, increase strength, and improve coordination to improve the patients ability to tolerate daily activity with less back pain    20 min Neuromuscular Re-education:  []  See flow sheet :   Rationale: increase strength and improve coordination  to improve the patients ability to improve daily activity          With   [x] TE   [] TA   [x] neuro   [] other: Patient Education: [x] Review HEP    [] Progressed/Changed HEP based on:   [] positioning   [] body mechanics   [] transfers   [] heat/ice application    [] other:      Other Objective/Functional Measures:   - Elev Left Crest  - Decr mobility (B) Innominate in stork stance  - Continued weakness (B) Hip region with little to no change from initial evaluation     Pain Level (0-10 scale) post treatment: 2    ASSESSMENT/Changes in Function:   - Good response to treatment with improved tolerance to activity and decreased pain after treatment today    Patient will continue to benefit from skilled PT services to modify and progress therapeutic interventions, address functional mobility deficits, address ROM deficits, address strength deficits, analyze and address soft tissue restrictions, and analyze and cue movement patterns to attain remaining goals. []  See Plan of Care  []  See progress note/recertification  []  See Discharge Summary         Progress towards goals / Updated goals:  Short Term Goals: To be accomplished in 5 treatments:  1. Pt will be compliant and independent with HEP in order to facilitate PT sessions and aid with self management             Eval Status:  Initiated             Current Status:  Pt reports non compliance with HEP 1/20/23  2.   Pt to tolerate 30 min or more of TE and/or Interventions w/o increased s/s             Eval Status:  Initiated             Current Status: Patient reports increased discomfort with TE  1/18/23     Long Term Goals: To be accomplished in 10 treatments:  1. Pt will report 50% improvement or better with low back dysfunction to show a significant increase in ability to tolerate ADL             Eval Status:  Initiated             Current Status:  2. Pt will demonstrate upright posturing of the low back to static trunk flx at 10* Fwd Flx Posture or better without added pain              Eval Status:  Static trunk flx at 16*             Current Status:  3. Pt will reports decreased radicular s/s of the left LE and back pain for carryover to improved household chores and family activity tolerance with pain at 3/10 or better to enjoy more family time with less discomfort               Eval Status:  Radicular s/s into the left LE and pain 6/10             Current Status: Patient reports 4-5/10 today 1/18/23  4. Pt will ambulate 500' (I) with a good pain and little to no discomfort or LOB for progress to community ambulate and increased social time for improved quality of life with her children               Eval Status:  Limited walking due to pian and limited social time with children             Current Status:   5. Pt will improve FOTO score to 50 in 11 visits to show significant improvement in function for progress to performing usual family activities with little difficulty             Eval Status: FOTO = 37             Current Status: FOTO = 38  1/18/23    PLAN  [x]  Upgrade activities as tolerated     [x]  Continue plan of care  []  Update interventions per flow sheet       []  Discharge due to:_  []  Other:_      Clarissa Tong, PT 1/20/2023  11:39 AM    No future appointments.

## 2023-02-09 ENCOUNTER — APPOINTMENT (OUTPATIENT)
Dept: PHYSICAL THERAPY | Age: 38
End: 2023-02-09

## 2023-02-13 ENCOUNTER — APPOINTMENT (OUTPATIENT)
Dept: PHYSICAL THERAPY | Age: 38
End: 2023-02-13

## 2023-02-16 ENCOUNTER — APPOINTMENT (OUTPATIENT)
Dept: PHYSICAL THERAPY | Age: 38
End: 2023-02-16

## 2023-02-20 ENCOUNTER — APPOINTMENT (OUTPATIENT)
Dept: PHYSICAL THERAPY | Age: 38
End: 2023-02-20

## 2023-02-23 ENCOUNTER — APPOINTMENT (OUTPATIENT)
Dept: PHYSICAL THERAPY | Age: 38
End: 2023-02-23

## 2023-02-27 ENCOUNTER — APPOINTMENT (OUTPATIENT)
Dept: PHYSICAL THERAPY | Age: 38
End: 2023-02-27

## 2023-03-02 ENCOUNTER — APPOINTMENT (OUTPATIENT)
Dept: PHYSICAL THERAPY | Age: 38
End: 2023-03-02

## 2023-12-30 ENCOUNTER — HOSPITAL ENCOUNTER (EMERGENCY)
Facility: HOSPITAL | Age: 38
Discharge: HOME | End: 2023-12-30
Attending: EMERGENCY MEDICINE
Payer: MEDICAID

## 2023-12-30 VITALS
BODY MASS INDEX: 31.01 KG/M2 | WEIGHT: 175 LBS | SYSTOLIC BLOOD PRESSURE: 131 MMHG | TEMPERATURE: 97.8 F | RESPIRATION RATE: 21 BRPM | HEART RATE: 78 BPM | HEIGHT: 63 IN | OXYGEN SATURATION: 99 % | DIASTOLIC BLOOD PRESSURE: 95 MMHG

## 2023-12-30 DIAGNOSIS — N30.01 ACUTE CYSTITIS WITH HEMATURIA: ICD-10-CM

## 2023-12-30 DIAGNOSIS — B96.89 BACTERIAL VAGINOSIS: ICD-10-CM

## 2023-12-30 DIAGNOSIS — R00.2 PALPITATIONS: Primary | ICD-10-CM

## 2023-12-30 DIAGNOSIS — N76.0 BACTERIAL VAGINOSIS: ICD-10-CM

## 2023-12-30 LAB
ANION GAP SERPL CALC-SCNC: 4 MMOL/L (ref 3–18)
APPEARANCE UR: CLEAR
BACTERIA URNS QL MICRO: ABNORMAL /HPF
BASOPHILS # BLD: 0 K/UL (ref 0–0.1)
BASOPHILS NFR BLD: 0 % (ref 0–2)
BILIRUB UR QL: NEGATIVE
BUN SERPL-MCNC: 15 MG/DL (ref 7–18)
BUN/CREAT SERPL: 23 (ref 12–20)
CALCIUM SERPL-MCNC: 8.5 MG/DL (ref 8.5–10.1)
CHLORIDE SERPL-SCNC: 108 MMOL/L (ref 100–111)
CO2 SERPL-SCNC: 28 MMOL/L (ref 21–32)
COLOR UR: ABNORMAL
CREAT SERPL-MCNC: 0.64 MG/DL (ref 0.6–1.3)
DIFFERENTIAL METHOD BLD: ABNORMAL
EOSINOPHIL # BLD: 0.1 K/UL (ref 0–0.4)
EOSINOPHIL NFR BLD: 1 % (ref 0–5)
EPITH CASTS URNS QL MICRO: ABNORMAL /LPF (ref 0–5)
ERYTHROCYTE [DISTWIDTH] IN BLOOD BY AUTOMATED COUNT: 13.2 % (ref 11.6–14.5)
GLUCOSE SERPL-MCNC: 70 MG/DL (ref 74–99)
GLUCOSE UR STRIP.AUTO-MCNC: NEGATIVE MG/DL
HCG UR QL: NEGATIVE
HCT VFR BLD AUTO: 37.3 % (ref 35–45)
HGB BLD-MCNC: 12.2 G/DL (ref 12–16)
HGB UR QL STRIP: ABNORMAL
IMM GRANULOCYTES # BLD AUTO: 0 K/UL (ref 0–0.04)
IMM GRANULOCYTES NFR BLD AUTO: 0 % (ref 0–0.5)
KETONES UR QL STRIP.AUTO: NEGATIVE MG/DL
LEUKOCYTE ESTERASE UR QL STRIP.AUTO: ABNORMAL
LYMPHOCYTES # BLD: 2.5 K/UL (ref 0.9–3.6)
LYMPHOCYTES NFR BLD: 32 % (ref 21–52)
MAGNESIUM SERPL-MCNC: 2.2 MG/DL (ref 1.6–2.6)
MCH RBC QN AUTO: 31.8 PG (ref 24–34)
MCHC RBC AUTO-ENTMCNC: 32.7 G/DL (ref 31–37)
MCV RBC AUTO: 97.1 FL (ref 78–100)
MONOCYTES # BLD: 0.6 K/UL (ref 0.05–1.2)
MONOCYTES NFR BLD: 8 % (ref 3–10)
MUCOUS THREADS URNS QL MICRO: ABNORMAL /LPF
NEUTS SEG # BLD: 4.7 K/UL (ref 1.8–8)
NEUTS SEG NFR BLD: 59 % (ref 40–73)
NITRITE UR QL STRIP.AUTO: POSITIVE
NRBC # BLD: 0 K/UL (ref 0–0.01)
NRBC BLD-RTO: 0 PER 100 WBC
PH UR STRIP: 6.5 (ref 5–8)
PLATELET # BLD AUTO: 237 K/UL (ref 135–420)
PMV BLD AUTO: 11.4 FL (ref 9.2–11.8)
POTASSIUM SERPL-SCNC: 3.8 MMOL/L (ref 3.5–5.5)
PROT UR STRIP-MCNC: 30 MG/DL
RBC # BLD AUTO: 3.84 M/UL (ref 4.2–5.3)
RBC #/AREA URNS HPF: ABNORMAL /HPF (ref 0–5)
SERVICE CMNT-IMP: NORMAL
SODIUM SERPL-SCNC: 140 MMOL/L (ref 136–145)
SP GR UR REFRACTOMETRY: 1.02 (ref 1–1.03)
TSH SERPL DL<=0.05 MIU/L-ACNC: 1.03 UIU/ML (ref 0.36–3.74)
UROBILINOGEN UR QL STRIP.AUTO: 1 EU/DL (ref 0.2–1)
WBC # BLD AUTO: 8 K/UL (ref 4.6–13.2)
WBC URNS QL MICRO: ABNORMAL /HPF (ref 0–4)
WET PREP GENITAL: NORMAL

## 2023-12-30 PROCEDURE — 99284 EMERGENCY DEPT VISIT MOD MDM: CPT

## 2023-12-30 PROCEDURE — 81025 URINE PREGNANCY TEST: CPT

## 2023-12-30 PROCEDURE — 85025 COMPLETE CBC W/AUTO DIFF WBC: CPT

## 2023-12-30 PROCEDURE — 83735 ASSAY OF MAGNESIUM: CPT

## 2023-12-30 PROCEDURE — 87591 N.GONORRHOEAE DNA AMP PROB: CPT

## 2023-12-30 PROCEDURE — 6370000000 HC RX 637 (ALT 250 FOR IP): Performed by: EMERGENCY MEDICINE

## 2023-12-30 PROCEDURE — 81001 URINALYSIS AUTO W/SCOPE: CPT

## 2023-12-30 PROCEDURE — 80048 BASIC METABOLIC PNL TOTAL CA: CPT

## 2023-12-30 PROCEDURE — 87210 SMEAR WET MOUNT SALINE/INK: CPT

## 2023-12-30 PROCEDURE — 87491 CHLMYD TRACH DNA AMP PROBE: CPT

## 2023-12-30 PROCEDURE — 93005 ELECTROCARDIOGRAM TRACING: CPT | Performed by: EMERGENCY MEDICINE

## 2023-12-30 PROCEDURE — 84443 ASSAY THYROID STIM HORMONE: CPT

## 2023-12-30 RX ORDER — CEPHALEXIN 250 MG/1
500 CAPSULE ORAL 2 TIMES DAILY
Qty: 28 CAPSULE | Refills: 0 | Status: SHIPPED | OUTPATIENT
Start: 2023-12-30 | End: 2024-01-06

## 2023-12-30 RX ORDER — METRONIDAZOLE 500 MG/1
500 TABLET ORAL
Status: COMPLETED | OUTPATIENT
Start: 2023-12-30 | End: 2023-12-30

## 2023-12-30 RX ORDER — PHENAZOPYRIDINE HYDROCHLORIDE 100 MG/1
100 TABLET, FILM COATED ORAL 3 TIMES DAILY PRN
Qty: 9 TABLET | Refills: 0 | Status: SHIPPED | OUTPATIENT
Start: 2023-12-30 | End: 2024-01-02

## 2023-12-30 RX ORDER — METRONIDAZOLE 500 MG/1
500 TABLET ORAL 3 TIMES DAILY
Qty: 21 TABLET | Refills: 0 | Status: SHIPPED | OUTPATIENT
Start: 2023-12-30 | End: 2024-01-06

## 2023-12-30 RX ORDER — CEPHALEXIN 250 MG/1
500 CAPSULE ORAL
Status: COMPLETED | OUTPATIENT
Start: 2023-12-30 | End: 2023-12-30

## 2023-12-30 RX ORDER — PHENAZOPYRIDINE HYDROCHLORIDE 100 MG/1
200 TABLET, FILM COATED ORAL
Status: COMPLETED | OUTPATIENT
Start: 2023-12-30 | End: 2023-12-30

## 2023-12-30 RX ADMIN — METRONIDAZOLE 500 MG: 500 TABLET ORAL at 22:25

## 2023-12-30 RX ADMIN — PHENAZOPYRIDINE HYDROCHLORIDE 200 MG: 100 TABLET ORAL at 21:45

## 2023-12-30 RX ADMIN — CEPHALEXIN 500 MG: 250 CAPSULE ORAL at 22:24

## 2023-12-31 LAB
EKG ATRIAL RATE: 88 BPM
EKG DIAGNOSIS: NORMAL
EKG P AXIS: 60 DEGREES
EKG P-R INTERVAL: 140 MS
EKG Q-T INTERVAL: 330 MS
EKG QRS DURATION: 84 MS
EKG QTC CALCULATION (BAZETT): 399 MS
EKG R AXIS: 39 DEGREES
EKG T AXIS: 39 DEGREES
EKG VENTRICULAR RATE: 88 BPM

## 2023-12-31 PROCEDURE — 93010 ELECTROCARDIOGRAM REPORT: CPT | Performed by: INTERNAL MEDICINE

## 2023-12-31 NOTE — ED TRIAGE NOTES
Pt c/o of vomiting 3X and palpitations that started today and pelvic pressure for 3 days. Pt states \"I was taking azo for my vaginal pressure because I thought I had an UTI but then I started with palpitations and vomiting today, and my family member gave me something to take to slow my heart rate.\"

## 2023-12-31 NOTE — ED NOTES
Discharge teaching provided to pt regarding treatment received, medications prescribed, and follow-up care. Pt verbalized understanding directions and follow up care. Pt left ambulatory with discharge paperwork in hand.

## 2023-12-31 NOTE — ED PROVIDER NOTES
pantoprazole (PROTONIX) 40 MG tablet TAKE 1 TABLET BY MOUTH TWO TIMES A DAY FOR 30 DAYS. INDICATIONS: GASTRIC ULCER      promethazine (PHENERGAN) 25 MG tablet Take 25 mg by mouth every 6 hours as needed      traMADol (ULTRAM) 50 MG tablet Take 50 mg by mouth every 6 hours as needed. (Patient not taking: Reported on 2023)         Past History     Past Medical History:  Past Medical History:   Diagnosis Date    Acid reflux     Chronic back pain     Incisional hernia     Intestinal malabsorption     Morbid obesity with body mass index of 40.0-49.9 ([facility])     Status post bariatric surgery 2012    sleeve resection / eben lopez       Past Surgical History:  Past Surgical History:   Procedure Laterality Date     SECTION      X 3    GI      Internal hernia - 2019    GI      Internal hernia ( dr Tanisha Seaman) -2018    GI      Conversion to gastric bypass-2017    GI      Sleeve gastrectomy- Dr Lopez    LAP GASTRIC BYPASS/KATRINA-EN-Y      revision from sleeve on 2017    ORTHOPEDIC SURGERY      scoliosis correction surgery X 2    TONSILLECTOMY AND ADENOIDECTOMY      TUBAL LIGATION         Family History:  History reviewed. No pertinent family history.    Social History:  Social History     Tobacco Use    Smoking status: Never    Smokeless tobacco: Never   Substance Use Topics    Alcohol use: No     Alcohol/week: 0.0 standard drinks of alcohol    Drug use: No       Allergies:  Allergies   Allergen Reactions    Nsaids Other (See Comments)     GI Ulcer- Hx of Gastric Bypass    Buprenorphine Hcl Hives and Other (See Comments)         Review of Systems     Review of Systems      Physical Exam       Patient Vitals for the past 12 hrs:   Temp Pulse Resp BP SpO2   23 -- 89 15 (!) 135/94 98 %   23 97.8 °F (36.6 °C) -- -- -- --   23 -- 99 20 (!) 147/119 98 %       IPVITALS  Patient Vitals for the past 24 hrs:   BP Temp Temp src Pulse Resp SpO2 Height Weight   23

## 2024-01-03 ENCOUNTER — HOSPITAL ENCOUNTER (EMERGENCY)
Facility: HOSPITAL | Age: 39
Discharge: HOME | End: 2024-01-04
Attending: STUDENT IN AN ORGANIZED HEALTH CARE EDUCATION/TRAINING PROGRAM
Payer: MEDICAID

## 2024-01-03 DIAGNOSIS — I49.1 PREMATURE ATRIAL CONTRACTIONS: Primary | ICD-10-CM

## 2024-01-03 PROCEDURE — 99284 EMERGENCY DEPT VISIT MOD MDM: CPT

## 2024-01-03 PROCEDURE — 93005 ELECTROCARDIOGRAM TRACING: CPT | Performed by: STUDENT IN AN ORGANIZED HEALTH CARE EDUCATION/TRAINING PROGRAM

## 2024-01-04 VITALS
RESPIRATION RATE: 25 BRPM | DIASTOLIC BLOOD PRESSURE: 84 MMHG | WEIGHT: 170 LBS | BODY MASS INDEX: 30.12 KG/M2 | SYSTOLIC BLOOD PRESSURE: 121 MMHG | OXYGEN SATURATION: 100 % | HEIGHT: 63 IN | TEMPERATURE: 97.6 F | HEART RATE: 78 BPM

## 2024-01-04 LAB
ALBUMIN SERPL-MCNC: 3.1 G/DL (ref 3.4–5)
ALBUMIN/GLOB SERPL: 0.9 (ref 0.8–1.7)
ALP SERPL-CCNC: 94 U/L (ref 45–117)
ALT SERPL-CCNC: 37 U/L (ref 13–56)
ANION GAP SERPL CALC-SCNC: 5 MMOL/L (ref 3–18)
AST SERPL-CCNC: 34 U/L (ref 10–38)
BASOPHILS # BLD: 0 K/UL (ref 0–0.1)
BASOPHILS NFR BLD: 0 % (ref 0–2)
BILIRUB SERPL-MCNC: 0.2 MG/DL (ref 0.2–1)
BUN SERPL-MCNC: 18 MG/DL (ref 7–18)
BUN/CREAT SERPL: 23 (ref 12–20)
CALCIUM SERPL-MCNC: 8.3 MG/DL (ref 8.5–10.1)
CHLORIDE SERPL-SCNC: 106 MMOL/L (ref 100–111)
CO2 SERPL-SCNC: 30 MMOL/L (ref 21–32)
CREAT SERPL-MCNC: 0.79 MG/DL (ref 0.6–1.3)
DIFFERENTIAL METHOD BLD: ABNORMAL
EKG ATRIAL RATE: 82 BPM
EKG DIAGNOSIS: NORMAL
EKG P AXIS: 48 DEGREES
EKG P-R INTERVAL: 122 MS
EKG Q-T INTERVAL: 356 MS
EKG QRS DURATION: 84 MS
EKG QTC CALCULATION (BAZETT): 415 MS
EKG R AXIS: 13 DEGREES
EKG T AXIS: 14 DEGREES
EKG VENTRICULAR RATE: 82 BPM
EOSINOPHIL # BLD: 0.1 K/UL (ref 0–0.4)
EOSINOPHIL NFR BLD: 2 % (ref 0–5)
ERYTHROCYTE [DISTWIDTH] IN BLOOD BY AUTOMATED COUNT: 13.1 % (ref 11.6–14.5)
GLOBULIN SER CALC-MCNC: 3.4 G/DL (ref 2–4)
GLUCOSE SERPL-MCNC: 107 MG/DL (ref 74–99)
HCT VFR BLD AUTO: 34.3 % (ref 35–45)
HGB BLD-MCNC: 11.3 G/DL (ref 12–16)
IMM GRANULOCYTES # BLD AUTO: 0 K/UL (ref 0–0.04)
IMM GRANULOCYTES NFR BLD AUTO: 0 % (ref 0–0.5)
LYMPHOCYTES # BLD: 2.3 K/UL (ref 0.9–3.6)
LYMPHOCYTES NFR BLD: 50 % (ref 21–52)
MAGNESIUM SERPL-MCNC: 2.2 MG/DL (ref 1.6–2.6)
MCH RBC QN AUTO: 31.9 PG (ref 24–34)
MCHC RBC AUTO-ENTMCNC: 32.9 G/DL (ref 31–37)
MCV RBC AUTO: 96.9 FL (ref 78–100)
MONOCYTES # BLD: 0.5 K/UL (ref 0.05–1.2)
MONOCYTES NFR BLD: 11 % (ref 3–10)
NEUTS SEG # BLD: 1.7 K/UL (ref 1.8–8)
NEUTS SEG NFR BLD: 37 % (ref 40–73)
NRBC # BLD: 0 K/UL (ref 0–0.01)
NRBC BLD-RTO: 0 PER 100 WBC
PLATELET # BLD AUTO: 209 K/UL (ref 135–420)
PMV BLD AUTO: 11.2 FL (ref 9.2–11.8)
POTASSIUM SERPL-SCNC: 4 MMOL/L (ref 3.5–5.5)
PROT SERPL-MCNC: 6.5 G/DL (ref 6.4–8.2)
RBC # BLD AUTO: 3.54 M/UL (ref 4.2–5.3)
SODIUM SERPL-SCNC: 141 MMOL/L (ref 136–145)
TROPONIN I SERPL HS-MCNC: 4 NG/L (ref 0–54)
WBC # BLD AUTO: 4.6 K/UL (ref 4.6–13.2)

## 2024-01-04 PROCEDURE — 93010 ELECTROCARDIOGRAM REPORT: CPT | Performed by: INTERNAL MEDICINE

## 2024-01-04 PROCEDURE — 84484 ASSAY OF TROPONIN QUANT: CPT

## 2024-01-04 PROCEDURE — 85025 COMPLETE CBC W/AUTO DIFF WBC: CPT

## 2024-01-04 PROCEDURE — 83735 ASSAY OF MAGNESIUM: CPT

## 2024-01-04 PROCEDURE — 80053 COMPREHEN METABOLIC PANEL: CPT

## 2024-01-04 NOTE — ED TRIAGE NOTES
AOX4 with a cc of chest tightness that started today. States she was recently in here a few days prior. Denies SOB. Denies self treatment.

## 2024-03-06 ENCOUNTER — HOSPITAL ENCOUNTER (EMERGENCY)
Facility: HOSPITAL | Age: 39
Discharge: HOME | End: 2024-03-06
Attending: EMERGENCY MEDICINE
Payer: MEDICAID

## 2024-03-06 VITALS
BODY MASS INDEX: 29.59 KG/M2 | OXYGEN SATURATION: 100 % | RESPIRATION RATE: 17 BRPM | SYSTOLIC BLOOD PRESSURE: 129 MMHG | HEIGHT: 63 IN | TEMPERATURE: 98.2 F | DIASTOLIC BLOOD PRESSURE: 86 MMHG | HEART RATE: 74 BPM | WEIGHT: 167 LBS

## 2024-03-06 DIAGNOSIS — N39.0 URINARY TRACT INFECTION WITHOUT HEMATURIA, SITE UNSPECIFIED: Primary | ICD-10-CM

## 2024-03-06 LAB
APPEARANCE UR: ABNORMAL
BACTERIA URNS QL MICRO: ABNORMAL /HPF
BILIRUB UR QL: ABNORMAL
COLOR UR: ABNORMAL
EPITH CASTS URNS QL MICRO: ABNORMAL /LPF (ref 0–5)
GLUCOSE UR STRIP.AUTO-MCNC: 250 MG/DL
HGB UR QL STRIP: NEGATIVE
KETONES UR QL STRIP.AUTO: 15 MG/DL
LEUKOCYTE ESTERASE UR QL STRIP.AUTO: ABNORMAL
NITRITE UR QL STRIP.AUTO: POSITIVE
PH UR STRIP: 5 (ref 5–8)
PROT UR STRIP-MCNC: 100 MG/DL
RBC #/AREA URNS HPF: ABNORMAL /HPF (ref 0–5)
SERVICE CMNT-IMP: NORMAL
SP GR UR REFRACTOMETRY: 1.01 (ref 1–1.03)
UROBILINOGEN UR QL STRIP.AUTO: 4 EU/DL (ref 0.2–1)
WBC URNS QL MICRO: ABNORMAL /HPF (ref 0–4)
WET PREP GENITAL: NORMAL

## 2024-03-06 PROCEDURE — 6370000000 HC RX 637 (ALT 250 FOR IP): Performed by: EMERGENCY MEDICINE

## 2024-03-06 PROCEDURE — 87210 SMEAR WET MOUNT SALINE/INK: CPT

## 2024-03-06 PROCEDURE — 87491 CHLMYD TRACH DNA AMP PROBE: CPT

## 2024-03-06 PROCEDURE — 87591 N.GONORRHOEAE DNA AMP PROB: CPT

## 2024-03-06 PROCEDURE — 81001 URINALYSIS AUTO W/SCOPE: CPT

## 2024-03-06 PROCEDURE — 99283 EMERGENCY DEPT VISIT LOW MDM: CPT

## 2024-03-06 RX ORDER — ACETAMINOPHEN 500 MG
1000 TABLET ORAL
Status: COMPLETED | OUTPATIENT
Start: 2024-03-06 | End: 2024-03-06

## 2024-03-06 RX ORDER — ACETAMINOPHEN 500 MG
1000 TABLET ORAL 3 TIMES DAILY
Qty: 540 TABLET | Refills: 1 | Status: SHIPPED | OUTPATIENT
Start: 2024-03-06

## 2024-03-06 RX ORDER — SULFAMETHOXAZOLE AND TRIMETHOPRIM 800; 160 MG/1; MG/1
1 TABLET ORAL 2 TIMES DAILY
Qty: 20 TABLET | Refills: 0 | Status: SHIPPED | OUTPATIENT
Start: 2024-03-06 | End: 2024-03-16

## 2024-03-06 RX ORDER — SULFAMETHOXAZOLE AND TRIMETHOPRIM 800; 160 MG/1; MG/1
1 TABLET ORAL
Status: COMPLETED | OUTPATIENT
Start: 2024-03-06 | End: 2024-03-06

## 2024-03-06 RX ADMIN — SULFAMETHOXAZOLE AND TRIMETHOPRIM 1 TABLET: 800; 160 TABLET ORAL at 23:14

## 2024-03-06 RX ADMIN — ACETAMINOPHEN 1000 MG: 500 TABLET ORAL at 23:14

## 2024-03-06 ASSESSMENT — LIFESTYLE VARIABLES
HOW OFTEN DO YOU HAVE A DRINK CONTAINING ALCOHOL: 2-4 TIMES A MONTH
HOW MANY STANDARD DRINKS CONTAINING ALCOHOL DO YOU HAVE ON A TYPICAL DAY: 1 OR 2

## 2024-03-06 ASSESSMENT — PAIN SCALES - GENERAL: PAINLEVEL_OUTOF10: 6

## 2024-03-06 ASSESSMENT — PAIN - FUNCTIONAL ASSESSMENT: PAIN_FUNCTIONAL_ASSESSMENT: 0-10

## 2024-03-07 ASSESSMENT — ENCOUNTER SYMPTOMS
VOMITING: 1
NAUSEA: 1
RESPIRATORY NEGATIVE: 1
ABDOMINAL PAIN: 1

## 2024-03-07 NOTE — ED PROVIDER NOTES
`[facility] EMERGENCY DEPT  eMERGENCY dEPARTMENT eNCOUnter      Pt Name: Marce Lee  MRN: [id number]  Birthdate 1985 of evaluation: 3/6/2024  Provider:Ta Townsend MD    CHIEF COMPLAINT       HPI  Marce Lee is a 38 y.o. female  C/O having  UTI symptoms and BV symptoms - (urgency, frequency, odor, and pain in lower abdomen since yesterday).  She denies having fever, chills, nausea or vomiting.    ROS  Review of Systems   Respiratory: Negative.     Cardiovascular: Negative.    Gastrointestinal:  Positive for abdominal pain, nausea and vomiting.   All other systems reviewed and are negative.      Except as noted above the remainder of the review of systems was reviewed and negative.       PAST MEDICAL HISTORY     Past Medical History:   Diagnosis Date    Acid reflux     Chronic back pain     Incisional hernia     Intestinal malabsorption     Morbid obesity with body mass index of 40.0-49.9 ([facility])     Status post bariatric surgery 2012    sleeve resection / eben piña         SURGICAL HISTORY       Past Surgical History:   Procedure Laterality Date     SECTION      X 3    GI      Internal hernia - 2019    GI      Internal hernia ( dr Tanisha Seaman) -2018    GI      Conversion to gastric bypass-2017    GI  2012    Sleeve gastrectomy- Dr Piña    LAP GASTRIC BYPASS/KATRINA-EN-Y      revision from sleeve on 2017    ORTHOPEDIC SURGERY      scoliosis correction surgery X 2    TONSILLECTOMY AND ADENOIDECTOMY      TUBAL LIGATION           CURRENTMEDICATIONS       Discharge Medication List as of 3/6/2024 11:07 PM        CONTINUE these medications which have NOT CHANGED    Details   Biotin 2.5 MG CAPS Take by mouthHistorical Med      calcium citrate (CALCITRATE) 950 (200 Ca) MG tablet Take by mouth dailyHistorical Med      Cholecalciferol 50 MCG ( UT) TABS Take 1 tablet by mouth dailyHistorical Med      cyanocobalamin 500 MCG tablet Take 1 tablet by mouth dailyHistorical Med

## 2024-03-07 NOTE — ED TRIAGE NOTES
Ambulatory pt c/o UTI symptoms and BV symptoms - urgency, frequency, odor, and pain in lower abdomen since yesterday. Pt reports previously tolerating 250mg Keflex (UTI) and flagyl 500mg (BV)

## 2024-03-09 NOTE — ED PROVIDER NOTES
[facility] EMERGENCY DEPT  EMERGENCY DEPARTMENT ENCOUNTER      Pt Name: Marce Lee  MRN: [id number]  Birthdate 1985  Date of evaluation: 1/3/2024  Provider: Addie Phillips MD    CHIEF COMPLAINT       Chief Complaint   Patient presents with    Chest Pain     HEART IS FLUTTERING AND FEELS TIGHT         HISTORY OF PRESENT ILLNESS   (Location/Symptom, Timing/Onset, Context/Setting, Quality, Duration, Modifying Factors, Severity)  Note limiting factors.   Marce Lee is a 38 y.o. female who presents to the emergency department for chest tightness and palpitations.  She states she noticed them a couple days ago and then again today.  She feels a tightness diffuse across the front of her chest.  Does not radiate anywhere else.  Denies any associated shortness of breath.  Denies any pain worse with deep inspiration.  Does not know what makes his symptoms better or worse.  Denies any recent drug or alcohol use.  Denies any swelling of lower extremities, history of blood clots or hemoptysis.  Denies any recent fever, sweats or chills.  Denies any dizziness or lightheadedness.     Nursing Notes were reviewed.    REVIEW OF SYSTEMS    (2-9 systems for level 4, 10 or more for level 5)     Constitutional: No fever  HENT: No ear pain  Eyes: No change in vision  Respiratory: No SOB  Cardio: Positive for chest tightness  GI: No blood in stool  : No hematuria  MSK: No back pain  Skin: No rashes  Neuro: No headache    Except as noted above the remainder of the review of systems was reviewed and negative.       PAST MEDICAL HISTORY     Past Medical History:   Diagnosis Date    Acid reflux     Chronic back pain     Incisional hernia     Intestinal malabsorption     Morbid obesity with body mass index of 40.0-49.9 ([facility])     Status post bariatric surgery 2012    sleeve resection / eben piña         SURGICAL HISTORY       Past Surgical History:   Procedure Laterality Date     SECTION      X 3    GI      Internal hernia

## 2024-04-01 ENCOUNTER — APPOINTMENT (OUTPATIENT)
Facility: HOSPITAL | Age: 39
End: 2024-04-01
Payer: MEDICAID

## 2024-04-01 ENCOUNTER — HOSPITAL ENCOUNTER (EMERGENCY)
Facility: HOSPITAL | Age: 39
Discharge: HOME | End: 2024-04-01
Attending: EMERGENCY MEDICINE
Payer: MEDICAID

## 2024-04-01 VITALS
DIASTOLIC BLOOD PRESSURE: 88 MMHG | OXYGEN SATURATION: 98 % | TEMPERATURE: 98 F | WEIGHT: 176 LBS | BODY MASS INDEX: 31.18 KG/M2 | HEIGHT: 63 IN | HEART RATE: 63 BPM | RESPIRATION RATE: 16 BRPM | SYSTOLIC BLOOD PRESSURE: 132 MMHG

## 2024-04-01 DIAGNOSIS — S39.012A STRAIN OF LUMBAR REGION, INITIAL ENCOUNTER: Primary | ICD-10-CM

## 2024-04-01 PROCEDURE — 72100 X-RAY EXAM L-S SPINE 2/3 VWS: CPT

## 2024-04-01 PROCEDURE — 99283 EMERGENCY DEPT VISIT LOW MDM: CPT

## 2024-04-01 ASSESSMENT — LIFESTYLE VARIABLES
HOW OFTEN DO YOU HAVE A DRINK CONTAINING ALCOHOL: MONTHLY OR LESS
HOW MANY STANDARD DRINKS CONTAINING ALCOHOL DO YOU HAVE ON A TYPICAL DAY: 1 OR 2

## 2024-04-01 ASSESSMENT — PAIN SCALES - GENERAL: PAINLEVEL_OUTOF10: 10

## 2024-04-01 ASSESSMENT — PAIN - FUNCTIONAL ASSESSMENT: PAIN_FUNCTIONAL_ASSESSMENT: 0-10

## 2024-04-02 NOTE — DISCHARGE INSTRUCTIONS
Return for any new or worsening pain, fever not resolving with motrin or tylenol, shortness of breath, vomiting, decreased fluid intake, any urinary or bowel changes, weakness, numbness, dizziness, or any change or concerns.

## 2024-04-02 NOTE — ED PROVIDER NOTES
[facility] EMERGENCY DEPT  EMERGENCY DEPARTMENT ENCOUNTER      Pt Name: Marce Lee  MRN: [id number]  Birthdate 1985  Date of evaluation: 2024  Provider: Howard Castro MD    CHIEF COMPLAINT       Chief Complaint   Patient presents with    Tailbone Pain    Back Pain       HPI:  Marce Lee is a 38 y.o. female who presents to the emergency department pt c/o missed a step and fell on lower back.  No loc, no head or neck injury. No weakness or numbness. No wound. No abd pain.   H/o chronic back pain, on nycynta and tizanadine.    Not preg, h/o btl.      HPI    Nursing Notes were reviewed.    REVIEW OF SYSTEMS    (2-9 systems for level 4, 10 or more for level 5)     Review of Systems    Except as noted above the remainder of the review of systems was reviewed and negative.       PAST MEDICAL HISTORY     Past Medical History:   Diagnosis Date    Acid reflux     Chronic back pain     Incisional hernia     Intestinal malabsorption     Morbid obesity with body mass index of 40.0-49.9 ([facility])     Status post bariatric surgery 2012    sleeve resection / eben piña         SURGICAL HISTORY       Past Surgical History:   Procedure Laterality Date     SECTION      X 3    GI      Internal hernia - 2019    GI      Internal hernia ( dr Tanisha Seaman) -2018    GI      Conversion to gastric bypass-2017    GI      Sleeve gastrectomy- Dr Piña    LAP GASTRIC BYPASS/KATRINA-EN-Y      revision from sleeve on 2017    ORTHOPEDIC SURGERY      scoliosis correction surgery X 2    TONSILLECTOMY AND ADENOIDECTOMY      TUBAL LIGATION           CURRENT MEDICATIONS       Previous Medications    ACETAMINOPHEN (TYLENOL) 500 MG TABLET    Take 2 tablets by mouth 3 times daily    BIOTIN 2.5 MG CAPS    Take by mouth    CALCIUM CITRATE (CALCITRATE) 950 (200 CA) MG TABLET    Take by mouth daily    CHOLECALCIFEROL 50 MCG ( UT) TABS    Take 1 tablet by mouth daily    CYANOCOBALAMIN 500 MCG TABLET    Take 1 tablet by

## 2024-04-02 NOTE — ED TRIAGE NOTES
Ambulatory pt c/o tailbone / lower back pain x 4 hours s/p falling down the steps. Denies hitting head or LOC. Denies blood thinner use.

## 2024-04-02 NOTE — ED NOTES
Discharge instructions reviewed with patient. Questions and concerns addressed. Bracelet removed and shredded.

## 2024-06-16 ENCOUNTER — TELEPHONE (OUTPATIENT)
Age: 39
End: 2024-06-16

## 2024-06-19 ENCOUNTER — OFFICE VISIT (OUTPATIENT)
Age: 39
End: 2024-06-19

## 2024-06-19 VITALS
BODY MASS INDEX: 32.64 KG/M2 | WEIGHT: 184.2 LBS | TEMPERATURE: 97.7 F | HEART RATE: 68 BPM | HEIGHT: 63 IN | OXYGEN SATURATION: 100 % | SYSTOLIC BLOOD PRESSURE: 136 MMHG | DIASTOLIC BLOOD PRESSURE: 88 MMHG

## 2024-06-19 DIAGNOSIS — K56.50 SMALL BOWEL OBSTRUCTION DUE TO ADHESIONS (HCC): Primary | ICD-10-CM

## 2024-06-19 RX ORDER — PRENATAL VIT CALC,IRON,FOLIC
1 TABLET ORAL 2 TIMES DAILY
COMMUNITY

## 2024-06-19 ASSESSMENT — PATIENT HEALTH QUESTIONNAIRE - PHQ9
2. FEELING DOWN, DEPRESSED OR HOPELESS: NOT AT ALL
SUM OF ALL RESPONSES TO PHQ QUESTIONS 1-9: 0
1. LITTLE INTEREST OR PLEASURE IN DOING THINGS: NOT AT ALL
SUM OF ALL RESPONSES TO PHQ9 QUESTIONS 1 & 2: 0
SUM OF ALL RESPONSES TO PHQ QUESTIONS 1-9: 0

## 2024-06-19 NOTE — PATIENT INSTRUCTIONS
After Surgery: Any complete chewable, such as: Shedd’s Complete chewables.    Avoid Shedd sours or gummies.  They lack iron and other important nutrients and also have added sugar.    Continue with chewable vitamin or change to adult complete multivitamin one month after surgery. Menstruating women can take a prenatal vitamin.    Make sure has at least 18 mg iron and 400-800 mcg folic acid):    Vitamin B12, B Complex Vitamin, and Biotin  Start taking within a month after surgery.   Vitamin B12:  1000 mcg of Vitamin B12  at least three times weekly    Must take sublingually (meaning you take it under your tongue) or in a liquid drop form for easy absorption.        B Complex Vitamin: Take a pill or liquid drop form once daily.       Biotin: This vitamin can help prevent hair loss.    Recommend 5mg   (5000 mcg) a day  Biotin is Optional

## 2024-06-19 NOTE — PROGRESS NOTES
Bariatric Surgery                POD #3    /88 (Site: Right Upper Arm, Position: Sitting, Cuff Size: Small Adult)   Pulse 68   Temp 97.7 °F (36.5 °C)   Ht 1.6 m (5' 3\")   Wt 83.6 kg (184 lb 3.2 oz)   SpO2 100%   BMI 32.63 kg/m²   Patient had a diagnostic laparoscopy with lysis of adhesions at [facility] this past weekend for a partial bowel obstruction.  At the time of her exploration she had no evidence of recurrent internal hernia.  She does have a follow-up with her surgeon at [facility] sometime within the next week     Exam:  Appears well in no distress  Lungs- clear bilaterally  Abd - soft, incisions look good without erythema  Extremities- no new edema or swelling      Data Review:    Labs: Results:       Chemistry No results for input(s): \"GLU\", \"NA\", \"K\", \"CL\", \"CO2\", \"BUN\", \"TP\", \"GLOB\" in the last 72 hours.    Invalid input(s): \"CREA\", \"CA\", \"AGAP\", \"BUCR\", \"TBIL\", \"GPT\", \"AP\", \"ALB\", \"AGRAT\"   CBC w/Diff No results for input(s): \"WBC\", \"RBC\", \"HGB\", \"HCT\", \"PLT\", \"RETIC\" in the last 72 hours.    Invalid input(s): \"GRANS\", \"LYMPH\", \"EOS\"   Coagulation No results for input(s): \"INR\", \"APTT\" in the last 72 hours.    Invalid input(s): \"PTP\"    Liver Enzymes No results for input(s): \"TP\" in the last 72 hours.    Invalid input(s): \"ALB\", \"TBIL\", \"AP\", \"SGOT\", \"GPT\", \"DBIL\"       Op Note - Sherry Alston MD - 06/15/2024 1:55 PM EDT  Formatting of this note is different from the original.  Procedure Note    Procedure(s):  LAPAROSCOPY, DIAGNOSTIC/OPERATIVE LAPRASCOPIC LYSIS OF ADHESIONS    Indications: 38-year-old female patient with history of gastric sleeve guarded to Jana-en-Y gastric bypass a notable history of 2 small-bowel obstruction secondary to internal hernias. Presents with abdominal pain. CT imaging concerning with significant dilation of the remnant stomach which continuing fluid. Concern for internal herniation. Elected to proceed to the operating room for

## 2024-07-19 ENCOUNTER — TELEPHONE (OUTPATIENT)
Facility: HOSPITAL | Age: 39
End: 2024-07-19

## 2024-07-19 NOTE — TELEPHONE ENCOUNTER
tried to see if the pt was still attending her EVAL due to her not being 30 mins early to fill paperwork out. no answer & mailbox was full

## 2024-08-06 ENCOUNTER — HOSPITAL ENCOUNTER (EMERGENCY)
Facility: HOSPITAL | Age: 39
Discharge: HOME | End: 2024-08-06
Attending: EMERGENCY MEDICINE
Payer: MEDICAID

## 2024-08-06 VITALS
SYSTOLIC BLOOD PRESSURE: 143 MMHG | BODY MASS INDEX: 31.18 KG/M2 | TEMPERATURE: 98.6 F | DIASTOLIC BLOOD PRESSURE: 98 MMHG | WEIGHT: 176 LBS | RESPIRATION RATE: 20 BRPM | HEIGHT: 63 IN | HEART RATE: 74 BPM | OXYGEN SATURATION: 99 %

## 2024-08-06 DIAGNOSIS — N76.0 VAGINITIS AND VULVOVAGINITIS: Primary | ICD-10-CM

## 2024-08-06 LAB
APPEARANCE UR: CLEAR
BILIRUB UR QL: NEGATIVE
COLOR UR: YELLOW
GLUCOSE UR STRIP.AUTO-MCNC: NEGATIVE MG/DL
HCG UR QL: NEGATIVE
HGB UR QL STRIP: NEGATIVE
KETONES UR QL STRIP.AUTO: NEGATIVE MG/DL
LEUKOCYTE ESTERASE UR QL STRIP.AUTO: NEGATIVE
NITRITE UR QL STRIP.AUTO: NEGATIVE
PH UR STRIP: 6.5 (ref 5–8)
PROT UR STRIP-MCNC: NEGATIVE MG/DL
SERVICE CMNT-IMP: NORMAL
SP GR UR REFRACTOMETRY: 1.02 (ref 1–1.03)
UROBILINOGEN UR QL STRIP.AUTO: 1 EU/DL (ref 0.2–1)
WET PREP GENITAL: NORMAL

## 2024-08-06 PROCEDURE — 87210 SMEAR WET MOUNT SALINE/INK: CPT

## 2024-08-06 PROCEDURE — 6360000002 HC RX W HCPCS: Performed by: EMERGENCY MEDICINE

## 2024-08-06 PROCEDURE — 99284 EMERGENCY DEPT VISIT MOD MDM: CPT

## 2024-08-06 PROCEDURE — 96372 THER/PROPH/DIAG INJ SC/IM: CPT

## 2024-08-06 PROCEDURE — 87491 CHLMYD TRACH DNA AMP PROBE: CPT

## 2024-08-06 PROCEDURE — 81025 URINE PREGNANCY TEST: CPT

## 2024-08-06 PROCEDURE — 87591 N.GONORRHOEAE DNA AMP PROB: CPT

## 2024-08-06 PROCEDURE — 6370000000 HC RX 637 (ALT 250 FOR IP): Performed by: EMERGENCY MEDICINE

## 2024-08-06 PROCEDURE — 87661 TRICHOMONAS VAGINALIS AMPLIF: CPT

## 2024-08-06 PROCEDURE — 2500000003 HC RX 250 WO HCPCS: Performed by: EMERGENCY MEDICINE

## 2024-08-06 PROCEDURE — 81003 URINALYSIS AUTO W/O SCOPE: CPT

## 2024-08-06 RX ORDER — DOXYCYCLINE HYCLATE 100 MG
100 TABLET ORAL 2 TIMES DAILY
Qty: 14 TABLET | Refills: 0 | Status: SHIPPED | OUTPATIENT
Start: 2024-08-06 | End: 2024-08-13

## 2024-08-06 RX ORDER — DOXYCYCLINE HYCLATE 100 MG/1
100 CAPSULE ORAL
Status: COMPLETED | OUTPATIENT
Start: 2024-08-06 | End: 2024-08-06

## 2024-08-06 RX ADMIN — DOXYCYCLINE HYCLATE 100 MG: 100 CAPSULE ORAL at 19:58

## 2024-08-06 RX ADMIN — LIDOCAINE HYDROCHLORIDE 500 MG: 10 INJECTION, SOLUTION EPIDURAL; INFILTRATION; INTRACAUDAL; PERINEURAL at 19:58

## 2024-08-06 ASSESSMENT — PAIN DESCRIPTION - ORIENTATION: ORIENTATION: LOWER

## 2024-08-06 ASSESSMENT — PAIN SCALES - GENERAL: PAINLEVEL_OUTOF10: 3

## 2024-08-06 ASSESSMENT — PAIN - FUNCTIONAL ASSESSMENT: PAIN_FUNCTIONAL_ASSESSMENT: 0-10

## 2024-08-06 ASSESSMENT — LIFESTYLE VARIABLES
HOW OFTEN DO YOU HAVE A DRINK CONTAINING ALCOHOL: NEVER
HOW MANY STANDARD DRINKS CONTAINING ALCOHOL DO YOU HAVE ON A TYPICAL DAY: PATIENT DOES NOT DRINK

## 2024-08-06 ASSESSMENT — PAIN DESCRIPTION - LOCATION: LOCATION: ABDOMEN

## 2024-08-06 NOTE — ED PROVIDER NOTES
EMERGENCY DEPARTMENT HISTORY AND PHYSICAL EXAM    7:00 PM EDT seen at this time and QA        Date: 8/6/2024  Patient Name: Marce Lee    History of Presenting Illness     Chief Complaint   Patient presents with    Exposure to STD         History Provided By: patient    Additional History (Context): Marce Lee is a 38 y.o. female presents with history of gastric bypass, bowel obstruction with resection a month to 2 months ago, some chronic back pain and gastritis, complains of 5 days of vaginal odor and itching some pink discharge.  Suspects STD or BV.  This is very abnormal.  Some burning when she pees.  No change in her abdominal pain patterns.  No vomiting or diarrhea she is passing flatus and moving bowels..    PCP: Luis Davidson MD    Chief Complaint:   Duration:    Timing:    Location:   Quality:   Severity:   Modifying Factors:   Associated Symptoms:       Current Facility-Administered Medications   Medication Dose Route Frequency Provider Last Rate Last Admin    cefTRIAXone (ROCEPHIN) 500 mg in lidocaine 1 % 1.43 mL IM Injection  500 mg IntraMUSCular NOW Larry Marie MD        doxycycline hyclate (VIBRAMYCIN) capsule 100 mg  100 mg Oral NOW Larry Marie MD         Current Outpatient Medications   Medication Sig Dispense Refill    doxycycline hyclate (VIBRA-TABS) 100 MG tablet Take 1 tablet by mouth 2 times daily for 7 days 14 tablet 0    Pediatric Multivit-Minerals (VITALETS CHILDRENS) CHEW Take 1 tablet by mouth 2 times daily      acetaminophen (TYLENOL) 500 MG tablet Take 2 tablets by mouth 3 times daily 540 tablet 1    cyanocobalamin 500 MCG tablet Take 1 tablet by mouth daily      pantoprazole (PROTONIX) 40 MG tablet TAKE 1 TABLET BY MOUTH TWO TIMES A DAY FOR 30 DAYS. INDICATIONS: GASTRIC ULCER         Past History     Past Medical History:  Past Medical History:   Diagnosis Date    Acid reflux     Chronic back pain     Incisional hernia     Intestinal malabsorption

## 2024-08-07 LAB
C TRACH RRNA SPEC QL NAA+PROBE: NEGATIVE
N GONORRHOEA RRNA SPEC QL NAA+PROBE: NEGATIVE
SPECIMEN SOURCE: NORMAL
T VAGINALIS RRNA SPEC QL NAA+PROBE: NEGATIVE

## 2025-03-10 ENCOUNTER — OFFICE VISIT (OUTPATIENT)
Age: 40
End: 2025-03-10
Payer: MEDICAID

## 2025-03-10 VITALS
HEART RATE: 66 BPM | WEIGHT: 180.8 LBS | DIASTOLIC BLOOD PRESSURE: 92 MMHG | OXYGEN SATURATION: 100 % | TEMPERATURE: 97.2 F | SYSTOLIC BLOOD PRESSURE: 139 MMHG | BODY MASS INDEX: 32.04 KG/M2 | HEIGHT: 63 IN

## 2025-03-10 DIAGNOSIS — R13.10 DYSPHAGIA, UNSPECIFIED TYPE: ICD-10-CM

## 2025-03-10 DIAGNOSIS — K90.9 INTESTINAL MALABSORPTION, UNSPECIFIED TYPE: ICD-10-CM

## 2025-03-10 DIAGNOSIS — Z98.84 S/P GASTRIC BYPASS: ICD-10-CM

## 2025-03-10 DIAGNOSIS — K90.9 INTESTINAL MALABSORPTION, UNSPECIFIED TYPE: Primary | ICD-10-CM

## 2025-03-10 PROCEDURE — 99214 OFFICE O/P EST MOD 30 MIN: CPT | Performed by: SPECIALIST

## 2025-03-10 RX ORDER — ETONOGESTREL AND ETHINYL ESTRADIOL VAGINAL RING .015; .12 MG/D; MG/D
RING VAGINAL
COMMUNITY
Start: 2025-01-13

## 2025-03-10 RX ORDER — ACETAMINOPHEN 160 MG
2000 TABLET,DISINTEGRATING ORAL DAILY
COMMUNITY

## 2025-03-10 NOTE — PROGRESS NOTES
Subjective:     Marce Lee  is a 39 y.o. female who presents for follow-up about 7.5 years following sleeve to bypass conversion (as noted below). She has lost a total of 75 pounds since surgery.  Body mass index is 32.03 kg/m². The patient presents today to assess their progress toward their goal of weight loss and to address any issues that may be present.  Today the patient and I have reviewed their diet and how appropriate their food choices are.  The following issues have been identified - complex surgical history to include the following -     Surgical history -     2012 - Sleeve Resection [facility] at 276 lbs    9/27/2017 - sleeve to bypass conversion via our office at 255 lbs    7/2018 - repair internal hernia via Tanisha Seaman    2/2019 - laparoscopy / laparotomy repair internal hernia via this office    6/2024 - LAPAROSCOPY, DIAGNOSTIC/OPERATIVE LAPRASCOPIC LYSIS OF ADHESIONS - [place] - Sherry Alston MD         3/10/2025     9:43 AM 3/10/2025     9:42 AM 8/6/2024     6:38 PM 6/19/2024     8:12 AM 4/1/2024    11:00 PM 4/1/2024     9:48 PM 3/6/2024     9:20 PM   Ambulatory Bariatric Summary   Systolic 139 150 143 136 132 131 129   Diastolic 92 99 98 88 88 84 86   Pulse  66 74 68 63 83 74   Temp  97.2 °F (36.2 °C) 98.6 °F (37 °C) 97.7 °F (36.5 °C)  98 °F (36.7 °C) 98.2 °F (36.8 °C)   Respirations   20  16 17 17   Weight - Scale  180.8 176 184.2  176 167   Height  1.6 m (5' 3\") 1.6 m (5' 3\") 1.6 m (5' 3\")  1.6 m (5' 3\") 1.6 m (5' 3\")   BMI  32.1 kg/m2 31.2 kg/m2 32.7 kg/m2  31.2 kg/m2 29.6 kg/m2   Weight - Scale  82 kg (180 lb 12.8 oz) 79.8 kg (176 lb) 83.6 kg (184 lb 3.2 oz)  79.8 kg (176 lb) 75.8 kg (167 lb)   BMI (Calculated)  32.1 31.2 32.7  31.2 29.6     Surgery related complication: - see above     She reports some sporadic dysphagias and denies difficulty breathing.    Patients pain score: 0/10        3/10/2025     9:43 AM 3/10/2025     9:42 AM 8/6/2024     6:38 PM 6/19/2024

## 2025-03-26 ENCOUNTER — HOSPITAL ENCOUNTER (OUTPATIENT)
Facility: HOSPITAL | Age: 40
Setting detail: OUTPATIENT SURGERY
Discharge: HOME | End: 2025-03-29
Payer: MEDICAID

## 2025-03-26 ENCOUNTER — HOSPITAL ENCOUNTER (OUTPATIENT)
Facility: HOSPITAL | Age: 40
Discharge: HOME | End: 2025-03-29
Payer: MEDICAID

## 2025-03-26 VITALS
BODY MASS INDEX: 30.92 KG/M2 | HEIGHT: 63 IN | WEIGHT: 174.5 LBS | HEART RATE: 75 BPM | DIASTOLIC BLOOD PRESSURE: 88 MMHG | RESPIRATION RATE: 18 BRPM | TEMPERATURE: 97.6 F | SYSTOLIC BLOOD PRESSURE: 133 MMHG

## 2025-03-26 DIAGNOSIS — E66.01 MORBID OBESITY: ICD-10-CM

## 2025-03-26 PROCEDURE — 74240 X-RAY XM UPR GI TRC 1CNTRST: CPT

## 2025-03-26 PROCEDURE — 74220 X-RAY XM ESOPHAGUS 1CNTRST: CPT

## 2025-03-26 PROCEDURE — 2500000003 HC RX 250 WO HCPCS: Performed by: SPECIALIST

## 2025-03-26 PROCEDURE — 74240 X-RAY XM UPR GI TRC 1CNTRST: CPT | Performed by: SPECIALIST

## 2025-03-26 RX ADMIN — BARIUM SULFATE 100 ML: 960 POWDER, FOR SUSPENSION ORAL at 14:25

## 2025-03-26 NOTE — PROCEDURES
[facility]    Upper GI Procedure Report      Marce Lee    Medical Record Number:[id number]    1985    Date of Service - March 26, 2025    Pre-Op Diagnosis - pt with very complex surgical to include the following -     2012 - Sleeve Resection [facility] [place] at 276 lbs    9/27/2017 - sleeve to bypass conversion via our office at 255 lbs    7/2018 - repair internal hernia via Tanisha Jurgen    2/2019 - laparoscopy / laparotomy repair internal hernia via this office    6/2024 - LAPAROSCOPY, DIAGNOSTIC/OPERATIVE LAPRASCOPIC LYSIS OF ADHESIONS - [place] - Sherry Alston MD     Post-Op Diagnosis -same    Procedure - UGI study with barium    Surgeon - Austin Mitchell MD    Assistant - None    Complications - None    Specimens - None    Implants - None    Estimate Blood Loss - None    Statement of Medical Necessity - Need for radiologic evaluation prior to further management of their care..    Procedure -the patient was brought to the fluoroscopy suite where they were given thin barium.  On swallowing the barium the patient was noted to have normal peristalsis of their esophagus with progressive flow into the distal esophagus.  Specific findings of the distal esophagus revealed that they did have a hiatal hernia. Contrast flowed normally through the esophagus and into a properly sized gastric pouch without reflux or obstruction or signs of stricture. The pouch filled in a timely manner and emptied into the larry limb without issue or hesitation. The contrast was followed distally into her lower GI tract.  The anatomy was normal for the timeframe with no stricture or obstruction at the anastomosis or any other abnormality.  Given the benign findings of today's exam we will establish her anatomy is normal and follow her as a routine pt.    Austin Mitchell MD

## 2025-03-26 NOTE — PROGRESS NOTES
[facility] UGI FOCUS NOTE      Date:  3/26/2025  Time:  2:07 PM    Patient:  Marce Lee  Procedure:  UGI      Patient Questions  Lap Band Adjustment Patient Questionnaire:  If female, are you pregnant? []Yes     [x]No  Tolerates thick liquids:  [x]Well   []1     []2     []3     []4     []5     []Poorly  Tolerates red meat:  []Well   []1     []2     []3     []4     []5     [x] Poorly  Tolerates chicken:  [x]Well   []1     []2     []3     []4     []5     []Poorly  Tolerates fish:   [x]Well   []1     []2     []3     []4     []5     []Poorly  Hunger is:   [x]Well Controlled     []1     []2     []3     []4     []5      [] Poorly Controlled  Nightime regurgitation:  []Never     [x]1     []2     []3     []4     []5     []Frequently    Lap Band Info:  Band Type  []Realize     []Realize-C     []AP     []VG     []10cm     []Unknown  []Other      Comments:        Emiliana Alonso RN

## 2025-04-01 ENCOUNTER — APPOINTMENT (OUTPATIENT)
Facility: HOSPITAL | Age: 40
End: 2025-04-01
Payer: MEDICAID

## 2025-04-01 ENCOUNTER — HOSPITAL ENCOUNTER (EMERGENCY)
Facility: HOSPITAL | Age: 40
Discharge: HOME | End: 2025-04-02
Attending: EMERGENCY MEDICINE
Payer: MEDICAID

## 2025-04-01 DIAGNOSIS — R10.13 EPIGASTRIC PAIN: Primary | ICD-10-CM

## 2025-04-01 LAB
ALBUMIN SERPL-MCNC: 2.8 G/DL (ref 3.4–5)
ALBUMIN/GLOB SERPL: 0.7 (ref 0.8–1.7)
ALP SERPL-CCNC: 79 U/L (ref 45–117)
ALT SERPL-CCNC: 17 U/L (ref 13–56)
ANION GAP SERPL CALC-SCNC: 5 MMOL/L (ref 3–18)
APPEARANCE UR: CLEAR
AST SERPL-CCNC: 17 U/L (ref 10–38)
BACTERIA URNS QL MICRO: NEGATIVE /HPF
BASOPHILS # BLD: 0.01 K/UL (ref 0–0.1)
BASOPHILS NFR BLD: 0.2 % (ref 0–2)
BILIRUB SERPL-MCNC: 0.2 MG/DL (ref 0.2–1)
BILIRUB UR QL: NEGATIVE
BUN SERPL-MCNC: 20 MG/DL (ref 7–18)
BUN/CREAT SERPL: 42 (ref 12–20)
CALCIUM SERPL-MCNC: 8.6 MG/DL (ref 8.5–10.1)
CHLORIDE SERPL-SCNC: 107 MMOL/L (ref 100–111)
CO2 SERPL-SCNC: 28 MMOL/L (ref 21–32)
COLOR UR: ABNORMAL
CREAT SERPL-MCNC: 0.48 MG/DL (ref 0.6–1.3)
DIFFERENTIAL METHOD BLD: ABNORMAL
EOSINOPHIL # BLD: 0.08 K/UL (ref 0–0.4)
EOSINOPHIL NFR BLD: 1.7 % (ref 0–5)
EPITH CASTS URNS QL MICRO: NORMAL /LPF (ref 0–5)
ERYTHROCYTE [DISTWIDTH] IN BLOOD BY AUTOMATED COUNT: 13.3 % (ref 11.6–14.5)
GLOBULIN SER CALC-MCNC: 4.1 G/DL (ref 2–4)
GLUCOSE SERPL-MCNC: 89 MG/DL (ref 74–99)
GLUCOSE UR STRIP.AUTO-MCNC: NEGATIVE MG/DL
HCG SERPL QL: NEGATIVE
HCT VFR BLD AUTO: 36.3 % (ref 35–45)
HGB BLD-MCNC: 11.6 G/DL (ref 12–16)
HGB UR QL STRIP: NEGATIVE
IMM GRANULOCYTES # BLD AUTO: 0.01 K/UL (ref 0–0.04)
IMM GRANULOCYTES NFR BLD AUTO: 0.2 % (ref 0–0.5)
KETONES UR QL STRIP.AUTO: NEGATIVE MG/DL
LACTATE BLD-SCNC: 1.04 MMOL/L (ref 0.4–2)
LEUKOCYTE ESTERASE UR QL STRIP.AUTO: ABNORMAL
LIPASE SERPL-CCNC: 17 U/L (ref 13–75)
LYMPHOCYTES # BLD: 2.25 K/UL (ref 0.9–3.3)
LYMPHOCYTES NFR BLD: 48.1 % (ref 21–52)
MAGNESIUM SERPL-MCNC: 2 MG/DL (ref 1.6–2.6)
MCH RBC QN AUTO: 31.4 PG (ref 24–34)
MCHC RBC AUTO-ENTMCNC: 32 G/DL (ref 31–37)
MCV RBC AUTO: 98.4 FL (ref 78–100)
MONOCYTES # BLD: 0.48 K/UL (ref 0.05–1.2)
MONOCYTES NFR BLD: 10.3 % (ref 3–10)
NEUTS SEG # BLD: 1.85 K/UL (ref 1.8–8)
NEUTS SEG NFR BLD: 39.5 % (ref 40–73)
NITRITE UR QL STRIP.AUTO: NEGATIVE
NRBC # BLD: 0 K/UL (ref 0–0.01)
NRBC BLD-RTO: 0 PER 100 WBC
PH UR STRIP: 6.5 (ref 5–8)
PLATELET # BLD AUTO: 227 K/UL (ref 135–420)
PMV BLD AUTO: 11.2 FL (ref 9.2–11.8)
POTASSIUM SERPL-SCNC: 4 MMOL/L (ref 3.5–5.5)
PROT SERPL-MCNC: 6.9 G/DL (ref 6.4–8.2)
PROT UR STRIP-MCNC: NEGATIVE MG/DL
RBC # BLD AUTO: 3.69 M/UL (ref 4.2–5.3)
RBC #/AREA URNS HPF: NEGATIVE /HPF (ref 0–5)
SERVICE CMNT-IMP: NORMAL
SODIUM SERPL-SCNC: 140 MMOL/L (ref 136–145)
SP GR UR REFRACTOMETRY: 1.02 (ref 1–1.03)
UROBILINOGEN UR QL STRIP.AUTO: 1 EU/DL (ref 0.2–1)
WBC # BLD AUTO: 4.7 K/UL (ref 4.6–13.2)
WBC URNS QL MICRO: NORMAL /HPF (ref 0–5)
WET PREP GENITAL: NORMAL

## 2025-04-01 PROCEDURE — 3430000000 HC RX DIAGNOSTIC RADIOPHARMACEUTICAL: Performed by: STUDENT IN AN ORGANIZED HEALTH CARE EDUCATION/TRAINING PROGRAM

## 2025-04-01 PROCEDURE — 96375 TX/PRO/DX INJ NEW DRUG ADDON: CPT

## 2025-04-01 PROCEDURE — 87491 CHLMYD TRACH DNA AMP PROBE: CPT

## 2025-04-01 PROCEDURE — 87591 N.GONORRHOEAE DNA AMP PROB: CPT

## 2025-04-01 PROCEDURE — 2580000003 HC RX 258: Performed by: EMERGENCY MEDICINE

## 2025-04-01 PROCEDURE — 6360000004 HC RX CONTRAST MEDICATION: Performed by: EMERGENCY MEDICINE

## 2025-04-01 PROCEDURE — 99285 EMERGENCY DEPT VISIT HI MDM: CPT

## 2025-04-01 PROCEDURE — 83735 ASSAY OF MAGNESIUM: CPT

## 2025-04-01 PROCEDURE — 83690 ASSAY OF LIPASE: CPT

## 2025-04-01 PROCEDURE — 6360000002 HC RX W HCPCS: Performed by: STUDENT IN AN ORGANIZED HEALTH CARE EDUCATION/TRAINING PROGRAM

## 2025-04-01 PROCEDURE — A9537 TC99M MEBROFENIN: HCPCS | Performed by: STUDENT IN AN ORGANIZED HEALTH CARE EDUCATION/TRAINING PROGRAM

## 2025-04-01 PROCEDURE — 6360000002 HC RX W HCPCS: Performed by: EMERGENCY MEDICINE

## 2025-04-01 PROCEDURE — 84703 CHORIONIC GONADOTROPIN ASSAY: CPT

## 2025-04-01 PROCEDURE — 85025 COMPLETE CBC W/AUTO DIFF WBC: CPT

## 2025-04-01 PROCEDURE — 87210 SMEAR WET MOUNT SALINE/INK: CPT

## 2025-04-01 PROCEDURE — 96374 THER/PROPH/DIAG INJ IV PUSH: CPT

## 2025-04-01 PROCEDURE — 80053 COMPREHEN METABOLIC PANEL: CPT

## 2025-04-01 PROCEDURE — 74177 CT ABD & PELVIS W/CONTRAST: CPT

## 2025-04-01 PROCEDURE — 83605 ASSAY OF LACTIC ACID: CPT

## 2025-04-01 PROCEDURE — 81001 URINALYSIS AUTO W/SCOPE: CPT

## 2025-04-01 PROCEDURE — 87661 TRICHOMONAS VAGINALIS AMPLIF: CPT

## 2025-04-01 PROCEDURE — 76705 ECHO EXAM OF ABDOMEN: CPT

## 2025-04-01 RX ORDER — MORPHINE SULFATE 2 MG/ML
2 INJECTION, SOLUTION INTRAMUSCULAR; INTRAVENOUS ONCE
Refills: 0 | Status: COMPLETED | OUTPATIENT
Start: 2025-04-01 | End: 2025-04-01

## 2025-04-01 RX ORDER — IOPAMIDOL 612 MG/ML
100 INJECTION, SOLUTION INTRAVASCULAR
Status: COMPLETED | OUTPATIENT
Start: 2025-04-01 | End: 2025-04-01

## 2025-04-01 RX ORDER — ONDANSETRON 2 MG/ML
4 INJECTION INTRAMUSCULAR; INTRAVENOUS ONCE
Status: COMPLETED | OUTPATIENT
Start: 2025-04-01 | End: 2025-04-01

## 2025-04-01 RX ORDER — 0.9 % SODIUM CHLORIDE 0.9 %
1000 INTRAVENOUS SOLUTION INTRAVENOUS ONCE
Status: COMPLETED | OUTPATIENT
Start: 2025-04-01 | End: 2025-04-01

## 2025-04-01 RX ORDER — DIAZEPAM 10 MG/2ML
2.5 INJECTION, SOLUTION INTRAMUSCULAR; INTRAVENOUS ONCE
Status: COMPLETED | OUTPATIENT
Start: 2025-04-01 | End: 2025-04-01

## 2025-04-01 RX ORDER — KIT FOR THE PREPARATION OF TECHNETIUM TC 99M MEBROFENIN 45 MG/10ML
6 INJECTION, POWDER, LYOPHILIZED, FOR SOLUTION INTRAVENOUS
Status: COMPLETED | OUTPATIENT
Start: 2025-04-01 | End: 2025-04-01

## 2025-04-01 RX ORDER — FENTANYL CITRATE 50 UG/ML
50 INJECTION, SOLUTION INTRAMUSCULAR; INTRAVENOUS ONCE
Refills: 0 | Status: COMPLETED | OUTPATIENT
Start: 2025-04-01 | End: 2025-04-01

## 2025-04-01 RX ORDER — DIPHENHYDRAMINE HYDROCHLORIDE 50 MG/ML
25 INJECTION, SOLUTION INTRAMUSCULAR; INTRAVENOUS
Status: COMPLETED | OUTPATIENT
Start: 2025-04-01 | End: 2025-04-01

## 2025-04-01 RX ADMIN — FENTANYL CITRATE 50 MCG: 50 INJECTION INTRAMUSCULAR; INTRAVENOUS at 17:10

## 2025-04-01 RX ADMIN — MORPHINE SULFATE 2 MG: 2 INJECTION, SOLUTION INTRAMUSCULAR; INTRAVENOUS at 12:15

## 2025-04-01 RX ADMIN — SODIUM CHLORIDE 1000 ML: 0.9 INJECTION, SOLUTION INTRAVENOUS at 12:05

## 2025-04-01 RX ADMIN — SODIUM CHLORIDE 40 MG: 9 INJECTION, SOLUTION INTRAMUSCULAR; INTRAVENOUS; SUBCUTANEOUS at 12:07

## 2025-04-01 RX ADMIN — ONDANSETRON 4 MG: 2 INJECTION, SOLUTION INTRAMUSCULAR; INTRAVENOUS at 12:07

## 2025-04-01 RX ADMIN — DIPHENHYDRAMINE HYDROCHLORIDE 25 MG: 50 INJECTION INTRAMUSCULAR; INTRAVENOUS at 12:45

## 2025-04-01 RX ADMIN — DIAZEPAM 2.5 MG: 5 INJECTION, SOLUTION INTRAMUSCULAR; INTRAVENOUS at 22:46

## 2025-04-01 RX ADMIN — KIT FOR THE PREPARATION OF TECHNETIUM TC 99M MEBROFENIN 6 MILLICURIE: 45 INJECTION, POWDER, LYOPHILIZED, FOR SOLUTION INTRAVENOUS at 23:03

## 2025-04-01 RX ADMIN — IOPAMIDOL 87 ML: 612 INJECTION, SOLUTION INTRAVENOUS at 13:40

## 2025-04-01 ASSESSMENT — PAIN - FUNCTIONAL ASSESSMENT: PAIN_FUNCTIONAL_ASSESSMENT: 0-10

## 2025-04-01 ASSESSMENT — PAIN DESCRIPTION - LOCATION
LOCATION: ABDOMEN

## 2025-04-01 ASSESSMENT — PAIN SCALES - GENERAL
PAINLEVEL_OUTOF10: 8
PAINLEVEL_OUTOF10: 10
PAINLEVEL_OUTOF10: 8

## 2025-04-01 ASSESSMENT — PAIN DESCRIPTION - ORIENTATION
ORIENTATION: MID;RIGHT
ORIENTATION: MID;LEFT

## 2025-04-01 NOTE — ED TRIAGE NOTES
Pt ambulatory to ED complains of  ABD pain, Urinary symptoms, and dysuria. Pt reports symptoms began last week. Pt reports pain is centrally located midline. Pt thought she had UTI and took amoxicillin at home and pain worsened.       Pt A&Ox4, speaking in full sentences, NAD at this time.     Active Ambulatory Problems     Diagnosis Date Noted    Intestinal malabsorption     Spondylolisthesis of lumbar region 02/02/2018    S/P gastric bypass 07/17/2018    Status post bariatric surgery     Hernia of abdominal cavity 02/02/2019    History of spinal surgery 02/02/2018    Incisional hernia     Acute gastric ulcer with hemorrhage 03/29/2018     Resolved Ambulatory Problems     Diagnosis Date Noted    No Resolved Ambulatory Problems     Past Medical History:   Diagnosis Date    Acid reflux     Chronic back pain     Morbid obesity with body mass index of 40.0-49.9

## 2025-04-01 NOTE — ED NOTES
Per Norberto, Nuc Med Tech, Pt has to be NPO 6 hrs and no narcotics/opioids for 6 hrs prior to study. Advised MD Brown. Advised pt since she last consumed fried chicken wings at 1628, NPO status began at that time and any pain medication administered past that time and moving forward will restart timer for pt to be able to receive nuc med study. Pt verbalized understanding and verbalized understanding on expectations of pain management during 6 hour wait for study. Pt agreeable to wait and verbalized she will maintain NPO status. MD and charge nurse aware at this time.

## 2025-04-01 NOTE — ED NOTES
Report given to LAMAR Whitman.      Patient information discussed and reviewed per facility protocol.      Outstanding orders reviewed (if applicable).     No applicable questions at this time.      Will sign off from patient.

## 2025-04-02 VITALS
TEMPERATURE: 98.1 F | HEART RATE: 82 BPM | SYSTOLIC BLOOD PRESSURE: 104 MMHG | BODY MASS INDEX: 31.01 KG/M2 | HEIGHT: 63 IN | OXYGEN SATURATION: 99 % | WEIGHT: 175 LBS | DIASTOLIC BLOOD PRESSURE: 67 MMHG | RESPIRATION RATE: 16 BRPM

## 2025-04-02 PROCEDURE — 6360000002 HC RX W HCPCS: Performed by: EMERGENCY MEDICINE

## 2025-04-02 PROCEDURE — 96376 TX/PRO/DX INJ SAME DRUG ADON: CPT

## 2025-04-02 RX ORDER — ONDANSETRON 4 MG/1
4 TABLET, ORALLY DISINTEGRATING ORAL EVERY 8 HOURS PRN
Qty: 20 TABLET | Refills: 0 | Status: SHIPPED | OUTPATIENT
Start: 2025-04-02

## 2025-04-02 RX ORDER — OXYCODONE AND ACETAMINOPHEN 5; 325 MG/1; MG/1
1 TABLET ORAL EVERY 6 HOURS PRN
Qty: 12 TABLET | Refills: 0 | Status: SHIPPED | OUTPATIENT
Start: 2025-04-02 | End: 2025-04-05

## 2025-04-02 RX ORDER — PANTOPRAZOLE SODIUM 40 MG/1
40 TABLET, DELAYED RELEASE ORAL
Qty: 30 TABLET | Refills: 0 | Status: SHIPPED | OUTPATIENT
Start: 2025-04-02 | End: 2025-05-02

## 2025-04-02 RX ORDER — MORPHINE SULFATE 2 MG/ML
2 INJECTION, SOLUTION INTRAMUSCULAR; INTRAVENOUS
Status: COMPLETED | OUTPATIENT
Start: 2025-04-02 | End: 2025-04-02

## 2025-04-02 RX ADMIN — MORPHINE SULFATE 2 MG: 2 INJECTION, SOLUTION INTRAMUSCULAR; INTRAVENOUS at 01:02

## 2025-04-02 NOTE — ED PROVIDER NOTES
EMERGENCY DEPARTMENT CONTINUING CARE NOTE      THIS NOTE IS DOCUMENTATION OF CARE PROVIDED AFTER CARE OF THE PATIENT WAS TRANSFERRED TO ME. PLEASE SEE THE NOTE BY THE INITIAL ED PROVIDER FOR DETAILS SURROUNDING THE H&P AND INITIAL MEDICAL DECISION MAKING.    Patient Name: Marce Lee  MRN: [id number]  YOB: 1985  Provider: Tigre Mo MD  PCP: Luis Davidson MD   Date of transfer of care: 4/1/25  ED Bed: 7    Diagnostic Study Results     LABS:  Recent Results (from the past 12 hours)   POCT lactic acid (lactate)    Collection Time: 04/01/25  5:21 PM   Result Value Ref Range    POC Lactic Acid 1.04 0.40 - 2.00 mmol/L       RADIOLOGIC STUDIES:   Non x-ray images such as CT, Ultrasound and MRI are read by the radiologist. X-ray images are visualized and preliminarily interpreted by the ED Provider with the findings as listed in the ED Course section below.     Interpretation per the Radiologist is listed below, if available at the time of this note:    NM HEPATOBILIARY         US GALLBLADDER RUQ   Final Result   Distended gallbladder with wall thickening. Findings are overall indeterminate,   however, suspicious for acute cholecystitis.      Electronically signed by Chilo Mccain      CT ABDOMEN PELVIS W IV CONTRAST Additional Contrast? None   Final Result   Dilated gallbladder. No obvious radiopaque stones. Common bile duct within   normal limits. Recommend right upper quadrant ultrasound for further evaluation.      Indeterminate left adnexal low attenuating linear focus incompletely evaluated   by CT. Pelvic ultrasound may be helpful for further evaluation.      Other chronic/incidental findings as above.            Electronically signed by Alonso [facility]OME[facility] HEPATOB[facility] (Preliminary result)  Result time 04/02/25 00:18:19  Preliminary result by Tyrone Mcfadden MD (04/02/25 00:18:14)                Narrative:    ** **PRELIMINARY REPORT** **    Normal filling and excretion of the 
[facility]    Humiliation, Afraid, Rape, and Kick questionnaire     Fear of Current or Ex-Partner: Not on file     Emotionally Abused: Not on file     Physically Abused: No     Sexually Abused: Not on file   Depression: Not at risk (6/19/2024)    PHQ-2     PHQ-2 Score: 0   Housing Stability: High Risk (8/16/2022)    [facility], [facility]    Housing Stability Vital Sign     Unable to Pay for Housing in the Last Year: Yes     Number of Places Lived in the Last Year: 1     Unstable Housing in the Last Year: No   Interpersonal Safety: Not At Risk (4/1/2025)    Interpersonal Safety Domain Source: IP Abuse Screening     Physical abuse: Denies     Verbal abuse: Denies     Emotional abuse: Denies     Financial abuse: Denies     Sexual abuse: Denies   Utilities: Not on file       Review of Systems     Negative except as listed above in HPI.    Physical Exam     Vitals:    04/01/25 1128   BP: (!) 151/93   Pulse: 87   Resp: 16   Temp: 98.1 °F (36.7 °C)   TempSrc: Oral   SpO2: 100%   Weight: 79.4 kg (175 lb)   Height: 1.6 m (5' 3\")       Physical Exam  Constitutional: Well nourished, well developed, appears stated age. Alert and oriented.  HEENT: Neck supple without meningismus. PERRLA, no conjunctival injection. EOM intact  Cardiovascular: RRR, Warm, well-perfused extremities  Respiratory: CTAB, Unlabored respiratory effort  Abdominal: Soft, nontender, nondistended, no CVA tenderness  Musculoskeletal: Full range of motion all extremities. No deformities. No peripheral edema.  Skin: Warm, dry. No rashes  Vascular: Pulses equal in all 4 extremities.  Neuro: CNs II-XII grossly intact. Sensation and motor function of extremities grossly intact.  Psych: Appropriate mood and affect.     Diagnostic Study Results     LABS:  No results found for this or any previous visit (from the past 120 hours).    RADIOLOGIC STUDIES:   Non x-ray images such as CT, Ultrasound and MRI are read by the radiologist.

## 2025-04-02 NOTE — DISCHARGE INSTRUCTIONS
Thank you for choosing [facility]'s Emergency Department for your care. It is our privilege to provide excellent care for you in your time of need. In the next several days, you may receive a survey via mail or email about your experience with our team. We would appreciate you taking a few minutes to complete the survey, as we use this information to learn what we have done well and what we could be doing better. Thank you for trusting us with your care.    -----------------------------------------------------------------------------  Below you will find a list of your tests from today's visit.   Labs  Recent Results (from the past 12 hours)   POCT lactic acid (lactate)    Collection Time: 04/01/25  5:21 PM   Result Value Ref Range    POC Lactic Acid 1.04 0.40 - 2.00 mmol/L        Radiologic Studies  NM HEPATOBILIARY         US GALLBLADDER RUQ   Final Result   Distended gallbladder with wall thickening. Findings are overall indeterminate,   however, suspicious for acute cholecystitis.      Electronically signed by Chilo Mccain      CT ABDOMEN PELVIS W IV CONTRAST Additional Contrast? None   Final Result   Dilated gallbladder. No obvious radiopaque stones. Common bile duct within   normal limits. Recommend right upper quadrant ultrasound for further evaluation.      Indeterminate left adnexal low attenuating linear focus incompletely evaluated   by CT. Pelvic ultrasound may be helpful for further evaluation.      Other chronic/incidental findings as above.            Electronically signed by Alonso Price        -----------------------------------------------------------------------------  The evaluation and treatment you received in the Emergency Department were for an urgent problem. It is important that you follow-up with a doctor, nurse practitioner, or physician assistant to: 1. confirm your diagnosis, 2. re-evaluate changes in your illness and treatment, and 3. for ongoing care. In some

## 2025-04-02 NOTE — ED NOTES
Patient brought down to [facility] for scan. Pt experiencing claustrophobia and stated she was unable to lay in scanner. MD made aware and medications ordered.

## 2025-04-09 ENCOUNTER — HOSPITAL ENCOUNTER (EMERGENCY)
Facility: HOSPITAL | Age: 40
Discharge: HOME | End: 2025-04-10
Attending: EMERGENCY MEDICINE
Payer: MEDICAID

## 2025-04-09 DIAGNOSIS — R14.0 ABDOMINAL BLOATING: ICD-10-CM

## 2025-04-09 DIAGNOSIS — R10.13 ABDOMINAL PAIN, EPIGASTRIC: Primary | ICD-10-CM

## 2025-04-09 PROCEDURE — 80048 BASIC METABOLIC PNL TOTAL CA: CPT

## 2025-04-09 PROCEDURE — 80076 HEPATIC FUNCTION PANEL: CPT

## 2025-04-09 PROCEDURE — 85652 RBC SED RATE AUTOMATED: CPT

## 2025-04-09 PROCEDURE — 99284 EMERGENCY DEPT VISIT MOD MDM: CPT

## 2025-04-09 PROCEDURE — 84703 CHORIONIC GONADOTROPIN ASSAY: CPT

## 2025-04-09 PROCEDURE — 83690 ASSAY OF LIPASE: CPT

## 2025-04-09 PROCEDURE — 85025 COMPLETE CBC W/AUTO DIFF WBC: CPT

## 2025-04-09 ASSESSMENT — PAIN DESCRIPTION - PAIN TYPE: TYPE: ACUTE PAIN

## 2025-04-09 ASSESSMENT — PAIN DESCRIPTION - LOCATION: LOCATION: ABDOMEN

## 2025-04-09 ASSESSMENT — PAIN - FUNCTIONAL ASSESSMENT
PAIN_FUNCTIONAL_ASSESSMENT: ACTIVITIES ARE NOT PREVENTED
PAIN_FUNCTIONAL_ASSESSMENT: 0-10

## 2025-04-09 ASSESSMENT — PAIN SCALES - GENERAL: PAINLEVEL_OUTOF10: 10

## 2025-04-10 ENCOUNTER — APPOINTMENT (OUTPATIENT)
Facility: HOSPITAL | Age: 40
End: 2025-04-10
Payer: MEDICAID

## 2025-04-10 VITALS
RESPIRATION RATE: 18 BRPM | BODY MASS INDEX: 30.12 KG/M2 | HEIGHT: 63 IN | TEMPERATURE: 97.3 F | OXYGEN SATURATION: 99 % | HEART RATE: 88 BPM | DIASTOLIC BLOOD PRESSURE: 87 MMHG | WEIGHT: 170 LBS | SYSTOLIC BLOOD PRESSURE: 128 MMHG

## 2025-04-10 LAB
ALBUMIN SERPL-MCNC: 3.2 G/DL (ref 3.4–5)
ALBUMIN/GLOB SERPL: 0.7 (ref 0.8–1.7)
ALP SERPL-CCNC: 104 U/L (ref 45–117)
ALT SERPL-CCNC: 22 U/L (ref 13–56)
ANION GAP SERPL CALC-SCNC: 6 MMOL/L (ref 3–18)
APPEARANCE UR: CLEAR
AST SERPL-CCNC: 21 U/L (ref 10–38)
BACTERIA URNS QL MICRO: ABNORMAL /HPF
BASOPHILS # BLD: 0.01 K/UL (ref 0–0.1)
BASOPHILS NFR BLD: 0.2 % (ref 0–2)
BILIRUB DIRECT SERPL-MCNC: <0.1 MG/DL (ref 0–0.2)
BILIRUB SERPL-MCNC: 0.1 MG/DL (ref 0.2–1)
BILIRUB UR QL: NEGATIVE
BUN SERPL-MCNC: 11 MG/DL (ref 7–18)
BUN/CREAT SERPL: 20 (ref 12–20)
CALCIUM SERPL-MCNC: 8.8 MG/DL (ref 8.5–10.1)
CHLORIDE SERPL-SCNC: 108 MMOL/L (ref 100–111)
CO2 SERPL-SCNC: 25 MMOL/L (ref 21–32)
COLOR UR: YELLOW
CREAT SERPL-MCNC: 0.54 MG/DL (ref 0.6–1.3)
DIFFERENTIAL METHOD BLD: ABNORMAL
EOSINOPHIL # BLD: 0.06 K/UL (ref 0–0.4)
EOSINOPHIL NFR BLD: 1 % (ref 0–5)
EPITH CASTS URNS QL MICRO: ABNORMAL /LPF (ref 0–5)
ERYTHROCYTE [DISTWIDTH] IN BLOOD BY AUTOMATED COUNT: 13.2 % (ref 11.6–14.5)
ERYTHROCYTE [SEDIMENTATION RATE] IN BLOOD: 82 MM/HR (ref 0–30)
GLOBULIN SER CALC-MCNC: 4.6 G/DL (ref 2–4)
GLUCOSE SERPL-MCNC: 98 MG/DL (ref 74–99)
GLUCOSE UR STRIP.AUTO-MCNC: NEGATIVE MG/DL
HCG SERPL QL: NEGATIVE
HCT VFR BLD AUTO: 36.3 % (ref 35–45)
HGB BLD-MCNC: 11.8 G/DL (ref 12–16)
HGB UR QL STRIP: NEGATIVE
IMM GRANULOCYTES # BLD AUTO: 0.07 K/UL (ref 0–0.04)
IMM GRANULOCYTES NFR BLD AUTO: 1.2 % (ref 0–0.5)
KETONES UR QL STRIP.AUTO: NEGATIVE MG/DL
LEUKOCYTE ESTERASE UR QL STRIP.AUTO: ABNORMAL
LIPASE SERPL-CCNC: 26 U/L (ref 13–75)
LYMPHOCYTES # BLD: 1.62 K/UL (ref 0.9–3.3)
LYMPHOCYTES NFR BLD: 27.6 % (ref 21–52)
MCH RBC QN AUTO: 31.8 PG (ref 24–34)
MCHC RBC AUTO-ENTMCNC: 32.5 G/DL (ref 31–37)
MCV RBC AUTO: 97.8 FL (ref 78–100)
MONOCYTES # BLD: 0.36 K/UL (ref 0.05–1.2)
MONOCYTES NFR BLD: 6.1 % (ref 3–10)
NEUTS SEG # BLD: 3.76 K/UL (ref 1.8–8)
NEUTS SEG NFR BLD: 63.9 % (ref 40–73)
NITRITE UR QL STRIP.AUTO: NEGATIVE
NRBC # BLD: 0 K/UL (ref 0–0.01)
NRBC BLD-RTO: 0 PER 100 WBC
PH UR STRIP: 6 (ref 5–8)
PLATELET # BLD AUTO: 267 K/UL (ref 135–420)
PMV BLD AUTO: 11.9 FL (ref 9.2–11.8)
POTASSIUM SERPL-SCNC: 4.2 MMOL/L (ref 3.5–5.5)
PROT SERPL-MCNC: 7.8 G/DL (ref 6.4–8.2)
PROT UR STRIP-MCNC: NEGATIVE MG/DL
RBC # BLD AUTO: 3.71 M/UL (ref 4.2–5.3)
RBC #/AREA URNS HPF: ABNORMAL /HPF (ref 0–5)
SODIUM SERPL-SCNC: 139 MMOL/L (ref 136–145)
SP GR UR REFRACTOMETRY: 1.02 (ref 1–1.03)
UROBILINOGEN UR QL STRIP.AUTO: 0.2 EU/DL (ref 0.2–1)
WBC # BLD AUTO: 5.9 K/UL (ref 4.6–13.2)
WBC URNS QL MICRO: ABNORMAL /HPF (ref 0–5)

## 2025-04-10 PROCEDURE — 96374 THER/PROPH/DIAG INJ IV PUSH: CPT

## 2025-04-10 PROCEDURE — 81001 URINALYSIS AUTO W/SCOPE: CPT

## 2025-04-10 PROCEDURE — 96375 TX/PRO/DX INJ NEW DRUG ADDON: CPT

## 2025-04-10 PROCEDURE — 74018 RADEX ABDOMEN 1 VIEW: CPT

## 2025-04-10 PROCEDURE — 6370000000 HC RX 637 (ALT 250 FOR IP): Performed by: EMERGENCY MEDICINE

## 2025-04-10 PROCEDURE — 6360000002 HC RX W HCPCS: Performed by: EMERGENCY MEDICINE

## 2025-04-10 RX ORDER — DIPHENHYDRAMINE HYDROCHLORIDE 50 MG/ML
25 INJECTION, SOLUTION INTRAMUSCULAR; INTRAVENOUS
Status: COMPLETED | OUTPATIENT
Start: 2025-04-10 | End: 2025-04-10

## 2025-04-10 RX ORDER — SUCRALFATE 1 G/1
1 TABLET ORAL
Status: COMPLETED | OUTPATIENT
Start: 2025-04-10 | End: 2025-04-10

## 2025-04-10 RX ORDER — DICYCLOMINE HYDROCHLORIDE 10 MG/1
20 CAPSULE ORAL ONCE
Status: COMPLETED | OUTPATIENT
Start: 2025-04-10 | End: 2025-04-10

## 2025-04-10 RX ORDER — MAG HYDROX/ALUMINUM HYD/SIMETH 400-400-40
15 SUSPENSION, ORAL (FINAL DOSE FORM) ORAL EVERY 6 HOURS PRN
Qty: 355 ML | Refills: 0 | Status: SHIPPED | OUTPATIENT
Start: 2025-04-10

## 2025-04-10 RX ORDER — DROPERIDOL 2.5 MG/ML
1.25 INJECTION, SOLUTION INTRAMUSCULAR; INTRAVENOUS ONCE
Status: COMPLETED | OUTPATIENT
Start: 2025-04-10 | End: 2025-04-10

## 2025-04-10 RX ORDER — DICYCLOMINE HCL 20 MG
20 TABLET ORAL 3 TIMES DAILY PRN
Qty: 30 TABLET | Refills: 0 | Status: SHIPPED | OUTPATIENT
Start: 2025-04-10

## 2025-04-10 RX ORDER — LIDOCAINE HYDROCHLORIDE 20 MG/ML
10 SOLUTION OROPHARYNGEAL 3 TIMES DAILY PRN
Qty: 100 ML | Refills: 0 | Status: SHIPPED | OUTPATIENT
Start: 2025-04-10

## 2025-04-10 RX ADMIN — LIDOCAINE HYDROCHLORIDE 40 ML: 20 SOLUTION ORAL; TOPICAL at 01:08

## 2025-04-10 RX ADMIN — DIPHENHYDRAMINE HYDROCHLORIDE 25 MG: 50 INJECTION INTRAMUSCULAR; INTRAVENOUS at 03:12

## 2025-04-10 RX ADMIN — DROPERIDOL 1.25 MG: 2.5 INJECTION, SOLUTION INTRAMUSCULAR; INTRAVENOUS at 02:25

## 2025-04-10 RX ADMIN — DICYCLOMINE HYDROCHLORIDE 20 MG: 10 CAPSULE ORAL at 01:08

## 2025-04-10 RX ADMIN — SUCRALFATE 1 G: 1 TABLET ORAL at 02:25

## 2025-04-10 NOTE — ED PROVIDER NOTES
(before breakfast) 30 tablet 0    vitamin D (VITAMIN D3) 50 MCG (2000 UT) CAPS capsule Take 1 capsule by mouth daily      tapentadol (NUCYNTA) 75 MG TABS Take 1 tablet by mouth every 6 hours as needed for Pain. Max Daily Amount: 300 mg      etonogestrel-ethinyl estradiol (NUVARING) 0.12-0.015 MG/24HR vaginal ring INSERT 1 RING VAGINALLY FOR 21 DAYS, THEN REMOVE FOR 7 DAYS      Pediatric Multivit-Minerals (VITALETS CHILDRENS) CHEW Take 1 tablet by mouth 2 times daily      acetaminophen (TYLENOL) 500 MG tablet Take 2 tablets by mouth 3 times daily (Patient not taking: Reported on 3/10/2025) 540 tablet 1    cyanocobalamin 500 MCG tablet Take 1 tablet by mouth daily (Patient not taking: Reported on 3/10/2025)         Past History     Past Medical History:  Past Medical History:   Diagnosis Date    Acid reflux     Chronic back pain     Incisional hernia     Intestinal malabsorption     Morbid obesity with body mass index of 40.0-49.9     Status post bariatric surgery 2012    sleeve resection / eben lopez       Past Surgical History:  Past Surgical History:   Procedure Laterality Date     SECTION      X 3    GI      Internal hernia - 2019    GI      Internal hernia ( dr Tanisha Seaman) -2018    GI      Conversion to gastric bypass-2017    GI      Sleeve gastrectomy- Dr Lopez    LAP GASTRIC BYPASS/KATRINA-EN-Y      revision from sleeve on 2017    ORTHOPEDIC SURGERY      scoliosis correction surgery X 2    TONSILLECTOMY AND ADENOIDECTOMY      TUBAL LIGATION         Family History:  No family history on file.    Social History:  Social History     Tobacco Use    Smoking status: Never    Smokeless tobacco: Never   Substance Use Topics    Alcohol use: No     Alcohol/week: 0.0 standard drinks of alcohol    Drug use: No       Allergies:  Allergies   Allergen Reactions    Nsaids Other (See Comments)     GI Ulcer- Hx of Gastric Bypass    Buprenorphine Hives    Buprenorphine Hcl Hives and Other (See  (XYLOCAINE) 2 % SOLN SOLUTION    Take 10 mLs by mouth 3 times daily as needed for Pain (Mix with the Maalox for acute upper abdominal pain)         Dictation disclaimer: Please note that this dictation was completed with [employer], the computer voice recognition software. Quite often unanticipated grammatical, syntax, homophones, and other interpretive errors are inadvertently transcribed by the computer software. Please disregard these errors. Please excuse any errors that have escaped final proofreading.     Dionisio Do D.O.  Emergency Physician   Acute [facility]            Dionisio Do DO  04/10/25 0309

## 2025-04-10 NOTE — DISCHARGE INSTRUCTIONS
Take the Protonix daily, Carafate 4 times a day for the next week at least.  Can also try the Maalox mixed with lidocaine for upper abdominal pain as well as Tylenol 1 g.  Try to avoid NSAIDs, alcohol, spicy foods, greasy foods as this can worsen your symptoms.  If feeling significant bloating not relieved with passing gas, can try over-the-counter Gas-X.

## 2025-08-20 ENCOUNTER — CLINICAL DOCUMENTATION (OUTPATIENT)
Facility: HOSPITAL | Age: 40
End: 2025-08-20

## (undated) DEVICE — 4-PORT MANIFOLD: Brand: NEPTUNE 2

## (undated) DEVICE — STAPLER INT L37CM STPL 21MM CIR ENDOSCP CRV INTLUMN B FRM

## (undated) DEVICE — ENDO CARRY-ON PROCEDURE KIT INCLUDES ENZYMATIC SPONGE, GAUZE, BIOHAZARD LABEL, TRAY, LUBRICANT, DIRTY SCOPE LABEL, WATER LABEL, TRAY, DRAWSTRING PAD, AND DEFENDO 4-PIECE KIT.: Brand: ENDO CARRY-ON PROCEDURE KIT

## (undated) DEVICE — BARIATRIC: Brand: MEDLINE INDUSTRIES, INC.

## (undated) DEVICE — NEEDLE INSUF L150MM DIA2MM DISP FOR PNEUMOPERI ENDOPATH

## (undated) DEVICE — E-Z CLEAN, PTFE COATED, ELECTROSURGICAL LAPAROSCOPIC ELECTRODE, J-HOOK, 33 CM., SINGLE-USE, FOR USE WITH HAND CONTROL PENCIL: Brand: MEGADYNE

## (undated) DEVICE — SYR IRR CATH TIP LR ADPT 70ML -- CONVERT TO ITEM 363120

## (undated) DEVICE — SUT VCRL + 0 27IN CT2 UD --

## (undated) DEVICE — TROCAR RMFG CANN S-SLV 5X100MM --

## (undated) DEVICE — TROCAR ENDOSCP L100MM DIA12MM STBL SL BLDELSS ENDOPATH XCEL

## (undated) DEVICE — SLEEVE TRCR L100MM DIA5MM UNIV STBL FOR BLDELSS DIL TIP

## (undated) DEVICE — Device

## (undated) DEVICE — TOWEL SURG W17XL27IN STD BLU COT NONFENESTRATED PREWASHED

## (undated) DEVICE — SUTURING DEVICE: Brand: ENDO STITCH

## (undated) DEVICE — SUT MONOCRYL PLUS UD 4-0 --

## (undated) DEVICE — KENDALL SCD EXPRESS SLEEVES, KNEE LENGTH, MEDIUM: Brand: KENDALL SCD

## (undated) DEVICE — CLIP SUT ENDOSCP F/2-0/3-0/4-0 -- LAPRA-TY

## (undated) DEVICE — STERILE POLYISOPRENE POWDER-FREE SURGICAL GLOVES: Brand: PROTEXIS

## (undated) DEVICE — RELOAD STPL L60MM H1-2.6MM MESENTERY THN TISS WHT 6 ROW

## (undated) DEVICE — RELOAD STPL H1.8-3.8MM REG THCK TISS G 6 ROW GRIPPING SURF

## (undated) DEVICE — SUTURE ETHIB EXCL BR GRN TAPR PT 2-0 30 X563H X563H

## (undated) DEVICE — TISSUE RETRIEVAL SYSTEM: Brand: INZII RETRIEVAL SYSTEM

## (undated) DEVICE — 3L THIN WALL CAN: Brand: CRD

## (undated) DEVICE — STAPLER SKIN L440MM 32MM LNG 12 FIRING B FRM PWR + GRIPPING

## (undated) DEVICE — CUTTER ENDOSCP L340MM LIN ARTC SGL STROKE FIRING ENDOPATH

## (undated) DEVICE — NEEDLE INSUF L120MM DIA2MM DISP FOR PNEUMOPERI ENDOPATH

## (undated) DEVICE — SUT PROL 2-0 30IN CT2 BLU --

## (undated) DEVICE — PREP SKN PREVAIL 40ML APPL --

## (undated) DEVICE — MEDIA CONTRAST 10ML 200MG/ML 41%

## (undated) DEVICE — AMD ANTIMICROBIAL DRAIN SPONGES, 6 PLY, 0.2% POLYHEXAMETHYLENE BIGUANIDE HCI (PHMB): Brand: EXCILON

## (undated) DEVICE — SEALANT HEMSTAT 5ML HUM FIBRIN THROM 2 VI APPL DEV EVICEL

## (undated) DEVICE — TROCAR LAP L100MM DIA5MM BLDELSS W/ STBL SL ENDOPATH XCEL

## (undated) DEVICE — DEVON™ KNEE AND BODY STRAP 60" X 3" (1.5 M X 7.6 CM): Brand: DEVON

## (undated) DEVICE — SYR 3ML LL TIP 1/10ML GRAD --

## (undated) DEVICE — SHEAR RMFG HARMONIC 5MMX36CM -- LAWSON OEM ITEM 322219

## (undated) DEVICE — SOL IRRIGATION INJ NACL 0.9% 500ML BTL

## (undated) DEVICE — ENDOCUT SCISSOR TIP, DISPOSABLE: Brand: RENEW

## (undated) DEVICE — CATHETER JEJUSTMY AD 16FR 15CC L108IN THMB VLV FOR DCOMPR

## (undated) DEVICE — MEDI-VAC NON-CONDUCTIVE SUCTION TUBING: Brand: CARDINAL HEALTH

## (undated) DEVICE — TROCAR ENDOSCP L100MM DIA15MM BLDELSS STBL SL ENDOPATH XCEL

## (undated) DEVICE — 3M™ STERI-STRIP™ REINFORCED ADHESIVE SKIN CLOSURES, R1548, 1 IN X 5 IN (25 MM X 125 MM), 4 STRIPS/ENVELOPE: Brand: 3M™ STERI-STRIP™

## (undated) DEVICE — FCPS RMFG RAD JAW 4LC 2.8X240 --

## (undated) DEVICE — RELOAD STPL H4.1X2MM DIA60MM THCK TISS GRN 6 ROW PWR GST B

## (undated) DEVICE — RELOAD STPL H1-2.5X45MM VASC THN TISS WHT 6 ROW B FRM SGL

## (undated) DEVICE — SUTURE PDS II SZ 0 L27IN ABSRB VLT L26MM CT-2 1/2 CIR Z334H

## (undated) DEVICE — DRAIN SURG 15FR L3/16IN SIL RND 3/4 FLUT 3/16IN TRCR

## (undated) DEVICE — SPONGE LAP 18X18IN STRL -- 5/PK

## (undated) DEVICE — VISUALIZATION SYSTEM: Brand: CLEARIFY

## (undated) DEVICE — NDL PRT INJ NSAF BLNT 18GX1.5 --

## (undated) DEVICE — SOLUTION LACTATED RINGERS INJECTION USP

## (undated) DEVICE — APPLIER CLP L SHFT DIA12MM 20 ROT MULT LIGACLP

## (undated) DEVICE — SUTURE ETHLN SZ 3-0 L30IN NONABSORBABLE BLK FSL L30MM 3/8 1671H

## (undated) DEVICE — TROCAR ENDOSCP BLDELSS 12X100 MM W/ HNDL STBL SL OPT TIP

## (undated) DEVICE — SYR 10ML LUER LOK 1/5ML GRAD --

## (undated) DEVICE — (D)BNDG ADHESIVE FABRIC 3/4X3 -- DISC BY MFR USE ITEM 357960

## (undated) DEVICE — MEDI-VAC SUCTION HANDLE REGULAR CAPACITY: Brand: CARDINAL HEALTH

## (undated) DEVICE — TIP APPL L35CM RIG FOR SEAL EVICEL

## (undated) DEVICE — TRAY CATH OD16FR SIL URIN M STATLOK STBL DEV SURSTP

## (undated) DEVICE — (D)NDL SPNE 22GX15CM -- DISC BY MFR W/NO SUB

## (undated) DEVICE — SHEAR HARMONIC 5MMX45CM -- ACE 7+

## (undated) DEVICE — REM POLYHESIVE ADULT PATIENT RETURN ELECTRODE: Brand: VALLEYLAB

## (undated) DEVICE — FORCEPS BX OVL CUP SERR DISP CAP L 240CM RAD JAW 4

## (undated) DEVICE — TRAY MYEL SFTY +

## (undated) DEVICE — DEVICE SUT W/ V-LOC SUT SZ 2-0 L8IN ABSRB LD UNIT SUT BARB

## (undated) DEVICE — NDL SPNE MANAN 22GX6IN --